# Patient Record
Sex: FEMALE | Race: WHITE | NOT HISPANIC OR LATINO | Employment: OTHER | URBAN - METROPOLITAN AREA
[De-identification: names, ages, dates, MRNs, and addresses within clinical notes are randomized per-mention and may not be internally consistent; named-entity substitution may affect disease eponyms.]

---

## 2016-08-09 LAB
HUMAN PAPILLOMAVIRUS (HPV): NORMAL
HUMAN PAPILLOMAVIRUS (HPV): NORMAL

## 2019-06-13 LAB
HEP C VIRUS AB: 0.1
HIV: NON REACTIVE

## 2020-05-21 ENCOUNTER — HOSPITAL ENCOUNTER (EMERGENCY)
Facility: HOSPITAL | Age: 55
Discharge: HOME | End: 2020-05-21
Attending: FAMILY MEDICINE

## 2020-05-21 VITALS
HEIGHT: 69 IN | WEIGHT: 169 LBS | RESPIRATION RATE: 16 BRPM | DIASTOLIC BLOOD PRESSURE: 68 MMHG | TEMPERATURE: 98 F | BODY MASS INDEX: 25.03 KG/M2 | HEART RATE: 61 BPM | OXYGEN SATURATION: 99 % | SYSTOLIC BLOOD PRESSURE: 132 MMHG

## 2020-05-21 DIAGNOSIS — R53.1 WEAKNESS: Primary | ICD-10-CM

## 2020-05-21 DIAGNOSIS — N30.00 ACUTE CYSTITIS WITHOUT HEMATURIA: ICD-10-CM

## 2020-05-21 LAB
BACTERIA #/AREA URNS HPF: ABNORMAL /HPF
BILIRUB UR QL STRIP: NEGATIVE
CLARITY UR: CLEAR
COLOR UR: YELLOW
GLUCOSE UR QL STRIP: NEGATIVE
HGB UR QL STRIP: ABNORMAL
KETONES UR QL STRIP: NEGATIVE
LEUKOCYTE ESTERASE UR QL STRIP: ABNORMAL
MICROSCOPIC COMMENT: ABNORMAL
NITRITE UR QL STRIP: NEGATIVE
PH UR STRIP: 8 [PH] (ref 5–8)
PROT UR QL STRIP: NEGATIVE
RBC #/AREA URNS HPF: 0 /HPF (ref 0–4)
SP GR UR STRIP: 1.01 (ref 1–1.03)
SQUAMOUS #/AREA URNS HPF: ABNORMAL /HPF
URN SPEC COLLECT METH UR: ABNORMAL
UROBILINOGEN UR STRIP-ACNC: NEGATIVE EU/DL
WBC #/AREA URNS HPF: 4 /HPF (ref 0–5)

## 2020-05-21 PROCEDURE — 96372 THER/PROPH/DIAG INJ SC/IM: CPT

## 2020-05-21 PROCEDURE — 99284 EMERGENCY DEPT VISIT MOD MDM: CPT | Mod: 25

## 2020-05-21 PROCEDURE — 63600175 PHARM REV CODE 636 W HCPCS: Performed by: FAMILY MEDICINE

## 2020-05-21 PROCEDURE — 81000 URINALYSIS NONAUTO W/SCOPE: CPT

## 2020-05-21 RX ORDER — CEPHALEXIN 250 MG/1
250 CAPSULE ORAL 4 TIMES DAILY
Qty: 28 CAPSULE | Refills: 0 | Status: SHIPPED | OUTPATIENT
Start: 2020-05-21 | End: 2020-05-28

## 2020-05-21 RX ORDER — CEPHALEXIN 250 MG/1
250 CAPSULE ORAL 4 TIMES DAILY
Qty: 28 CAPSULE | Refills: 0 | Status: SHIPPED | OUTPATIENT
Start: 2020-05-21 | End: 2020-05-21 | Stop reason: SDUPTHER

## 2020-05-21 RX ORDER — CEFTRIAXONE 1 G/1
1 INJECTION, POWDER, FOR SOLUTION INTRAMUSCULAR; INTRAVENOUS
Status: COMPLETED | OUTPATIENT
Start: 2020-05-21 | End: 2020-05-21

## 2020-05-21 RX ORDER — SULFAMETHOXAZOLE AND TRIMETHOPRIM 800; 160 MG/1; MG/1
1 TABLET ORAL
COMMUNITY
End: 2020-07-01

## 2020-05-21 RX ADMIN — CEFTRIAXONE SODIUM 1 G: 1 INJECTION, POWDER, FOR SOLUTION INTRAMUSCULAR; INTRAVENOUS at 10:05

## 2020-05-22 NOTE — ED PROVIDER NOTES
Encounter Date: 5/21/2020       History     Chief Complaint   Patient presents with    Urinary Tract Infection    Dysuria     54-year-old female presents complaining of right flank pain for the past 3 days also some suprapubic tenderness and some urinary frequency she has had history of recurrent UTIs and even pyelonephritis since she was young girl no known drug allergies        Review of patient's allergies indicates:  No Known Allergies  Past Medical History:   Diagnosis Date    Anxiety      Past Surgical History:   Procedure Laterality Date    KNEE ARTHROSCOPY      SINUS SURGERY      TUBAL LIGATION       History reviewed. No pertinent family history.  Social History     Tobacco Use    Smoking status: Current Every Day Smoker   Substance Use Topics    Alcohol use: Not Currently    Drug use: Not Currently     Review of Systems   Constitutional: Negative for fever.   HENT: Negative for sore throat.    Respiratory: Negative for shortness of breath.    Cardiovascular: Negative for chest pain.   Gastrointestinal: Negative for nausea.   Genitourinary: Negative for dysuria.   Musculoskeletal: Negative for back pain.   Skin: Negative for rash.   Neurological: Negative for weakness.   Hematological: Does not bruise/bleed easily.       Physical Exam     Initial Vitals [05/21/20 2022]   BP Pulse Resp Temp SpO2   132/68 61 16 98.3 °F (36.8 °C) 99 %      MAP       --         Physical Exam    Nursing note and vitals reviewed.  Constitutional: She appears well-developed and well-nourished. She is not diaphoretic. No distress.   HENT:   Head: Normocephalic and atraumatic.   Right Ear: External ear normal.   Left Ear: External ear normal.   Eyes: Pupils are equal, round, and reactive to light. Right eye exhibits no discharge. Left eye exhibits no discharge.   Neck: No tracheal deviation present. No JVD present.   Cardiovascular: Exam reveals no friction rub.    No murmur heard.  Pulmonary/Chest: No stridor. No  respiratory distress. She has no wheezes. She has no rales.   Abdominal: Bowel sounds are normal. She exhibits no distension.   Musculoskeletal: Normal range of motion.   Neurological: She is alert.   Skin: Skin is warm.   Psychiatric: She has a normal mood and affect.         ED Course   Procedures  Labs Reviewed   URINALYSIS, REFLEX TO URINE CULTURE - Abnormal; Notable for the following components:       Result Value    Occult Blood UA Trace (*)     Leukocytes, UA Trace (*)     All other components within normal limits    Narrative:     Preferred Collection Type->Urine, Clean Catch   URINALYSIS MICROSCOPIC - Abnormal; Notable for the following components:    Bacteria Moderate (*)     All other components within normal limits    Narrative:     Preferred Collection Type->Urine, Clean Catch          Imaging Results    None                                          Clinical Impression:       ICD-10-CM ICD-9-CM   1. Weakness R53.1 780.79   2. Acute cystitis without hematuria N30.00 595.0             ED Disposition Condition    Discharge Stable        ED Prescriptions     Medication Sig Dispense Start Date End Date Auth. Provider    cephALEXin (KEFLEX) 250 MG capsule  (Status: Discontinued) Take 1 capsule (250 mg total) by mouth 4 (four) times daily. for 7 days 28 capsule 5/21/2020 5/21/2020 Max Baeza MD    cephALEXin (KEFLEX) 250 MG capsule Take 1 capsule (250 mg total) by mouth 4 (four) times daily. for 7 days 28 capsule 5/21/2020 5/28/2020 Max Baeza MD        Follow-up Information    None                                    Max Baeza MD  05/22/20 0037

## 2020-05-22 NOTE — ED TRIAGE NOTES
Has been on bactrim for 1 wk for uti.  Pt seen at urgent care and treated.  No culture was sent b/c she did not have insurance.  Pt reports feeling better at first but today her back hurts, dysuria, frequency.

## 2020-06-02 ENCOUNTER — OFFICE VISIT (OUTPATIENT)
Dept: FAMILY MEDICINE | Facility: CLINIC | Age: 55
End: 2020-06-02

## 2020-06-02 VITALS
TEMPERATURE: 98 F | WEIGHT: 171 LBS | DIASTOLIC BLOOD PRESSURE: 67 MMHG | OXYGEN SATURATION: 95 % | BODY MASS INDEX: 25.33 KG/M2 | SYSTOLIC BLOOD PRESSURE: 105 MMHG | HEIGHT: 69 IN | HEART RATE: 71 BPM

## 2020-06-02 DIAGNOSIS — G89.29 FLANK PAIN, CHRONIC: Primary | ICD-10-CM

## 2020-06-02 DIAGNOSIS — Z87.440 HISTORY OF RECURRENT UTIS: ICD-10-CM

## 2020-06-02 DIAGNOSIS — E11.9 TYPE 2 DIABETES MELLITUS WITHOUT COMPLICATION, WITHOUT LONG-TERM CURRENT USE OF INSULIN: ICD-10-CM

## 2020-06-02 DIAGNOSIS — R10.9 FLANK PAIN, CHRONIC: Primary | ICD-10-CM

## 2020-06-02 PROCEDURE — 99203 PR OFFICE/OUTPT VISIT, NEW, LEVL III, 30-44 MIN: ICD-10-PCS | Mod: S$GLB,,, | Performed by: FAMILY MEDICINE

## 2020-06-02 PROCEDURE — 99203 OFFICE O/P NEW LOW 30 MIN: CPT | Mod: S$GLB,,, | Performed by: FAMILY MEDICINE

## 2020-06-02 RX ORDER — MECLIZINE HYDROCHLORIDE 25 MG/1
TABLET ORAL
COMMUNITY
Start: 2019-06-12 | End: 2020-07-01

## 2020-06-02 RX ORDER — ALPRAZOLAM 0.25 MG/1
TABLET ORAL
COMMUNITY
End: 2021-04-15 | Stop reason: SDUPTHER

## 2020-06-02 RX ORDER — CETIRIZINE HYDROCHLORIDE 10 MG/1
TABLET ORAL
COMMUNITY
Start: 2019-07-23 | End: 2020-07-01

## 2020-06-02 RX ORDER — CIPROFLOXACIN 250 MG/1
TABLET, FILM COATED ORAL
COMMUNITY
Start: 2019-07-23 | End: 2020-07-01

## 2020-06-02 NOTE — PROGRESS NOTES
"  [facility] - Clinic Note    Subjective      Ms. West is a 54 y.o. female who presents to clinic for a possible bladder infection along with follow-up of chronic conditions    Urine sediment, "looks like baby squids or octopus"   Blood pressure well controlled  History of diabetes  Was very sick in November, flu like symptoms  Has completed a 7 day course of Bactrim, and a course of Keflex.  Has had recurrent UTIs.  Still having symptoms.  Is not getting relief.  Has difficulty emptying her bladder.  No gross hematuria.  Always has right flank pain with the symptoms.      Review of Systems   Constitutional: Negative for fatigue and fever.   Respiratory: Negative for shortness of breath.    Cardiovascular: Negative for chest pain.   Genitourinary: Negative for difficulty urinating and dysuria.        Sediment present in urine     ROS otherwise negative    PMH Misti has a past medical history of Anxiety.   PSXH Misti has a past surgical history that includes Tubal ligation; Sinus surgery; and Knee arthroscopy.    Misti's family history is not on file.    Misti reports that she has been smoking. She does not have any smokeless tobacco history on file. She reports that she drank alcohol. She reports that she has current or past drug history.   ALG Misti is allergic to codeine; morphine; naproxen sodium; penicillins; tramadol hcl; and trintex.   MED Misti has a current medication list which includes the following prescription(s): alprazolam, ciprofloxacin hcl, loratadine-pseudoephedrine 5-120 mg, meclizine, phenazopyridine hcl, cetirizine, and sulfamethoxazole-trimethoprim 800-160mg.       Objective     /67   Pulse 71   Temp 97.8 °F (36.6 °C) (Oral)   Ht 5' 9" (1.753 m)   Wt 77.6 kg (171 lb)   SpO2 95%   BMI 25.25 kg/m²     Physical Exam   Constitutional: She is oriented to person, place, and time. She appears well-developed and well-nourished. No distress.   Cardiovascular: Normal rate, " regular rhythm, normal heart sounds and intact distal pulses.   No murmur heard.  Pulmonary/Chest: Effort normal and breath sounds normal. She has no wheezes. She has no rales.   Genitourinary:   Genitourinary Comments: Right sided flank pain   Neurological: She is alert and oriented to person, place, and time.   Skin: She is not diaphoretic.      Assessment/Plan     1. Flank pain, chronic  Ambulatory referral/consult to Urology   2. History of recurrent UTIs  Ambulatory referral/consult to Urology   3. Type 2 diabetes mellitus without complication, without long-term current use of insulin  Hemoglobin A1C    CBC auto differential    Comprehensive metabolic panel         Stephanie Phillip MD  Family Medicine  [facility]  [phone]

## 2020-06-04 DIAGNOSIS — Z12.11 COLON CANCER SCREENING: ICD-10-CM

## 2020-06-04 DIAGNOSIS — Z12.39 BREAST CANCER SCREENING: ICD-10-CM

## 2020-06-04 DIAGNOSIS — Z11.59 NEED FOR HEPATITIS C SCREENING TEST: ICD-10-CM

## 2020-06-08 ENCOUNTER — TELEPHONE (OUTPATIENT)
Dept: UROLOGY | Facility: CLINIC | Age: 55
End: 2020-06-08

## 2020-06-08 NOTE — TELEPHONE ENCOUNTER
Pt appt was made and pt placed on waiting list, pt verbalized an understanding     ----- Message from Nevin Espinosa sent at 6/8/2020  4:28 PM CDT -----  Contact: PT  Type:  Sooner Apoointment Request    Caller is requesting a sooner appointment.  Caller declined first available appointment listed below.  Caller will not accept being placed on the waitlist and is requesting a message be sent to doctor.    Name of Caller:  PT  When is the first available appointment?  07/17  Symptoms: Kidney infection. Pt has an referral  Best Call Back Number:  [phone]  Additional Information:  Please Advise ---Thank you

## 2020-06-08 NOTE — TELEPHONE ENCOUNTER
----- Message from Nevin Espinosa sent at 6/8/2020  4:28 PM CDT -----  Contact: PT  Type:  Sooner Apoointment Request    Caller is requesting a sooner appointment.  Caller declined first available appointment listed below.  Caller will not accept being placed on the waitlist and is requesting a message be sent to doctor.    Name of Caller:  PT  When is the first available appointment?  07/17  Symptoms: Kidney infection. Pt has an referral  Best Call Back Number:  [phone]  Additional Information:  Please Advise ---Thank you

## 2020-06-10 ENCOUNTER — LAB VISIT (OUTPATIENT)
Dept: LAB | Facility: HOSPITAL | Age: 55
End: 2020-06-10
Attending: FAMILY MEDICINE
Payer: OTHER GOVERNMENT

## 2020-06-10 DIAGNOSIS — E11.9 TYPE 2 DIABETES MELLITUS WITHOUT COMPLICATION, WITHOUT LONG-TERM CURRENT USE OF INSULIN: ICD-10-CM

## 2020-06-10 LAB
ALBUMIN SERPL BCP-MCNC: 4.2 G/DL (ref 3.5–5.2)
ALP SERPL-CCNC: 58 U/L (ref 55–135)
ALT SERPL W/O P-5'-P-CCNC: 27 U/L (ref 10–44)
ANION GAP SERPL CALC-SCNC: 7 MMOL/L (ref 8–16)
AST SERPL-CCNC: 27 U/L (ref 10–40)
BASOPHILS # BLD AUTO: 0.04 K/UL (ref 0–0.2)
BASOPHILS NFR BLD: 0.7 % (ref 0–1.9)
BILIRUB SERPL-MCNC: 0.5 MG/DL (ref 0.1–1)
BUN SERPL-MCNC: 16 MG/DL (ref 6–20)
CALCIUM SERPL-MCNC: 8.7 MG/DL (ref 8.7–10.5)
CHLORIDE SERPL-SCNC: 101 MMOL/L (ref 95–110)
CO2 SERPL-SCNC: 27 MMOL/L (ref 23–29)
CREAT SERPL-MCNC: 0.7 MG/DL (ref 0.5–1.4)
DIFFERENTIAL METHOD: NORMAL
EOSINOPHIL # BLD AUTO: 0.2 K/UL (ref 0–0.5)
EOSINOPHIL NFR BLD: 3.3 % (ref 0–8)
ERYTHROCYTE [DISTWIDTH] IN BLOOD BY AUTOMATED COUNT: 13.8 % (ref 11.5–14.5)
EST. GFR  (AFRICAN AMERICAN): >60 ML/MIN/1.73 M^2
EST. GFR  (NON AFRICAN AMERICAN): >60 ML/MIN/1.73 M^2
ESTIMATED AVG GLUCOSE: 108 MG/DL (ref 68–131)
GLUCOSE SERPL-MCNC: 98 MG/DL (ref 70–110)
HBA1C MFR BLD HPLC: 5.4 % (ref 4.5–6.2)
HCT VFR BLD AUTO: 39.2 % (ref 37–48.5)
HGB BLD-MCNC: 12.8 G/DL (ref 12–16)
IMM GRANULOCYTES # BLD AUTO: 0.02 K/UL (ref 0–0.04)
IMM GRANULOCYTES NFR BLD AUTO: 0.4 % (ref 0–0.5)
LYMPHOCYTES # BLD AUTO: 2.1 K/UL (ref 1–4.8)
LYMPHOCYTES NFR BLD: 36.8 % (ref 18–48)
MCH RBC QN AUTO: 30.3 PG (ref 27–31)
MCHC RBC AUTO-ENTMCNC: 32.7 G/DL (ref 32–36)
MCV RBC AUTO: 93 FL (ref 82–98)
MONOCYTES # BLD AUTO: 0.4 K/UL (ref 0.3–1)
MONOCYTES NFR BLD: 6.3 % (ref 4–15)
NEUTROPHILS # BLD AUTO: 3 K/UL (ref 1.8–7.7)
NEUTROPHILS NFR BLD: 52.5 % (ref 38–73)
NRBC BLD-RTO: 0 /100 WBC
PLATELET # BLD AUTO: 265 K/UL (ref 150–350)
PMV BLD AUTO: 9.5 FL (ref 9.2–12.9)
POTASSIUM SERPL-SCNC: 4 MMOL/L (ref 3.5–5.1)
PROT SERPL-MCNC: 6.9 G/DL (ref 6–8.4)
RBC # BLD AUTO: 4.23 M/UL (ref 4–5.4)
SODIUM SERPL-SCNC: 135 MMOL/L (ref 136–145)
WBC # BLD AUTO: 5.7 K/UL (ref 3.9–12.7)

## 2020-06-10 PROCEDURE — 36415 COLL VENOUS BLD VENIPUNCTURE: CPT

## 2020-06-10 PROCEDURE — 85025 COMPLETE CBC W/AUTO DIFF WBC: CPT

## 2020-06-10 PROCEDURE — 83036 HEMOGLOBIN GLYCOSYLATED A1C: CPT

## 2020-06-10 PROCEDURE — 80053 COMPREHEN METABOLIC PANEL: CPT

## 2020-06-26 ENCOUNTER — PATIENT OUTREACH (OUTPATIENT)
Dept: ADMINISTRATIVE | Facility: HOSPITAL | Age: 55
End: 2020-06-26

## 2020-07-01 ENCOUNTER — OFFICE VISIT (OUTPATIENT)
Dept: UROLOGY | Facility: CLINIC | Age: 55
End: 2020-07-01

## 2020-07-01 VITALS
HEART RATE: 84 BPM | WEIGHT: 172 LBS | SYSTOLIC BLOOD PRESSURE: 110 MMHG | RESPIRATION RATE: 16 BRPM | BODY MASS INDEX: 25.48 KG/M2 | HEIGHT: 69 IN | TEMPERATURE: 98 F | DIASTOLIC BLOOD PRESSURE: 58 MMHG

## 2020-07-01 DIAGNOSIS — G89.29 FLANK PAIN, CHRONIC: ICD-10-CM

## 2020-07-01 DIAGNOSIS — N30.00 ACUTE CYSTITIS WITHOUT HEMATURIA: ICD-10-CM

## 2020-07-01 DIAGNOSIS — R10.9 FLANK PAIN, CHRONIC: ICD-10-CM

## 2020-07-01 DIAGNOSIS — Z87.440 HISTORY OF RECURRENT UTIS: ICD-10-CM

## 2020-07-01 LAB
BILIRUB SERPL-MCNC: NEGATIVE MG/DL
BLOOD URINE, POC: ABNORMAL
CLARITY, POC UA: CLEAR
COLOR, POC UA: YELLOW
GLUCOSE UR QL STRIP: NEGATIVE
KETONES UR QL STRIP: ABNORMAL
LEUKOCYTE ESTERASE URINE, POC: ABNORMAL
NITRITE, POC UA: NEGATIVE
PH, POC UA: 5
PROTEIN, POC: NEGATIVE
SPECIFIC GRAVITY, POC UA: 1.02
UROBILINOGEN, POC UA: NEGATIVE

## 2020-07-01 PROCEDURE — 99214 OFFICE O/P EST MOD 30 MIN: CPT | Mod: PBBFAC | Performed by: UROLOGY

## 2020-07-01 PROCEDURE — 87086 URINE CULTURE/COLONY COUNT: CPT

## 2020-07-01 PROCEDURE — 99999 PR PBB SHADOW E&M-EST. PATIENT-LVL IV: CPT | Mod: PBBFAC,,, | Performed by: UROLOGY

## 2020-07-01 PROCEDURE — 99204 OFFICE O/P NEW MOD 45 MIN: CPT | Mod: S$PBB,,, | Performed by: UROLOGY

## 2020-07-01 PROCEDURE — 81002 URINALYSIS NONAUTO W/O SCOPE: CPT | Mod: PBBFAC | Performed by: UROLOGY

## 2020-07-01 PROCEDURE — 99999 PR PBB SHADOW E&M-EST. PATIENT-LVL IV: ICD-10-PCS | Mod: PBBFAC,,, | Performed by: UROLOGY

## 2020-07-01 PROCEDURE — 99204 PR OFFICE/OUTPT VISIT, NEW, LEVL IV, 45-59 MIN: ICD-10-PCS | Mod: S$PBB,,, | Performed by: UROLOGY

## 2020-07-01 NOTE — PROGRESS NOTES
Subjective:       Patient ID: Misti West is a 54 y.o. female.    Chief Complaint:   Recurring urinary tract infections.  Chronic right flank pain.    HPI   Mrs West is a 54-year-old female who have a history of chronic recurring urinary tract infections.  Patient referred that is being treated for infections 4 months or years and always the infections recur after discontinuation of the antibiotics.  The patient referred infections are characterized by difficulty emptying the bladder and right flank pain denies any fever denies any gross hematuria.  Patient have nocturia x2 with significant discomfort at urination.  Apart from this recurring infections the patient have an negative previous  history.  Further questioning her seems to be that she never have imaging study to determine the etiology of the flank pain.  At least in our records there is not any test with that purpose.    Today urinalysis shows a trace of leukocyte trace of ketones and trace of blood.  His primary care physician is giving here PD  mg daily to improve her symptoms.    The past gyn history she have 3 pregnancies and 3 normal vaginal deliveries.    The past medical and surgical history the current medications and allergies are well documented in the EHR and all these were reviewed by me during this visit.  At the present time the patient is only taking xanax and anti allergic medications.    Review of Systems   Constitutional: Negative.  Negative for activity change.   HENT: Negative.  Negative for facial swelling.    Eyes: Negative for discharge.   Respiratory: Negative for cough and shortness of breath.    Cardiovascular: Negative for chest pain and palpitations.   Gastrointestinal: Negative for abdominal distention, blood in stool and constipation.   Genitourinary: Positive for difficulty urinating, dysuria and flank pain. Negative for frequency, hematuria, urgency and vaginal pain.   Musculoskeletal: Negative for arthralgias.    Skin: Negative.    Neurological: Negative.  Negative for dizziness.   Hematological: Negative for adenopathy.   Psychiatric/Behavioral: The patient is not nervous/anxious.          Objective:      Physical Exam   Constitutional: She appears well-developed.   HENT:   Head: Normocephalic.   Eyes: Pupils are equal, round, and reactive to light.   Neck: Normal range of motion.   Cardiovascular: Normal rate.    Pulmonary/Chest: Effort normal.   Abdominal: Soft. She exhibits no distension and no mass. There is no abdominal tenderness. Hernia confirmed negative in the left inguinal area.   Genitourinary:    Vagina normal.      No vaginal erythema, tenderness or bleeding.   No erythema, tenderness or bleeding in the vagina.    Genitourinary Comments: No CVA tenderness in either side.  The perc usually of the spine is painless.     Musculoskeletal: Normal range of motion.   Neurological: She is alert.   Skin: Skin is warm.         Assessment:       1. Flank pain, chronic    2. History of recurrent UTIs    3. Acute cystitis without hematuria        Plan:       Flank pain, chronic  -     Ambulatory referral/consult to Urology    History of recurrent UTIs  -     Ambulatory referral/consult to Urology  -     POCT URINE DIPSTICK WITHOUT MICROSCOPE  -     Urine culture    Acute cystitis without hematuria  -     CT Abdomen Pelvis  Without Contrast; Future; Expected date: 07/13/2020      I explained to the patient that we need to run a urine culture and sensitivity in order to determine what type of bacteria is infecting her to properly  design the antibiotic regimen for her.  I also wants to proceed with an x-ray of the genitourinary tract I think a CT scan will be the most appropriate.  She she may need to have a cystoscopic evaluation on a ureteral dilation since most of her symptoms are difficulty emptying the bladder with hesitation.  All the questions were answered to her satisfaction we are going to follow her after the  above test results becomes available.

## 2020-07-02 LAB — BACTERIA UR CULT: NO GROWTH

## 2020-07-06 ENCOUNTER — TELEPHONE (OUTPATIENT)
Dept: UROLOGY | Facility: CLINIC | Age: 55
End: 2020-07-06

## 2020-07-06 NOTE — TELEPHONE ENCOUNTER
Attempted to call pt. LVM         ----- Message from Lakshmi Monroe sent at 7/3/2020 11:18 AM CDT -----  patient needs call back regarding status of antibiotic..[phone] (home)     [facility] - [address] [address]  Phone: [phone] Fax: [phone]

## 2020-07-07 ENCOUNTER — TELEPHONE (OUTPATIENT)
Dept: UROLOGY | Facility: CLINIC | Age: 55
End: 2020-07-07

## 2020-07-07 NOTE — TELEPHONE ENCOUNTER
See result note     ----- Message from Licha Brandon sent at 7/7/2020 12:02 PM CDT -----  Contact: self  Type:  Patient Returning Call    Who Called:  patient   Who Left Message for Patient:  Belen Parish  Does the patient know what this is regarding?:  results  Best Call Back Number:  [phone]  Additional Information:

## 2020-07-08 ENCOUNTER — HOSPITAL ENCOUNTER (OUTPATIENT)
Dept: RADIOLOGY | Facility: HOSPITAL | Age: 55
Discharge: HOME | End: 2020-07-08
Attending: UROLOGY
Payer: OTHER GOVERNMENT

## 2020-07-08 DIAGNOSIS — N30.00 ACUTE CYSTITIS WITHOUT HEMATURIA: ICD-10-CM

## 2020-07-08 PROCEDURE — 74176 CT ABD & PELVIS W/O CONTRAST: CPT | Mod: TC

## 2020-07-08 PROCEDURE — 74176 CT ABD & PELVIS W/O CONTRAST: CPT | Mod: 26,,, | Performed by: RADIOLOGY

## 2020-07-08 PROCEDURE — 74176 CT ABDOMEN PELVIS WITHOUT CONTRAST: ICD-10-PCS | Mod: 26,,, | Performed by: RADIOLOGY

## 2020-07-10 ENCOUNTER — OFFICE VISIT (OUTPATIENT)
Dept: FAMILY MEDICINE | Facility: CLINIC | Age: 55
End: 2020-07-10

## 2020-07-10 VITALS
DIASTOLIC BLOOD PRESSURE: 66 MMHG | HEIGHT: 69 IN | RESPIRATION RATE: 16 BRPM | SYSTOLIC BLOOD PRESSURE: 99 MMHG | HEART RATE: 69 BPM | BODY MASS INDEX: 25.15 KG/M2 | TEMPERATURE: 97 F | WEIGHT: 169.81 LBS | OXYGEN SATURATION: 95 %

## 2020-07-10 DIAGNOSIS — M25.562 CHRONIC PAIN OF LEFT KNEE: ICD-10-CM

## 2020-07-10 DIAGNOSIS — Z12.11 SCREENING FOR MALIGNANT NEOPLASM OF COLON: ICD-10-CM

## 2020-07-10 DIAGNOSIS — E11.9 DIABETES MELLITUS WITHOUT COMPLICATION: Primary | ICD-10-CM

## 2020-07-10 DIAGNOSIS — K76.89 LIVER CYST: ICD-10-CM

## 2020-07-10 DIAGNOSIS — G89.29 CHRONIC PAIN OF LEFT KNEE: ICD-10-CM

## 2020-07-10 PROCEDURE — 99214 OFFICE O/P EST MOD 30 MIN: CPT | Mod: S$GLB,,, | Performed by: FAMILY MEDICINE

## 2020-07-10 PROCEDURE — 99214 PR OFFICE/OUTPT VISIT, EST, LEVL IV, 30-39 MIN: ICD-10-PCS | Mod: S$GLB,,, | Performed by: FAMILY MEDICINE

## 2020-07-10 NOTE — PROGRESS NOTES
"  [facility] - Clinic Note    Subjective      Ms. West is a 54 y.o. female who presents to clinic for follow up.    Back pain  No ruq pain or very minimal  Right sided upper back pain  Having constipation and diarrhea  Left knee pain, chronic.         Review of Systems   Gastrointestinal: Positive for constipation and diarrhea. Negative for abdominal pain.   Musculoskeletal: Positive for back pain.     ROS otherwise negative    PMH Misti has a past medical history of Anxiety, Hepatomegaly, Liver lesion, and PONV (postoperative nausea and vomiting).   PSXH Misti has a past surgical history that includes Tubal ligation; Sinus surgery; Knee arthroscopy; and Esophagogastroduodenoscopy (N/A, 8/13/2020).    Misti's family history includes Arthritis in her father, mother, sister, sister, and sister; Breast cancer in her sister, sister, and sister; COPD in her father; Cancer in her father and mother; Diabetes in her mother; Hypertension in her father; Kidney disease in her sister.   SH Misti reports that she has been smoking. She has been smoking about 1.00 pack per day. She has never used smokeless tobacco. She reports previous alcohol use. She reports previous drug use.   ALG Misti is allergic to codeine; morphine; naproxen sodium; penicillins; tramadol hcl; and trintex.   MED Misti has a current medication list which includes the following prescription(s): alprazolam, atorvastatin, loratadine-pseudoephedrine 5-120 mg, omeprazole, sennosides, UNABLE TO FIND, and UNABLE TO FIND.       Objective     BP 99/66 (BP Location: Right arm, Patient Position: Sitting)   Pulse 69   Temp 97.2 °F (36.2 °C)   Resp 16   Ht 5' 9" (1.753 m)   Wt 77 kg (169 lb 12.8 oz)   SpO2 95%   BMI 25.08 kg/m²     Physical Exam  Vitals signs reviewed.   Constitutional:       General: She is not in acute distress.     Appearance: Normal appearance. She is not ill-appearing or toxic-appearing.   Cardiovascular:      Rate and Rhythm: " Normal rate and regular rhythm.      Pulses: Normal pulses.      Heart sounds: Normal heart sounds. No murmur. No gallop.    Pulmonary:      Effort: Pulmonary effort is normal. No respiratory distress.      Breath sounds: No wheezing or rales.   Neurological:      Mental Status: She is alert. Mental status is at baseline.        Assessment/Plan     1. Diabetes mellitus without complication  Lipid Panel   2. Liver cyst  US Abdomen Limited   3. Screening for malignant neoplasm of colon  Ambulatory referral/consult to General Surgery   4. Chronic pain of left knee  Ambulatory referral/consult to Orthopedics     Referrals to Ortho for knee pain. Chronic. Would like evaluation.  Needs colonoscopy  Evaluate liver cyst  Lipid panel      Future Appointments   Date Time Provider Department Center   8/24/2020  8:50 AM PROCEDURAL COVID TESTING, [facility] PRIMARY CARE [facility] PRIMCAR [facility]   9/1/2020 10:00 AM [facility], DIABETIC EYECAM [facility]   9/1/2020 11:00 AM Pierre Espinal MD [facility]   9/14/2020 10:00 AM [facility]SE [facility]   9/14/2020 10:40 AM COVID TESTING, [facility]   1/29/2021  9:30 AM [facility] CT2 LIMIT 500 LBS [facility]AN [facility]       Stephanie Phillip MD  Family Medicine  [facility]  [phone]

## 2020-07-11 ENCOUNTER — LAB VISIT (OUTPATIENT)
Dept: LAB | Facility: HOSPITAL | Age: 55
End: 2020-07-11
Attending: FAMILY MEDICINE
Payer: OTHER GOVERNMENT

## 2020-07-11 DIAGNOSIS — E11.9 DIABETES MELLITUS WITHOUT COMPLICATION: ICD-10-CM

## 2020-07-11 LAB
CHOLEST SERPL-MCNC: 212 MG/DL (ref 120–199)
CHOLEST/HDLC SERPL: 3.8 {RATIO} (ref 2–5)
HDLC SERPL-MCNC: 56 MG/DL (ref 40–75)
HDLC SERPL: 26.4 % (ref 20–50)
LDLC SERPL CALC-MCNC: 143 MG/DL (ref 63–159)
NONHDLC SERPL-MCNC: 156 MG/DL
TRIGL SERPL-MCNC: 65 MG/DL (ref 30–150)

## 2020-07-11 PROCEDURE — 80061 LIPID PANEL: CPT

## 2020-07-11 PROCEDURE — 36415 COLL VENOUS BLD VENIPUNCTURE: CPT

## 2020-07-13 ENCOUNTER — TELEPHONE (OUTPATIENT)
Dept: FAMILY MEDICINE | Facility: CLINIC | Age: 55
End: 2020-07-13

## 2020-07-13 ENCOUNTER — NURSE TRIAGE (OUTPATIENT)
Dept: ADMINISTRATIVE | Facility: CLINIC | Age: 55
End: 2020-07-13

## 2020-07-13 ENCOUNTER — HOSPITAL ENCOUNTER (OUTPATIENT)
Dept: RADIOLOGY | Facility: HOSPITAL | Age: 55
Discharge: HOME | End: 2020-07-13
Attending: FAMILY MEDICINE
Payer: OTHER GOVERNMENT

## 2020-07-13 DIAGNOSIS — K76.89 LIVER CYST: ICD-10-CM

## 2020-07-13 PROCEDURE — 76705 ECHO EXAM OF ABDOMEN: CPT | Mod: 26,,, | Performed by: RADIOLOGY

## 2020-07-13 PROCEDURE — 76705 US ABDOMEN LIMITED: ICD-10-PCS | Mod: 26,,, | Performed by: RADIOLOGY

## 2020-07-13 PROCEDURE — 76705 ECHO EXAM OF ABDOMEN: CPT | Mod: TC

## 2020-07-13 NOTE — TELEPHONE ENCOUNTER
Pt c/o pain to epigastric and RUQ pain and widespread rash after eating x 2 days. Pt advised per protocol to ED now and pt verbalizes understanding.     Reason for Disposition   Pain lasting > 10 minutes and over 50 years old    Additional Information   Negative: Passed out (i.e., fainted, collapsed and was not responding)   Negative: Shock suspected (e.g., cold/pale/clammy skin, too weak to stand, low BP, rapid pulse)   Negative: Visible sweat on face or sweat is dripping down   Negative: SEVERE abdominal pain (e.g., excruciating)    Protocols used: ABDOMINAL PAIN - UPPER-A-OH

## 2020-07-13 NOTE — TELEPHONE ENCOUNTER
Pt states she will go the the ER if symptoms come back via triage nurse. Will keep us updated on her s/s.        Message from Dori Amaya sent at 7/13/2020  8:25 AM CDT -----  Type: Needs Medical Advice  Who Called:  Misit  Symptoms (please be specific):  broken out in hives (burns and itches) and the pain she discussed with you has gotten worse.  She hurts through to her shoulder blades and under her right ribs This all happens after she eats food.  She can't sleep.   How long has patient had these symptoms:  Worsening since 7/11, Pain lasts from 6 - 8 hours  Pharmacy name and phone #:    [facility] [place] [facility] [address] [facility]  [address] [place] [address] [place] [address]  Phone: [phone] Fax: [phone]  Best Call Back Number: [phone]  Additional Information:  Please call her. thank you! (transferred call to oncall nurse)

## 2020-07-14 ENCOUNTER — PATIENT OUTREACH (OUTPATIENT)
Dept: ADMINISTRATIVE | Facility: OTHER | Age: 55
End: 2020-07-14

## 2020-07-15 ENCOUNTER — OFFICE VISIT (OUTPATIENT)
Dept: UROLOGY | Facility: CLINIC | Age: 55
End: 2020-07-15

## 2020-07-15 ENCOUNTER — PATIENT MESSAGE (OUTPATIENT)
Dept: FAMILY MEDICINE | Facility: CLINIC | Age: 55
End: 2020-07-15

## 2020-07-15 ENCOUNTER — TELEPHONE (OUTPATIENT)
Dept: ORTHOPEDICS | Facility: CLINIC | Age: 55
End: 2020-07-15

## 2020-07-15 VITALS
DIASTOLIC BLOOD PRESSURE: 70 MMHG | BODY MASS INDEX: 25.62 KG/M2 | TEMPERATURE: 98 F | HEART RATE: 76 BPM | SYSTOLIC BLOOD PRESSURE: 116 MMHG | HEIGHT: 69 IN | WEIGHT: 173 LBS | RESPIRATION RATE: 16 BRPM

## 2020-07-15 DIAGNOSIS — G89.29 FLANK PAIN, CHRONIC: Primary | ICD-10-CM

## 2020-07-15 DIAGNOSIS — R10.9 FLANK PAIN, CHRONIC: Primary | ICD-10-CM

## 2020-07-15 DIAGNOSIS — K76.9 LIVER DISEASE, UNSPECIFIED: ICD-10-CM

## 2020-07-15 LAB
BILIRUB SERPL-MCNC: NEGATIVE MG/DL
BLOOD URINE, POC: ABNORMAL
CLARITY, POC UA: CLEAR
COLOR, POC UA: YELLOW
GLUCOSE UR QL STRIP: NEGATIVE
KETONES UR QL STRIP: NEGATIVE
LEUKOCYTE ESTERASE URINE, POC: ABNORMAL
NITRITE, POC UA: NEGATIVE
PH, POC UA: 5
PROTEIN, POC: NEGATIVE
SPECIFIC GRAVITY, POC UA: 1.01
UROBILINOGEN, POC UA: NEGATIVE

## 2020-07-15 PROCEDURE — 81002 URINALYSIS NONAUTO W/O SCOPE: CPT | Mod: PBBFAC | Performed by: UROLOGY

## 2020-07-15 PROCEDURE — 99213 PR OFFICE/OUTPT VISIT, EST, LEVL III, 20-29 MIN: ICD-10-PCS | Mod: S$PBB,,, | Performed by: UROLOGY

## 2020-07-15 PROCEDURE — 99999 PR PBB SHADOW E&M-EST. PATIENT-LVL III: ICD-10-PCS | Mod: PBBFAC,,, | Performed by: UROLOGY

## 2020-07-15 PROCEDURE — 99999 PR PBB SHADOW E&M-EST. PATIENT-LVL III: CPT | Mod: PBBFAC,,, | Performed by: UROLOGY

## 2020-07-15 PROCEDURE — 99213 OFFICE O/P EST LOW 20 MIN: CPT | Mod: S$PBB,,, | Performed by: UROLOGY

## 2020-07-15 PROCEDURE — 99213 OFFICE O/P EST LOW 20 MIN: CPT | Mod: PBBFAC | Performed by: UROLOGY

## 2020-07-15 RX ORDER — ATORVASTATIN CALCIUM 10 MG/1
10 TABLET, FILM COATED ORAL DAILY
Qty: 90 TABLET | Refills: 3 | Status: SHIPPED | OUTPATIENT
Start: 2020-07-15 | End: 2021-11-18 | Stop reason: SDUPTHER

## 2020-07-15 NOTE — PROGRESS NOTES
Mrs. West is a 54-year-old female last seen in our office on 07/01/2020 at that time the patient was complaining of chronic right flank pain and recurrent urinary tract infections.  I explained to the patient that she needs to have a CT scan of the abdomen and pelvis without contrast that she did.  The CT scan did show a questionable lesion of the liver.  Also has some hepatomegaly.  Dr. Phillip saw the patient after that CT scan and order and a abdominal ultrasound that again shows the lesion in the liver but no significant diagnosis can be achieved with the images of the ultrasound.  I went ahead and suggested to the patient that we need to proceed with an MRI with and without contrast to further determine if these lesions consistent to be benign or otherwise malignant.  She agreed to proceed with that test.    I explained to the patient that after all that is been completely  elucidated and clear we may consider to perform a cystoscopy with a possible urethral dilation.  Still I do not have explanation why she is complaining of right flank pain.  All the questions were answered the patient satisfaction she left the office in satisfactory condition I am planning to review the MRI results and give her a call with the final disposition.  I spent approximately 20 min with Ms. Maria today all the time was spent on counseling

## 2020-07-15 NOTE — LETTER
July 15, 2020      Stephanie Phillip MD  [address] [facility] [place] [address]           [facility]  [address] [place] [address]  Phone: [phone]  Fax: [phone]          Patient: Misti West   MR Number: [id number]   YOB: 1965   Date of Visit: 7/15/2020       Dear Dr. Stephanie Phillip:    Thank you for referring Misti West to me for evaluation. Attached you will find relevant portions of my assessment and plan of care.    If you have questions, please do not hesitate to call me. I look forward to following Misti West along with you.    Sincerely,    Bhargav Keane MD    Enclosure  CC:  No Recipients    If you would like to receive this communication electronically, please contact [email or web address] or [phone] to request more information on [employer] access.    For providers and/or their staff who would like to refer a patient to [facility], please contact us through our one-stop-shop provider referral line, [facility], at [phone].    If you feel you have received this communication in error or would no longer like to receive these types of communications, please e-mail [email or web address]

## 2020-07-15 NOTE — PROGRESS NOTES
Requested updates within Care Everywhere.  Patient's chart was reviewed for overdue ARYA topics.  Immunizations not able to be reconciled.

## 2020-07-16 ENCOUNTER — HOSPITAL ENCOUNTER (OUTPATIENT)
Dept: RADIOLOGY | Facility: HOSPITAL | Age: 55
Discharge: HOME | End: 2020-07-16
Attending: UROLOGY
Payer: OTHER GOVERNMENT

## 2020-07-16 DIAGNOSIS — K76.9 LIVER DISEASE, UNSPECIFIED: ICD-10-CM

## 2020-07-16 DIAGNOSIS — E11.9 TYPE 2 DIABETES MELLITUS WITHOUT COMPLICATION: ICD-10-CM

## 2020-07-16 PROCEDURE — 74181 MRI ABDOMEN WITHOUT CONTRAST: ICD-10-PCS | Mod: 26,,, | Performed by: RADIOLOGY

## 2020-07-16 PROCEDURE — 74181 MRI ABDOMEN W/O CONTRAST: CPT | Mod: TC

## 2020-07-16 PROCEDURE — 74181 MRI ABDOMEN W/O CONTRAST: CPT | Mod: 26,,, | Performed by: RADIOLOGY

## 2020-07-20 ENCOUNTER — PATIENT OUTREACH (OUTPATIENT)
Dept: ADMINISTRATIVE | Facility: HOSPITAL | Age: 55
End: 2020-07-20

## 2020-07-20 ENCOUNTER — TELEPHONE (OUTPATIENT)
Dept: UROLOGY | Facility: CLINIC | Age: 55
End: 2020-07-20

## 2020-07-20 NOTE — PROGRESS NOTES
Population Health Outreach.  Called pt to schedule appt for Urine micro albumin. Pt insist that she is not a diabetic. She  was not sure how being a diabetic was in her chart. Pt having multiple health issues and wants to Establish with a new PCP. Appointment made with Dr Espinal on 07/28/20at 1020 am at [facility]. MED clinic.

## 2020-07-20 NOTE — TELEPHONE ENCOUNTER
----- Message from Nate Baldwin sent at 7/17/2020  3:02 PM CDT -----  Contact: patient  Type: Needs Medical Advice  Who Called:  patient  Symptoms (please be specific):    How long has patient had these symptoms:    Pharmacy name and phone #:    Best Call Back Number: [phone]  Additional Information: stated she was unable to complete mri w/contrast stated tech suggest that she be sedated to have test done. Requesting a call back

## 2020-07-21 ENCOUNTER — TELEPHONE (OUTPATIENT)
Dept: UROLOGY | Facility: CLINIC | Age: 55
End: 2020-07-21

## 2020-07-21 NOTE — TELEPHONE ENCOUNTER
Pt states her bowls have not been working properly. She had diarrhea sat and then was constipated, she was able to have a normal stool this AM. Informed pt she can take OTC meds for relief, Pt states she was unable to finish the MRI. She felt hot and was uncomfortable and felt as if her breast were burning, she asked tech to pull her from the machine. Informed pt I will speak to MD regarding sedation and treatment options and give her a call back, verbalized an understanding.

## 2020-07-23 DIAGNOSIS — E11.9 TYPE 2 DIABETES MELLITUS WITHOUT COMPLICATION, UNSPECIFIED WHETHER LONG TERM INSULIN USE: ICD-10-CM

## 2020-07-28 ENCOUNTER — TELEPHONE (OUTPATIENT)
Dept: GASTROENTEROLOGY | Facility: CLINIC | Age: 55
End: 2020-07-28

## 2020-07-28 ENCOUNTER — OFFICE VISIT (OUTPATIENT)
Dept: FAMILY MEDICINE | Facility: CLINIC | Age: 55
End: 2020-07-28

## 2020-07-28 VITALS
WEIGHT: 171 LBS | HEART RATE: 67 BPM | DIASTOLIC BLOOD PRESSURE: 55 MMHG | RESPIRATION RATE: 15 BRPM | OXYGEN SATURATION: 96 % | HEIGHT: 69 IN | SYSTOLIC BLOOD PRESSURE: 107 MMHG | BODY MASS INDEX: 25.33 KG/M2 | TEMPERATURE: 100 F

## 2020-07-28 DIAGNOSIS — R10.12 LEFT UPPER QUADRANT PAIN: Primary | ICD-10-CM

## 2020-07-28 PROCEDURE — 99214 OFFICE O/P EST MOD 30 MIN: CPT | Mod: S$GLB,,, | Performed by: FAMILY MEDICINE

## 2020-07-28 PROCEDURE — 99214 PR OFFICE/OUTPT VISIT, EST, LEVL IV, 30-39 MIN: ICD-10-PCS | Mod: S$GLB,,, | Performed by: FAMILY MEDICINE

## 2020-07-28 NOTE — PROGRESS NOTES
[facility] - Clinic Note    Subjective      Ms. West is a 54 y.o. female who presents to clinic for Follow-up    Patient presents to transfer care.  Patient reports that 10 years ago she had a history of diabetes.  She lost weight and controlled it with the diet and for the last 10 years she has not taken any medication for diabetes.  Her A1cs have been in the normal range.  She reports the pain in her left upper quadrant a stabbing sensation.  She has been taking some supplements to try to help.  Has been having some issues with the liver which were noted to be cysts.    PMH Misti has a past medical history of Anxiety, Hepatomegaly, and Liver lesion.   PSXH Misti has a past surgical history that includes Tubal ligation; Sinus surgery; and Knee arthroscopy.    Misti's family history includes Arthritis in her father, mother, sister, sister, and sister; Breast cancer in her sister, sister, and sister; COPD in her father; Cancer in her father and mother; Diabetes in her mother; Hypertension in her father; Kidney disease in her sister.   TERRENCE Chappell reports that she has been smoking. She has been smoking about 1.00 pack per day. She has never used smokeless tobacco. She reports previous alcohol use. She reports previous drug use.   DERRICK Chappell is allergic to codeine; morphine; naproxen sodium; penicillins; tramadol hcl; and trintex.   LEILANI Chappell has a current medication list which includes the following prescription(s): atorvastatin, alprazolam, loratadine-pseudoephedrine 5-120 mg, omeprazole, sennosides, UNABLE TO FIND, and UNABLE TO FIND.     Review of Systems   Constitutional: Negative for chills and fever.   Respiratory: Negative for shortness of breath.    Cardiovascular: Negative for chest pain.   Gastrointestinal: Positive for abdominal pain.     Objective     BP (!) 107/55 (BP Location: Right arm, Patient Position: Sitting, BP Method: Large (Automatic))   Pulse 67   Temp 99.8 °F (37.7 °C) (Temporal)    "Resp 15   Ht 5' 9" (1.753 m)   Wt 77.6 kg (171 lb)   SpO2 96%   BMI 25.25 kg/m²     Physical Exam  Vitals signs and nursing note reviewed.   Constitutional:       General: She is not in acute distress.     Appearance: Normal appearance. She is well-developed. She is not diaphoretic.   HENT:      Head: Normocephalic and atraumatic.      Right Ear: External ear normal.      Left Ear: External ear normal.   Eyes:      General:         Right eye: No discharge.         Left eye: No discharge.   Cardiovascular:      Rate and Rhythm: Normal rate and regular rhythm.      Heart sounds: Normal heart sounds.   Pulmonary:      Effort: Pulmonary effort is normal.      Breath sounds: Normal breath sounds. No wheezing or rales.   Abdominal:      General: Abdomen is flat. Bowel sounds are normal.      Palpations: Abdomen is soft.      Tenderness: There is abdominal tenderness (luq).   Skin:     General: Skin is warm and dry.   Neurological:      Mental Status: She is alert and oriented to person, place, and time. Mental status is at baseline.   Psychiatric:         Mood and Affect: Mood normal.         Behavior: Behavior normal.         Thought Content: Thought content normal.         Judgment: Judgment normal.        Assessment/Plan     1. Left upper quadrant pain  Ambulatory referral/consult to Gastroenterology     Will refer to GI further evaluation of the left upper quadrant pain.  May need scopes.  Continue follow-up with Dr. Keane.  Will follow-up in 1 month.    Future Appointments   Date Time Provider Department Center   8/5/2020  9:30 AM [facility] MAMMO1 [facility] M[facility]MO [facility]   8/10/2020 11:10 AM PROCEDURAL COVID TESTING, [facility] PRIMARY CARE [facility] PRIMCAR [facility]   8/18/2020  1:00 PM Franki Cox DO [facility] ORTHO [facility]   8/24/2020  8:50 AM PROCEDURAL COVID TESTING, [facility] PRIMARY CARE [facility] PRIMCAR [facility]   9/1/2020 11:00 AM Pierre Espinal MD [facility] JESS [facility]   9/14/2020 10:00 AM [facility], RADHA " NURSE [facility] PREADM [facility]   9/14/2020 10:40 AM COVID TESTING, [facility] FAMILY PRACTICE [facility]   1/29/2021  9:30 AM [facility] CT2 LIMIT 500 LBS [facility] CT SCAN [facility]         Pierre Espinal MD  Family Medicine  [facility]

## 2020-07-28 NOTE — TELEPHONE ENCOUNTER
Called and scheduled appointment with gerda    ----- Message from Rafa Lawson sent at 7/28/2020  4:26 PM CDT -----  Regarding: appointment  Contact: self  Type: Needs Medical Advice  Who Called:  self  Symptoms (please be specific):    How long has patient had these symptoms:   Pharmacy name and phone #:    Best Call Back Number: [phone]  Additional Information: Patient requesting to schedule colonoscopy.

## 2020-07-29 ENCOUNTER — TELEPHONE (OUTPATIENT)
Dept: GASTROENTEROLOGY | Facility: CLINIC | Age: 55
End: 2020-07-29

## 2020-07-29 ENCOUNTER — OFFICE VISIT (OUTPATIENT)
Dept: GASTROENTEROLOGY | Facility: CLINIC | Age: 55
End: 2020-07-29

## 2020-07-29 ENCOUNTER — PATIENT OUTREACH (OUTPATIENT)
Dept: ADMINISTRATIVE | Facility: OTHER | Age: 55
End: 2020-07-29

## 2020-07-29 VITALS — HEIGHT: 69 IN | WEIGHT: 168.63 LBS | RESPIRATION RATE: 18 BRPM | BODY MASS INDEX: 24.98 KG/M2

## 2020-07-29 DIAGNOSIS — R19.8 IRREGULAR BOWEL HABITS: Primary | ICD-10-CM

## 2020-07-29 DIAGNOSIS — R19.4 CHANGE IN BOWEL HABITS: ICD-10-CM

## 2020-07-29 DIAGNOSIS — K76.9 LIVER LESION: ICD-10-CM

## 2020-07-29 DIAGNOSIS — Z01.818 PREOP TESTING: ICD-10-CM

## 2020-07-29 DIAGNOSIS — M54.6 ACUTE RIGHT-SIDED THORACIC BACK PAIN: ICD-10-CM

## 2020-07-29 DIAGNOSIS — R12 HEARTBURN: ICD-10-CM

## 2020-07-29 DIAGNOSIS — Z87.19 HISTORY OF DIVERTICULOSIS: ICD-10-CM

## 2020-07-29 DIAGNOSIS — R39.198 DIFFICULTY URINATING: ICD-10-CM

## 2020-07-29 DIAGNOSIS — R10.12 LUQ PAIN: ICD-10-CM

## 2020-07-29 PROCEDURE — 99244 PR OFFICE CONSULTATION,LEVEL IV: ICD-10-PCS | Mod: S$PBB,,, | Performed by: NURSE PRACTITIONER

## 2020-07-29 PROCEDURE — 99214 OFFICE O/P EST MOD 30 MIN: CPT | Mod: PBBFAC,PO | Performed by: NURSE PRACTITIONER

## 2020-07-29 PROCEDURE — 99999 PR PBB SHADOW E&M-EST. PATIENT-LVL IV: ICD-10-PCS | Mod: PBBFAC,,, | Performed by: NURSE PRACTITIONER

## 2020-07-29 PROCEDURE — 99999 PR PBB SHADOW E&M-EST. PATIENT-LVL IV: CPT | Mod: PBBFAC,,, | Performed by: NURSE PRACTITIONER

## 2020-07-29 PROCEDURE — 99244 OFF/OP CNSLTJ NEW/EST MOD 40: CPT | Mod: S$PBB,,, | Performed by: NURSE PRACTITIONER

## 2020-07-29 RX ORDER — OMEPRAZOLE 40 MG/1
40 CAPSULE, DELAYED RELEASE ORAL
Qty: 30 CAPSULE | Refills: 1 | Status: SHIPPED | OUTPATIENT
Start: 2020-07-29 | End: 2020-10-14

## 2020-07-29 NOTE — H&P (VIEW-ONLY)
Subjective:       Patient ID: Misti West is a 54 y.o. female Body mass index is 24.91 kg/m².    Chief Complaint: Abdominal Pain (diarrhea/constipation, reflux, bloating)    This patient is new to me.  Referring Provider: Dr. Espinal for LUQ pain.     GI Problem  The primary symptoms include abdominal pain, nausea (rarely) and diarrhea. Primary symptoms do not include fever, weight loss, fatigue, vomiting, melena, hematochezia or dysuria.   The abdominal pain began more than 2 days ago (started 1-2 weeks ago to epigastric to LUQ pain; started with right flank pain in 1/2020, has been seeing urology for it). The abdominal pain is located in the LUQ (described as pressure). The severity of the abdominal pain is 0/10 (currently).   The illness is also significant for constipation. The illness does not include chills, dysphagia or odynophagia. Associated symptoms comments: High fiber diet, eats lots of vegetables, such as cauliflower, broccoli, beans  Change in bowel habits started ~5/2020; rotates between constipation (about once a week, lasts 3-4 days, took exlax once which was yesterday) then progresses to diarrhea (lasts about 24 hours) then goes back to constipation. Significant associated medical issues include GERD (frequent belching after eating). Associated medical issues do not include inflammatory bowel disease. Associated medical issues comments: had UTI in 1/2020, saw urologist for it, getting set up for cystoscopy soon.     Review of Systems   Constitutional: Negative for appetite change, chills, fatigue, fever and weight loss.   HENT: Negative for sore throat and trouble swallowing.    Respiratory: Negative for cough, choking and shortness of breath.    Cardiovascular: Negative for chest pain.   Gastrointestinal: Positive for abdominal pain, constipation, diarrhea and nausea (rarely). Negative for anal bleeding, blood in stool, dysphagia, hematochezia, melena, rectal pain and vomiting.    Genitourinary: Positive for difficulty urinating. Negative for dysuria and flank pain.   Neurological: Negative for weakness.       No LMP recorded. Patient is postmenopausal.  Past Medical History:   Diagnosis Date    Anxiety     Hepatomegaly     Liver lesion      Past Surgical History:   Procedure Laterality Date    KNEE ARTHROSCOPY      SINUS SURGERY      TUBAL LIGATION       Family History   Problem Relation Age of Onset    Diabetes Mother     Cancer Mother     Arthritis Mother     Cancer Father     COPD Father     Hypertension Father     Arthritis Father     Breast cancer Sister     Arthritis Sister     Breast cancer Sister     Arthritis Sister     Breast cancer Sister     Kidney disease Sister     Arthritis Sister     Colon polyps Neg Hx     Colon cancer Neg Hx     Crohn's disease Neg Hx     Ulcerative colitis Neg Hx     Stomach cancer Neg Hx     Esophageal cancer Neg Hx     Celiac disease Neg Hx      Wt Readings from Last 10 Encounters:   07/29/20 76.5 kg (168 lb 10.4 oz)   07/28/20 77.6 kg (171 lb)   07/15/20 78.5 kg (173 lb)   07/10/20 77 kg (169 lb 12.8 oz)   07/01/20 78 kg (172 lb)   06/02/20 77.6 kg (171 lb)   05/21/20 76.7 kg (169 lb)     Lab Results   Component Value Date    WBC 5.70 06/10/2020    HGB 12.8 06/10/2020    HCT 39.2 06/10/2020    MCV 93 06/10/2020     06/10/2020     CMP  Sodium   Date Value Ref Range Status   06/10/2020 135 (L) 136 - 145 mmol/L Final     Potassium   Date Value Ref Range Status   06/10/2020 4.0 3.5 - 5.1 mmol/L Final     Chloride   Date Value Ref Range Status   06/10/2020 101 95 - 110 mmol/L Final     CO2   Date Value Ref Range Status   06/10/2020 27 23 - 29 mmol/L Final     Glucose   Date Value Ref Range Status   06/10/2020 98 70 - 110 mg/dL Final     BUN, Bld   Date Value Ref Range Status   06/10/2020 16 6 - 20 mg/dL Final     Creatinine   Date Value Ref Range Status   06/10/2020 0.7 0.5 - 1.4 mg/dL Final     Calcium   Date Value Ref  Range Status   06/10/2020 8.7 8.7 - 10.5 mg/dL Final     Total Protein   Date Value Ref Range Status   06/10/2020 6.9 6.0 - 8.4 g/dL Final     Albumin   Date Value Ref Range Status   06/10/2020 4.2 3.5 - 5.2 g/dL Final     Total Bilirubin   Date Value Ref Range Status   06/10/2020 0.5 0.1 - 1.0 mg/dL Final     Comment:     For infants and newborns, interpretation of results should be based  on gestational age, weight and in agreement with clinical  observations.  Premature Infant recommended reference ranges:  Up to 24 hours.............<8.0 mg/dL  Up to 48 hours............<12.0 mg/dL  3-5 days..................<15.0 mg/dL  6-29 days.................<15.0 mg/dL       Alkaline Phosphatase   Date Value Ref Range Status   06/10/2020 58 55 - 135 U/L Final     AST   Date Value Ref Range Status   06/10/2020 27 10 - 40 U/L Final     ALT   Date Value Ref Range Status   06/10/2020 27 10 - 44 U/L Final     Anion Gap   Date Value Ref Range Status   06/10/2020 7 (L) 8 - 16 mmol/L Final     eGFR if    Date Value Ref Range Status   06/10/2020 >60.0 >60 mL/min/1.73 m^2 Final     eGFR if non    Date Value Ref Range Status   06/10/2020 >60.0 >60 mL/min/1.73 m^2 Final     Comment:     Calculation used to obtain the estimated glomerular filtration  rate (eGFR) is the CKD-EPI equation.        Reviewed prior medical records including radiology report of 7/16/2020 MRI abdomen; 7/13/2020 limited abdominal ultrasound; & 7/8/2020 CT abdomen pelvis.    Objective:      Physical Exam  Vitals signs and nursing note reviewed.   Constitutional:       General: She is not in acute distress.     Appearance: Normal appearance. She is well-developed. She is not diaphoretic.   HENT:      Mouth/Throat:      Comments: Patient is wearing a face mask, which covers patient's mouth and nose, due to COVID 19 concerns.  Eyes:      General: No scleral icterus.     Conjunctiva/sclera: Conjunctivae normal.      Pupils: Pupils are  equal, round, and reactive to light.   Pulmonary:      Effort: Pulmonary effort is normal. No respiratory distress.      Breath sounds: Normal breath sounds. No wheezing.   Abdominal:      General: Bowel sounds are normal. There is no distension or abdominal bruit.      Palpations: Abdomen is soft. Abdomen is not rigid. There is no mass.      Tenderness: There is no abdominal tenderness. There is no guarding or rebound. Negative signs include Walker's sign and McBurney's sign.   Skin:     General: Skin is warm and dry.      Coloration: Skin is not pale.      Findings: No erythema or rash.      Comments: Non-jaundiced   Neurological:      Mental Status: She is alert and oriented to person, place, and time.   Psychiatric:         Behavior: Behavior normal.         Thought Content: Thought content normal.         Judgment: Judgment normal.         Assessment:       1. Irregular bowel habits    2. Change in bowel habits    3. History of diverticulosis    4. LUQ pain    5. Heartburn    6. Liver lesion    7. Acute right-sided thoracic back pain    8. Difficulty urinating        Plan:       Irregular bowel habits & Change in bowel habits  - schedule Colonoscopy, discussed procedure with the patient, including risks and benefits, patient verbalized understanding    History of diverticulosis  - schedule Colonoscopy, discussed procedure with the patient, including risks and benefits, patient verbalized understanding  - discussed the diagnosis of diverticulosis and diverticulitis, & to prevent diverticulitis, high fiber diet is recommended.  - Recommended daily exercise, adequate water intake (six 8-oz glasses of water daily), and high fiber diet. OTC fiber supplements are recommended if diet does not reach daily fiber goal (25 grams daily), such as Metamucil, Citrucel, or FiberCon (take as directed, separate from other oral medications by >2 hours).    LUQ pain  -  START   omeprazole (PRILOSEC) 40 MG capsule; Take 1 capsule  (40 mg total) by mouth before breakfast.  Dispense: 30 capsule; Refill: 1  - avoid/minimize use of NSAIDs- since they can cause GI upset, bleeding and/or ulcers. If NSAID must be taken, recommend take with food.  - schedule EGD, discussed procedure with patient, including risks and benefits, patient verbalized understanding    Heartburn  -  START   omeprazole (PRILOSEC) 40 MG capsule; Take 1 capsule (40 mg total) by mouth before breakfast.  Dispense: 30 capsule; Refill: 1  - schedule EGD, discussed procedure with patient, including risks and benefits, patient verbalized understanding    Liver lesion  -     CT Abdomen Pelvis W Wo Contrast; Future; Expected date: to be done in 3-6 months to monitor liver lesion    Acute right-sided thoracic back pain & Difficulty urinating  Recommend follow-up with Primary Care Provider & urology for continued evaluation and management.    Follow up in about 1 month (around 8/29/2020), or if symptoms worsen or fail to improve.      If no improvement in symptoms or symptoms worsen, call/follow-up at clinic or go to ER.

## 2020-07-29 NOTE — TELEPHONE ENCOUNTER
----- Message from ARACELIS Escobar sent at 7/29/2020  4:30 PM CDT -----  I wanted the CT scan to be done in 3-6 months to monitor liver lesion. It appears she is scheduled for the CT scan sometime next week. Please reschedule CT scan to be done in 3-6 months.Thank you,Leona

## 2020-07-29 NOTE — PATIENT INSTRUCTIONS
Eating a High-Fiber Diet  Fiber is what gives strength and structure to plants. Most grains, beans, vegetables, and fruits contain fiber. Foods rich in fiber are often low in calories and fat, and they fill you up more. They may also reduce your risks for certain health problems. To find out the amount of fiber in canned, packaged, or frozen foods, read the Nutrition Facts label. It tells you how much fiber is in a serving.    Types of fiber and their benefits  There are two types of fiber: insoluble and soluble. They both aid digestion and help you maintain a healthy weight.  · Insoluble fiber. This is found in whole grains, cereals, certain fruits and vegetables such as apple skin, corn, and carrots. Insoluble fiber may prevent constipation and reduce the risk for certain types of cancer.  · Soluble fiber. This type of fiber is in oats, beans, and certain fruits and vegetables such as strawberries and peas. Soluble fiber can reduce cholesterol, which may help lower the risk for heart disease. It also helps control blood sugar levels.  Look for high-fiber foods  Try these foods to add fiber to your diet:  · Whole-grain breads and cereals. Try to eat 6 to 8 ounces a day. Include wheat and oat bran cereals, whole-wheat muffins or toast, and corn tortillas in your meals.  · Fruits. Try to eat 2 cups a day. Apples, oranges, strawberries, pears, and bananas are good sources. (Note: Fruit juice is low in fiber.)  · Vegetables. Try to eat at least 2.5 cups a day. Add asparagus, carrots, broccoli, peas, and corn to your meals.  · Beans. One cup of cooked lentils gives you over 15 grams of fiber. Try navy beans, lentils, and chickpeas.  · Seeds. A small handful of seeds gives you about 3 grams of fiber. Try sunflower seeds.  Keep track of your fiber  Keep track of how much fiber you eat. Start by reading food labels. Then eat a variety of foods high in fiber. As you begin to eat more fiber, ask your healthcare provider  how much water you should be drinking to keep your digestive system working smoothly.  You should aim for a certain amount of fiber in your diet each day. If you are a woman, that amount is between 25 and 28 grams per day. Men should aim for 30 to 33 grams per day. After age 50, your daily fiber needs drop to 22 grams for women and 28 grams for men.  Before you reach for the fiber supplements, think about this. Fiber is found naturally in healthy whole foods. It gives you that feeling of fullness after you eat. Taking fiber supplements or eating fiber-enriched foods will not give you this full feeling.  Your fiber intake is a good measure for the quality of your overall diet. If you are missing out on your daily amount of fiber, you may be lacking other important nutrients as well.  Date Last Reviewed: 5/11/2015 © 2000-2017 [employer]. [address] [place] [address]. All rights reserved. This information is not intended as a substitute for professional medical care. Always follow your healthcare professional's instructions.          Understanding Diverticulosis and Diverticulitis     Pouches or diverticula usually occur in the lower part of the colon called the sigmoid.     The colon (large intestine) is the last part of the digestive tract. It absorbs water from stool and changes it from a liquid to a solid. In certain cases, small pouches called diverticula can form in the colon wall. This condition is called diverticulosis. The pouches can become infected. If this happens, it becomes a more serious problem called diverticulitis. These problems can be painful. But they can be managed.  Managing your condition  Diet changes or medicines may be prescribed.   If you have diverticulosis  Recommendations include:  · Diet changes are often enough to control symptoms. The main changes are adding fiber (roughage) and drinking more water. Fiber absorbs water as it travels through your colon. This  helps your stool stay soft and move smoothly. Water helps this process.  · If needed, you may be told to take over-the-counter stool softeners.  · To help relieve pain, antispasmodic medicines may be prescribed.  · Watch for changes in your bowel movements. Tell the healthcare provider if you notice any changes.  · Begin an exercise program. Ask your healthcare provider how to get started.  · Get plenty of rest and sleep.   If you have diverticulitis  Treatment depends on how bad your symptoms are.  · For mild symptoms. You may be put on a liquid diet for a short time. Antibiotics are usually prescribed. If these two steps relieve your symptoms, you may then be prescribed a high-fiber diet. If you still have symptoms, your healthcare provider will discuss more treatment choices with you.  · For severe symptoms. You may need to be admitted to the hospital. There, you can be given IV antibiotics and fluids. You will also be put on a low-fiber or liquid diet. Although not common, surgery is needed in some people with severe symptoms.  [place] to colon health     Diverticulitis occurs when the pouches become infected or inflamed.     Help keep your colon healthy with a diet that includes plenty of high-fiber fruits, vegetables, and whole grains. Drink plenty of liquids like water and juice. Maintain a healthy lifestyle including regular exercise, stress management, and adequate rest and sleep.   Date Last Reviewed: 7/1/2016  © 1426-7642 [employer]. [address] [place] [address]. All rights reserved. This information is not intended as a substitute for professional medical care. Always follow your healthcare professional's instructions.          GERD (Adult)    The esophagus is a tube that carries food from the mouth to the stomach. A valve at the lower end of the esophagus prevents stomach acid from flowing upward. When this valve doesn't work properly, stomach contents may repeatedly flow back  "up (reflux) into the esophagus. This is called gastroesophageal reflux disease (GERD). GERD can irritate the esophagus. It can cause problems with swallowing or breathing. In severe cases, GERD can cause recurrent pneumonia or other serious problems.  Symptoms of reflux include burning, pressure or sharp pain in the upper abdomen or mid to lower chest. The pain can spread to the neck, back, or shoulder. There may be belching, an acid taste in the back of the throat, chronic cough, or sore throat or hoarseness. GERD symptoms often occur during the day after a big meal. They can also occur at night when lying down.   Home care  Lifestyle changes can help reduce symptoms. If needed, medicines may be prescribed. Symptoms often improve with treatment, but if treatment is stopped, the symptoms often return after a few months. So most persons with GERD will need to continue treatment.  Lifestyle changes  · Limit or avoid fatty, fried, and spicy foods, as well as coffee, chocolate, mint, and foods with high acid content such as tomatoes and citrus fruit and juices (orange, grapefruit, lemon).  · Dont eat large meals, especially at night. Frequent, smaller meals are best. Do not lie down right after eating. And dont eat anything 3 hours before going to bed.  · Avoid drinking alcohol and smoking. As much as possible, stay away from second hand smoke.  · If you are overweight, losing weight will reduce symptoms.   · Avoid wearing tight clothing around your stomach area.  · If your symptoms occur during sleep, use a foam wedge to elevate your upper body (not just your head.) Or, place 4" blocks under the head of your bed.  Medicines  If needed, medicines can help relieve the symptoms of GERD and prevent damage to the esophagus. Discuss a medicine plan with your healthcare provider. This may include one or more of the following medicines:  · Antacids to help neutralize the normal acids in your stomach.  · Acid blockers (H2 " blockers) to decrease acid production.  · Acid inhibitors (PPIs) to decrease acid production in a different way than the blockers. They may work better, but can take a little longer to take effect.  Take an antacid 30-60 minutes after eating and at bedtime, but not at the same time as an acid blocker.  Try not to take medicines such as ibuprofen and aspirin. If you are taking aspirin for your heart or other medical reasons, talk to your healthcare provider about stopping it.  Follow-up care  Follow up with your healthcare provider or as advised by our staff.  When to seek medical advice  Call your healthcare provider if any of the following occur:  · Stomach pain gets worse or moves to the lower right abdomen (appendix area)  · Chest pain appears or gets worse, or spreads to the back, neck, shoulder, or arm  · Frequent vomiting (cant keep down liquids)  · Blood in the stool or vomit (red or black in color)  · Feeling weak or dizzy  · Fever of 100.4ºF (38ºC) or higher, or as directed by your healthcare provider  Date Last Reviewed: 6/23/2015  © 4956-3112 [employer]. [address] [place] [address]. All rights reserved. This information is not intended as a substitute for professional medical care. Always follow your healthcare professional's instructions.            Abdominal Pain    Abdominal pain is pain in the stomach or belly area. Everyone has this pain from time to time. In many cases it goes away on its own. But abdominal pain can sometimes be due to a serious problem, such as appendicitis. So its important to know when to seek help.  Causes of abdominal pain  There are many possible causes of abdominal pain. Common causes in adults include:  · Constipation, diarrhea, or gas  · Stomach acid flowing back up into the esophagus (acid reflux or heartburn)  · Severe acid reflux, called GERD (gastroesophageal reflux disease)  · A sore in the lining of the stomach or small intestine (peptic  ulcer)  · Inflammation of the gallbladder, liver, or pancreas  · Gallstones or kidney stones  · Appendicitis   · Intestinal blockage   · An internal organ pushing through a muscle or other tissue (hernia)  · Urinary tract infections  · In women, menstrual cramps, fibroids, or endometriosis  · Inflammation or infection of the intestines  Diagnosing the cause of abdominal pain  Your healthcare provider will do a physical exam help find the cause of your pain. If needed, tests will be ordered. Belly pain has many possible causes. So it can be hard to find the reason for your pain. Giving details about your pain can help. Tell your provider where and when you feel the pain, and what makes it better or worse. Also let your provider know if you have other symptoms such as:  · Fever  · Tiredness  · Upset stomach (nausea)  · Vomiting  · Changes in bathroom habits  Treating abdominal pain  Some causes of pain need emergency medical treatment right away. These include appendicitis or a bowel blockage. Other problems can be treated with rest, fluids, or medicines. Your healthcare provider can give you specific instructions for treatment or self-care based on what is causing your pain.  If you have vomiting or diarrhea, sip water or other clear fluids. When you are ready to eat solid foods again, start with small amounts of easy-to-digest, low-fat foods. These include apple sauce, toast, or crackers.   When to seek medical care  Call 911 or go to the hospital right away if you:  · Cant pass stool and are vomiting  · Are vomiting blood or have bloody diarrhea or black, tarry diarrhea  · Have chest, neck, or shoulder pain  · Feel like you might pass out  · Have pain in your shoulder blades with nausea  · Have sudden, severe belly pain  · Have new, severe pain unlike any you have felt before  · Have a belly that is rigid, hard, and tender to touch  Call your healthcare provider if you have:  · Pain for more than 5  days  · Bloating for more than 2 days  · Diarrhea for more than 5 days  · A fever of 100.4°F (38.0°C) or higher, or as directed by your provider  · Pain that gets worse  · Weight loss for no reason  · Continued lack of appetite  · Blood in your stool  How to prevent abdominal pain  Here are some tips to help prevent abdominal pain:  · Eat smaller amounts of food at one time.  · Avoid greasy, fried, or other high-fat foods.  · Avoid foods that give you gas.  · Exercise regularly.  · Drink plenty of fluids.  To help prevent GERD symptoms:  · Quit smoking.  · Reduce alcohol and certain foods that increase stomach acid.  · Avoid aspirin and over-the-counter pain and fever medicines (NSAIDS or nonsteroidal anti-inflammatory drugs), if possible  · Lose extra weight.  · Finish eating at least 2 hours before you go to bed or lie down.  · Raise the head of your bed.  Date Last Reviewed: 7/1/2016  © 5684-8671 [employer]. [address] [place] [address], [place] [address]. All rights reserved. This information is not intended as a substitute for professional medical care. Always follow your healthcare professional's instructions.

## 2020-07-29 NOTE — LETTER
July 29, 2020        Pierre Espinal MD  [address] [facility] [place] [address]             [facility]  [address], [address]  [place] [address]  Phone: [phone]   Patient: Misti West   MR Number: [id number]   YOB: 1965   Date of Visit: 7/29/2020       Dear Dr. Espinal:    Thank you for referring Misti West to me for evaluation. Below are the relevant portions of my assessment and plan of care.    1. Irregular bowel habits    2. History of diverticulosis    3. LUQ pain      Irregular bowel habits    History of diverticulosis    LUQ pain      No follow-ups on file.      If you have questions, please do not hesitate to call me. I look forward to following Misti along with you.    Sincerely,      Leona Javed, ARACELIS           CC  No Recipients

## 2020-07-29 NOTE — PROGRESS NOTES
Requested updates within Care Everywhere.  Patient's chart was reviewed for overdue ARYA topics.  Immunizations reconciled.    Mammogram tasked to patient.

## 2020-07-29 NOTE — Clinical Note
I wanted the CT scan to be done in 3-6 months to monitor liver lesion. It appears she is scheduled for the CT scan sometime next week. Please reschedule CT scan to be done in 3-6 months.  Thank you,  Leona

## 2020-07-31 ENCOUNTER — TELEPHONE (OUTPATIENT)
Dept: UROLOGY | Facility: CLINIC | Age: 55
End: 2020-07-31

## 2020-07-31 ENCOUNTER — OFFICE VISIT (OUTPATIENT)
Dept: UROLOGY | Facility: CLINIC | Age: 55
End: 2020-07-31

## 2020-07-31 VITALS
RESPIRATION RATE: 16 BRPM | BODY MASS INDEX: 24.88 KG/M2 | HEIGHT: 69 IN | DIASTOLIC BLOOD PRESSURE: 74 MMHG | WEIGHT: 168 LBS | HEART RATE: 65 BPM | TEMPERATURE: 98 F | SYSTOLIC BLOOD PRESSURE: 114 MMHG

## 2020-07-31 DIAGNOSIS — Z41.9 SURGERY, ELECTIVE: Primary | ICD-10-CM

## 2020-07-31 DIAGNOSIS — N39.0 RECURRENT UTI: Primary | ICD-10-CM

## 2020-07-31 PROCEDURE — 99999 PR PBB SHADOW E&M-EST. PATIENT-LVL V: CPT | Mod: PBBFAC,,, | Performed by: UROLOGY

## 2020-07-31 PROCEDURE — 99999 PR PBB SHADOW E&M-EST. PATIENT-LVL V: ICD-10-PCS | Mod: PBBFAC,,, | Performed by: UROLOGY

## 2020-07-31 PROCEDURE — 99213 OFFICE O/P EST LOW 20 MIN: CPT | Mod: S$PBB,,, | Performed by: UROLOGY

## 2020-07-31 PROCEDURE — 99213 PR OFFICE/OUTPT VISIT, EST, LEVL III, 20-29 MIN: ICD-10-PCS | Mod: S$PBB,,, | Performed by: UROLOGY

## 2020-07-31 PROCEDURE — 99215 OFFICE O/P EST HI 40 MIN: CPT | Mod: PBBFAC,PN | Performed by: UROLOGY

## 2020-07-31 RX ORDER — CIPROFLOXACIN 2 MG/ML
400 INJECTION, SOLUTION INTRAVENOUS
Status: CANCELLED | OUTPATIENT
Start: 2020-09-17

## 2020-07-31 NOTE — PROGRESS NOTES
Subjective:       Patient ID: Misti West is a 54 y.o. female.    Chief Complaint:   HPI   History of recurring urinary tract infections.  Right flank pain.    Review of Systems   Constitutional: Negative.  Negative for activity change.   HENT: Negative.  Negative for facial swelling.    Eyes: Negative for discharge.   Respiratory: Negative for cough and shortness of breath.    Cardiovascular: Negative for chest pain and palpitations.   Gastrointestinal: Negative for abdominal distention, blood in stool and constipation.   Genitourinary: Positive for flank pain. Negative for dyspareunia, dysuria, frequency, hematuria, urgency and vaginal pain.   Musculoskeletal: Positive for back pain. Negative for arthralgias.   Skin: Negative.    Neurological: Negative.  Negative for dizziness.   Hematological: Negative for adenopathy.   Psychiatric/Behavioral: The patient is not nervous/anxious.        Mrs. West is a 54-year-old female who has history of recurring urinary tract infections and right flank pain.  A CT scan of the abdomen and pelvis was performed that shows no evidence of any abnormality or calcifications in the  tract.  The CT scan did show lesions in the liver she underwent a MRI that shows that the cyst on the liver.  She is under the care of GI at the present time and they are contemplating in performing upper and lower GI endoscopy.    The patient have history of urethral stenosis as a child and is having lower urinary tract symptoms consistent with nocturia x3 in occasions time for mild urinary frequency lower urinary flow and the sensation that is difficult to empty the bladder satisfactory.  In the past we have considered the possibility of performing a cystoscopy and a urethral dilation and I think that probably we need to proceed with that since there is no evidence of urinary tract infection at the present time.    The past medical and surgical history the current medications and allergies are well  documented in the electronic health record and all these were reviewed by me during this visit.    Review of systems:      Objective:      Physical Exam   Constitutional: She appears well-developed.   HENT:   Head: Normocephalic.   Eyes: Pupils are equal, round, and reactive to light.   Neck: Normal range of motion.   Cardiovascular: Normal rate.    Pulmonary/Chest: Effort normal.   Abdominal: Soft. She exhibits no distension and no mass. There is no abdominal tenderness. Hernia confirmed negative in the left inguinal area.   Genitourinary:    Vagina normal.      No vaginal erythema, tenderness or bleeding.   No erythema, tenderness or bleeding in the vagina.   Musculoskeletal: Normal range of motion.   Neurological: She is alert.   Skin: Skin is warm.         Assessment:       1. Recurrent UTI        Plan:       Recurrent UTI  -     Case Request Operating Room: CYSTOURETHROSCOPY     Patient will undergo cystoscopy with possible urethral dilation on September 17, 2020 under mac sedation.  The rationale risks and complication of the procedure have been fully discussed with the patient she agree with it.  All the questions were answered at her satisfaction.

## 2020-08-05 ENCOUNTER — PATIENT OUTREACH (OUTPATIENT)
Dept: ADMINISTRATIVE | Facility: HOSPITAL | Age: 55
End: 2020-08-05

## 2020-08-05 ENCOUNTER — HOSPITAL ENCOUNTER (OUTPATIENT)
Dept: RADIOLOGY | Facility: HOSPITAL | Age: 55
Discharge: HOME | End: 2020-08-05
Attending: FAMILY MEDICINE
Payer: OTHER GOVERNMENT

## 2020-08-05 ENCOUNTER — TELEPHONE (OUTPATIENT)
Dept: GASTROENTEROLOGY | Facility: CLINIC | Age: 55
End: 2020-08-05

## 2020-08-05 DIAGNOSIS — Z12.39 BREAST CANCER SCREENING: ICD-10-CM

## 2020-08-05 PROCEDURE — 77067 MAMMO DIGITAL SCREENING BILAT WITH TOMOSYNTHESIS_CAD: ICD-10-PCS | Mod: 26,,, | Performed by: RADIOLOGY

## 2020-08-05 PROCEDURE — 77067 SCR MAMMO BI INCL CAD: CPT | Mod: TC

## 2020-08-05 PROCEDURE — 77063 BREAST TOMOSYNTHESIS BI: CPT | Mod: 26,,, | Performed by: RADIOLOGY

## 2020-08-05 PROCEDURE — 77063 MAMMO DIGITAL SCREENING BILAT WITH TOMOSYNTHESIS_CAD: ICD-10-PCS | Mod: 26,,, | Performed by: RADIOLOGY

## 2020-08-05 PROCEDURE — 77067 SCR MAMMO BI INCL CAD: CPT | Mod: 26,,, | Performed by: RADIOLOGY

## 2020-08-05 NOTE — PROGRESS NOTES
Population Health Outreach.  Spoke with pt about dx of DM. She said that she was dx about 15 yrs ago. She stated that Dr. Case Horton was her family MD for several yrs and she also went to [facility]. I informed her that 2 yrs of below DM range A1c labs needed to even think about considering not being a diabetic. Pt verbalized understanding. She stated that she was upset because her CT scan was cancelled today and she was not aware because a message was emailed to her and she can not get into her [facility] email.She perfers a phone call or paper letter for communication.    08/05/20  FAX SENTTO DR CASE HORTON FOR PAST LABS X3 YRS  [phone]

## 2020-08-05 NOTE — TELEPHONE ENCOUNTER
----- Message from Milan Rockwell sent at 8/5/2020  9:24 AM CDT -----  Regarding: appt cx w/out being notified  MRN  [id number]    Misti West is here in outpatient upset that her CT was canceled and no one called her. Pt showed up here for her CT unknowing that it was canceled. Pt states that she drove from [place] for this and that another dr is awaiting to do a procedure pending this CT. Pt wishes to be called as soon as possible.

## 2020-08-05 NOTE — TELEPHONE ENCOUNTER
----- Message from Milan Rockwell sent at 8/5/2020  9:24 AM CDT -----  Regarding: appt cx w/out being notified  MRN  [id number]    Misti West is here in outpatient upset that her CT was canceled and no one called her. Pt showed up here for her CT unknowing that it was canceled. Pt states that she drove from [place] for this and that another dr is awaiting to do a procedure pending this CT. Pt wishes to be called as soon as possible.

## 2020-08-05 NOTE — LETTER
FAX      AUTHORIZATION FOR RELEASE OF   CONFIDENTIAL INFORMATION         NURY    We are seeing Misti West, date of birth 1965, in the clinic at [facility]. Pierre Espinal MD is the patient's PCP. Misti West has an outstanding lab/procedure at the time we reviewed her chart. In order to help keep her health information updated, she has authorized us to request the following medical record(s):        (  )  MAMMOGRAM                                      (  )  COLONOSCOPY      (  )  PAP SMEAR                                          (  )  MOST RECENT LAB RESULTS     (  )  DEXA SCAN                                          (  )  DIABETIC EYE EXAM            (  )  DIABETIC FOOT EXAM                        (  )  MOST RECENT A1c, LIPID, &          URINE MICRO-ALBUMIN     (  )  OUTSIDE IMMUNIZATIONS                 ( X )  DIABETIC LABS FOR X3 YRS                                                                                             FROM  LAST VISIT        Please fax records to [facility]  [phone]     If you have any questions, please contact Jenise at [phone].      Jenise Chaparro L.P.N. Clinical Care Coordinator  [address] [place] [address] [phone]

## 2020-08-10 ENCOUNTER — HOSPITAL ENCOUNTER (EMERGENCY)
Facility: HOSPITAL | Age: 55
Discharge: HOME | End: 2020-08-10
Attending: FAMILY MEDICINE
Payer: OTHER GOVERNMENT

## 2020-08-10 ENCOUNTER — LAB VISIT (OUTPATIENT)
Dept: PRIMARY CARE CLINIC | Facility: CLINIC | Age: 55
End: 2020-08-10
Payer: OTHER GOVERNMENT

## 2020-08-10 VITALS
RESPIRATION RATE: 19 BRPM | WEIGHT: 172 LBS | HEART RATE: 49 BPM | OXYGEN SATURATION: 96 % | BODY MASS INDEX: 25.48 KG/M2 | TEMPERATURE: 98 F | HEIGHT: 69 IN | SYSTOLIC BLOOD PRESSURE: 131 MMHG | DIASTOLIC BLOOD PRESSURE: 81 MMHG

## 2020-08-10 DIAGNOSIS — R07.9 CHEST PAIN: ICD-10-CM

## 2020-08-10 DIAGNOSIS — R07.9 CHEST PAIN, UNSPECIFIED TYPE: Primary | ICD-10-CM

## 2020-08-10 DIAGNOSIS — K21.9 CHEST PAIN DUE TO GERD: ICD-10-CM

## 2020-08-10 DIAGNOSIS — R07.9 CHEST PAIN DUE TO GERD: ICD-10-CM

## 2020-08-10 DIAGNOSIS — Z01.818 PREOP TESTING: ICD-10-CM

## 2020-08-10 LAB
ALBUMIN SERPL BCP-MCNC: 4.2 G/DL (ref 3.5–5.2)
ALP SERPL-CCNC: 60 U/L (ref 55–135)
ALT SERPL W/O P-5'-P-CCNC: 18 U/L (ref 10–44)
ANION GAP SERPL CALC-SCNC: 10 MMOL/L (ref 8–16)
AST SERPL-CCNC: 22 U/L (ref 10–40)
BASOPHILS # BLD AUTO: 0.05 K/UL (ref 0–0.2)
BASOPHILS NFR BLD: 0.8 % (ref 0–1.9)
BILIRUB SERPL-MCNC: 0.7 MG/DL (ref 0.1–1)
BUN SERPL-MCNC: 14 MG/DL (ref 6–20)
CALCIUM SERPL-MCNC: 8.8 MG/DL (ref 8.7–10.5)
CHLORIDE SERPL-SCNC: 105 MMOL/L (ref 95–110)
CO2 SERPL-SCNC: 23 MMOL/L (ref 23–29)
CREAT SERPL-MCNC: 0.8 MG/DL (ref 0.5–1.4)
DIFFERENTIAL METHOD: ABNORMAL
EOSINOPHIL # BLD AUTO: 0.2 K/UL (ref 0–0.5)
EOSINOPHIL NFR BLD: 3.2 % (ref 0–8)
ERYTHROCYTE [DISTWIDTH] IN BLOOD BY AUTOMATED COUNT: 13.2 % (ref 11.5–14.5)
EST. GFR  (AFRICAN AMERICAN): >60 ML/MIN/1.73 M^2
EST. GFR  (NON AFRICAN AMERICAN): >60 ML/MIN/1.73 M^2
GLUCOSE SERPL-MCNC: 100 MG/DL (ref 70–110)
HCT VFR BLD AUTO: 36.9 % (ref 37–48.5)
HGB BLD-MCNC: 12.5 G/DL (ref 12–16)
IMM GRANULOCYTES # BLD AUTO: 0.01 K/UL (ref 0–0.04)
IMM GRANULOCYTES NFR BLD AUTO: 0.2 % (ref 0–0.5)
LIPASE SERPL-CCNC: 29 U/L (ref 4–60)
LYMPHOCYTES # BLD AUTO: 2.7 K/UL (ref 1–4.8)
LYMPHOCYTES NFR BLD: 44 % (ref 18–48)
MCH RBC QN AUTO: 30.6 PG (ref 27–31)
MCHC RBC AUTO-ENTMCNC: 33.9 G/DL (ref 32–36)
MCV RBC AUTO: 90 FL (ref 82–98)
MONOCYTES # BLD AUTO: 0.4 K/UL (ref 0.3–1)
MONOCYTES NFR BLD: 6 % (ref 4–15)
NEUTROPHILS # BLD AUTO: 2.8 K/UL (ref 1.8–7.7)
NEUTROPHILS NFR BLD: 45.8 % (ref 38–73)
NRBC BLD-RTO: 0 /100 WBC
PLATELET # BLD AUTO: 214 K/UL (ref 150–350)
PMV BLD AUTO: 10 FL (ref 9.2–12.9)
POTASSIUM SERPL-SCNC: 3.5 MMOL/L (ref 3.5–5.1)
PROT SERPL-MCNC: 6.9 G/DL (ref 6–8.4)
RBC # BLD AUTO: 4.08 M/UL (ref 4–5.4)
SODIUM SERPL-SCNC: 138 MMOL/L (ref 136–145)
TROPONIN I SERPL DL<=0.01 NG/ML-MCNC: <0.01 NG/ML (ref 0.02–0.5)
WBC # BLD AUTO: 6.2 K/UL (ref 3.9–12.7)

## 2020-08-10 PROCEDURE — 84484 ASSAY OF TROPONIN QUANT: CPT

## 2020-08-10 PROCEDURE — 93005 ELECTROCARDIOGRAM TRACING: CPT

## 2020-08-10 PROCEDURE — 99284 EMERGENCY DEPT VISIT MOD MDM: CPT | Mod: 25

## 2020-08-10 PROCEDURE — U0003 INFECTIOUS AGENT DETECTION BY NUCLEIC ACID (DNA OR RNA); SEVERE ACUTE RESPIRATORY SYNDROME CORONAVIRUS 2 (SARS-COV-2) (CORONAVIRUS DISEASE [COVID-19]), AMPLIFIED PROBE TECHNIQUE, MAKING USE OF HIGH THROUGHPUT TECHNOLOGIES AS DESCRIBED BY CMS-2020-01-R: HCPCS

## 2020-08-10 PROCEDURE — 80053 COMPREHEN METABOLIC PANEL: CPT

## 2020-08-10 PROCEDURE — 83690 ASSAY OF LIPASE: CPT

## 2020-08-10 PROCEDURE — 25000003 PHARM REV CODE 250: Performed by: FAMILY MEDICINE

## 2020-08-10 PROCEDURE — 85025 COMPLETE CBC W/AUTO DIFF WBC: CPT

## 2020-08-10 RX ADMIN — LIDOCAINE HYDROCHLORIDE: 20 SOLUTION ORAL; TOPICAL at 03:08

## 2020-08-11 LAB — SARS-COV-2 RNA RESP QL NAA+PROBE: NOT DETECTED

## 2020-08-11 NOTE — ED PROVIDER NOTES
Encounter Date: 8/10/2020       History     Chief Complaint   Patient presents with    Chest Pain    Headache     54-year-old female presents to the ED complaining of epigastric discomfort that is nonradiating some associated nausea no vomiting there is no radiation the pain Ms. similar pain she has had in the past she has had a mild headache but no fever chills or cough no exposure to documented COVID-19 patient        Review of patient's allergies indicates:   Allergen Reactions    Codeine     Morphine     Naproxen sodium     Penicillins     Tramadol hcl     Trintex      Past Medical History:   Diagnosis Date    Anxiety     Hepatomegaly     Liver lesion      Past Surgical History:   Procedure Laterality Date    KNEE ARTHROSCOPY      SINUS SURGERY      TUBAL LIGATION       Family History   Problem Relation Age of Onset    Diabetes Mother     Cancer Mother     Arthritis Mother     Cancer Father     COPD Father     Hypertension Father     Arthritis Father     Breast cancer Sister     Arthritis Sister     Breast cancer Sister     Arthritis Sister     Breast cancer Sister     Kidney disease Sister     Arthritis Sister     Colon polyps Neg Hx     Colon cancer Neg Hx     Crohn's disease Neg Hx     Ulcerative colitis Neg Hx     Stomach cancer Neg Hx     Esophageal cancer Neg Hx     Celiac disease Neg Hx      Social History     Tobacco Use    Smoking status: Current Every Day Smoker     Packs/day: 1.00    Smokeless tobacco: Never Used   Substance Use Topics    Alcohol use: Not Currently     Frequency: Never     Drinks per session: Patient refused     Binge frequency: Never    Drug use: Not Currently     Review of Systems   Constitutional: Negative for fever.   HENT: Negative for sore throat.    Respiratory: Negative for shortness of breath.    Cardiovascular: Negative for chest pain.   Gastrointestinal: Negative for nausea.   Genitourinary: Negative for dysuria.   Musculoskeletal:  Negative for back pain.   Skin: Negative for rash.   Neurological: Negative for weakness.   Hematological: Does not bruise/bleed easily.   Psychiatric/Behavioral: Negative for agitation, behavioral problems, confusion and decreased concentration.       Physical Exam     Initial Vitals [08/10/20 0316]   BP Pulse Resp Temp SpO2   138/81 61 19 97.8 °F (36.6 °C) 96 %      MAP       --         Physical Exam    Nursing note and vitals reviewed.  Constitutional: She appears well-developed and well-nourished. She is not diaphoretic. No distress.   HENT:   Head: Normocephalic and atraumatic.   Right Ear: External ear normal.   Left Ear: External ear normal.   Eyes: Pupils are equal, round, and reactive to light. Right eye exhibits no discharge. Left eye exhibits no discharge.   Neck: No tracheal deviation present. No JVD present.   Cardiovascular: Exam reveals no friction rub.    No murmur heard.  Pulmonary/Chest: No stridor. No respiratory distress. She has no wheezes. She has no rales.   Abdominal: Bowel sounds are normal. She exhibits no distension.   Musculoskeletal: Normal range of motion.   Neurological: She is alert.   Skin: Skin is warm.   Psychiatric: She has a normal mood and affect.         ED Course   Procedures  Labs Reviewed   CBC W/ AUTO DIFFERENTIAL - Abnormal; Notable for the following components:       Result Value    Hematocrit 36.9 (*)     All other components within normal limits   TROPONIN I - Abnormal; Notable for the following components:    Troponin I <0.01 (*)     All other components within normal limits   COMPREHENSIVE METABOLIC PANEL   LIPASE        ECG Results          EKG 12-lead (Final result)  Result time 08/10/20 08:56:00    Final result by Interface, Lab In [facility] (08/10/20 08:56:00)                 Narrative:    Test Reason : R07.9,    Vent. Rate : 057 BPM     Atrial Rate : 057 BPM     P-R Int : 188 ms          QRS Dur : 100 ms      QT Int : 434 ms       P-R-T Axes : 081 069 070 degrees      QTc Int : 422 ms    Sinus bradycardia  Possible Anterior infarct ,age undetermined  Abnormal ECG  No previous ECGs available  Confirmed by Ervin Mueller MD ([id number]) on 8/10/2020 8:55:50 AM    Referred By:             Confirmed By:Ervin Mueller MD                            Imaging Results    None      Patient's chest pain was relieved with GI cocktail                                     Clinical Impression:       ICD-10-CM ICD-9-CM   1. Chest pain, unspecified type  R07.9 786.50   2. Chest pain  R07.9 786.50   3. Chest pain due to GERD  R07.9 786.50    K21.9 530.81             ED Disposition Condition    Discharge Stable        ED Prescriptions     None        Follow-up Information    None                                    Max Baeza MD  08/11/20 0416

## 2020-08-12 DIAGNOSIS — M25.562 ACUTE PAIN OF LEFT KNEE: Primary | ICD-10-CM

## 2020-08-13 ENCOUNTER — ANESTHESIA EVENT (OUTPATIENT)
Dept: ENDOSCOPY | Facility: HOSPITAL | Age: 55
End: 2020-08-13

## 2020-08-13 ENCOUNTER — ANESTHESIA (OUTPATIENT)
Dept: ENDOSCOPY | Facility: HOSPITAL | Age: 55
End: 2020-08-13

## 2020-08-13 ENCOUNTER — HOSPITAL ENCOUNTER (OUTPATIENT)
Facility: HOSPITAL | Age: 55
Discharge: HOME | End: 2020-08-13
Attending: INTERNAL MEDICINE | Admitting: INTERNAL MEDICINE

## 2020-08-13 DIAGNOSIS — R10.13 EPIGASTRIC PAIN: ICD-10-CM

## 2020-08-13 DIAGNOSIS — K29.70 GASTRITIS, PRESENCE OF BLEEDING UNSPECIFIED, UNSPECIFIED CHRONICITY, UNSPECIFIED GASTRITIS TYPE: ICD-10-CM

## 2020-08-13 DIAGNOSIS — K44.9 HIATAL HERNIA: Primary | ICD-10-CM

## 2020-08-13 PROCEDURE — 43239 PR EGD, FLEX, W/BIOPSY, SGL/MULTI: ICD-10-PCS | Mod: ,,, | Performed by: INTERNAL MEDICINE

## 2020-08-13 PROCEDURE — 63600175 PHARM REV CODE 636 W HCPCS: Performed by: NURSE ANESTHETIST, CERTIFIED REGISTERED

## 2020-08-13 PROCEDURE — 37000008 HC ANESTHESIA 1ST 15 MINUTES: Performed by: INTERNAL MEDICINE

## 2020-08-13 PROCEDURE — 88342 IMHCHEM/IMCYTCHM 1ST ANTB: CPT | Performed by: PATHOLOGY

## 2020-08-13 PROCEDURE — 43239 EGD BIOPSY SINGLE/MULTIPLE: CPT | Mod: ,,, | Performed by: INTERNAL MEDICINE

## 2020-08-13 PROCEDURE — 27201012 HC FORCEPS, HOT/COLD, DISP: Performed by: INTERNAL MEDICINE

## 2020-08-13 PROCEDURE — 43239 EGD BIOPSY SINGLE/MULTIPLE: CPT | Performed by: INTERNAL MEDICINE

## 2020-08-13 PROCEDURE — 25000003 PHARM REV CODE 250: Performed by: INTERNAL MEDICINE

## 2020-08-13 PROCEDURE — 88342 IMHCHEM/IMCYTCHM 1ST ANTB: CPT | Mod: 26,,, | Performed by: PATHOLOGY

## 2020-08-13 PROCEDURE — 88342 CHG IMMUNOCYTOCHEMISTRY: ICD-10-PCS | Mod: 26,,, | Performed by: PATHOLOGY

## 2020-08-13 PROCEDURE — 88305 TISSUE EXAM BY PATHOLOGIST: CPT | Mod: 26,,, | Performed by: PATHOLOGY

## 2020-08-13 PROCEDURE — D9220A PRA ANESTHESIA: Mod: ,,, | Performed by: ANESTHESIOLOGY

## 2020-08-13 PROCEDURE — 88305 TISSUE EXAM BY PATHOLOGIST: CPT | Performed by: PATHOLOGY

## 2020-08-13 PROCEDURE — D9220A PRA ANESTHESIA: ICD-10-PCS | Mod: ,,, | Performed by: ANESTHESIOLOGY

## 2020-08-13 PROCEDURE — 88305 TISSUE EXAM BY PATHOLOGIST: ICD-10-PCS | Mod: 26,,, | Performed by: PATHOLOGY

## 2020-08-13 PROCEDURE — 25000003 PHARM REV CODE 250: Performed by: NURSE ANESTHETIST, CERTIFIED REGISTERED

## 2020-08-13 RX ORDER — PROPOFOL 10 MG/ML
VIAL (ML) INTRAVENOUS
Status: DISCONTINUED | OUTPATIENT
Start: 2020-08-13 | End: 2020-08-13

## 2020-08-13 RX ORDER — SODIUM CHLORIDE 9 MG/ML
INJECTION, SOLUTION INTRAVENOUS CONTINUOUS
Status: DISCONTINUED | OUTPATIENT
Start: 2020-08-13 | End: 2020-08-13 | Stop reason: HOSPADM

## 2020-08-13 RX ORDER — LIDOCAINE HCL/PF 100 MG/5ML
SYRINGE (ML) INTRAVENOUS
Status: DISCONTINUED | OUTPATIENT
Start: 2020-08-13 | End: 2020-08-13

## 2020-08-13 RX ADMIN — SODIUM CHLORIDE: 0.9 INJECTION, SOLUTION INTRAVENOUS at 08:08

## 2020-08-13 RX ADMIN — LIDOCAINE HYDROCHLORIDE 100 MG: 20 INJECTION INTRAVENOUS at 08:08

## 2020-08-13 RX ADMIN — PROPOFOL 30 MG: 10 INJECTION, EMULSION INTRAVENOUS at 08:08

## 2020-08-13 RX ADMIN — PROPOFOL 100 MG: 10 INJECTION, EMULSION INTRAVENOUS at 08:08

## 2020-08-13 NOTE — PLAN OF CARE
Patient seen by Dr Aguayo and is ok to discharge to home.  Discharge instructions given to patient and spouse.  Both verbalilized understanding.

## 2020-08-13 NOTE — ANESTHESIA PREPROCEDURE EVALUATION
08/13/2020  Misti West is a 54 y.o., female.    Anesthesia Evaluation    I have reviewed the Patient Summary Reports.    I have reviewed the Nursing Notes. I have reviewed the NPO Status.   I have reviewed the Medications.     Review of Systems  Anesthesia Hx:  Denies Family Hx of Anesthesia complications.  Personal Hx of Anesthesia complications, Post-Operative Nausea/Vomiting, in the past, but not with recent anesthetics / prophylaxis   Hepatic/GI:   GERD Liver Disease,        Physical Exam  General:  Well nourished    Airway/Jaw/Neck:  Airway Findings: Mouth Opening: Normal Tongue: Normal  General Airway Assessment: Adult  Mallampati: III  Improves to II with phonation.  TM Distance: Normal, at least 6 cm  Jaw/Neck Findings:  Neck ROM: Normal ROM     Eyes/Ears/Nose:  EYES/EARS/NOSE FINDINGS: Normal    Chest/Lungs:  Chest/Lungs Findings: Normal Respiratory Rate     Heart/Vascular:  Heart Findings: Rate: Normal  Rhythm: Regular Rhythm        Mental Status:  Mental Status Findings: Normal        Anesthesia Plan  Type of Anesthesia, risks & benefits discussed:  Anesthesia Type:  general  Patient's Preference:   Intra-op Monitoring Plan: standard ASA monitors  Intra-op Monitoring Plan Comments:   Post Op Pain Control Plan:   Post Op Pain Control Plan Comments:   Induction:   IV  Beta Blocker:  Patient is not currently on a Beta-Blocker (No further documentation required).       Informed Consent: Patient understands risks and agrees with Anesthesia plan.  Questions answered. Anesthesia consent signed with patient.  ASA Score: 2     Day of Surgery Review of History & Physical:    H&P update referred to the provider.         Ready For Surgery From Anesthesia Perspective.

## 2020-08-13 NOTE — DISCHARGE INSTRUCTIONS
Eating a High-Fiber Diet  Fiber is what gives strength and structure to plants. Most grains, beans, vegetables, and fruits contain fiber. Foods rich in fiber are often low in calories and fat, and they fill you up more. They may also reduce your risks for certain health problems. To find out the amount of fiber in canned, packaged, or frozen foods, read the Nutrition Facts label. It tells you how much fiber is in a serving.    Types of fiber and their benefits  There are two types of fiber: insoluble and soluble. They both aid digestion and help you maintain a healthy weight.  · Insoluble fiber. This is found in whole grains, cereals, certain fruits and vegetables such as apple skin, corn, and carrots. Insoluble fiber may prevent constipation and reduce the risk for certain types of cancer.  · Soluble fiber. This type of fiber is in oats, beans, and certain fruits and vegetables such as strawberries and peas. Soluble fiber can reduce cholesterol, which may help lower the risk for heart disease. It also helps control blood sugar levels.  Look for high-fiber foods  Try these foods to add fiber to your diet:  · Whole-grain breads and cereals. Try to eat 6 to 8 ounces a day. Include wheat and oat bran cereals, whole-wheat muffins or toast, and corn tortillas in your meals.  · Fruits. Try to eat 2 cups a day. Apples, oranges, strawberries, pears, and bananas are good sources. (Note: Fruit juice is low in fiber.)  · Vegetables. Try to eat at least 2.5 cups a day. Add asparagus, carrots, broccoli, peas, and corn to your meals.  · Beans. One cup of cooked lentils gives you over 15 grams of fiber. Try navy beans, lentils, and chickpeas.  · Seeds. A small handful of seeds gives you about 3 grams of fiber. Try sunflower seeds.  Keep track of your fiber  Keep track of how much fiber you eat. Start by reading food labels. Then eat a variety of foods high in fiber. As you begin to eat more fiber, ask your healthcare provider  how much water you should be drinking to keep your digestive system working smoothly.  You should aim for a certain amount of fiber in your diet each day. If you are a woman, that amount is between 25 and 28 grams per day. Men should aim for 30 to 33 grams per day. After age 50, your daily fiber needs drop to 22 grams for women and 28 grams for men.  Before you reach for the fiber supplements, think about this. Fiber is found naturally in healthy whole foods. It gives you that feeling of fullness after you eat. Taking fiber supplements or eating fiber-enriched foods will not give you this full feeling.  Your fiber intake is a good measure for the quality of your overall diet. If you are missing out on your daily amount of fiber, you may be lacking other important nutrients as well.  Date Last Reviewed: 5/11/2015 © 2000-2017 [employer]. [address] [place] [address]. All rights reserved. This information is not intended as a substitute for professional medical care. Always follow your healthcare professional's instructions.        Gastritis (Adult)    Gastritis is inflammation and irritation of the stomach lining. It can be present for a short time (acute) or be long lasting (chronic). Gastritis is often caused by infection with bacteria called H pylori. More than a third of people in the [place] have this bacteria in their bodies. In many cases, H pylori causes no problems or symptoms. In some people, though, the infection irritates the stomach lining and causes gastritis. Other causes of stomach irritation include drinking alcohol or taking pain-relieving medicines called NSAIDs (such as aspirin or ibuprofen).   Symptoms of gastritis can include:  · Abdominal pain or bloating  · Loss of appetite  · Nausea or vomiting  · Vomiting blood or having black stools  · Feeling more tired than usual  An inflamed and irritated stomach lining is more likely to develop a sore called an ulcer. To help  prevent this, gastritis should be treated.  Home care  If needed, medicines may be prescribed. If you have H pylori infection, treating it will likely relieve your symptoms. Other changes can help reduce stomach irritation and help it heal.  · If you have been prescribed medicines for H pylori infection, take them as directed. Take all of the medicine until it is finished or your healthcare provider tells you to stop, even if you feel better.  · Your healthcare provider may recommend avoiding NSAIDs. If you take daily aspirin for your heart or other medical reasons, do not stop without talking to your healthcare provider first.  · Avoid drinking alcohol.  · Stop smoking. Smoking can irritate the stomach and delay healing. As much as possible, stay away from second hand smoke.  Follow-up care  Follow up with your healthcare provider, or as advised by our staff. Testing may be needed to check for inflammation or an ulcer.  When to seek medical advice  Call your healthcare provider for any of the following:  · Stomach pain that gets worse or moves to the lower right abdomen (appendix area)  · Chest pain that appears or gets worse, or spreads to the back, neck, shoulder, or arm  · Frequent vomiting (cant keep down liquids)  · Blood in the stool or vomit (red or black in color)  · Feeling weak or dizzy  · Fever of 100.4ºF (38ºC) or higher, or as directed by your healthcare provider  Date Last Reviewed: 6/22/2015 © 2000-2017 [employer]. [address] [place] [address]. All rights reserved. This information is not intended as a substitute for professional medical care. Always follow your healthcare professional's instructions.        What Is a Hiatal Hernia?    Hiatal hernia is when the area where the stomach and esophagus meet bulges up through the diaphragm into the chest cavity. In some cases, part of the stomach may bulge above the diaphragm. Stomach acid may move up into the esophagus and  cause symptoms. The symptoms are often blamed on gastroesophageal reflux disease (GERD). You may only know about the hernia when it shows up on an X-ray taken for other reasons.   What you may feel  The hiatus is a normal hole in the diaphragm. The esophagus passes through this hole and leads to the stomach. In some cases, part of the stomach may bulge above the diaphragm. This bulge is called a hernia. Stomach acid may move up into the esophagus and cause symptoms.  When you eat, the muscle at the hiatus relaxes to allow food to pass into the stomach. It tightens again to keep food and digestive acids in the stomach.  Many people with hiatal hernias have mild symptoms. You may notice the following GERD symptoms:  · Heartburn or other chest discomfort  · A feeling of chest fullness after a meal  · Frequent burping  · Acid taste in the mouth  · Trouble swallowing  Treating symptoms  If you have been diagnosed with hiatal hernia, these suggestions may help improve symptoms:  · Lose excess weight. Extra weight puts pressure on the stomach and esophagus.  · Dont lie down after eating. Sit up for at least an hour after eating. Lying down after eating can increase symptoms.  · Avoid certain foods and drinks. These include fatty foods, chocolate, coffee, mint, and other foods that cause symptoms for you.  · Dont smoke or drink alcohol. These can worsen symptoms.  · Look at your medicines. Discuss your medicines with your healthcare provider. Many medicines can cause symptoms.  · Consider an antacid medicine. Ask your healthcare provider about over-the-counter and prescription medicines that may help.  · Ask about surgery, if needed. Surgery is a treatment choice for some people. Your healthcare provider can determine if surgery is an option for you.    Date Last Reviewed: 10/1/2016  © 3398-2602 [employer]. [address] [place] [address], [place] [address]. All rights reserved. This information is not intended as a  substitute for professional medical care. Always follow your healthcare professional's instructions.        Upper GI Endoscopy     During endoscopy, a long, flexible tube is used to view the inside of your upper GI tract.      Upper GI endoscopy allows your healthcare provider to look directly into the beginning of your gastrointestinal (GI) tract. The esophagus, stomach, and duodenum (the first part of the small intestine) make up the upper GI tract.   Before the exam  Follow these and any other instructions you are given before your endoscopy. If you dont follow the healthcare providers instructions carefully, the test may need to be canceled or done over:  · Don't eat or drink anything after midnight the night before your exam. If your exam is in the afternoon, drink only clear liquids in the morning. Don't eat or drink anything for 8 hours before the exam. In some cases, you may be able to take medicines with sips of water until 2 hours before the procedure. Speak with your healthcare provider about this.   · Bring your X-rays and any other test results you have.  · Because you will be sedated, arrange for an adult to drive you home after the exam.  · Tell your healthcare provider before the exam if you are taking any medicines or have any medical problems.  The procedure  Here is what to expect:  · You will lie on the endoscopy table. Usually patients lie on the left side.  · You will be monitored and given oxygen.  · Your throat may be numbed with a spray or gargle. You are given medicine through an intravenous (IV) line that will help you relax and remain comfortable. You may be awake or asleep during the procedure.  · The healthcare provider will put the endoscope in your mouth and down your esophagus. It is thinner than most pieces of food that you swallow. It will not affect your breathing. The medicine helps keep you from gagging.  · Air is put into your GI tract to expand it. It can make you  burp.  · During the procedure, the healthcare provider can take biopsies (tissue samples), remove abnormalities, such as polyps, or treat abnormalities through a variety of devices placed through the endoscope. You will not feel this.   · The endoscope carries images of your upper GI tract to a video screen. If you are awake, you may be able to look at the images.  · After the procedure is done, you will rest for a time. An adult must drive you home.  When to call your healthcare provider  Contact your healthcare provider if you have:  · Black or tarry stools, or blood in your stool  · Fever  · Pain in your belly that does not go away  · Nausea and vomiting, or vomiting blood   Date Last Reviewed: 7/1/2016  © 7807-3627 [employer]. [address] [place] [address], [place] [address]. All rights reserved. This information is not intended as a substitute for professional medical care. Always follow your healthcare professional's instructions.

## 2020-08-13 NOTE — [employer] PATIENT INSTRUCTIONS
Discharge Summary/Instructions after an Endoscopic Procedure  Patient Name: Misti West  Patient MRN: [id number]  Patient YOB: 1965  Thursday, August 13, 2020  Tim Jorge MD  RESTRICTIONS:  During your procedure today, you received medications for sedation.  These   medications may affect your judgment, balance and coordination.  Therefore,   for 24 hours, you have the following restrictions:   - DO NOT drive a car, operate machinery, make legal/financial decisions,   sign important papers or drink alcohol.    ACTIVITY:  Today: no heavy lifting, straining or running due to procedural   sedation/anesthesia.  The following day: return to full activity including work.  DIET:  Eat and drink normally unless instructed otherwise.     TREATMENT FOR COMMON SIDE EFFECTS:  - Mild abdominal pain, nausea, belching, bloating or excessive gas:  rest,   eat lightly and use a heating pad.  - Sore Throat: treat with throat lozenges and/or gargle with warm salt   water.  - Because air was used during the procedure, expelling large amounts of air   from your rectum or belching is normal.  - If a bowel prep was taken, you may not have a bowel movement for 1-3 days.    This is normal.  SYMPTOMS TO WATCH FOR AND REPORT TO YOUR PHYSICIAN:  1. Abdominal pain or bloating, other than gas cramps.  2. Chest pain.  3. Back pain.  4. Signs of infection such as: chills or fever occurring within 24 hours   after the procedure.  5. Rectal bleeding, which would show as bright red, maroon, or black stools.   (A tablespoon of blood from the rectum is not serious, especially if   hemorrhoids are present.)  6. Vomiting.  7. Weakness or dizziness.  GO DIRECTLY TO THE NEAREST EMERGENCY ROOM IF YOU HAVE ANY OF THE FOLLOWING:      Difficulty breathing              Chills and/or fever over 101 F   Persistent vomiting and/or vomiting blood   Severe abdominal pain   Severe chest pain   Black, tarry stools   Bleeding- more than one  tablespoon   Any other symptom or condition that you feel may need urgent attention  Your doctor recommends these additional instructions:  If any biopsies were taken, your doctors clinic will contact you in 1 to 2   weeks with any results.  - Patient has a contact number available for emergencies.  The signs and   symptoms of potential delayed complications were discussed with the   patient.  Return to normal activities tomorrow.  Written discharge   instructions were provided to the patient.   - Resume previous diet.   - Continue present medications.   - No aspirin, ibuprofen, naproxen, or other non-steroidal anti-inflammatory   drugs.   - Await pathology results.   - Discharge patient to home (ambulatory).   - Follow an antireflux regimen.   - Return to nurse practitioner in 6 weeks.  For questions, problems or results please call your physician - Tim Jorge MD at Work:  [phone].  [facility], EMERGENCY ROOM PHONE NUMBER: [phone]  IF A COMPLICATION OR EMERGENCY SITUATION ARISES AND YOU ARE UNABLE TO REACH   YOUR PHYSICIAN - GO DIRECTLY TO THE EMERGENCY ROOM.  Tim Jorge MD  8/13/2020 8:53:17 AM  This report has been verified and signed electronically.  [employer]

## 2020-08-13 NOTE — TRANSFER OF CARE
"Anesthesia Transfer of Care Note    Patient: Misti West    Procedure(s) Performed: Procedure(s) (LRB):  EGD (ESOPHAGOGASTRODUODENOSCOPY) (N/A)    Patient location: GI    Anesthesia Type: general    Transport from OR: Transported from OR on room air with adequate spontaneous ventilation    Post pain: adequate analgesia    Post assessment: no apparent anesthetic complications    Post vital signs: stable    Level of consciousness: awake    Nausea/Vomiting: no nausea/vomiting    Complications: none    Transfer of care protocol was followed      Last vitals:   Visit Vitals  Ht 5' 9" (1.753 m)   Wt 77.1 kg (170 lb)   Breastfeeding No   BMI 25.10 kg/m²     "

## 2020-08-13 NOTE — ANESTHESIA POSTPROCEDURE EVALUATION
Anesthesia Post Evaluation    Patient: Misti West    Procedure(s) Performed: Procedure(s) (LRB):  EGD (ESOPHAGOGASTRODUODENOSCOPY) (N/A)    Final Anesthesia Type: general    Patient location during evaluation: PACU  Patient participation: Yes- Able to Participate  Level of consciousness: awake and alert  Post-procedure vital signs: reviewed and stable  Pain management: adequate  Airway patency: patent    PONV status at discharge: No PONV  Anesthetic complications: no      Cardiovascular status: blood pressure returned to baseline  Respiratory status: unassisted  Hydration status: euvolemic  Follow-up not needed.          Vitals Value Taken Time   /70 08/13/20 0920   Temp 36.6 °C (97.9 °F) 08/13/20 0854   Pulse 58 08/13/20 0920   Resp 16 08/13/20 0920   SpO2 98 % 08/13/20 0920         Event Time   Out of Recovery 09:50:00         Pain/Marcio Score: Marcio Score: 10 (8/13/2020  9:45 AM)

## 2020-08-14 VITALS
BODY MASS INDEX: 25.18 KG/M2 | OXYGEN SATURATION: 98 % | SYSTOLIC BLOOD PRESSURE: 121 MMHG | HEART RATE: 58 BPM | WEIGHT: 170 LBS | RESPIRATION RATE: 16 BRPM | DIASTOLIC BLOOD PRESSURE: 70 MMHG | HEIGHT: 69 IN | TEMPERATURE: 98 F

## 2020-08-17 ENCOUNTER — PATIENT OUTREACH (OUTPATIENT)
Dept: ADMINISTRATIVE | Facility: OTHER | Age: 55
End: 2020-08-17

## 2020-08-18 ENCOUNTER — PATIENT OUTREACH (OUTPATIENT)
Dept: ADMINISTRATIVE | Facility: HOSPITAL | Age: 55
End: 2020-08-18

## 2020-08-18 ENCOUNTER — HOSPITAL ENCOUNTER (OUTPATIENT)
Dept: RADIOLOGY | Facility: HOSPITAL | Age: 55
Discharge: HOME | End: 2020-08-18
Attending: ORTHOPAEDIC SURGERY
Payer: OTHER GOVERNMENT

## 2020-08-18 ENCOUNTER — OFFICE VISIT (OUTPATIENT)
Dept: ORTHOPEDICS | Facility: CLINIC | Age: 55
End: 2020-08-18

## 2020-08-18 VITALS
WEIGHT: 170 LBS | HEART RATE: 66 BPM | OXYGEN SATURATION: 98 % | BODY MASS INDEX: 25.18 KG/M2 | HEIGHT: 69 IN | TEMPERATURE: 99 F | RESPIRATION RATE: 12 BRPM

## 2020-08-18 DIAGNOSIS — M25.562 ACUTE PAIN OF LEFT KNEE: ICD-10-CM

## 2020-08-18 DIAGNOSIS — G89.29 CHRONIC PAIN OF LEFT KNEE: ICD-10-CM

## 2020-08-18 DIAGNOSIS — M25.562 CHRONIC PAIN OF LEFT KNEE: ICD-10-CM

## 2020-08-18 DIAGNOSIS — M23.52 CHRONIC INSTABILITY OF LEFT KNEE: ICD-10-CM

## 2020-08-18 DIAGNOSIS — M17.32 POST-TRAUMATIC OSTEOARTHRITIS OF LEFT KNEE: Primary | ICD-10-CM

## 2020-08-18 DIAGNOSIS — M71.22 BAKER CYST, LEFT: ICD-10-CM

## 2020-08-18 LAB
FINAL PATHOLOGIC DIAGNOSIS: NORMAL
GROSS: NORMAL
SUPPLEMENTAL DIAGNOSIS: NORMAL

## 2020-08-18 PROCEDURE — 99214 OFFICE O/P EST MOD 30 MIN: CPT | Mod: PBBFAC,25,PN | Performed by: ORTHOPAEDIC SURGERY

## 2020-08-18 PROCEDURE — 99204 PR OFFICE/OUTPT VISIT, NEW, LEVL IV, 45-59 MIN: ICD-10-PCS | Mod: 25,S$PBB,, | Performed by: ORTHOPAEDIC SURGERY

## 2020-08-18 PROCEDURE — 73562 XR KNEE 3 VIEW LEFT: ICD-10-PCS | Mod: 26,LT,, | Performed by: RADIOLOGY

## 2020-08-18 PROCEDURE — 73562 X-RAY EXAM OF KNEE 3: CPT | Mod: 26,LT,, | Performed by: RADIOLOGY

## 2020-08-18 PROCEDURE — 99999 PR PBB SHADOW E&M-EST. PATIENT-LVL IV: CPT | Mod: PBBFAC,,, | Performed by: ORTHOPAEDIC SURGERY

## 2020-08-18 PROCEDURE — 99204 OFFICE O/P NEW MOD 45 MIN: CPT | Mod: 25,S$PBB,, | Performed by: ORTHOPAEDIC SURGERY

## 2020-08-18 PROCEDURE — 20610 LARGE JOINT ASPIRATION/INJECTION: L KNEE: ICD-10-PCS | Mod: S$PBB,LT,, | Performed by: ORTHOPAEDIC SURGERY

## 2020-08-18 PROCEDURE — 99999 PR PBB SHADOW E&M-EST. PATIENT-LVL IV: ICD-10-PCS | Mod: PBBFAC,,, | Performed by: ORTHOPAEDIC SURGERY

## 2020-08-18 PROCEDURE — 20610 DRAIN/INJ JOINT/BURSA W/O US: CPT | Mod: PBBFAC,PN | Performed by: ORTHOPAEDIC SURGERY

## 2020-08-18 PROCEDURE — 73562 X-RAY EXAM OF KNEE 3: CPT | Mod: TC,PN,LT

## 2020-08-18 RX ORDER — TRIAMCINOLONE ACETONIDE 40 MG/ML
40 INJECTION, SUSPENSION INTRA-ARTICULAR; INTRAMUSCULAR
Status: DISCONTINUED | OUTPATIENT
Start: 2020-08-18 | End: 2020-08-18 | Stop reason: HOSPADM

## 2020-08-18 RX ADMIN — TRIAMCINOLONE ACETONIDE 40 MG: 40 INJECTION, SUSPENSION INTRA-ARTICULAR; INTRAMUSCULAR at 01:08

## 2020-08-18 NOTE — LETTER
August 18, 2020      Stephanie Phillip MD  [address] [place] [address]           [facility]  [address] [place] [address] [address]  [facility] [place] [address]  Phone: [phone]  Fax: [phone]          Patient: Misti West   MR Number: [id number]   YOB: 1965   Date of Visit: 8/18/2020       Dear Dr. Stephanie Phillip:    Thank you for referring Misti West to me for evaluation. Attached you will find relevant portions of my assessment and plan of care.    If you have questions, please do not hesitate to call me. I look forward to following Misti West along with you.    Sincerely,    Franki Cox, DO    Enclosure  CC:  No Recipients    If you would like to receive this communication electronically, please contact [email or web address] or [phone] to request more information on Member Desk Link access.    For providers and/or their staff who would like to refer a patient to [facility], please contact us through our one-stop-shop provider referral line, [facility], at [phone].    If you feel you have received this communication in error or would no longer like to receive these types of communications, please e-mail [email or web address]
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
## 2020-08-18 NOTE — PROGRESS NOTES
Health Maintenance Due   Topic Date Due    Foot Exam  10/07/1973    Urine Microalbumin  10/07/1973    TETANUS VACCINE  10/07/1981    Pneumococcal Vaccine (Medium Risk) (1 of 1 - PPSV23) 10/07/1982    Shingles Vaccine (1 of 2) 10/07/2013    Colorectal Cancer Screening  08/20/2016    Eye Exam  02/22/2017     Updates were requested from care everywhere.  Chart was reviewed for overdue Proactive [facility] Encounters (ARYA) topics (CRS, Breast Cancer Screening, Eye exam)  Health Maintenance has been updated.  LINKS immunization registry triggered.  Immunizations were not able to be reconciled. Patient not found in LINKS.

## 2020-08-18 NOTE — PROCEDURES
Large Joint Aspiration/Injection: L knee    Date/Time: 8/18/2020 1:00 PM  Performed by: Franki Cox DO  Authorized by: Franki Cox, DO     Consent Done?:  Yes (Verbal)  Indications:  Arthritis, diagnostic evaluation, joint swelling and pain  Site marked: the procedure site was marked    Timeout: prior to procedure the correct patient, procedure, and site was verified    Prep: patient was prepped and draped in usual sterile fashion      Details:  Needle Size:  22 G  Ultrasonic Guidance for needle placement?: No    Approach:  Anterolateral  Location:  Knee  Site:  L knee  Medications:  40 mg triamcinolone acetonide 40 mg/mL  Patient tolerance:  Patient tolerated the procedure well with no immediate complications

## 2020-08-24 ENCOUNTER — LAB VISIT (OUTPATIENT)
Dept: PRIMARY CARE CLINIC | Facility: CLINIC | Age: 55
End: 2020-08-24
Payer: OTHER GOVERNMENT

## 2020-08-24 DIAGNOSIS — Z01.818 PREOP TESTING: ICD-10-CM

## 2020-08-24 PROCEDURE — U0003 INFECTIOUS AGENT DETECTION BY NUCLEIC ACID (DNA OR RNA); SEVERE ACUTE RESPIRATORY SYNDROME CORONAVIRUS 2 (SARS-COV-2) (CORONAVIRUS DISEASE [COVID-19]), AMPLIFIED PROBE TECHNIQUE, MAKING USE OF HIGH THROUGHPUT TECHNOLOGIES AS DESCRIBED BY CMS-2020-01-R: HCPCS

## 2020-08-25 LAB — SARS-COV-2 RNA RESP QL NAA+PROBE: NOT DETECTED

## 2020-08-27 ENCOUNTER — TELEPHONE (OUTPATIENT)
Dept: GASTROENTEROLOGY | Facility: CLINIC | Age: 55
End: 2020-08-27

## 2020-08-27 ENCOUNTER — HOSPITAL ENCOUNTER (OUTPATIENT)
Facility: HOSPITAL | Age: 55
Discharge: HOME | End: 2020-08-27
Attending: INTERNAL MEDICINE | Admitting: INTERNAL MEDICINE

## 2020-08-27 ENCOUNTER — ANESTHESIA (OUTPATIENT)
Dept: ENDOSCOPY | Facility: HOSPITAL | Age: 55
End: 2020-08-27

## 2020-08-27 ENCOUNTER — ANESTHESIA EVENT (OUTPATIENT)
Dept: ENDOSCOPY | Facility: HOSPITAL | Age: 55
End: 2020-08-27

## 2020-08-27 DIAGNOSIS — K64.8 INTERNAL HEMORRHOIDS: ICD-10-CM

## 2020-08-27 DIAGNOSIS — K57.90 DIVERTICULOSIS: ICD-10-CM

## 2020-08-27 DIAGNOSIS — K63.5 POLYP OF COLON, UNSPECIFIED PART OF COLON, UNSPECIFIED TYPE: Primary | ICD-10-CM

## 2020-08-27 DIAGNOSIS — R19.4 CHANGE IN BOWEL HABITS: ICD-10-CM

## 2020-08-27 PROCEDURE — 45380 PR COLONOSCOPY,BIOPSY: ICD-10-PCS | Mod: 59,,, | Performed by: INTERNAL MEDICINE

## 2020-08-27 PROCEDURE — 27201012 HC FORCEPS, HOT/COLD, DISP: Performed by: INTERNAL MEDICINE

## 2020-08-27 PROCEDURE — 88305 TISSUE EXAM BY PATHOLOGIST: CPT | Mod: 26,,, | Performed by: PATHOLOGY

## 2020-08-27 PROCEDURE — 25000003 PHARM REV CODE 250: Performed by: NURSE ANESTHETIST, CERTIFIED REGISTERED

## 2020-08-27 PROCEDURE — 45385 PR COLONOSCOPY,REMV LESN,SNARE: ICD-10-PCS | Mod: ,,, | Performed by: INTERNAL MEDICINE

## 2020-08-27 PROCEDURE — 25000003 PHARM REV CODE 250: Performed by: INTERNAL MEDICINE

## 2020-08-27 PROCEDURE — 88305 TISSUE EXAM BY PATHOLOGIST: ICD-10-PCS | Mod: 26,,, | Performed by: PATHOLOGY

## 2020-08-27 PROCEDURE — 27201089 HC SNARE, DISP (ANY): Performed by: INTERNAL MEDICINE

## 2020-08-27 PROCEDURE — 37000009 HC ANESTHESIA EA ADD 15 MINS: Performed by: INTERNAL MEDICINE

## 2020-08-27 PROCEDURE — 45385 COLONOSCOPY W/LESION REMOVAL: CPT | Mod: ,,, | Performed by: INTERNAL MEDICINE

## 2020-08-27 PROCEDURE — 88305 TISSUE EXAM BY PATHOLOGIST: CPT | Performed by: PATHOLOGY

## 2020-08-27 PROCEDURE — 45380 COLONOSCOPY AND BIOPSY: CPT | Mod: 59,,, | Performed by: INTERNAL MEDICINE

## 2020-08-27 PROCEDURE — D9220A PRA ANESTHESIA: Mod: ,,, | Performed by: ANESTHESIOLOGY

## 2020-08-27 PROCEDURE — 45385 COLONOSCOPY W/LESION REMOVAL: CPT | Performed by: INTERNAL MEDICINE

## 2020-08-27 PROCEDURE — 63600175 PHARM REV CODE 636 W HCPCS: Performed by: NURSE ANESTHETIST, CERTIFIED REGISTERED

## 2020-08-27 PROCEDURE — 45380 COLONOSCOPY AND BIOPSY: CPT | Performed by: INTERNAL MEDICINE

## 2020-08-27 PROCEDURE — 37000008 HC ANESTHESIA 1ST 15 MINUTES: Performed by: INTERNAL MEDICINE

## 2020-08-27 PROCEDURE — D9220A PRA ANESTHESIA: ICD-10-PCS | Mod: ,,, | Performed by: ANESTHESIOLOGY

## 2020-08-27 RX ORDER — SODIUM CHLORIDE 9 MG/ML
INJECTION, SOLUTION INTRAVENOUS CONTINUOUS
Status: DISCONTINUED | OUTPATIENT
Start: 2020-08-27 | End: 2020-08-27 | Stop reason: HOSPADM

## 2020-08-27 RX ORDER — CLARITHROMYCIN 500 MG/1
500 TABLET, FILM COATED ORAL 2 TIMES DAILY
Qty: 20 TABLET | Refills: 0 | Status: SHIPPED | OUTPATIENT
Start: 2020-08-27 | End: 2020-09-06

## 2020-08-27 RX ORDER — METRONIDAZOLE 500 MG/1
500 TABLET ORAL 3 TIMES DAILY
Qty: 30 TABLET | Refills: 0 | Status: SHIPPED | OUTPATIENT
Start: 2020-08-27 | End: 2020-09-06

## 2020-08-27 RX ORDER — LIDOCAINE HCL/PF 100 MG/5ML
SYRINGE (ML) INTRAVENOUS
Status: DISCONTINUED | OUTPATIENT
Start: 2020-08-27 | End: 2020-08-27

## 2020-08-27 RX ORDER — PROPOFOL 10 MG/ML
VIAL (ML) INTRAVENOUS
Status: DISCONTINUED | OUTPATIENT
Start: 2020-08-27 | End: 2020-08-27

## 2020-08-27 RX ADMIN — PROPOFOL 50 MG: 10 INJECTION, EMULSION INTRAVENOUS at 08:08

## 2020-08-27 RX ADMIN — LIDOCAINE HYDROCHLORIDE 100 MG: 20 INJECTION INTRAVENOUS at 08:08

## 2020-08-27 RX ADMIN — SODIUM CHLORIDE: 0.9 INJECTION, SOLUTION INTRAVENOUS at 08:08

## 2020-08-27 RX ADMIN — PROPOFOL 100 MG: 10 INJECTION, EMULSION INTRAVENOUS at 08:08

## 2020-08-27 NOTE — DISCHARGE INSTRUCTIONS

## 2020-08-27 NOTE — INTERVAL H&P NOTE
The patient has been examined and the H&P has been reviewed:    I concur with the findings and no changes have occurred since H&P was written.    Surgery risks, benefits and alternative options discussed and understood by patient/family.          Active Hospital Problems    Diagnosis  POA    Change in bowel habits [R19.4]  Yes      Resolved Hospital Problems   No resolved problems to display.

## 2020-08-27 NOTE — ANESTHESIA PREPROCEDURE EVALUATION
08/27/2020  Misti West is a 54 y.o., female.    Anesthesia Evaluation    I have reviewed the Patient Summary Reports.    I have reviewed the Nursing Notes.       Review of Systems  Anesthesia Hx:  Hx of Anesthetic complications    Cardiovascular:  Cardiovascular Normal     Pulmonary:  Pulmonary Normal    Hepatic/GI:   Hiatal Hernia, Liver Disease,        Physical Exam  General:  Well nourished    Airway/Jaw/Neck:  Airway Findings: Mouth Opening: Normal Tongue: Normal  Mallampati: II  TM Distance: Normal, at least 6 cm  Jaw/Neck Findings:  Neck ROM: Normal ROM     Eyes/Ears/Nose:  Eyes/Ears/Nose Findings:    Dental:  Dental Findings: In tact   Chest/Lungs:  Chest/Lungs Findings: Normal Respiratory Rate     Heart/Vascular:  Heart Findings: Rate: Normal  Rhythm: Regular Rhythm        Mental Status:  Mental Status Findings:  Cooperative, Alert and Oriented         Anesthesia Plan  Type of Anesthesia, risks & benefits discussed:  Anesthesia Type:  general  Patient's Preference: General  Intra-op Monitoring Plan: standard ASA monitors  Intra-op Monitoring Plan Comments:   Post Op Pain Control Plan:   Post Op Pain Control Plan Comments:   Induction:   IV  Beta Blocker:  Patient is not currently on a Beta-Blocker (No further documentation required).       Informed Consent: Patient understands risks and agrees with Anesthesia plan.  Questions answered. Anesthesia consent signed with patient.  ASA Score: 1     Day of Surgery Review of History & Physical:    H&P update referred to the surgeon.         Ready For Surgery From Anesthesia Perspective.

## 2020-08-27 NOTE — PLAN OF CARE
Vss, jennifer po fluids, denies pain, ambulates easily. IV removed, catheter intact. Discharge instructions provided and states understanding. States ready to go home.  Discharged from facility with family per wheelchair.

## 2020-08-27 NOTE — PROVATION PATIENT INSTRUCTIONS
Discharge Summary/Instructions after an Endoscopic Procedure  Patient Name: iMsti West  Patient MRN: [id number]  Patient YOB: 1965  Thursday, August 27, 2020  Tim Jorge MD  RESTRICTIONS:  During your procedure today, you received medications for sedation.  These   medications may affect your judgment, balance and coordination.  Therefore,   for 24 hours, you have the following restrictions:   - DO NOT drive a car, operate machinery, make legal/financial decisions,   sign important papers or drink alcohol.    ACTIVITY:  Today: no heavy lifting, straining or running due to procedural   sedation/anesthesia.  The following day: return to full activity including work.  DIET:  Eat and drink normally unless instructed otherwise.     TREATMENT FOR COMMON SIDE EFFECTS:  - Mild abdominal pain, nausea, belching, bloating or excessive gas:  rest,   eat lightly and use a heating pad.  - Sore Throat: treat with throat lozenges and/or gargle with warm salt   water.  - Because air was used during the procedure, expelling large amounts of air   from your rectum or belching is normal.  - If a bowel prep was taken, you may not have a bowel movement for 1-3 days.    This is normal.  SYMPTOMS TO WATCH FOR AND REPORT TO YOUR PHYSICIAN:  1. Abdominal pain or bloating, other than gas cramps.  2. Chest pain.  3. Back pain.  4. Signs of infection such as: chills or fever occurring within 24 hours   after the procedure.  5. Rectal bleeding, which would show as bright red, maroon, or black stools.   (A tablespoon of blood from the rectum is not serious, especially if   hemorrhoids are present.)  6. Vomiting.  7. Weakness or dizziness.  GO DIRECTLY TO THE NEAREST EMERGENCY ROOM IF YOU HAVE ANY OF THE FOLLOWING:      Difficulty breathing              Chills and/or fever over 101 F   Persistent vomiting and/or vomiting blood   Severe abdominal pain   Severe chest pain   Black, tarry stools   Bleeding- more than one  tablespoon   Any other symptom or condition that you feel may need urgent attention  Your doctor recommends these additional instructions:  If any biopsies were taken, your doctors clinic will contact you in 1 to 2   weeks with any results.  - Patient has a contact number available for emergencies.  The signs and   symptoms of potential delayed complications were discussed with the   patient.  Return to normal activities tomorrow.  Written discharge   instructions were provided to the patient.   - High fiber diet.   - Continue present medications.   - Await pathology results.   - Repeat colonoscopy in 3 - 5 years for surveillance.   - Discharge patient to home (ambulatory).   - Repeat colonoscopy in 3 - 5 years for surveillance.   - Return to my office after studies are complete.  For questions, problems or results please call your physician - Tim Jorge MD at Work:  [phone].  [facility], EMERGENCY ROOM PHONE NUMBER: [phone]  IF A COMPLICATION OR EMERGENCY SITUATION ARISES AND YOU ARE UNABLE TO REACH   YOUR PHYSICIAN - GO DIRECTLY TO THE EMERGENCY ROOM.  Tim Jorge MD  8/27/2020 9:17:11 AM  This report has been verified and signed electronically.  [employer]ATION

## 2020-08-27 NOTE — TRANSFER OF CARE
"Anesthesia Transfer of Care Note    Patient: Misti West    Procedure(s) Performed: Procedure(s) (LRB):  COLONOSCOPY (N/A)    Patient location: GI    Anesthesia Type: general    Transport from OR: Transported from OR on room air with adequate spontaneous ventilation    Post pain: adequate analgesia    Post assessment: no apparent anesthetic complications and tolerated procedure well    Post vital signs: stable    Level of consciousness: awake, alert and oriented    Nausea/Vomiting: no nausea/vomiting    Complications: none    Transfer of care protocol was followed      Last vitals:   Visit Vitals  /60 (BP Location: Left arm, Patient Position: Lying)   Pulse (!) 57   Temp 36.7 °C (98.1 °F) (Skin)   Resp 16   Ht 5' 9" (1.753 m)   Wt 75.3 kg (166 lb)   SpO2 97%   Breastfeeding No   BMI 24.51 kg/m²     "

## 2020-08-28 VITALS
WEIGHT: 166 LBS | HEART RATE: 62 BPM | SYSTOLIC BLOOD PRESSURE: 128 MMHG | RESPIRATION RATE: 16 BRPM | HEIGHT: 69 IN | BODY MASS INDEX: 24.59 KG/M2 | DIASTOLIC BLOOD PRESSURE: 64 MMHG | TEMPERATURE: 98 F | OXYGEN SATURATION: 97 %

## 2020-08-28 LAB
FINAL PATHOLOGIC DIAGNOSIS: NORMAL
GROSS: NORMAL

## 2020-08-28 NOTE — ANESTHESIA POSTPROCEDURE EVALUATION
Anesthesia Post Evaluation    Patient: Misti West    Procedure(s) Performed: Procedure(s) (LRB):  COLONOSCOPY (N/A)    Final Anesthesia Type: general    Patient location during evaluation: PACU  Patient participation: Yes- Able to Participate  Level of consciousness: awake and alert, oriented and awake  Post-procedure vital signs: reviewed and stable  Pain management: adequate  Airway patency: patent    PONV status at discharge: No PONV  Anesthetic complications: no      Cardiovascular status: blood pressure returned to baseline and hemodynamically stable  Respiratory status: unassisted, spontaneous ventilation and room air  Hydration status: euvolemic  Follow-up not needed.          Vitals Value Taken Time   /64 08/27/20 0930   Temp 36.7 °C (98.1 °F) 08/27/20 0910   Pulse 62 08/27/20 0930   Resp 16 08/27/20 0930   SpO2 97 % 08/27/20 0930         Event Time   Out of Recovery 09:40:12         Pain/Marcio Score: Marcio Score: 10 (8/27/2020  9:30 AM)

## 2020-09-01 ENCOUNTER — CLINICAL SUPPORT (OUTPATIENT)
Dept: FAMILY MEDICINE | Facility: CLINIC | Age: 55
End: 2020-09-01

## 2020-09-01 ENCOUNTER — OFFICE VISIT (OUTPATIENT)
Dept: FAMILY MEDICINE | Facility: CLINIC | Age: 55
End: 2020-09-01

## 2020-09-01 VITALS
OXYGEN SATURATION: 97 % | RESPIRATION RATE: 15 BRPM | HEIGHT: 69 IN | WEIGHT: 174.81 LBS | SYSTOLIC BLOOD PRESSURE: 96 MMHG | TEMPERATURE: 99 F | DIASTOLIC BLOOD PRESSURE: 63 MMHG | BODY MASS INDEX: 25.89 KG/M2 | HEART RATE: 76 BPM

## 2020-09-01 DIAGNOSIS — R10.84 GENERALIZED ABDOMINAL PAIN: Primary | ICD-10-CM

## 2020-09-01 DIAGNOSIS — E11.9 TYPE 2 DIABETES MELLITUS WITHOUT COMPLICATION, UNSPECIFIED WHETHER LONG TERM INSULIN USE: ICD-10-CM

## 2020-09-01 DIAGNOSIS — J30.2 SEASONAL ALLERGIES: ICD-10-CM

## 2020-09-01 PROCEDURE — 92250 FUNDUS PHOTOGRAPHY W/I&R: CPT | Mod: 26,S$PBB,, | Performed by: OPHTHALMOLOGY

## 2020-09-01 PROCEDURE — 92250 DIABETIC EYE SCREENING PHOTO: ICD-10-PCS | Mod: 26,S$PBB,, | Performed by: OPHTHALMOLOGY

## 2020-09-01 PROCEDURE — 99214 OFFICE O/P EST MOD 30 MIN: CPT | Mod: S$GLB,,, | Performed by: FAMILY MEDICINE

## 2020-09-01 PROCEDURE — 99214 PR OFFICE/OUTPT VISIT, EST, LEVL IV, 30-39 MIN: ICD-10-PCS | Mod: S$GLB,,, | Performed by: FAMILY MEDICINE

## 2020-09-01 PROCEDURE — 92250 FUNDUS PHOTOGRAPHY W/I&R: CPT | Mod: PBBFAC,PN

## 2020-09-01 RX ORDER — LORATADINE AND PSEUDOEPHEDRINE SULFATE 5; 120 MG/1; MG/1
1 TABLET, EXTENDED RELEASE ORAL 2 TIMES DAILY PRN
Qty: 20 TABLET | Refills: 2 | Status: SHIPPED | OUTPATIENT
Start: 2020-09-01 | End: 2021-04-15 | Stop reason: SDUPTHER

## 2020-09-01 NOTE — PROGRESS NOTES
[facility] - Clinic Note    Subjective      Ms. West is a 54 y.o. female who presents to clinic for Follow-up    Patient reports that overall she has been feeling better.  Had colonoscopy with polypectomy.  No signs of cancer.  EGD showed H pylori.  Is currently taking antibiotics for this.  Has having some jittery feeling in her stomach.  Could be related to anxiety.  Has not noticed any relation to food.  Needs refill of Claritin D which she uses intermittently for allergy symptoms.    PMH Misti has a past medical history of Anxiety, Arthritis, Cancer, Hepatomegaly, Hiatal hernia, Liver lesion, and PONV (postoperative nausea and vomiting).   PSXH Misti has a past surgical history that includes Tubal ligation; Sinus surgery; Knee arthroscopy; Esophagogastroduodenoscopy (N/A, 8/13/2020); Cervix surgery; and Colonoscopy (N/A, 8/27/2020).    Misti's family history includes Arthritis in her father, mother, sister, sister, and sister; Breast cancer in her sister, sister, and sister; COPD in her father; Cancer in her father and mother; Diabetes in her mother; Hypertension in her father; Kidney disease in her sister.    Misti reports that she has been smoking. She has been smoking about 1.00 pack per day. She has never used smokeless tobacco. She reports previous alcohol use. She reports that she does not use drugs.   DERRICK Chappell is allergic to codeine; morphine; naproxen sodium; penicillins; tramadol hcl; and trintex.   LEILANI Chappell has a current medication list which includes the following prescription(s): alprazolam, atorvastatin, clarithromycin, claritin-d 12 hour, metronidazole, and omeprazole.     Review of Systems   Constitutional: Negative for chills and fever.   HENT: Positive for congestion.    Cardiovascular: Negative for chest pain.     Objective     BP 96/63 (BP Location: Left arm, Patient Position: Sitting, BP Method: Large (Automatic))   Pulse 76   Temp 98.8 °F (37.1 °C) (Temporal)   Resp 15    "Ht 5' 9" (1.753 m)   Wt 79.3 kg (174 lb 12.8 oz)   SpO2 97%   BMI 25.81 kg/m²     Physical Exam  Vitals signs and nursing note reviewed.   Constitutional:       General: She is not in acute distress.     Appearance: Normal appearance. She is well-developed. She is not diaphoretic.   HENT:      Head: Normocephalic and atraumatic.      Right Ear: External ear normal.      Left Ear: External ear normal.   Eyes:      General:         Right eye: No discharge.         Left eye: No discharge.   Cardiovascular:      Rate and Rhythm: Normal rate and regular rhythm.      Heart sounds: Normal heart sounds.   Pulmonary:      Effort: Pulmonary effort is normal.      Breath sounds: Normal breath sounds. No wheezing or rales.   Skin:     General: Skin is warm and dry.   Neurological:      Mental Status: She is alert and oriented to person, place, and time. Mental status is at baseline.   Psychiatric:         Mood and Affect: Mood normal.         Behavior: Behavior normal.         Thought Content: Thought content normal.         Judgment: Judgment normal.        Assessment/Plan     1. Generalized abdominal pain     2. Seasonal allergies  loratadine-pseudoephedrine 5-120 mg (CLARITIN-D 12 HOUR) 5-120 mg per tablet     Will continue to monitor abdominal symptoms.  Finish antibiotics.  Patient has follow-up with urology in the next coming weeks.  Refill of Claritin D provided.   reviewed.  No red flags.    Future Appointments   Date Time Provider Department Center   9/14/2020 10:00 AM [facility], RADHA NURSE [facility]   9/14/2020 10:40 AM COVID TESTING, [facility]   12/1/2020  1:30 PM Tim Aguayo MD [facility]STRO [facility]   1/29/2021  9:30 AM [facility] CT2 LIMIT 500 LBS [facility] CT SCAN [place]         Pierre Espinal MD  Family Medicine  [facility]            "

## 2020-09-01 NOTE — PROGRESS NOTES
Misti West is a 54 y.o. female here for a diabetic eye screening with non-dilated fundus photos per PCP.    Patient cooperative?:YES  Small pupils?:NO  Last eye exam: UNKNOWN    For exam results, see Encounter Report.

## 2020-09-08 ENCOUNTER — PATIENT MESSAGE (OUTPATIENT)
Dept: GASTROENTEROLOGY | Facility: CLINIC | Age: 55
End: 2020-09-08

## 2020-09-10 ENCOUNTER — PATIENT MESSAGE (OUTPATIENT)
Dept: GASTROENTEROLOGY | Facility: CLINIC | Age: 55
End: 2020-09-10

## 2020-09-14 ENCOUNTER — TELEPHONE (OUTPATIENT)
Dept: UROLOGY | Facility: CLINIC | Age: 55
End: 2020-09-14

## 2020-09-14 ENCOUNTER — HOSPITAL ENCOUNTER (OUTPATIENT)
Dept: PREADMISSION TESTING | Facility: HOSPITAL | Age: 55
Discharge: HOME | End: 2020-09-14
Attending: UROLOGY
Payer: OTHER GOVERNMENT

## 2020-09-14 ENCOUNTER — ANESTHESIA EVENT (OUTPATIENT)
Dept: SURGERY | Facility: HOSPITAL | Age: 55
End: 2020-09-14

## 2020-09-14 VITALS — HEIGHT: 69 IN | WEIGHT: 174 LBS | BODY MASS INDEX: 25.77 KG/M2

## 2020-09-14 DIAGNOSIS — Z41.9 SURGERY, ELECTIVE: Primary | ICD-10-CM

## 2020-09-14 DIAGNOSIS — Z01.818 PREOP EXAMINATION: ICD-10-CM

## 2020-09-14 PROCEDURE — 99900103 DSU ONLY-NO CHARGE-INITIAL HR (STAT)

## 2020-09-14 PROCEDURE — U0003 INFECTIOUS AGENT DETECTION BY NUCLEIC ACID (DNA OR RNA); SEVERE ACUTE RESPIRATORY SYNDROME CORONAVIRUS 2 (SARS-COV-2) (CORONAVIRUS DISEASE [COVID-19]), AMPLIFIED PROBE TECHNIQUE, MAKING USE OF HIGH THROUGHPUT TECHNOLOGIES AS DESCRIBED BY CMS-2020-01-R: HCPCS

## 2020-09-14 NOTE — TELEPHONE ENCOUNTER
Spoke to pt and informed her procedure for 09/17 will be moved to 09/24, pt was informed she will need to repeat covid, order has been placed. Will need to contact covid clinic to schedule no open slots are available.

## 2020-09-14 NOTE — ANESTHESIA PREPROCEDURE EVALUATION
09/14/2020  Misti West is a 54 y.o., female.    Anesthesia Evaluation    I have reviewed the Patient Summary Reports.    I have reviewed the Nursing Notes.    I have reviewed the Medications.     Review of Systems  Anesthesia Hx:         PONV Neg history of prior surgery. Denies Family Hx of Anesthesia complications.   Denies Personal Hx of Anesthesia complications.   Social:  Smoker    Hematology/Oncology:  Hematology Normal   Oncology Normal     EENT/Dental:EENT/Dental Normal   Cardiovascular:  Cardiovascular Normal     Pulmonary:  Pulmonary Normal    Renal/:  Renal/ Normal     Hepatic/GI:   Hiatal Hernia, Liver Disease,    Musculoskeletal:  Musculoskeletal Normal    Neurological:  Neurology Normal    Endocrine:  Endocrine Normal    Dermatological:  Skin Normal    Psych:  Psychiatric Normal           Physical Exam  General:  Well nourished    Airway/Jaw/Neck:  Airway Findings: Mouth Opening: Normal Tongue: Normal  General Airway Assessment: Adult  Mallampati: I  TM Distance: 4 - 6 cm        Eyes/Ears/Nose:  EYES/EARS/NOSE FINDINGS: Normal   Dental:  DENTAL FINDINGS: Normal   Chest/Lungs:  Chest/Lungs Clear    Heart/Vascular:  Heart Findings: Normal Heart murmur: negative Vascular Findings: Normal    Abdomen:  Abdomen Findings: Normal    Musculoskeletal:  Musculoskeletal Findings: Normal   Skin:  Skin Findings: Normal    Mental Status:  Mental Status Findings: Normal        Anesthesia Plan  Type of Anesthesia, risks & benefits discussed:  Anesthesia Type:  general  Patient's Preference:   Intra-op Monitoring Plan: standard ASA monitors  Intra-op Monitoring Plan Comments:   Post Op Pain Control Plan:   Post Op Pain Control Plan Comments:   Induction:   IV  Beta Blocker:  Patient is not currently on a Beta-Blocker (No further documentation required).       Informed Consent: Patient understands risks and  agrees with Anesthesia plan.  Questions answered. Anesthesia consent signed with patient.  ASA Score: 2     Day of Surgery Review of History & Physical: I have interviewed and examined the patient. I have reviewed the patient's H&P dated:    H&P update referred to the provider.         Ready For Surgery From Anesthesia Perspective.

## 2020-09-15 LAB — SARS-COV-2 RNA RESP QL NAA+PROBE: NOT DETECTED

## 2020-09-17 ENCOUNTER — ANESTHESIA (OUTPATIENT)
Dept: SURGERY | Facility: HOSPITAL | Age: 55
End: 2020-09-17

## 2020-09-28 ENCOUNTER — LAB VISIT (OUTPATIENT)
Dept: FAMILY MEDICINE | Facility: CLINIC | Age: 55
End: 2020-09-28
Payer: OTHER GOVERNMENT

## 2020-09-28 DIAGNOSIS — Z41.9 SURGERY, ELECTIVE: ICD-10-CM

## 2020-09-28 PROCEDURE — U0003 INFECTIOUS AGENT DETECTION BY NUCLEIC ACID (DNA OR RNA); SEVERE ACUTE RESPIRATORY SYNDROME CORONAVIRUS 2 (SARS-COV-2) (CORONAVIRUS DISEASE [COVID-19]), AMPLIFIED PROBE TECHNIQUE, MAKING USE OF HIGH THROUGHPUT TECHNOLOGIES AS DESCRIBED BY CMS-2020-01-R: HCPCS

## 2020-09-29 ENCOUNTER — TELEPHONE (OUTPATIENT)
Dept: UROLOGY | Facility: CLINIC | Age: 55
End: 2020-09-29

## 2020-09-29 ENCOUNTER — PATIENT MESSAGE (OUTPATIENT)
Dept: SURGERY | Facility: HOSPITAL | Age: 55
End: 2020-09-29

## 2020-09-29 ENCOUNTER — PATIENT MESSAGE (OUTPATIENT)
Dept: GASTROENTEROLOGY | Facility: CLINIC | Age: 55
End: 2020-09-29

## 2020-09-29 LAB — SARS-COV-2 RNA RESP QL NAA+PROBE: NOT DETECTED

## 2020-09-29 NOTE — TELEPHONE ENCOUNTER
Message sent through PP also by pt,  answered pt concerns through Patient Portal     ----- Message from Mary Ann Mandujano sent at 9/29/2020 12:32 PM CDT -----  Regarding: Appointment time  Contact: Patient  Patient want to speak with a nurse regarding upcoming appointment procedure time and date please call back at [phone], patient needed to know because of transportation

## 2020-10-01 ENCOUNTER — HOSPITAL ENCOUNTER (OUTPATIENT)
Facility: HOSPITAL | Age: 55
Discharge: HOME | End: 2020-10-01
Attending: UROLOGY | Admitting: UROLOGY

## 2020-10-01 VITALS
WEIGHT: 170 LBS | HEART RATE: 58 BPM | HEIGHT: 69 IN | BODY MASS INDEX: 25.18 KG/M2 | DIASTOLIC BLOOD PRESSURE: 71 MMHG | TEMPERATURE: 98 F | RESPIRATION RATE: 12 BRPM | SYSTOLIC BLOOD PRESSURE: 125 MMHG | OXYGEN SATURATION: 98 %

## 2020-10-01 DIAGNOSIS — N39.0 RECURRENT UTI: ICD-10-CM

## 2020-10-01 PROCEDURE — 37000009 HC ANESTHESIA EA ADD 15 MINS: Performed by: UROLOGY

## 2020-10-01 PROCEDURE — 71000015 HC POSTOP RECOV 1ST HR: Performed by: UROLOGY

## 2020-10-01 PROCEDURE — 36000706: Performed by: UROLOGY

## 2020-10-01 PROCEDURE — D9220A PRA ANESTHESIA: Mod: ,,, | Performed by: ANESTHESIOLOGY

## 2020-10-01 PROCEDURE — 52000 PR CYSTOURETHROSCOPY: ICD-10-PCS | Mod: ,,, | Performed by: UROLOGY

## 2020-10-01 PROCEDURE — 25000003 PHARM REV CODE 250: Performed by: UROLOGY

## 2020-10-01 PROCEDURE — 36000707: Performed by: UROLOGY

## 2020-10-01 PROCEDURE — 52000 CYSTOURETHROSCOPY: CPT | Mod: ,,, | Performed by: UROLOGY

## 2020-10-01 PROCEDURE — D9220A PRA ANESTHESIA: ICD-10-PCS | Mod: ,,, | Performed by: ANESTHESIOLOGY

## 2020-10-01 PROCEDURE — 37000008 HC ANESTHESIA 1ST 15 MINUTES: Performed by: UROLOGY

## 2020-10-01 PROCEDURE — 63600175 PHARM REV CODE 636 W HCPCS: Performed by: NURSE ANESTHETIST, CERTIFIED REGISTERED

## 2020-10-01 PROCEDURE — 71000033 HC RECOVERY, INTIAL HOUR: Performed by: UROLOGY

## 2020-10-01 PROCEDURE — 63600175 PHARM REV CODE 636 W HCPCS: Performed by: UROLOGY

## 2020-10-01 RX ORDER — OXYCODONE AND ACETAMINOPHEN 5; 325 MG/1; MG/1
1 TABLET ORAL
Status: DISCONTINUED | OUTPATIENT
Start: 2020-10-01 | End: 2020-10-01 | Stop reason: HOSPADM

## 2020-10-01 RX ORDER — PROPOFOL 10 MG/ML
VIAL (ML) INTRAVENOUS
Status: DISCONTINUED | OUTPATIENT
Start: 2020-10-01 | End: 2020-10-01

## 2020-10-01 RX ORDER — SODIUM CHLORIDE, SODIUM LACTATE, POTASSIUM CHLORIDE, CALCIUM CHLORIDE 600; 310; 30; 20 MG/100ML; MG/100ML; MG/100ML; MG/100ML
INJECTION, SOLUTION INTRAVENOUS CONTINUOUS PRN
Status: DISCONTINUED | OUTPATIENT
Start: 2020-10-01 | End: 2020-10-01

## 2020-10-01 RX ORDER — ONDANSETRON 2 MG/ML
4 INJECTION INTRAMUSCULAR; INTRAVENOUS DAILY PRN
Status: DISCONTINUED | OUTPATIENT
Start: 2020-10-01 | End: 2020-10-01 | Stop reason: HOSPADM

## 2020-10-01 RX ORDER — CIPROFLOXACIN 2 MG/ML
400 INJECTION, SOLUTION INTRAVENOUS
Status: COMPLETED | OUTPATIENT
Start: 2020-10-01 | End: 2020-10-01

## 2020-10-01 RX ORDER — MIDAZOLAM HYDROCHLORIDE 1 MG/ML
INJECTION, SOLUTION INTRAMUSCULAR; INTRAVENOUS
Status: DISCONTINUED | OUTPATIENT
Start: 2020-10-01 | End: 2020-10-01

## 2020-10-01 RX ORDER — SODIUM CHLORIDE, SODIUM LACTATE, POTASSIUM CHLORIDE, CALCIUM CHLORIDE 600; 310; 30; 20 MG/100ML; MG/100ML; MG/100ML; MG/100ML
INJECTION, SOLUTION INTRAVENOUS CONTINUOUS
Status: CANCELLED | OUTPATIENT
Start: 2020-10-01

## 2020-10-01 RX ORDER — KETOROLAC TROMETHAMINE 30 MG/ML
15 INJECTION, SOLUTION INTRAMUSCULAR; INTRAVENOUS ONCE
Status: DISCONTINUED | OUTPATIENT
Start: 2020-10-01 | End: 2020-10-01 | Stop reason: HOSPADM

## 2020-10-01 RX ORDER — LIDOCAINE HYDROCHLORIDE 10 MG/ML
1 INJECTION, SOLUTION EPIDURAL; INFILTRATION; INTRACAUDAL; PERINEURAL ONCE
Status: CANCELLED | OUTPATIENT
Start: 2020-10-01 | End: 2020-10-01

## 2020-10-01 RX ORDER — LIDOCAINE HYDROCHLORIDE 20 MG/ML
INJECTION INTRAVENOUS
Status: DISCONTINUED | OUTPATIENT
Start: 2020-10-01 | End: 2020-10-01

## 2020-10-01 RX ORDER — SODIUM CHLORIDE, SODIUM LACTATE, POTASSIUM CHLORIDE, CALCIUM CHLORIDE 600; 310; 30; 20 MG/100ML; MG/100ML; MG/100ML; MG/100ML
125 INJECTION, SOLUTION INTRAVENOUS CONTINUOUS
Status: DISCONTINUED | OUTPATIENT
Start: 2020-10-01 | End: 2020-10-01 | Stop reason: HOSPADM

## 2020-10-01 RX ORDER — LIDOCAINE HYDROCHLORIDE 20 MG/ML
JELLY TOPICAL
Status: DISCONTINUED | OUTPATIENT
Start: 2020-10-01 | End: 2020-10-01 | Stop reason: HOSPADM

## 2020-10-01 RX ADMIN — PROPOFOL 50 MG: 10 INJECTION, EMULSION INTRAVENOUS at 07:10

## 2020-10-01 RX ADMIN — CIPROFLOXACIN 400 MG: 2 INJECTION, SOLUTION INTRAVENOUS at 08:10

## 2020-10-01 RX ADMIN — PROPOFOL 50 MG: 10 INJECTION, EMULSION INTRAVENOUS at 08:10

## 2020-10-01 RX ADMIN — SODIUM CHLORIDE, POTASSIUM CHLORIDE, SODIUM LACTATE AND CALCIUM CHLORIDE: 600; 310; 30; 20 INJECTION, SOLUTION INTRAVENOUS at 07:10

## 2020-10-01 RX ADMIN — MIDAZOLAM HYDROCHLORIDE 2 MG: 1 INJECTION, SOLUTION INTRAMUSCULAR; INTRAVENOUS at 07:10

## 2020-10-01 RX ADMIN — LIDOCAINE HYDROCHLORIDE 100 MG: 20 INJECTION, SOLUTION INTRAVENOUS at 07:10

## 2020-10-01 NOTE — OP NOTE
CYSTOSCOPY REPORT    Surgery Date:  10/1/2020  Surgeon(s) and Role:     * Bhargav Keane MD - Primary    Misti West  : 1965  Pre Procedure Diagnosis:  Recurring urinary tract infections.  Suprapubic pressure.    OPERATION: CYSTOSCOPY    ANESTHESIA: 10 cc 2% lidocaine jelly applied per urethra.  Mac      PROCEDURE:  The patient was taken to the cystoscopy suite and placed in supine position with legs in lithotomy position.  The genitalia was prepped and draped  in the usual sterile fashion.  Two percent lidocaine jelly was inserted in the urethra.  After sufficent time had passed to allow good local anesthesia, the cystoscope was inserted in the urethra. The dome, anterior, posterior and lateral walls were examined systematically.  The ureteral orifices were in their usual position and configuration.  The cystoscope was turned upon itself 180 degrees to visualize the bladder neck.  The cystoscope was then brought to the level of the bladder neck, the water was turned on and the urethra was visualized.  The cystoscope was removed.  Specimen:  None     FINDINGS:  URETHRA:  Calibrated to 16 [place] dilated to 30 [place].  No diverticulum seen  BLADDER:  No lesions or stones seen.  No evidence of acute or chronic inflammation.  TRABEC:  Grade 2-3  URETERAL ORIFICES:  Normal shape size and position both with clear efflux.  OTHER FINDINGS:  None    Procedure medications: Lidocaine gel in the urethra  Post Procedure Diagnosis:  Mild urethral stenosis    ASSESSMENT/PLAN:    Status Post  cystoscopy.  1. Push fluids for 24 hours.  2. May see blood in the urine, this should gradually improve over the next 2-3 days.  3. The patient was instructed to return to the office or go to the emergency should fever, chills, cloudy urine, or inability to urinate develop.  4.  PLAN:  We will observe any change of symptoms after urethral dilation to decide the patient needs any further pharmacological management.  5. Follow up in  2 weeks

## 2020-10-01 NOTE — TRANSFER OF CARE
"Anesthesia Transfer of Care Note    Patient: Misti West    Procedure(s) Performed: Procedure(s):  CYSTOSCOPY, WITH URETHRAL DILATION    Patient location: PACU    Anesthesia Type: general    Transport from OR: Transported from OR on room air with adequate spontaneous ventilation    Post pain: adequate analgesia    Post assessment: no apparent anesthetic complications and tolerated procedure well    Post vital signs: stable    Level of consciousness: awake, alert and oriented    Nausea/Vomiting: no nausea/vomiting    Complications: none    Transfer of care protocol was followed      Last vitals:   Visit Vitals  /67   Pulse 61   Temp 36.6 °C (97.9 °F) (Oral)   Resp 14   Ht 5' 9" (1.753 m)   Wt 77.1 kg (170 lb)   SpO2 99%   Breastfeeding No   BMI 25.10 kg/m²     "

## 2020-10-01 NOTE — PLAN OF CARE
Patient arrives  to PACU resting quietly, monitor connected, PIV intact and infusing LR'S to gravity. VS'S, no complaints will continue to monitor.

## 2020-10-01 NOTE — BRIEF OP NOTE
Brief Operative Note     Surgery Date: 10/1/2020    Surgeon:   Bhargav Keane MD     Pre-op Diagnosis:  Recurrent UTI [N39.0]  Recurrent UTI [N39.0]    Post-op Diagnosis:  Mild urethral stenosis.  Normal cystoscopy    Procedure:  Procedure(s):  CYSTOSCOPY, WITH URETHRAL DILATION    Anesthesia: Local MAC    Findings/Key Components:  Urethra calibrated to 16 French dilated to 30 French    Estimated Blood Loss: Minimal                                                                                                                           Discharge Summary    Admit Date: 10/1/2020     Attending Physician: Bhargav Keane MD     Discharge Physician: Bhargav Keane MD      Discharge Date: 10/1/2020    Treatments/Procedures: Procedure(s):  CYSTOSCOPY, WITH URETHRAL DILATION  Final Diagnosis:  Mild urethral stenosis  Hospital Course:  Cystoscopy and urethral dilation performed as planned  Follow-up Dr. Keane 2 weeks    Disposition: Home or Self Care     Patient Instructions:     Current Discharge Medication List:         Brett Misti   Home Medication Instructions DENISE:[id number]    Printed on:10/01/20 0822   Medication Information                      ALPRAZolam (XANAX) 0.25 MG tablet  Take by mouth.             atorvastatin (LIPITOR) 10 MG tablet  Take 1 tablet (10 mg total) by mouth once daily.             loratadine-pseudoephedrine 5-120 mg (CLARITIN-D 12 HOUR) 5-120 mg per tablet  Take 1 tablet by mouth 2 (two) times daily as needed for Allergies.             omeprazole (PRILOSEC) 40 MG capsule  Take 1 capsule (40 mg total) by mouth before breakfast.                     Current Discharge Medication List      CONTINUE these medications which have NOT CHANGED    Details   ALPRAZolam (XANAX) 0.25 MG tablet Take by mouth.      atorvastatin (LIPITOR) 10 MG tablet Take 1 tablet (10 mg total) by mouth once daily.  Qty: 90 tablet, Refills: 3      loratadine-pseudoephedrine 5-120 mg (CLARITIN-D 12 HOUR)  5-120 mg per tablet Take 1 tablet by mouth 2 (two) times daily as needed for Allergies.  Qty: 20 tablet, Refills: 2    Associated Diagnoses: Seasonal allergies      omeprazole (PRILOSEC) 40 MG capsule Take 1 capsule (40 mg total) by mouth before breakfast.  Qty: 30 capsule, Refills: 1    Associated Diagnoses: LUQ pain; Heartburn             Discharge Procedure Orders:    Discharge Procedure Orders   Diet Adult Regular     Activity as tolerated

## 2020-10-01 NOTE — ANESTHESIA POSTPROCEDURE EVALUATION
Anesthesia Post Evaluation    Patient: Misti West    Procedure(s) Performed: Procedure(s):  CYSTOSCOPY, WITH URETHRAL DILATION    Final Anesthesia Type: general    Patient location during evaluation: PACU  Patient participation: Yes- Able to Participate  Level of consciousness: awake and awake and alert  Post-procedure vital signs: reviewed and stable  Pain management: adequate  Airway patency: patent    PONV status at discharge: No PONV  Anesthetic complications: no      Cardiovascular status: blood pressure returned to baseline  Respiratory status: unassisted and spontaneous ventilation  Hydration status: euvolemic  Follow-up not needed.          Vitals Value Taken Time   /79 10/01/20 0902   Temp 36.5 °C (97.7 °F) 10/01/20 0820   Pulse 60 10/01/20 0859   Resp 12 10/01/20 0855   SpO2 98 % 10/01/20 0859   Vitals shown include unvalidated device data.      Event Time   Out of Recovery 08:43:10         Pain/Marcio Score: Marcio Score: 10 (10/1/2020  8:55 AM)  Modified Marcio Score: 20 (10/1/2020  8:55 AM)

## 2020-10-01 NOTE — H&P
Patient ID: Misti West is a 54 y.o. female.     Chief Complaint:   HPI   History of recurring urinary tract infections.  Right flank pain.    Mrs. West is a 54-year-old female who has history of recurring urinary tract infections and right flank pain.  A CT scan of the abdomen and pelvis was performed that shows no evidence of any abnormality or calcifications in the  tract.  The CT scan did show lesions in the liver she underwent a MRI that shows that the cyst on the liver.  She is under the care of GI at the present time and they are contemplating in performing upper and lower GI endoscopy.     The patient have history of urethral stenosis as a child and is having lower urinary tract symptoms consistent with nocturia x3 in occasions time for mild urinary frequency lower urinary flow and the sensation that is difficult to empty the bladder satisfactory.  In the past we have considered the possibility of performing a cystoscopy and a urethral dilation and I think that probably we need to proceed with that since there is no evidence of urinary tract infection at the present time.     The past medical and surgical history the current medications and allergies are well documented in the electronic health record and all these were reviewed by me during this visit.     Review of Systems   Constitutional: Negative.  Negative for activity change.   HENT: Negative.  Negative for facial swelling.    Eyes: Negative for discharge.   Respiratory: Negative for cough and shortness of breath.    Cardiovascular: Negative for chest pain and palpitations.   Gastrointestinal: Negative for abdominal distention, blood in stool and constipation.   Genitourinary: Positive for flank pain. Negative for dyspareunia, dysuria, frequency, hematuria, urgency and vaginal pain.   Musculoskeletal: Positive for back pain. Negative for arthralgias.   Skin: Negative.    Neurological: Negative.  Negative for dizziness.   Hematological: Negative for  adenopathy.   Psychiatric/Behavioral: The patient is not nervous/anxious.             Objective:   Physical Exam   Constitutional: She appears well-developed.   HENT:   Head: Normocephalic.   Eyes: Pupils are equal, round, and reactive to light.   Neck: Normal range of motion.   Cardiovascular: Normal rate.    Pulmonary/Chest: Effort normal.   Abdominal: Soft. She exhibits no distension and no mass. There is no abdominal tenderness. Hernia confirmed negative in the left inguinal area.   Genitourinary:    Vagina normal.      No vaginal erythema, tenderness or bleeding.   No erythema, tenderness or bleeding in the vagina.   Musculoskeletal: Normal range of motion.   Neurological: She is alert.   Skin: Skin is warm.          Assessment:       1. Recurrent UTI        Plan:       Recurrent UTI  -     Case Request Operating Room: CYSTOURETHROSCOPY      Patient will undergo cystoscopy with possible urethral dilation on September 17, 2020 under mac sedation.  The rationale risks and complication of the procedure have been fully discussed with the patient she agree with it.  All the questions were answered at her satisfaction.

## 2020-10-05 ENCOUNTER — PATIENT MESSAGE (OUTPATIENT)
Dept: GASTROENTEROLOGY | Facility: CLINIC | Age: 55
End: 2020-10-05

## 2020-10-05 ENCOUNTER — PATIENT MESSAGE (OUTPATIENT)
Dept: ADMINISTRATIVE | Facility: HOSPITAL | Age: 55
End: 2020-10-05

## 2020-10-09 ENCOUNTER — PATIENT MESSAGE (OUTPATIENT)
Dept: UROLOGY | Facility: CLINIC | Age: 55
End: 2020-10-09

## 2020-10-12 ENCOUNTER — PATIENT OUTREACH (OUTPATIENT)
Dept: ADMINISTRATIVE | Facility: OTHER | Age: 55
End: 2020-10-12

## 2020-10-12 ENCOUNTER — PATIENT MESSAGE (OUTPATIENT)
Dept: UROLOGY | Facility: CLINIC | Age: 55
End: 2020-10-12

## 2020-10-13 NOTE — PROGRESS NOTES
Chart was reviewed for overdue Proactive [facility] Encounters (ARYA)  topics  Updates were requested from care everywhere  Health Maintenance has been updated  [facility] immunization registry triggered

## 2020-10-14 ENCOUNTER — OFFICE VISIT (OUTPATIENT)
Dept: UROLOGY | Facility: CLINIC | Age: 55
End: 2020-10-14

## 2020-10-14 ENCOUNTER — PATIENT MESSAGE (OUTPATIENT)
Dept: UROLOGY | Facility: CLINIC | Age: 55
End: 2020-10-14

## 2020-10-14 VITALS
HEIGHT: 69 IN | SYSTOLIC BLOOD PRESSURE: 125 MMHG | DIASTOLIC BLOOD PRESSURE: 74 MMHG | HEART RATE: 62 BPM | TEMPERATURE: 98 F | RESPIRATION RATE: 16 BRPM | BODY MASS INDEX: 24.73 KG/M2 | WEIGHT: 167 LBS

## 2020-10-14 DIAGNOSIS — N32.81 OAB (OVERACTIVE BLADDER): Primary | ICD-10-CM

## 2020-10-14 LAB
BILIRUB SERPL-MCNC: NEGATIVE MG/DL
BLOOD URINE, POC: NEGATIVE
CLARITY, POC UA: CLEAR
COLOR, POC UA: YELLOW
GLUCOSE UR QL STRIP: NEGATIVE
KETONES UR QL STRIP: NEGATIVE
LEUKOCYTE ESTERASE URINE, POC: ABNORMAL
NITRITE, POC UA: NEGATIVE
PH, POC UA: 7
PROTEIN, POC: NEGATIVE
SPECIFIC GRAVITY, POC UA: 1.02
UROBILINOGEN, POC UA: NEGATIVE

## 2020-10-14 PROCEDURE — 99999 PR PBB SHADOW E&M-EST. PATIENT-LVL III: ICD-10-PCS | Mod: PBBFAC,,, | Performed by: UROLOGY

## 2020-10-14 PROCEDURE — 99212 PR OFFICE/OUTPT VISIT, EST, LEVL II, 10-19 MIN: ICD-10-PCS | Mod: 25,,, | Performed by: UROLOGY

## 2020-10-14 PROCEDURE — 81002 POCT URINE DIPSTICK WITHOUT MICROSCOPE: ICD-10-PCS | Mod: ,,, | Performed by: UROLOGY

## 2020-10-14 PROCEDURE — 99212 OFFICE O/P EST SF 10 MIN: CPT | Mod: 25,,, | Performed by: UROLOGY

## 2020-10-14 PROCEDURE — 81002 URINALYSIS NONAUTO W/O SCOPE: CPT | Mod: ,,, | Performed by: UROLOGY

## 2020-10-14 PROCEDURE — 99999 PR PBB SHADOW E&M-EST. PATIENT-LVL III: CPT | Mod: PBBFAC,,, | Performed by: UROLOGY

## 2020-10-14 RX ORDER — ASCORBIC ACID 500 MG
500 TABLET ORAL DAILY
COMMUNITY

## 2020-10-14 RX ORDER — BIOTIN 1 MG
1000 TABLET ORAL 3 TIMES DAILY
COMMUNITY

## 2020-10-14 RX ORDER — OXYBUTYNIN CHLORIDE 5 MG/1
5 TABLET, EXTENDED RELEASE ORAL DAILY
Qty: 30 TABLET | Refills: 12 | Status: SHIPPED | OUTPATIENT
Start: 2020-10-14 | End: 2021-05-12

## 2020-10-14 RX ORDER — MULTIVIT WITH MINERALS/HERBS
1 TABLET ORAL DAILY
COMMUNITY

## 2020-10-14 NOTE — PROGRESS NOTES
Mrs. West is a 55-year-old female who have history of suprapubic pressure and lower urinary tract symptoms.  On October 1st of this year she underwent a cystoscopic evaluation and urethral dilation.  The patient here for her follow-up visit.  The patient referred that her symptoms have significantly improved S still experiencing frequency and urgency.    The urinalysis today is clear.  After a lengthy discussion with the patient we agree that we are going to start her on an anticholinergic with the hope that with that medication she may improve her urinary frequency and urgency.  I explained the rationale the risks and complications of anticholinergic therapy.  She agreed to proceed with oxybutynin 5 mg p.o. q.d..    She is going to have a follow-up appointment in about 4 5 months to see if with the dilation and anticholinergics we have satisfactory control of her symptoms.  All the questions were answered to her satisfaction and she left the office in satisfactory condition.  I spent approximately 50 min with Ms. West all the time was spent on counseling.

## 2020-10-21 ENCOUNTER — PATIENT MESSAGE (OUTPATIENT)
Dept: GASTROENTEROLOGY | Facility: CLINIC | Age: 55
End: 2020-10-21

## 2020-10-21 ENCOUNTER — OFFICE VISIT (OUTPATIENT)
Dept: GASTROENTEROLOGY | Facility: CLINIC | Age: 55
End: 2020-10-21

## 2020-10-21 VITALS
WEIGHT: 168.63 LBS | HEART RATE: 76 BPM | DIASTOLIC BLOOD PRESSURE: 65 MMHG | SYSTOLIC BLOOD PRESSURE: 100 MMHG | BODY MASS INDEX: 24.91 KG/M2

## 2020-10-21 DIAGNOSIS — A04.8 H. PYLORI INFECTION: ICD-10-CM

## 2020-10-21 DIAGNOSIS — K59.00 CONSTIPATION, UNSPECIFIED CONSTIPATION TYPE: ICD-10-CM

## 2020-10-21 DIAGNOSIS — R10.9 ABDOMINAL DISCOMFORT: Primary | ICD-10-CM

## 2020-10-21 DIAGNOSIS — R10.13 EPIGASTRIC PAIN: ICD-10-CM

## 2020-10-21 DIAGNOSIS — K76.9 LIVER LESION: ICD-10-CM

## 2020-10-21 PROCEDURE — 99999 PR PBB SHADOW E&M-EST. PATIENT-LVL III: CPT | Mod: PBBFAC,,, | Performed by: INTERNAL MEDICINE

## 2020-10-21 PROCEDURE — 99214 OFFICE O/P EST MOD 30 MIN: CPT | Mod: S$PBB,,, | Performed by: INTERNAL MEDICINE

## 2020-10-21 PROCEDURE — 99213 OFFICE O/P EST LOW 20 MIN: CPT | Mod: PBBFAC,PN | Performed by: INTERNAL MEDICINE

## 2020-10-21 PROCEDURE — 99999 PR PBB SHADOW E&M-EST. PATIENT-LVL III: ICD-10-PCS | Mod: PBBFAC,,, | Performed by: INTERNAL MEDICINE

## 2020-10-21 PROCEDURE — 99214 PR OFFICE/OUTPT VISIT, EST, LEVL IV, 30-39 MIN: ICD-10-PCS | Mod: S$PBB,,, | Performed by: INTERNAL MEDICINE

## 2020-10-21 NOTE — PROGRESS NOTES
Subjective:       Patient ID: Misti West is a 55 y.o. female.    This is an established patient.      Chief Complaint: Spasms and Gastroesophageal Reflux    Patient seen for abdominal discomfort, remote onset, ongoing but intermittent, described as nervousness/quivering, with no alleviating factors and not related to eating.  She believes it is worsened by anxiety.  She had H.pylori infection on recent EGD which was treated but eradication testing has not been done.  She had colonoscopy which was unremarkable.  She has been on ditropan for OAB but notes that this made her constipated.  She has a stool softener which she is just beginning to take.  No other acute GI issues.  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  She cannot tolerate MRI so interval follow up with CT scan is recommended.  She also had two simple liver cysts.  She has no known history of intrinsic liver disease.      Review of Systems   Constitutional: Negative for chills, fatigue and fever.   HENT: Negative for sore throat and trouble swallowing.    Respiratory: Negative for cough, shortness of breath and wheezing.    Cardiovascular: Negative for chest pain and palpitations.   Gastrointestinal: Positive for constipation (medication induced.). Negative for abdominal pain, blood in stool, nausea and vomiting.        + abdominal discomfort     Genitourinary: Positive for dysuria (OAB). Negative for hematuria.   Musculoskeletal: Negative for arthralgias and myalgias.   Integumentary:  Negative for color change and rash.   Neurological: Negative for dizziness and headaches.   Hematological: Negative for adenopathy.   Psychiatric/Behavioral: Negative for confusion. The patient is not nervous/anxious.    All other systems reviewed and are negative.        Objective:         Vitals:    10/21/20 1435   BP: 100/65   Pulse: 76   Weight: 76.5 kg (168 lb 10.4 oz)       Physical Exam  Constitutional:        Appearance: She is well-developed.   HENT:      Head: Normocephalic and atraumatic.   Eyes:      General: No scleral icterus.     Pupils: Pupils are equal, round, and reactive to light.   Neck:      Musculoskeletal: Normal range of motion.   Cardiovascular:      Rate and Rhythm: Normal rate and regular rhythm.      Heart sounds: No murmur.   Pulmonary:      Effort: Pulmonary effort is normal.      Breath sounds: Normal breath sounds. No wheezing.   Abdominal:      General: Bowel sounds are normal. There is no distension.      Palpations: Abdomen is soft.      Tenderness: There is no abdominal tenderness.   Musculoskeletal:         General: No tenderness.   Lymphadenopathy:      Cervical: No cervical adenopathy.   Skin:     General: Skin is warm and dry.      Findings: No rash.   Neurological:      Mental Status: She is alert.           Lab Results   Component Value Date    WBC 6.20 08/10/2020    HGB 12.5 08/10/2020    HCT 36.9 (L) 08/10/2020    MCV 90 08/10/2020     08/10/2020         CMP  Sodium   Date Value Ref Range Status   08/10/2020 138 136 - 145 mmol/L Final     Potassium   Date Value Ref Range Status   08/10/2020 3.5 3.5 - 5.1 mmol/L Final     Chloride   Date Value Ref Range Status   08/10/2020 105 95 - 110 mmol/L Final     CO2   Date Value Ref Range Status   08/10/2020 23 23 - 29 mmol/L Final     Glucose   Date Value Ref Range Status   08/10/2020 100 70 - 110 mg/dL Final     BUN, Bld   Date Value Ref Range Status   08/10/2020 14 6 - 20 mg/dL Final     Creatinine   Date Value Ref Range Status   08/10/2020 0.8 0.5 - 1.4 mg/dL Final     Calcium   Date Value Ref Range Status   08/10/2020 8.8 8.7 - 10.5 mg/dL Final     Total Protein   Date Value Ref Range Status   08/10/2020 6.9 6.0 - 8.4 g/dL Final     Albumin   Date Value Ref Range Status   08/10/2020 4.2 3.5 - 5.2 g/dL Final     Total Bilirubin   Date Value Ref Range Status   08/10/2020 0.7 0.1 - 1.0 mg/dL Final     Comment:     For infants and  newborns, interpretation of results should be based  on gestational age, weight and in agreement with clinical  observations.  Premature Infant recommended reference ranges:  Up to 24 hours.............<8.0 mg/dL  Up to 48 hours............<12.0 mg/dL  3-5 days..................<15.0 mg/dL  6-29 days.................<15.0 mg/dL       Alkaline Phosphatase   Date Value Ref Range Status   08/10/2020 60 55 - 135 U/L Final     AST   Date Value Ref Range Status   08/10/2020 22 10 - 40 U/L Final     ALT   Date Value Ref Range Status   08/10/2020 18 10 - 44 U/L Final     Anion Gap   Date Value Ref Range Status   08/10/2020 10 8 - 16 mmol/L Final     eGFR if    Date Value Ref Range Status   08/10/2020 >60.0 >60 mL/min/1.73 m^2 Final     eGFR if non    Date Value Ref Range Status   08/10/2020 >60.0 >60 mL/min/1.73 m^2 Final     Comment:     Calculation used to obtain the estimated glomerular filtration  rate (eGFR) is the CKD-EPI equation.          Past CT scan was independently visualized and reviewed by me and showed findings as above in HPI.    Assessment:       1. Abdominal discomfort    2. H. pylori infection    3. Constipation, unspecified constipation type    4. Epigastric pain    5. Liver lesion        Plan:       1.  Schedule CT scan with triple phase liver protocol  2.  Test stool for h.pylori eradication  3.  Antireflux precautions including avoidance of late night eating and lying down soon after eating.     4.  Consider bentyl if no relief with stool softener for constipation  5.  Further recommendations to follow after above.

## 2020-11-03 ENCOUNTER — HOSPITAL ENCOUNTER (OUTPATIENT)
Dept: RADIOLOGY | Facility: HOSPITAL | Age: 55
Discharge: HOME | End: 2020-11-03
Attending: INTERNAL MEDICINE

## 2020-11-03 DIAGNOSIS — K59.00 CONSTIPATION, UNSPECIFIED CONSTIPATION TYPE: ICD-10-CM

## 2020-11-03 DIAGNOSIS — A04.8 H. PYLORI INFECTION: ICD-10-CM

## 2020-11-03 DIAGNOSIS — R10.9 ABDOMINAL DISCOMFORT: ICD-10-CM

## 2020-11-03 DIAGNOSIS — R10.13 EPIGASTRIC PAIN: ICD-10-CM

## 2020-11-03 DIAGNOSIS — K76.9 LIVER LESION: ICD-10-CM

## 2020-11-03 PROCEDURE — 74177 CT ABD & PELVIS W/CONTRAST: CPT | Mod: 26,,, | Performed by: RADIOLOGY

## 2020-11-03 PROCEDURE — 25500020 PHARM REV CODE 255

## 2020-11-03 PROCEDURE — 74177 CT ABDOMEN PELVIS WITH CONTRAST: ICD-10-PCS | Mod: 26,,, | Performed by: RADIOLOGY

## 2020-11-03 PROCEDURE — A9698 NON-RAD CONTRAST MATERIALNOC: HCPCS

## 2020-11-03 PROCEDURE — 74177 CT ABD & PELVIS W/CONTRAST: CPT | Mod: TC

## 2020-11-03 RX ADMIN — IOHEXOL 100 ML: 350 INJECTION, SOLUTION INTRAVENOUS at 10:11

## 2020-11-03 RX ADMIN — IOHEXOL 1000 ML: 9 SOLUTION ORAL at 10:11

## 2020-11-06 ENCOUNTER — PATIENT MESSAGE (OUTPATIENT)
Dept: GASTROENTEROLOGY | Facility: CLINIC | Age: 55
End: 2020-11-06

## 2020-11-25 ENCOUNTER — PATIENT MESSAGE (OUTPATIENT)
Dept: FAMILY MEDICINE | Facility: CLINIC | Age: 55
End: 2020-11-25

## 2020-11-25 DIAGNOSIS — M25.531 RIGHT WRIST PAIN: ICD-10-CM

## 2020-11-25 DIAGNOSIS — L98.9 SKIN LESION: Primary | ICD-10-CM

## 2020-11-30 ENCOUNTER — PATIENT MESSAGE (OUTPATIENT)
Dept: GASTROENTEROLOGY | Facility: CLINIC | Age: 55
End: 2020-11-30

## 2020-11-30 NOTE — TELEPHONE ENCOUNTER
Informed pt her referrals has been place, please wait on a call from [facility]. Pt states she understands

## 2020-12-01 ENCOUNTER — OFFICE VISIT (OUTPATIENT)
Dept: GASTROENTEROLOGY | Facility: CLINIC | Age: 55
End: 2020-12-01

## 2020-12-01 VITALS
SYSTOLIC BLOOD PRESSURE: 125 MMHG | WEIGHT: 160.25 LBS | HEART RATE: 76 BPM | BODY MASS INDEX: 23.67 KG/M2 | DIASTOLIC BLOOD PRESSURE: 77 MMHG

## 2020-12-01 DIAGNOSIS — A04.8 H. PYLORI INFECTION: ICD-10-CM

## 2020-12-01 DIAGNOSIS — R10.13 EPIGASTRIC PAIN: Primary | ICD-10-CM

## 2020-12-01 DIAGNOSIS — K76.89 LIVER CYST: ICD-10-CM

## 2020-12-01 DIAGNOSIS — R19.4 CHANGE IN BOWEL HABITS: ICD-10-CM

## 2020-12-01 PROCEDURE — 99999 PR PBB SHADOW E&M-EST. PATIENT-LVL III: CPT | Mod: PBBFAC,,, | Performed by: INTERNAL MEDICINE

## 2020-12-01 PROCEDURE — 99213 OFFICE O/P EST LOW 20 MIN: CPT | Mod: PBBFAC,PN | Performed by: INTERNAL MEDICINE

## 2020-12-01 PROCEDURE — 99214 PR OFFICE/OUTPT VISIT, EST, LEVL IV, 30-39 MIN: ICD-10-PCS | Mod: S$PBB,,, | Performed by: INTERNAL MEDICINE

## 2020-12-01 PROCEDURE — 99214 OFFICE O/P EST MOD 30 MIN: CPT | Mod: S$PBB,,, | Performed by: INTERNAL MEDICINE

## 2020-12-01 PROCEDURE — 99999 PR PBB SHADOW E&M-EST. PATIENT-LVL III: ICD-10-PCS | Mod: PBBFAC,,, | Performed by: INTERNAL MEDICINE

## 2020-12-01 NOTE — PROGRESS NOTES
Subjective:       Patient ID: Misti West is a 55 y.o. female.    This is an established patient.      Chief Complaint: Abdominal discomfort    PAST HISTORY:    Patient seen for abdominal discomfort, remote onset, ongoing but intermittent, described as nervousness/quivering, with no alleviating factors and not related to eating.  She believes it is worsened by anxiety.  She had H.pylori infection on recent EGD which was treated but eradication testing has not been done.  She had colonoscopy which was unremarkable.  She has been on ditropan for OAB but notes that this made her constipated.  She has a stool softener which she is just beginning to take.  No other acute GI issues.  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  She cannot tolerate MRI so interval follow up with CT scan is recommended.  She also had two simple liver cysts.  She has no known history of intrinsic liver disease.        INTERVAL HISTORY:  Since last visit, she notes that her symptoms have improved greatly.  She has not yet been able to submit stool sample to confirm h.pylori eradication.  She denies bleeding or abdominal pain.  Her CT scan was repeated and showed only simple liver cysts with no other findings.  She denies any other specific complaints.  She states that constipation is improved.    Review of Systems   HENT: Negative for trouble swallowing.    Respiratory: Negative for shortness of breath and wheezing.    Cardiovascular: Negative for palpitations.   Gastrointestinal: Positive for constipation (medication induced.). Negative for blood in stool.        + abdominal discomfort     Genitourinary: Positive for dysuria (OAB). Negative for hematuria.   Integumentary:  Negative for color change.   Neurological: Negative for dizziness.   Hematological: Negative for adenopathy.   Psychiatric/Behavioral: Negative for confusion. The patient is not nervous/anxious.    All other systems  reviewed and are negative.        Objective:         Vitals:    12/01/20 1336   BP: 125/77   Pulse: 76   Weight: 72.7 kg (160 lb 4.4 oz)       Physical Exam  Constitutional:       Appearance: She is well-developed.   HENT:      Head: Normocephalic and atraumatic.   Eyes:      General: No scleral icterus.     Pupils: Pupils are equal, round, and reactive to light.   Neck:      Musculoskeletal: Normal range of motion.   Cardiovascular:      Rate and Rhythm: Normal rate and regular rhythm.      Heart sounds: No murmur.   Pulmonary:      Effort: Pulmonary effort is normal.      Breath sounds: Normal breath sounds. No wheezing.   Abdominal:      General: Bowel sounds are normal. There is no distension.      Palpations: Abdomen is soft.      Tenderness: There is no abdominal tenderness.   Musculoskeletal:         General: No tenderness.   Lymphadenopathy:      Cervical: No cervical adenopathy.   Skin:     General: Skin is warm and dry.      Findings: No rash.   Neurological:      Mental Status: She is alert.           Lab Results   Component Value Date    WBC 6.20 08/10/2020    HGB 12.5 08/10/2020    HCT 36.9 (L) 08/10/2020    MCV 90 08/10/2020     08/10/2020         CMP  Sodium   Date Value Ref Range Status   08/10/2020 138 136 - 145 mmol/L Final     Potassium   Date Value Ref Range Status   08/10/2020 3.5 3.5 - 5.1 mmol/L Final     Chloride   Date Value Ref Range Status   08/10/2020 105 95 - 110 mmol/L Final     CO2   Date Value Ref Range Status   08/10/2020 23 23 - 29 mmol/L Final     Glucose   Date Value Ref Range Status   08/10/2020 100 70 - 110 mg/dL Final     BUN   Date Value Ref Range Status   08/10/2020 14 6 - 20 mg/dL Final     Creatinine   Date Value Ref Range Status   08/10/2020 0.8 0.5 - 1.4 mg/dL Final     Calcium   Date Value Ref Range Status   08/10/2020 8.8 8.7 - 10.5 mg/dL Final     Total Protein   Date Value Ref Range Status   08/10/2020 6.9 6.0 - 8.4 g/dL Final     Albumin   Date Value Ref Range  Status   08/10/2020 4.2 3.5 - 5.2 g/dL Final     Total Bilirubin   Date Value Ref Range Status   08/10/2020 0.7 0.1 - 1.0 mg/dL Final     Comment:     For infants and newborns, interpretation of results should be based  on gestational age, weight and in agreement with clinical  observations.  Premature Infant recommended reference ranges:  Up to 24 hours.............<8.0 mg/dL  Up to 48 hours............<12.0 mg/dL  3-5 days..................<15.0 mg/dL  6-29 days.................<15.0 mg/dL       Alkaline Phosphatase   Date Value Ref Range Status   08/10/2020 60 55 - 135 U/L Final     AST   Date Value Ref Range Status   08/10/2020 22 10 - 40 U/L Final     ALT   Date Value Ref Range Status   08/10/2020 18 10 - 44 U/L Final     Anion Gap   Date Value Ref Range Status   08/10/2020 10 8 - 16 mmol/L Final     eGFR if    Date Value Ref Range Status   08/10/2020 >60.0 >60 mL/min/1.73 m^2 Final     eGFR if non    Date Value Ref Range Status   08/10/2020 >60.0 >60 mL/min/1.73 m^2 Final     Comment:     Calculation used to obtain the estimated glomerular filtration  rate (eGFR) is the CKD-EPI equation.          Past CT scan was independently visualized and reviewed by me and showed findings as above in HPI.    Assessment:       1. Epigastric pain    2. Change in bowel habits    3. Liver cyst    4. H. pylori infection        Plan:       1.  Continue current medications.  2.  Test stool for h.pylori eradication  3.  Antireflux precautions including avoidance of late night eating and lying down soon after eating.     4.  Further recommendations to follow after above.

## 2020-12-09 DIAGNOSIS — M25.531 RIGHT WRIST PAIN: Primary | ICD-10-CM

## 2020-12-10 ENCOUNTER — OFFICE VISIT (OUTPATIENT)
Dept: ORTHOPEDICS | Facility: CLINIC | Age: 55
End: 2020-12-10

## 2020-12-10 ENCOUNTER — HOSPITAL ENCOUNTER (OUTPATIENT)
Dept: RADIOLOGY | Facility: HOSPITAL | Age: 55
Discharge: HOME | End: 2020-12-10
Attending: ORTHOPAEDIC SURGERY

## 2020-12-10 VITALS — RESPIRATION RATE: 16 BRPM | BODY MASS INDEX: 23.7 KG/M2 | WEIGHT: 160 LBS | HEIGHT: 69 IN

## 2020-12-10 DIAGNOSIS — M65.4 DE QUERVAIN'S TENOSYNOVITIS, RIGHT: Primary | ICD-10-CM

## 2020-12-10 DIAGNOSIS — M25.531 RIGHT WRIST PAIN: ICD-10-CM

## 2020-12-10 DIAGNOSIS — M17.9 OSTEOARTHRITIS OF KNEE, UNSPECIFIED LATERALITY, UNSPECIFIED OSTEOARTHRITIS TYPE: ICD-10-CM

## 2020-12-10 PROCEDURE — 99203 PR OFFICE/OUTPT VISIT, NEW, LEVL III, 30-44 MIN: ICD-10-PCS | Mod: 25,S$PBB,, | Performed by: ORTHOPAEDIC SURGERY

## 2020-12-10 PROCEDURE — 99213 OFFICE O/P EST LOW 20 MIN: CPT | Mod: PBBFAC,25,PN | Performed by: ORTHOPAEDIC SURGERY

## 2020-12-10 PROCEDURE — 99999 PR PBB SHADOW E&M-EST. PATIENT-LVL III: ICD-10-PCS | Mod: PBBFAC,,, | Performed by: ORTHOPAEDIC SURGERY

## 2020-12-10 PROCEDURE — 73110 X-RAY EXAM OF WRIST: CPT | Mod: TC,PN,RT

## 2020-12-10 PROCEDURE — 73110 XR WRIST COMPLETE 3 VIEWS RIGHT: ICD-10-PCS | Mod: 26,RT,, | Performed by: RADIOLOGY

## 2020-12-10 PROCEDURE — 73110 X-RAY EXAM OF WRIST: CPT | Mod: 26,RT,, | Performed by: RADIOLOGY

## 2020-12-10 PROCEDURE — 20550 TENDON SHEATH: ICD-10-PCS | Mod: S$PBB,RT,, | Performed by: ORTHOPAEDIC SURGERY

## 2020-12-10 PROCEDURE — 99203 OFFICE O/P NEW LOW 30 MIN: CPT | Mod: 25,S$PBB,, | Performed by: ORTHOPAEDIC SURGERY

## 2020-12-10 PROCEDURE — 20550 NJX 1 TENDON SHEATH/LIGAMENT: CPT | Mod: PBBFAC,PN | Performed by: ORTHOPAEDIC SURGERY

## 2020-12-10 PROCEDURE — 99999 PR PBB SHADOW E&M-EST. PATIENT-LVL III: CPT | Mod: PBBFAC,,, | Performed by: ORTHOPAEDIC SURGERY

## 2020-12-10 RX ORDER — DEXAMETHASONE SODIUM PHOSPHATE 4 MG/ML
4 INJECTION, SOLUTION INTRA-ARTICULAR; INTRALESIONAL; INTRAMUSCULAR; INTRAVENOUS; SOFT TISSUE
Status: DISCONTINUED | OUTPATIENT
Start: 2020-12-10 | End: 2020-12-10 | Stop reason: HOSPADM

## 2020-12-10 RX ADMIN — DEXAMETHASONE SODIUM PHOSPHATE 4 MG: 4 INJECTION INTRA-ARTICULAR; INTRALESIONAL; INTRAMUSCULAR; INTRAVENOUS; SOFT TISSUE at 09:12

## 2020-12-10 NOTE — PROGRESS NOTES
Past Medical History:   Diagnosis Date    Anxiety     Arthritis     Cancer     cervical    Hepatomegaly     3 liver cyst    Hiatal hernia     Liver lesion     PONV (postoperative nausea and vomiting)        Past Surgical History:   Procedure Laterality Date    CERVIX SURGERY      COLONOSCOPY N/A 8/27/2020    Procedure: COLONOSCOPY;  Surgeon: Tim Aguayo MD;  Location: [facility];  Service: Endoscopy;  Laterality: N/A;    CYSTOSCOPY WITH URETHRAL DILATION  10/1/2020    Procedure: CYSTOSCOPY, WITH URETHRAL DILATION;  Surgeon: Bhargav Keane MD;  Location: [facility] OR;  Service: Urology;;    ESOPHAGOGASTRODUODENOSCOPY N/A 8/13/2020    Procedure: EGD (ESOPHAGOGASTRODUODENOSCOPY);  Surgeon: Tim Aguayo MD;  Location: [facility];  Service: Endoscopy;  Laterality: N/A;    KNEE ARTHROSCOPY      SINUS SURGERY      TUBAL LIGATION         Current Outpatient Medications   Medication Sig    ALPRAZolam (XANAX) 0.25 MG tablet Take by mouth.    ascorbic acid, vitamin C, (VITAMIN C) 500 MG tablet Take 500 mg by mouth once daily.    atorvastatin (LIPITOR) 10 MG tablet Take 1 tablet (10 mg total) by mouth once daily.    b complex vitamins tablet Take 1 tablet by mouth once daily.    biotin 1 mg tablet Take 1,000 mcg by mouth 3 (three) times daily.    cholecalciferol, vitamin D3, (VITAMIN D3 ORAL) Take by mouth.    loratadine-pseudoephedrine 5-120 mg (CLARITIN-D 12 HOUR) 5-120 mg per tablet Take 1 tablet by mouth 2 (two) times daily as needed for Allergies.    multivitamin capsule Take 1 capsule by mouth once daily.    oxybutynin (DITROPAN XL) 5 MG TR24 Take 1 tablet (5 mg total) by mouth once daily.     No current facility-administered medications for this visit.        Review of patient's allergies indicates:   Allergen Reactions    Codeine     Morphine     Naproxen sodium     Penicillins     Tramadol hcl     Trintex        Family History   Problem Relation Age of Onset    Diabetes Mother      Cancer Mother     Arthritis Mother     Cancer Father     COPD Father     Hypertension Father     Arthritis Father     Breast cancer Sister     Arthritis Sister     Breast cancer Sister     Arthritis Sister     Breast cancer Sister     Kidney disease Sister     Arthritis Sister     Colon polyps Neg Hx     Colon cancer Neg Hx     Crohn's disease Neg Hx     Ulcerative colitis Neg Hx     Stomach cancer Neg Hx     Esophageal cancer Neg Hx     Celiac disease Neg Hx        Social History     Socioeconomic History    Marital status:      Spouse name: Not on file    Number of children: Not on file    Years of education: Not on file    Highest education level: Not on file   Occupational History    Not on file   Social Needs    Financial resource strain: Not very hard    Food insecurity     Worry: Never true     Inability: Never true    Transportation needs     Medical: No     Non-medical: No   Tobacco Use    Smoking status: Current Every Day Smoker     Packs/day: 1.00    Smokeless tobacco: Never Used   Substance and Sexual Activity    Alcohol use: Not Currently     Frequency: Never     Drinks per session: Patient refused     Binge frequency: Never    Drug use: Never    Sexual activity: Yes   Lifestyle    Physical activity     Days per week: 5 days     Minutes per session: 20 min    Stress: Not at all   Relationships    Social connections     Talks on phone: More than three times a week     Gets together: Three times a week     Attends Christian service: Not on file     Active member of club or organization: Patient refused     Attends meetings of clubs or organizations: Patient refused     Relationship status:    Other Topics Concern    Not on file   Social History Narrative    Not on file       Chief Complaint:   Chief Complaint   Patient presents with    Right Wrist - Pain    Right Shoulder - Pain       History of present illness:  This is a 55-year-old female seen for  right wrist pain.  Patient is left-hand dominant but does use her right hand a lot for crafts.  Pain along the radial aspect and volar aspect of the wrist.  Sounds like she gets a ganglion cyst from time to time.  Pain is an 8/10.  Symptoms are worsening and severe.  Pain even radiates into her shoulder at times.  Patient takes Aleve as well as numerous vitamins.      Review of Systems:    Constitution: Negative for chills, fever, and sweats.  Negative for unexplained weight loss.    HENT:  Negative for headaches and blurry vision.    Cardiovascular:Negative for chest pain or irregular heart beat. Negative for hypertension.    Respiratory:  Negative for cough and shortness of breath.    Gastrointestinal: Negative for abdominal pain, heartburn, melena, nausea, and vomitting.    Genitourinary:  Negative bladder incontinence and dysuria.    Musculoskeletal:  See HPI    Neurological: Negative for numbness.    Psychiatric/Behavioral: Negative for depression.  The patient is not nervous/anxious.      Endocrine: Negative for polyuria    Hematologic/Lymphatic: Negative for bleeding problem.  Does not bruise/bleed easily.    Skin: Negative for poor would healing and rash      Physical Examination:    Vital Signs:    Vitals:    12/10/20 0910   Resp: 16       Body mass index is 23.63 kg/m².    This a well-developed, well nourished patient in no acute distress.  They are alert and oriented and cooperative to examination.  Pt. walks without an antalgic gait.      Examination of the right hand and wrist shows no signs of rashes or erythema. Patient has possibly a small volar ganglion. Patient has full range of motion of the wrist in flexion and extension as well as ulnar and radial deviation. The patient also has full range of motion of all joints in the hand. There are 2+ radial pulse and intact light touch sensation in all 5 digits. Nontender over the anatomic snuffbox. Negative Tinel's.  Positive Finkelstein's  test.    Examination of the left hand and wrist shows no signs of rashes or erythema. Patient has no masses ecchymosis or effusions. Patient has full range of motion of the wrist in flexion and extension as well as ulnar and radial deviation. The patient also has full range of motion of all joints in the hand. There are 2+ radial pulse and intact light touch sensation in all 5 digits. Nontender over the anatomic snuffbox. Negative Tinel's. Negative Finkelstein's test.          X-rays:  X-rays of the right wrist are ordered and reviewed which show no significant abnormalities     Assessment::  Right de Quervain tenosynovitis  Osteoarthritis    Plan:  I reviewed the findings with her today.  We talked some time going over her supplements and vitamins.  I gave her an information sheet on osteoarthritis as well.  Patient agreed to a de Quervain injection.  Follow-up as needed.    This note was created using [employer] voice recognition software that occasionally misinterpreted phrases or words.    Consult note is delivered via Epic messaging service.

## 2020-12-10 NOTE — LETTER
December 10, 2020      Pierre Espinal MD  [address] [facility] [place] [address]           [facility] HERIBERTO SOUZA [address] [place] [id number]  Phone: [phone]          Patient: Misti West   MR Number: [id number]   YOB: 1965   Date of Visit: 12/10/2020       Dear Dr. Pierre Espinal:    Thank you for referring Misti West to me for evaluation. Attached you will find relevant portions of my assessment and plan of care.    If you have questions, please do not hesitate to call me. I look forward to following Misti West along with you.    Sincerely,    Neo Zapata MD    Enclosure  CC:  No Recipients    If you would like to receive this communication electronically, please contact [email or web address] or [phone] to request more information on [employer] access.    For providers and/or their staff who would like to refer a patient to [facility], please contact us through our one-stop-shop provider referral line, [facility] , at [phone].    If you feel you have received this communication in error or would no longer like to receive these types of communications, please e-mail [email or web address]

## 2020-12-10 NOTE — PROCEDURES
Tendon Sheath    Date/Time: 12/10/2020 9:30 AM  Performed by: Neo Zapata MD  Authorized by: Neo Zapata MD     Consent Done?:  Yes (Verbal)  Indications:  Pain  Site marked: the procedure site was marked    Timeout: prior to procedure the correct patient, procedure, and site was verified    Prep: patient was prepped and draped in usual sterile fashion      Local anesthesia used?: Yes    Anesthesia:  Local infiltration  Local anesthetic:  Lidocaine 1% without epinephrine and bupivacaine 0.25% without epinephrine  Anesthetic total (ml):  2    Location:  Wrist  Site:  R first doral compartment  Needle size:  22 G  Approach:  Dorsal  Medications:  4 mg dexamethasone 4 mg/mL  Patient tolerance:  Patient tolerated the procedure well with no immediate complications

## 2020-12-23 DIAGNOSIS — E11.9 TYPE 2 DIABETES MELLITUS WITHOUT COMPLICATION: ICD-10-CM

## 2021-01-04 ENCOUNTER — PATIENT MESSAGE (OUTPATIENT)
Dept: ADMINISTRATIVE | Facility: HOSPITAL | Age: 56
End: 2021-01-04

## 2021-02-12 ENCOUNTER — PATIENT MESSAGE (OUTPATIENT)
Dept: FAMILY MEDICINE | Facility: CLINIC | Age: 56
End: 2021-02-12

## 2021-02-12 DIAGNOSIS — Z86.39 HISTORY OF DIABETES MELLITUS: Primary | ICD-10-CM

## 2021-02-18 ENCOUNTER — LAB VISIT (OUTPATIENT)
Dept: LAB | Facility: HOSPITAL | Age: 56
End: 2021-02-18
Attending: FAMILY MEDICINE

## 2021-02-18 ENCOUNTER — PATIENT MESSAGE (OUTPATIENT)
Dept: FAMILY MEDICINE | Facility: CLINIC | Age: 56
End: 2021-02-18

## 2021-02-18 DIAGNOSIS — E11.9 TYPE 2 DIABETES MELLITUS WITHOUT COMPLICATION: ICD-10-CM

## 2021-02-18 DIAGNOSIS — Z86.39 HISTORY OF DIABETES MELLITUS: ICD-10-CM

## 2021-02-18 LAB
ALBUMIN SERPL BCP-MCNC: 4.4 G/DL (ref 3.5–5.2)
ALP SERPL-CCNC: 66 U/L (ref 55–135)
ALT SERPL W/O P-5'-P-CCNC: 26 U/L (ref 10–44)
ANION GAP SERPL CALC-SCNC: 9 MMOL/L (ref 8–16)
AST SERPL-CCNC: 26 U/L (ref 10–40)
BASOPHILS # BLD AUTO: 0.07 K/UL (ref 0–0.2)
BASOPHILS NFR BLD: 1.3 % (ref 0–1.9)
BILIRUB SERPL-MCNC: 0.7 MG/DL (ref 0.1–1)
BUN SERPL-MCNC: 12 MG/DL (ref 6–20)
CALCIUM SERPL-MCNC: 9.3 MG/DL (ref 8.7–10.5)
CHLORIDE SERPL-SCNC: 105 MMOL/L (ref 95–110)
CHOLEST SERPL-MCNC: 198 MG/DL (ref 120–199)
CHOLEST/HDLC SERPL: 2.7 {RATIO} (ref 2–5)
CO2 SERPL-SCNC: 30 MMOL/L (ref 23–29)
CREAT SERPL-MCNC: 0.8 MG/DL (ref 0.5–1.4)
DIFFERENTIAL METHOD: ABNORMAL
EOSINOPHIL # BLD AUTO: 0.5 K/UL (ref 0–0.5)
EOSINOPHIL NFR BLD: 8.8 % (ref 0–8)
ERYTHROCYTE [DISTWIDTH] IN BLOOD BY AUTOMATED COUNT: 13.5 % (ref 11.5–14.5)
EST. GFR  (AFRICAN AMERICAN): >60 ML/MIN/1.73 M^2
EST. GFR  (NON AFRICAN AMERICAN): >60 ML/MIN/1.73 M^2
ESTIMATED AVG GLUCOSE: 111 MG/DL (ref 68–131)
ESTIMATED AVG GLUCOSE: 111 MG/DL (ref 68–131)
GLUCOSE SERPL-MCNC: 96 MG/DL (ref 70–110)
HBA1C MFR BLD: 5.5 % (ref 4.5–6.2)
HBA1C MFR BLD: 5.5 % (ref 4.5–6.2)
HCT VFR BLD AUTO: 41.8 % (ref 37–48.5)
HDLC SERPL-MCNC: 73 MG/DL (ref 40–75)
HDLC SERPL: 36.9 % (ref 20–50)
HGB BLD-MCNC: 13.9 G/DL (ref 12–16)
IMM GRANULOCYTES # BLD AUTO: 0.01 K/UL (ref 0–0.04)
IMM GRANULOCYTES NFR BLD AUTO: 0.2 % (ref 0–0.5)
LDLC SERPL CALC-MCNC: 116.2 MG/DL (ref 63–159)
LYMPHOCYTES # BLD AUTO: 2.2 K/UL (ref 1–4.8)
LYMPHOCYTES NFR BLD: 39.3 % (ref 18–48)
MCH RBC QN AUTO: 30.8 PG (ref 27–31)
MCHC RBC AUTO-ENTMCNC: 33.3 G/DL (ref 32–36)
MCV RBC AUTO: 93 FL (ref 82–98)
MONOCYTES # BLD AUTO: 0.3 K/UL (ref 0.3–1)
MONOCYTES NFR BLD: 6.2 % (ref 4–15)
NEUTROPHILS # BLD AUTO: 2.4 K/UL (ref 1.8–7.7)
NEUTROPHILS NFR BLD: 44.2 % (ref 38–73)
NONHDLC SERPL-MCNC: 125 MG/DL
NRBC BLD-RTO: 0 /100 WBC
PLATELET # BLD AUTO: 280 K/UL (ref 150–350)
PMV BLD AUTO: 9.6 FL (ref 9.2–12.9)
POTASSIUM SERPL-SCNC: 4.3 MMOL/L (ref 3.5–5.1)
PROT SERPL-MCNC: 7.5 G/DL (ref 6–8.4)
RBC # BLD AUTO: 4.51 M/UL (ref 4–5.4)
SODIUM SERPL-SCNC: 144 MMOL/L (ref 136–145)
TRIGL SERPL-MCNC: 44 MG/DL (ref 30–150)
WBC # BLD AUTO: 5.47 K/UL (ref 3.9–12.7)

## 2021-02-18 PROCEDURE — 80061 LIPID PANEL: CPT

## 2021-02-18 PROCEDURE — 80053 COMPREHEN METABOLIC PANEL: CPT

## 2021-02-18 PROCEDURE — 83036 HEMOGLOBIN GLYCOSYLATED A1C: CPT

## 2021-02-18 PROCEDURE — 36415 COLL VENOUS BLD VENIPUNCTURE: CPT

## 2021-02-18 PROCEDURE — 85025 COMPLETE CBC W/AUTO DIFF WBC: CPT

## 2021-02-21 ENCOUNTER — PATIENT MESSAGE (OUTPATIENT)
Dept: FAMILY MEDICINE | Facility: CLINIC | Age: 56
End: 2021-02-21

## 2021-03-08 ENCOUNTER — IMMUNIZATION (OUTPATIENT)
Dept: FAMILY MEDICINE | Facility: CLINIC | Age: 56
End: 2021-03-08

## 2021-03-08 DIAGNOSIS — Z23 NEED FOR VACCINATION: Primary | ICD-10-CM

## 2021-03-08 PROCEDURE — 91301 COVID-19, MRNA, LNP-S, PF, 100 MCG/0.5 ML DOSE VACCINE: ICD-10-PCS | Mod: S$GLB,,, | Performed by: FAMILY MEDICINE

## 2021-03-08 PROCEDURE — 0011A COVID-19, MRNA, LNP-S, PF, 100 MCG/0.5 ML DOSE VACCINE: ICD-10-PCS | Mod: CV19,S$GLB,, | Performed by: FAMILY MEDICINE

## 2021-03-08 PROCEDURE — 91301 COVID-19, MRNA, LNP-S, PF, 100 MCG/0.5 ML DOSE VACCINE: CPT | Mod: S$GLB,,, | Performed by: FAMILY MEDICINE

## 2021-03-08 PROCEDURE — 0011A COVID-19, MRNA, LNP-S, PF, 100 MCG/0.5 ML DOSE VACCINE: CPT | Mod: CV19,S$GLB,, | Performed by: FAMILY MEDICINE

## 2021-04-05 ENCOUNTER — IMMUNIZATION (OUTPATIENT)
Dept: FAMILY MEDICINE | Facility: CLINIC | Age: 56
End: 2021-04-05

## 2021-04-05 DIAGNOSIS — Z23 NEED FOR VACCINATION: Primary | ICD-10-CM

## 2021-04-05 PROCEDURE — 0012A COVID-19, MRNA, LNP-S, PF, 100 MCG/0.5 ML DOSE VACCINE: ICD-10-PCS | Mod: CV19,S$GLB,, | Performed by: FAMILY MEDICINE

## 2021-04-05 PROCEDURE — 91301 COVID-19, MRNA, LNP-S, PF, 100 MCG/0.5 ML DOSE VACCINE: CPT | Mod: S$GLB,,, | Performed by: FAMILY MEDICINE

## 2021-04-05 PROCEDURE — 91301 COVID-19, MRNA, LNP-S, PF, 100 MCG/0.5 ML DOSE VACCINE: ICD-10-PCS | Mod: S$GLB,,, | Performed by: FAMILY MEDICINE

## 2021-04-05 PROCEDURE — 0012A COVID-19, MRNA, LNP-S, PF, 100 MCG/0.5 ML DOSE VACCINE: CPT | Mod: CV19,S$GLB,, | Performed by: FAMILY MEDICINE

## 2021-04-07 ENCOUNTER — PATIENT MESSAGE (OUTPATIENT)
Dept: ADMINISTRATIVE | Facility: HOSPITAL | Age: 56
End: 2021-04-07

## 2021-04-14 ENCOUNTER — OFFICE VISIT (OUTPATIENT)
Dept: FAMILY MEDICINE | Facility: CLINIC | Age: 56
End: 2021-04-14

## 2021-04-14 ENCOUNTER — PATIENT MESSAGE (OUTPATIENT)
Dept: FAMILY MEDICINE | Facility: CLINIC | Age: 56
End: 2021-04-14

## 2021-04-14 ENCOUNTER — LAB VISIT (OUTPATIENT)
Dept: LAB | Facility: HOSPITAL | Age: 56
End: 2021-04-14
Attending: FAMILY MEDICINE

## 2021-04-14 VITALS
WEIGHT: 165.19 LBS | TEMPERATURE: 97 F | RESPIRATION RATE: 14 BRPM | BODY MASS INDEX: 24.47 KG/M2 | HEART RATE: 70 BPM | DIASTOLIC BLOOD PRESSURE: 72 MMHG | SYSTOLIC BLOOD PRESSURE: 108 MMHG | HEIGHT: 69 IN | OXYGEN SATURATION: 97 %

## 2021-04-14 DIAGNOSIS — R53.83 FATIGUE, UNSPECIFIED TYPE: Primary | ICD-10-CM

## 2021-04-14 DIAGNOSIS — L98.9 SKIN LESION: ICD-10-CM

## 2021-04-14 DIAGNOSIS — R53.83 FATIGUE, UNSPECIFIED TYPE: ICD-10-CM

## 2021-04-14 LAB
ALBUMIN SERPL BCP-MCNC: 4.4 G/DL (ref 3.5–5.2)
ALP SERPL-CCNC: 61 U/L (ref 55–135)
ALT SERPL W/O P-5'-P-CCNC: 22 U/L (ref 10–44)
ANION GAP SERPL CALC-SCNC: 8 MMOL/L (ref 8–16)
AST SERPL-CCNC: 21 U/L (ref 10–40)
BASOPHILS # BLD AUTO: 0.05 K/UL (ref 0–0.2)
BASOPHILS NFR BLD: 0.8 % (ref 0–1.9)
BILIRUB SERPL-MCNC: 0.6 MG/DL (ref 0.1–1)
BUN SERPL-MCNC: 15 MG/DL (ref 6–20)
CALCIUM SERPL-MCNC: 9.3 MG/DL (ref 8.7–10.5)
CHLORIDE SERPL-SCNC: 105 MMOL/L (ref 95–110)
CO2 SERPL-SCNC: 27 MMOL/L (ref 23–29)
CREAT SERPL-MCNC: 0.6 MG/DL (ref 0.5–1.4)
DIFFERENTIAL METHOD: NORMAL
EOSINOPHIL # BLD AUTO: 0.3 K/UL (ref 0–0.5)
EOSINOPHIL NFR BLD: 5.2 % (ref 0–8)
ERYTHROCYTE [DISTWIDTH] IN BLOOD BY AUTOMATED COUNT: 13.9 % (ref 11.5–14.5)
EST. GFR  (AFRICAN AMERICAN): >60 ML/MIN/1.73 M^2
EST. GFR  (NON AFRICAN AMERICAN): >60 ML/MIN/1.73 M^2
GLUCOSE SERPL-MCNC: 96 MG/DL (ref 70–110)
HCT VFR BLD AUTO: 40.7 % (ref 37–48.5)
HGB BLD-MCNC: 13.5 G/DL (ref 12–16)
IMM GRANULOCYTES # BLD AUTO: 0.02 K/UL (ref 0–0.04)
IMM GRANULOCYTES NFR BLD AUTO: 0.3 % (ref 0–0.5)
LYMPHOCYTES # BLD AUTO: 2.1 K/UL (ref 1–4.8)
LYMPHOCYTES NFR BLD: 33 % (ref 18–48)
MCH RBC QN AUTO: 30.9 PG (ref 27–31)
MCHC RBC AUTO-ENTMCNC: 33.2 G/DL (ref 32–36)
MCV RBC AUTO: 93 FL (ref 82–98)
MONOCYTES # BLD AUTO: 0.3 K/UL (ref 0.3–1)
MONOCYTES NFR BLD: 4.6 % (ref 4–15)
NEUTROPHILS # BLD AUTO: 3.5 K/UL (ref 1.8–7.7)
NEUTROPHILS NFR BLD: 56.1 % (ref 38–73)
NRBC BLD-RTO: 0 /100 WBC
PLATELET # BLD AUTO: 290 K/UL (ref 150–450)
PMV BLD AUTO: 9.7 FL (ref 9.2–12.9)
POTASSIUM SERPL-SCNC: 4.3 MMOL/L (ref 3.5–5.1)
PROT SERPL-MCNC: 7.3 G/DL (ref 6–8.4)
RBC # BLD AUTO: 4.37 M/UL (ref 4–5.4)
SODIUM SERPL-SCNC: 140 MMOL/L (ref 136–145)
WBC # BLD AUTO: 6.31 K/UL (ref 3.9–12.7)

## 2021-04-14 PROCEDURE — 36415 COLL VENOUS BLD VENIPUNCTURE: CPT | Performed by: FAMILY MEDICINE

## 2021-04-14 PROCEDURE — 99214 PR OFFICE/OUTPT VISIT, EST, LEVL IV, 30-39 MIN: ICD-10-PCS | Mod: ,,, | Performed by: FAMILY MEDICINE

## 2021-04-14 PROCEDURE — 85025 COMPLETE CBC W/AUTO DIFF WBC: CPT | Performed by: FAMILY MEDICINE

## 2021-04-14 PROCEDURE — 80053 COMPREHEN METABOLIC PANEL: CPT | Performed by: FAMILY MEDICINE

## 2021-04-14 PROCEDURE — 99214 OFFICE O/P EST MOD 30 MIN: CPT | Mod: ,,, | Performed by: FAMILY MEDICINE

## 2021-04-15 ENCOUNTER — PATIENT MESSAGE (OUTPATIENT)
Dept: FAMILY MEDICINE | Facility: CLINIC | Age: 56
End: 2021-04-15

## 2021-04-15 DIAGNOSIS — J30.2 SEASONAL ALLERGIES: ICD-10-CM

## 2021-04-18 RX ORDER — LORATADINE AND PSEUDOEPHEDRINE SULFATE 5; 120 MG/1; MG/1
1 TABLET, EXTENDED RELEASE ORAL 2 TIMES DAILY PRN
Qty: 20 TABLET | Refills: 2 | Status: SHIPPED | OUTPATIENT
Start: 2021-04-18 | End: 2021-12-09

## 2021-04-18 RX ORDER — ALPRAZOLAM 0.25 MG/1
0.25 TABLET ORAL NIGHTLY PRN
Qty: 30 TABLET | Refills: 0 | Status: SHIPPED | OUTPATIENT
Start: 2021-04-18 | End: 2021-11-18 | Stop reason: SDUPTHER

## 2021-04-19 ENCOUNTER — PATIENT MESSAGE (OUTPATIENT)
Dept: FAMILY MEDICINE | Facility: CLINIC | Age: 56
End: 2021-04-19

## 2021-04-19 DIAGNOSIS — J30.2 SEASONAL ALLERGIES: ICD-10-CM

## 2021-04-19 RX ORDER — LORATADINE AND PSEUDOEPHEDRINE SULFATE 5; 120 MG/1; MG/1
1 TABLET, EXTENDED RELEASE ORAL 2 TIMES DAILY PRN
Qty: 20 TABLET | Refills: 2 | Status: CANCELLED | OUTPATIENT
Start: 2021-04-19

## 2021-04-20 ENCOUNTER — PATIENT MESSAGE (OUTPATIENT)
Dept: FAMILY MEDICINE | Facility: CLINIC | Age: 56
End: 2021-04-20

## 2021-05-12 ENCOUNTER — PATIENT OUTREACH (OUTPATIENT)
Dept: ADMINISTRATIVE | Facility: OTHER | Age: 56
End: 2021-05-12

## 2021-05-12 ENCOUNTER — OFFICE VISIT (OUTPATIENT)
Dept: DERMATOLOGY | Facility: CLINIC | Age: 56
End: 2021-05-12

## 2021-05-12 DIAGNOSIS — L82.1 SEBORRHEIC KERATOSIS: ICD-10-CM

## 2021-05-12 DIAGNOSIS — L81.4 SOLAR LENTIGINOSIS: ICD-10-CM

## 2021-05-12 DIAGNOSIS — L57.3 POIKILODERMA CIVATTE'S: ICD-10-CM

## 2021-05-12 DIAGNOSIS — D18.01 CHERRY ANGIOMA: ICD-10-CM

## 2021-05-12 DIAGNOSIS — D48.5 NEOPLASM OF UNCERTAIN BEHAVIOR OF SKIN: Primary | ICD-10-CM

## 2021-05-12 PROCEDURE — 88305 TISSUE EXAM BY PATHOLOGIST: CPT | Mod: 26,,, | Performed by: PATHOLOGY

## 2021-05-12 PROCEDURE — 11102 TANGNTL BX SKIN SINGLE LES: CPT | Mod: S$PBB,,, | Performed by: DERMATOLOGY

## 2021-05-12 PROCEDURE — 99999 PR PBB SHADOW E&M-EST. PATIENT-LVL III: CPT | Mod: PBBFAC,,, | Performed by: DERMATOLOGY

## 2021-05-12 PROCEDURE — 11103 TANGNTL BX SKIN EA SEP/ADDL: CPT | Mod: S$PBB,,, | Performed by: DERMATOLOGY

## 2021-05-12 PROCEDURE — 11102 PR TANGENTIAL BIOPSY, SKIN, SINGLE LESION: ICD-10-PCS | Mod: S$PBB,,, | Performed by: DERMATOLOGY

## 2021-05-12 PROCEDURE — 88342 IMHCHEM/IMCYTCHM 1ST ANTB: CPT | Mod: 26,,, | Performed by: PATHOLOGY

## 2021-05-12 PROCEDURE — 88305 TISSUE EXAM BY PATHOLOGIST: CPT | Mod: 59 | Performed by: PATHOLOGY

## 2021-05-12 PROCEDURE — 11103 PR TANGENTIAL BIOPSY, SKIN, EA ADDTL LESION: ICD-10-PCS | Mod: S$PBB,,, | Performed by: DERMATOLOGY

## 2021-05-12 PROCEDURE — 11103 TANGNTL BX SKIN EA SEP/ADDL: CPT | Mod: PBBFAC,PO | Performed by: DERMATOLOGY

## 2021-05-12 PROCEDURE — 99202 PR OFFICE/OUTPT VISIT, NEW, LEVL II, 15-29 MIN: ICD-10-PCS | Mod: 25,S$PBB,, | Performed by: DERMATOLOGY

## 2021-05-12 PROCEDURE — 99999 PR PBB SHADOW E&M-EST. PATIENT-LVL III: ICD-10-PCS | Mod: PBBFAC,,, | Performed by: DERMATOLOGY

## 2021-05-12 PROCEDURE — 99202 OFFICE O/P NEW SF 15 MIN: CPT | Mod: 25,S$PBB,, | Performed by: DERMATOLOGY

## 2021-05-12 PROCEDURE — 11102 TANGNTL BX SKIN SINGLE LES: CPT | Mod: PBBFAC,PO | Performed by: DERMATOLOGY

## 2021-05-12 PROCEDURE — 99213 OFFICE O/P EST LOW 20 MIN: CPT | Mod: PBBFAC,PO | Performed by: DERMATOLOGY

## 2021-05-12 PROCEDURE — 88342 CHG IMMUNOCYTOCHEMISTRY: ICD-10-PCS | Mod: 26,,, | Performed by: PATHOLOGY

## 2021-05-12 PROCEDURE — 88305 TISSUE EXAM BY PATHOLOGIST: ICD-10-PCS | Mod: 26,,, | Performed by: PATHOLOGY

## 2021-05-20 LAB
FINAL PATHOLOGIC DIAGNOSIS: NORMAL
GROSS: NORMAL
Lab: NORMAL
MICROSCOPIC EXAM: NORMAL

## 2021-08-12 DIAGNOSIS — Z12.31 OTHER SCREENING MAMMOGRAM: ICD-10-CM

## 2021-09-02 DIAGNOSIS — E11.9 TYPE 2 DIABETES MELLITUS WITHOUT COMPLICATION: ICD-10-CM

## 2021-09-15 DIAGNOSIS — E11.9 TYPE 2 DIABETES MELLITUS WITHOUT COMPLICATION: ICD-10-CM

## 2021-10-26 ENCOUNTER — PATIENT MESSAGE (OUTPATIENT)
Dept: FAMILY MEDICINE | Facility: CLINIC | Age: 56
End: 2021-10-26

## 2021-10-28 DIAGNOSIS — E11.9 TYPE 2 DIABETES MELLITUS WITHOUT COMPLICATION, UNSPECIFIED WHETHER LONG TERM INSULIN USE: ICD-10-CM

## 2021-11-18 ENCOUNTER — OFFICE VISIT (OUTPATIENT)
Dept: FAMILY MEDICINE | Facility: CLINIC | Age: 56
End: 2021-11-18

## 2021-11-18 VITALS
DIASTOLIC BLOOD PRESSURE: 66 MMHG | TEMPERATURE: 98 F | HEART RATE: 12 BPM | HEIGHT: 69 IN | WEIGHT: 162.19 LBS | SYSTOLIC BLOOD PRESSURE: 120 MMHG | BODY MASS INDEX: 24.02 KG/M2 | RESPIRATION RATE: 12 BRPM | OXYGEN SATURATION: 97 %

## 2021-11-18 DIAGNOSIS — Z12.4 SCREENING FOR CERVICAL CANCER: Primary | ICD-10-CM

## 2021-11-18 DIAGNOSIS — F41.9 ANXIETY: ICD-10-CM

## 2021-11-18 DIAGNOSIS — E78.5 HYPERLIPIDEMIA, UNSPECIFIED HYPERLIPIDEMIA TYPE: ICD-10-CM

## 2021-11-18 PROCEDURE — 88175 CYTOPATH C/V AUTO FLUID REDO: CPT | Performed by: FAMILY MEDICINE

## 2021-11-18 PROCEDURE — 99214 PR OFFICE/OUTPT VISIT, EST, LEVL IV, 30-39 MIN: ICD-10-PCS | Mod: S$PBB,,, | Performed by: FAMILY MEDICINE

## 2021-11-18 PROCEDURE — 99999 PR PBB SHADOW E&M-EST. PATIENT-LVL IV: ICD-10-PCS | Mod: PBBFAC,,, | Performed by: FAMILY MEDICINE

## 2021-11-18 PROCEDURE — 87624 HPV HI-RISK TYP POOLED RSLT: CPT | Performed by: FAMILY MEDICINE

## 2021-11-18 PROCEDURE — 99999 PR PBB SHADOW E&M-EST. PATIENT-LVL IV: CPT | Mod: PBBFAC,,, | Performed by: FAMILY MEDICINE

## 2021-11-18 PROCEDURE — 99214 OFFICE O/P EST MOD 30 MIN: CPT | Mod: S$PBB,,, | Performed by: FAMILY MEDICINE

## 2021-11-18 PROCEDURE — 99214 OFFICE O/P EST MOD 30 MIN: CPT | Mod: PBBFAC | Performed by: FAMILY MEDICINE

## 2021-11-20 RX ORDER — ATORVASTATIN CALCIUM 10 MG/1
10 TABLET, FILM COATED ORAL DAILY
Qty: 90 TABLET | Refills: 3 | Status: SHIPPED | OUTPATIENT
Start: 2021-11-20 | End: 2022-07-21 | Stop reason: SDUPTHER

## 2021-11-20 RX ORDER — ALPRAZOLAM 0.25 MG/1
0.25 TABLET ORAL NIGHTLY PRN
Qty: 30 TABLET | Refills: 0 | Status: SHIPPED | OUTPATIENT
Start: 2021-11-20 | End: 2022-03-28

## 2021-11-24 LAB
FINAL PATHOLOGIC DIAGNOSIS: NORMAL
HPV HR 12 DNA SPEC QL NAA+PROBE: NEGATIVE
HPV16 AG SPEC QL: NEGATIVE
HPV18 DNA SPEC QL NAA+PROBE: NEGATIVE
Lab: NORMAL

## 2021-12-08 ENCOUNTER — PATIENT MESSAGE (OUTPATIENT)
Dept: FAMILY MEDICINE | Facility: CLINIC | Age: 56
End: 2021-12-08

## 2021-12-08 DIAGNOSIS — J30.2 SEASONAL ALLERGIES: ICD-10-CM

## 2021-12-09 RX ORDER — LORATADINE AND PSEUDOEPHEDRINE SULFATE 5; 120 MG/1; MG/1
1 TABLET, EXTENDED RELEASE ORAL 2 TIMES DAILY PRN
Qty: 20 TABLET | Refills: 2 | Status: SHIPPED | OUTPATIENT
Start: 2021-12-09 | End: 2022-03-28

## 2022-01-05 ENCOUNTER — PATIENT MESSAGE (OUTPATIENT)
Dept: FAMILY MEDICINE | Facility: CLINIC | Age: 57
End: 2022-01-05

## 2022-01-05 DIAGNOSIS — J40 BRONCHITIS: Primary | ICD-10-CM

## 2022-01-05 RX ORDER — AZITHROMYCIN 250 MG/1
TABLET, FILM COATED ORAL
Qty: 6 TABLET | Refills: 0 | Status: SHIPPED | OUTPATIENT
Start: 2022-01-05 | End: 2022-01-10

## 2022-01-06 ENCOUNTER — PATIENT MESSAGE (OUTPATIENT)
Dept: FAMILY MEDICINE | Facility: CLINIC | Age: 57
End: 2022-01-06

## 2022-01-10 ENCOUNTER — PATIENT MESSAGE (OUTPATIENT)
Dept: ADMINISTRATIVE | Facility: HOSPITAL | Age: 57
End: 2022-01-10

## 2022-01-14 ENCOUNTER — LAB VISIT (OUTPATIENT)
Dept: FAMILY MEDICINE | Facility: CLINIC | Age: 57
End: 2022-01-14

## 2022-01-14 ENCOUNTER — NURSE TRIAGE (OUTPATIENT)
Dept: ADMINISTRATIVE | Facility: CLINIC | Age: 57
End: 2022-01-14

## 2022-01-14 ENCOUNTER — TELEPHONE (OUTPATIENT)
Dept: FAMILY MEDICINE | Facility: CLINIC | Age: 57
End: 2022-01-14

## 2022-01-14 DIAGNOSIS — Z20.822 EXPOSURE TO COVID-19 VIRUS: Primary | ICD-10-CM

## 2022-01-14 PROCEDURE — U0005 INFEC AGEN DETEC AMPLI PROBE: HCPCS | Performed by: FAMILY MEDICINE

## 2022-01-14 PROCEDURE — U0003 INFECTIOUS AGENT DETECTION BY NUCLEIC ACID (DNA OR RNA); SEVERE ACUTE RESPIRATORY SYNDROME CORONAVIRUS 2 (SARS-COV-2) (CORONAVIRUS DISEASE [COVID-19]), AMPLIFIED PROBE TECHNIQUE, MAKING USE OF HIGH THROUGHPUT TECHNOLOGIES AS DESCRIBED BY CMS-2020-01-R: HCPCS | Performed by: FAMILY MEDICINE

## 2022-01-14 NOTE — TELEPHONE ENCOUNTER
----- Message from Erlinda Ceron sent at 1/14/2022  1:23 PM CST -----  Contact: Patient  Type:  Sooner Apoointment Request    Caller is requesting a sooner appointment.  Caller declined first available appointment listed below.  Caller will not accept being placed on the waitlist and is requesting a message be sent to doctor.    Name of Caller:  Patient  When is the first available appointment?  2/22  Symptoms:  Sinus congestion, chills  Best Call Back Number:  [phone]  Additional Information:  Please call back.  Thanks.

## 2022-01-14 NOTE — PROGRESS NOTES
Misti West presented to clinic for COVID-19 swab.   Misti West verified x2, name and .   Misti West instructed on what will be completed, asked if ever had COVID-19 swab.   Explained procedure to Misti West.   Specimen obtained.   No questions or concerns voiced further at this time.   Misti West left in satisfactory condition.

## 2022-01-14 NOTE — TELEPHONE ENCOUNTER
Her granddaughter tested positive for covid sars covid on Sunday. Her grandson tested positive for covid on yesterday. Pt stated she is not sure what strain the grandson has. Pt has been exposed and she wants to know if she can watch both kids. She said she has no choice she has to watch them both. Instructed pt to make sure she keep them separate to protect everyone since the kids are from two separate home. Pt also instructed to make sure she is wearing a tight fitted mask, washing her hands, using disinfecting on frequently touch places. Pt verbalized understanding. Call pt with additional care advice.     Reason for Disposition   General information question, no triage required and triager able to answer question    Protocols used: INFORMATION ONLY CALL-A-AH

## 2022-01-15 ENCOUNTER — PATIENT MESSAGE (OUTPATIENT)
Dept: ADMINISTRATIVE | Facility: OTHER | Age: 57
End: 2022-01-15

## 2022-01-15 LAB
SARS-COV-2 RNA RESP QL NAA+PROBE: DETECTED
SARS-COV-2- CYCLE NUMBER: 16

## 2022-02-16 DIAGNOSIS — G89.29 CHRONIC PAIN OF LEFT KNEE: Primary | ICD-10-CM

## 2022-02-16 DIAGNOSIS — M25.562 CHRONIC PAIN OF LEFT KNEE: Primary | ICD-10-CM

## 2022-02-17 ENCOUNTER — PATIENT OUTREACH (OUTPATIENT)
Dept: ADMINISTRATIVE | Facility: OTHER | Age: 57
End: 2022-02-17

## 2022-02-22 ENCOUNTER — OFFICE VISIT (OUTPATIENT)
Dept: PODIATRY | Facility: CLINIC | Age: 57
End: 2022-02-22

## 2022-02-22 VITALS
HEART RATE: 81 BPM | RESPIRATION RATE: 18 BRPM | HEIGHT: 69 IN | SYSTOLIC BLOOD PRESSURE: 115 MMHG | DIASTOLIC BLOOD PRESSURE: 61 MMHG | BODY MASS INDEX: 23.99 KG/M2 | TEMPERATURE: 98 F | WEIGHT: 162 LBS

## 2022-02-22 DIAGNOSIS — M79.672 PAIN IN BOTH FEET: Primary | ICD-10-CM

## 2022-02-22 DIAGNOSIS — M79.671 PAIN IN BOTH FEET: Primary | ICD-10-CM

## 2022-02-22 DIAGNOSIS — L85.1 ACQUIRED KERATOSIS OF PLANTAR ASPECT OF FOOT: ICD-10-CM

## 2022-02-22 DIAGNOSIS — B35.1 ONYCHOMYCOSIS OF TOENAIL: ICD-10-CM

## 2022-02-22 PROCEDURE — 99999 PR PBB SHADOW E&M-EST. PATIENT-LVL IV: CPT | Mod: PBBFAC,,, | Performed by: PODIATRIST

## 2022-02-22 PROCEDURE — 99202 PR OFFICE/OUTPT VISIT, NEW, LEVL II, 15-29 MIN: ICD-10-PCS | Mod: S$PBB,,, | Performed by: PODIATRIST

## 2022-02-22 PROCEDURE — 99214 OFFICE O/P EST MOD 30 MIN: CPT | Mod: PBBFAC | Performed by: PODIATRIST

## 2022-02-22 PROCEDURE — 99999 PR PBB SHADOW E&M-EST. PATIENT-LVL IV: ICD-10-PCS | Mod: PBBFAC,,, | Performed by: PODIATRIST

## 2022-02-22 PROCEDURE — 99202 OFFICE O/P NEW SF 15 MIN: CPT | Mod: S$PBB,,, | Performed by: PODIATRIST

## 2022-02-23 DIAGNOSIS — E11.9 TYPE 2 DIABETES MELLITUS WITHOUT COMPLICATION: ICD-10-CM

## 2022-02-24 ENCOUNTER — HOSPITAL ENCOUNTER (OUTPATIENT)
Dept: RADIOLOGY | Facility: HOSPITAL | Age: 57
Discharge: HOME | End: 2022-02-24
Attending: ORTHOPAEDIC SURGERY

## 2022-02-24 ENCOUNTER — TELEPHONE (OUTPATIENT)
Dept: FAMILY MEDICINE | Facility: CLINIC | Age: 57
End: 2022-02-24

## 2022-02-24 ENCOUNTER — PATIENT MESSAGE (OUTPATIENT)
Dept: FAMILY MEDICINE | Facility: CLINIC | Age: 57
End: 2022-02-24

## 2022-02-24 ENCOUNTER — PATIENT MESSAGE (OUTPATIENT)
Dept: ORTHOPEDICS | Facility: CLINIC | Age: 57
End: 2022-02-24

## 2022-02-24 ENCOUNTER — OFFICE VISIT (OUTPATIENT)
Dept: ORTHOPEDICS | Facility: CLINIC | Age: 57
End: 2022-02-24

## 2022-02-24 VITALS — RESPIRATION RATE: 18 BRPM | WEIGHT: 162 LBS | BODY MASS INDEX: 23.99 KG/M2 | HEIGHT: 69 IN

## 2022-02-24 DIAGNOSIS — M25.562 CHRONIC PAIN OF LEFT KNEE: Primary | ICD-10-CM

## 2022-02-24 DIAGNOSIS — M17.9 OSTEOARTHRITIS OF KNEE, UNSPECIFIED LATERALITY, UNSPECIFIED OSTEOARTHRITIS TYPE: Primary | ICD-10-CM

## 2022-02-24 DIAGNOSIS — G89.29 CHRONIC PAIN OF LEFT KNEE: ICD-10-CM

## 2022-02-24 DIAGNOSIS — G89.29 CHRONIC PAIN OF LEFT KNEE: Primary | ICD-10-CM

## 2022-02-24 DIAGNOSIS — M17.12 OSTEOARTHRITIS OF LEFT KNEE, UNSPECIFIED OSTEOARTHRITIS TYPE: ICD-10-CM

## 2022-02-24 DIAGNOSIS — M17.9 OSTEOARTHRITIS OF KNEE, UNSPECIFIED LATERALITY, UNSPECIFIED OSTEOARTHRITIS TYPE: ICD-10-CM

## 2022-02-24 DIAGNOSIS — M25.562 CHRONIC PAIN OF LEFT KNEE: ICD-10-CM

## 2022-02-24 PROCEDURE — 99214 PR OFFICE/OUTPT VISIT, EST, LEVL IV, 30-39 MIN: ICD-10-PCS | Mod: S$PBB,,, | Performed by: ORTHOPAEDIC SURGERY

## 2022-02-24 PROCEDURE — 99999 PR PBB SHADOW E&M-EST. PATIENT-LVL III: CPT | Mod: PBBFAC,,, | Performed by: ORTHOPAEDIC SURGERY

## 2022-02-24 PROCEDURE — 99213 OFFICE O/P EST LOW 20 MIN: CPT | Mod: PBBFAC,PN | Performed by: ORTHOPAEDIC SURGERY

## 2022-02-24 PROCEDURE — 73562 X-RAY EXAM OF KNEE 3: CPT | Mod: 26,RT,, | Performed by: RADIOLOGY

## 2022-02-24 PROCEDURE — 99214 OFFICE O/P EST MOD 30 MIN: CPT | Mod: S$PBB,,, | Performed by: ORTHOPAEDIC SURGERY

## 2022-02-24 PROCEDURE — 73562 XR KNEE ORTHO LEFT WITH FLEXION: ICD-10-PCS | Mod: 26,RT,, | Performed by: RADIOLOGY

## 2022-02-24 PROCEDURE — 73562 X-RAY EXAM OF KNEE 3: CPT | Mod: TC,PN,RT

## 2022-02-24 PROCEDURE — 99999 PR PBB SHADOW E&M-EST. PATIENT-LVL III: ICD-10-PCS | Mod: PBBFAC,,, | Performed by: ORTHOPAEDIC SURGERY

## 2022-02-24 PROCEDURE — 73564 X-RAY EXAM KNEE 4 OR MORE: CPT | Mod: 26,LT,, | Performed by: RADIOLOGY

## 2022-02-24 PROCEDURE — 73564 XR KNEE ORTHO LEFT WITH FLEXION: ICD-10-PCS | Mod: 26,LT,, | Performed by: RADIOLOGY

## 2022-02-24 RX ORDER — CLINDAMYCIN PHOSPHATE 900 MG/50ML
900 INJECTION, SOLUTION INTRAVENOUS
Status: CANCELLED | OUTPATIENT
Start: 2022-02-24

## 2022-02-24 RX ORDER — MUPIROCIN 20 MG/G
OINTMENT TOPICAL
Status: CANCELLED | OUTPATIENT
Start: 2022-02-24

## 2022-02-24 RX ORDER — TRANEXAMIC ACID 100 MG/ML
1000 INJECTION, SOLUTION INTRAVENOUS
Status: CANCELLED | OUTPATIENT
Start: 2022-02-24

## 2022-02-24 NOTE — TELEPHONE ENCOUNTER
Patient was evaluated today by Dr. Zapata and consented for Left knee TKA 3/29. Patient is in need of surgical clearance to proceed. Please advise!

## 2022-02-24 NOTE — TELEPHONE ENCOUNTER
----- Message from Everett Mcdonnell sent at 2/24/2022  4:36 PM CST -----  Regarding: pre op clearance  Patient was seen today by Dr. Zapata and consented for left total knee arthroplasty.  Surgery has been scheduled for 3/29/22.  Please advise on pre op medical clearance for this patient.      Thank you,     Asa

## 2022-02-24 NOTE — PROGRESS NOTES
Past Medical History:   Diagnosis Date    Anxiety     Arthritis     Cancer     cervical    Hepatomegaly     3 liver cyst    Hiatal hernia     Liver lesion     PONV (postoperative nausea and vomiting)        Past Surgical History:   Procedure Laterality Date    CERVIX SURGERY      COLONOSCOPY N/A 8/27/2020    Procedure: COLONOSCOPY;  Surgeon: Tim Aguayo MD;  Location: [facility];  Service: Endoscopy;  Laterality: N/A;    CYSTOSCOPY WITH URETHRAL DILATION  10/1/2020    Procedure: CYSTOSCOPY, WITH URETHRAL DILATION;  Surgeon: Bhargav Keane MD;  Location: [facility] OR;  Service: Urology;;    ESOPHAGOGASTRODUODENOSCOPY N/A 8/13/2020    Procedure: EGD (ESOPHAGOGASTRODUODENOSCOPY);  Surgeon: Tim Aguayo MD;  Location: [facility];  Service: Endoscopy;  Laterality: N/A;    KNEE ARTHROSCOPY      SINUS SURGERY      TUBAL LIGATION         Current Outpatient Medications   Medication Sig    ALPRAZolam (XANAX) 0.25 MG tablet Take 1 tablet (0.25 mg total) by mouth nightly as needed for Anxiety.    ascorbic acid, vitamin C, (VITAMIN C) 500 MG tablet Take 500 mg by mouth once daily.    atorvastatin (LIPITOR) 10 MG tablet Take 1 tablet (10 mg total) by mouth once daily.    b complex vitamins tablet Take 1 tablet by mouth once daily.    biotin 1 mg tablet Take 1,000 mcg by mouth 3 (three) times daily.    cholecalciferol, vitamin D3, (VITAMIN D3 ORAL) Take by mouth.    loratadine-pseudoephedrine 5-120 mg (CLARITIN-D 12 HOUR) 5-120 mg per tablet Take 1 tablet by mouth 2 (two) times daily as needed for Allergies.    multivitamin capsule Take 1 capsule by mouth once daily.     No current facility-administered medications for this visit.       Review of patient's allergies indicates:   Allergen Reactions    Codeine     Morphine     Naproxen sodium     Penicillins     Tramadol hcl     Trintex        Family History   Problem Relation Age of Onset    Diabetes Mother     Cancer Mother     Arthritis  Mother     Cancer Father     COPD Father     Hypertension Father     Arthritis Father     Melanoma Father     Breast cancer Sister     Arthritis Sister     Breast cancer Sister     Arthritis Sister     Breast cancer Sister     Kidney disease Sister     Arthritis Sister     Colon polyps Neg Hx     Colon cancer Neg Hx     Crohn's disease Neg Hx     Ulcerative colitis Neg Hx     Stomach cancer Neg Hx     Esophageal cancer Neg Hx     Celiac disease Neg Hx        Social History     Socioeconomic History    Marital status:    Tobacco Use    Smoking status: Current Every Day Smoker     Packs/day: 1.00    Smokeless tobacco: Never Used   Substance and Sexual Activity    Alcohol use: Not Currently    Drug use: Never    Sexual activity: Yes       Chief Complaint:   Chief Complaint   Patient presents with    Left Knee - Pain       History of present illness:  This is a 56-year-old female seen for worsening left knee pain.  Patient had a open ligament surgery of her left knee in 1980.  Since then she has had progressive left knee pain.  Patient has known arthritis.  She has tried injections with only about a week of relief.  She has an  brace which does not fit.  Pain is a 9/10.  Knee continues to give way.  Starting affect her back and hip pain is constant.  Wakes her up at night.  Pain is a 9/10.      Review of Systems:    Constitution: Negative for chills, fever, and sweats.  Negative for unexplained weight loss.    HENT:  Negative for headaches and blurry vision.    Cardiovascular:Negative for chest pain or irregular heart beat. Negative for hypertension.    Respiratory:  Negative for cough and shortness of breath.    Gastrointestinal: Negative for abdominal pain, heartburn, melena, nausea, and vomitting.    Genitourinary:  Negative bladder incontinence and dysuria.    Musculoskeletal:  See HPI    Neurological: Negative for numbness.    Psychiatric/Behavioral: Negative for depression.   The patient is not nervous/anxious.      Endocrine: Negative for polyuria    Hematologic/Lymphatic: Negative for bleeding problem.  Does not bruise/bleed easily.    Skin: Negative for poor would healing and rash      Physical Examination:    Vital Signs:    Vitals:    02/24/22 0828   Resp: 18       Body mass index is 23.92 kg/m².    This a well-developed, well nourished patient in no acute distress.  They are alert and oriented and cooperative to examination.  Pt. walks without an antalgic gait.      Examination of the left knee shows no rashes or erythema. There are no masses ecchymosis or effusion. Patient has full range of motion from 0-130°. Patient is moderately tender to palpation over lateral joint line and nontender to palpation over the medial joint line. Patient has a - Lachman exam, - anterior drawer exam, and - posterior drawer exam. - Gustavo's exam. Knee is stable to varus and valgus stress. 5 out of 5 motor strength. Palpable distal pulses. Intact light touch sensation. Negative Patellofemoral crepitus    Heart is regular rate without obvious murmurs   Normal respiratory effort without audible wheezing  Abdomen is soft and nontender     X-rays:  X-rays of the left knee are ordered and reviewed which show severe lateral compartment narrowing of the left knee with some joint subluxation.  Progressive arthritis from previous x-rays     Assessment::  Left valgus knee arthritis    Plan:  Reviewed the findings with her today.  Patient has end-stage knee arthritis with some giving way due to the chronic ligament issues.  We talked about total knee arthroplasty.  Patient is agreeable.  Plan is for left Buzz robotic assisted total knee arthroplasty.  Risks, benefits, and alternatives to the procedure were explained to the patient including but not limited to damage to nerves, arteries, blood vessels, bones, tendons, ligaments, stiffness, instability, infection, DVT, PE, as well as general anesthetic  complications including seizure, stroke, heart attack and even death. The patient understood these risks and wished to proceed and signed the informed consent.       This note was created using [employer] voice recognition software that occasionally misinterpreted phrases or words.    Consult note is delivered via Epic messaging service.

## 2022-02-27 NOTE — PROGRESS NOTES
Subjective:       Patient ID: Misti West is a 56 y.o. female.    Chief Complaint: Callouses and Nail Problem  Patient presents with complaint very painful areas 1 on the ball of both feet and progressing discomfort big toenails right greater than left.  She has been applying medicated pads to the calluses on the ball of her feet.  Started wearing orthopedic shoes which have helped even her knees.  Pain level 8/10    Past Medical History:   Diagnosis Date    Anxiety     Arthritis     Cancer     cervical    Hepatomegaly     3 liver cyst    Hiatal hernia     Liver lesion     PONV (postoperative nausea and vomiting)      Past Surgical History:   Procedure Laterality Date    CERVIX SURGERY      COLONOSCOPY N/A 8/27/2020    Procedure: COLONOSCOPY;  Surgeon: Tim Aguayo MD;  Location: [facility] ENDO;  Service: Endoscopy;  Laterality: N/A;    CYSTOSCOPY WITH URETHRAL DILATION  10/1/2020    Procedure: CYSTOSCOPY, WITH URETHRAL DILATION;  Surgeon: Bhargav Keane MD;  Location: [facility] OR;  Service: Urology;;    ESOPHAGOGASTRODUODENOSCOPY N/A 8/13/2020    Procedure: EGD (ESOPHAGOGASTRODUODENOSCOPY);  Surgeon: Tim Aguayo MD;  Location: [facility];  Service: Endoscopy;  Laterality: N/A;    KNEE ARTHROSCOPY      SINUS SURGERY      TUBAL LIGATION       Family History   Problem Relation Age of Onset    Diabetes Mother     Cancer Mother     Arthritis Mother     Cancer Father     COPD Father     Hypertension Father     Arthritis Father     Melanoma Father     Breast cancer Sister     Arthritis Sister     Breast cancer Sister     Arthritis Sister     Breast cancer Sister     Kidney disease Sister     Arthritis Sister     Colon polyps Neg Hx     Colon cancer Neg Hx     Crohn's disease Neg Hx     Ulcerative colitis Neg Hx     Stomach cancer Neg Hx     Esophageal cancer Neg Hx     Celiac disease Neg Hx      Social History     Socioeconomic History    Marital status:    Tobacco Use     "Smoking status: Current Every Day Smoker     Packs/day: 1.00    Smokeless tobacco: Never Used   Substance and Sexual Activity    Alcohol use: Not Currently    Drug use: Never    Sexual activity: Yes       Current Outpatient Medications   Medication Sig Dispense Refill    ALPRAZolam (XANAX) 0.25 MG tablet Take 1 tablet (0.25 mg total) by mouth nightly as needed for Anxiety. 30 tablet 0    ascorbic acid, vitamin C, (VITAMIN C) 500 MG tablet Take 500 mg by mouth once daily.      atorvastatin (LIPITOR) 10 MG tablet Take 1 tablet (10 mg total) by mouth once daily. 90 tablet 3    b complex vitamins tablet Take 1 tablet by mouth once daily.      biotin 1 mg tablet Take 1,000 mcg by mouth 3 (three) times daily.      cholecalciferol, vitamin D3, (VITAMIN D3 ORAL) Take by mouth.      loratadine-pseudoephedrine 5-120 mg (CLARITIN-D 12 HOUR) 5-120 mg per tablet Take 1 tablet by mouth 2 (two) times daily as needed for Allergies. 20 tablet 2    multivitamin capsule Take 1 capsule by mouth once daily.       No current facility-administered medications for this visit.     Review of patient's allergies indicates:   Allergen Reactions    Codeine     Morphine     Naproxen sodium     Penicillins     Tramadol hcl     Trintex        Review of Systems   HENT: Negative for congestion.    Respiratory: Negative for cough.    Cardiovascular: Negative for leg swelling.   Musculoskeletal: Negative for gait problem.   All other systems reviewed and are negative.      Objective:      Vitals:    02/22/22 1501   BP: 115/61   Pulse: 81   Resp: 18   Temp: 97.7 °F (36.5 °C)   TempSrc: Oral   Weight: 73.5 kg (162 lb)   Height: 5' 9" (1.753 m)     Physical Exam  Vitals and nursing note reviewed.   Constitutional:       General: She is not in acute distress.  Cardiovascular:      Pulses:           Dorsalis pedis pulses are 2+ on the right side and 2+ on the left side.        Posterior tibial pulses are 2+ on the right side and 2+ on the " left side.   Pulmonary:      Effort: Pulmonary effort is normal.   Feet:      Right foot:      Skin integrity: Callus (Painful macerated IPK sub 4th digit right foot.  Tender dystrophic fungal right hallux nail with some incurvation of nail borders.  No sign of infection) present.      Toenail Condition: Fungal disease present.     Left foot:      Skin integrity: Callus (Painful macerated IPK sub 5th digit region left foot.  Mild fungal nail no pain no sign of infection) present.   Skin:     Capillary Refill: Capillary refill takes less than 2 seconds.   Neurological:      General: No focal deficit present.   Psychiatric:         Mood and Affect: Mood normal.         Behavior: Behavior normal.                                        Assessment:       1. Pain in both feet    2. Acquired keratosis of plantar aspect of foot    3. Onychomycosis of toenail - Right Foot        Plan:           Following debridement of hyperkeratotic lesions reassured patient these areas have developed seed corn from repetitive pressure, most likely flat or ill-fitting shoes prior to her change in shoes.  Advised patient excellent sign she has had reduced pain with the new shoes which she should continue at all times even indoors until these areas are completely resolved with no recurrence.  We discussed additional arch support to help take pressure off the ball of the foot, proper arch support and how to gradually adjust to wearing comfortably removing existing insoles 1st.  Reviewed appropriate shoes indoors, no flat shoes or walking barefoot.  We discussed care and maintenance of these areas to try to prevent recurrence  We discussed fungal nail right greater than left big toe.  Explained on the right due to discoloration and thickening of the nail both borders are starting to slightly pinch or becoming incurvated.  Reassured patient nail is not ingrown and we had a lengthy discussion regarding topical treatments use twice daily as well  as soaking in warm water and Epson salt to help alleviate pain, pressure, treat fungal nail and try to avoid ingrown nail and infection.  Showed patient how to reduce thickness of nail to prevent pain and allow medication to penetrate more effectively.  This should be done weekly.  We discussed early signs of fungus left great toenail, should be treating both nails daily and explained it can take 3 months to notice improvement and 12 months to completely grow out.  Patient was in understanding and agreement with treatment plan.  I counseled the patient on their conditions, implications and medical management.  Instructed patient to contact the office with any changes, questions, concerns, worsening of symptoms.   Total face to face time, exam, assessment, treatment, discussion, documentation 20 minutes, more than half this time spent on consultation and coordination of care.   Follow up as needed      This note was created using M*Modal voice recognition software that occasionally misinterpreted phrases or words.

## 2022-03-02 ENCOUNTER — PATIENT MESSAGE (OUTPATIENT)
Dept: ADMINISTRATIVE | Facility: HOSPITAL | Age: 57
End: 2022-03-02

## 2022-03-05 ENCOUNTER — PATIENT MESSAGE (OUTPATIENT)
Dept: ADMINISTRATIVE | Facility: OTHER | Age: 57
End: 2022-03-05

## 2022-03-07 ENCOUNTER — PATIENT MESSAGE (OUTPATIENT)
Dept: FAMILY MEDICINE | Facility: CLINIC | Age: 57
End: 2022-03-07

## 2022-03-09 ENCOUNTER — TELEPHONE (OUTPATIENT)
Dept: FAMILY MEDICINE | Facility: CLINIC | Age: 57
End: 2022-03-09

## 2022-03-09 ENCOUNTER — OFFICE VISIT (OUTPATIENT)
Dept: FAMILY MEDICINE | Facility: CLINIC | Age: 57
End: 2022-03-09

## 2022-03-09 VITALS
RESPIRATION RATE: 14 BRPM | DIASTOLIC BLOOD PRESSURE: 66 MMHG | SYSTOLIC BLOOD PRESSURE: 126 MMHG | OXYGEN SATURATION: 97 % | WEIGHT: 160.81 LBS | HEIGHT: 69 IN | TEMPERATURE: 98 F | BODY MASS INDEX: 23.82 KG/M2 | HEART RATE: 79 BPM

## 2022-03-09 DIAGNOSIS — Z01.818 PREOP EXAMINATION: Primary | ICD-10-CM

## 2022-03-09 PROCEDURE — 99999 PR PBB SHADOW E&M-EST. PATIENT-LVL V: ICD-10-PCS | Mod: PBBFAC,,, | Performed by: FAMILY MEDICINE

## 2022-03-09 PROCEDURE — 99214 PR OFFICE/OUTPT VISIT, EST, LEVL IV, 30-39 MIN: ICD-10-PCS | Mod: ,,, | Performed by: FAMILY MEDICINE

## 2022-03-09 PROCEDURE — 99214 OFFICE O/P EST MOD 30 MIN: CPT | Mod: ,,, | Performed by: FAMILY MEDICINE

## 2022-03-09 PROCEDURE — 99999 PR PBB SHADOW E&M-EST. PATIENT-LVL V: CPT | Mod: PBBFAC,,, | Performed by: FAMILY MEDICINE

## 2022-03-09 NOTE — PROGRESS NOTES
[facility] Note    Subjective      Ms. West is a 56 y.o. female who presents to clinic for Pre-op Exam (Sx on 03/29/22 for knee replacement)    Patient is scheduled for left knee replacement with Dr. Zapata on 03/29/2022.  She denies any chest pain, blurry vision, headaches, shortness of breath.  She has no other complaints at this time.    PMH Misti has a past medical history of Anxiety, Arthritis, Cancer, Hepatomegaly, Hiatal hernia, Liver lesion, and PONV (postoperative nausea and vomiting).   PSXH Misti has a past surgical history that includes Tubal ligation; Sinus surgery; Knee arthroscopy; Esophagogastroduodenoscopy (N/A, 8/13/2020); Cervix surgery; Colonoscopy (N/A, 8/27/2020); and Cystoscopy with urethral dilation (10/1/2020).    Misti's family history includes Arthritis in her father, mother, sister, sister, and sister; Breast cancer in her sister, sister, and sister; COPD in her father; Cancer in her father and mother; Diabetes in her mother; Hypertension in her father; Kidney disease in her sister; Melanoma in her father.    Misti reports that she has been smoking. She has been smoking about 1.00 pack per day. She has never used smokeless tobacco. She reports previous alcohol use. She reports that she does not use drugs.   DERRICK Chappell is allergic to codeine, morphine, naproxen sodium, penicillins, tramadol hcl, and trintex.   LEILANI Chappell has a current medication list which includes the following prescription(s): alprazolam, ascorbic acid (vitamin c), atorvastatin, b complex vitamins, biotin, cholecalciferol (vitamin d3), claritin-d 12 hour, and multivitamin.     Review of Systems   Constitutional: Negative for chills and fever.   Eyes: Negative for visual disturbance.   Respiratory: Negative for shortness of breath.    Cardiovascular: Negative for chest pain.   Musculoskeletal: Positive for arthralgias.     Objective     /66 (BP Location: Right arm, Patient Position: Sitting, BP  "Method: Large (Manual))   Pulse 79   Temp 97.6 °F (36.4 °C) (Temporal)   Resp 14   Ht 5' 9" (1.753 m)   Wt 72.9 kg (160 lb 12.8 oz)   SpO2 97%   BMI 23.75 kg/m²     Physical Exam  Vitals and nursing note reviewed.   Constitutional:       General: She is not in acute distress.     Appearance: Normal appearance. She is well-developed. She is not diaphoretic.   HENT:      Head: Normocephalic and atraumatic.      Right Ear: External ear normal.      Left Ear: External ear normal.   Eyes:      General:         Right eye: No discharge.         Left eye: No discharge.   Cardiovascular:      Rate and Rhythm: Normal rate and regular rhythm.      Heart sounds: Normal heart sounds.   Pulmonary:      Effort: Pulmonary effort is normal.      Breath sounds: Normal breath sounds. No wheezing or rales.   Skin:     General: Skin is warm and dry.   Neurological:      Mental Status: She is alert and oriented to person, place, and time. Mental status is at baseline.   Psychiatric:         Mood and Affect: Mood normal.         Behavior: Behavior normal.         Thought Content: Thought content normal.         Judgment: Judgment normal.        Assessment/Plan     1. Preop examination  Comprehensive Metabolic Panel    CBC Auto Differential    Hemoglobin A1C    EKG 12-lead    X-Ray Chest PA And Lateral     Preoperative examination:  Patient is currently medically optimized. Reviewed patient's preoperative lab work, EKG, chest x-ray.  Chest x-ray within normal limits, EKG with no new abnormalities.  Based on RCRI patient is low risk for a low to moderate risk procedure.  Patient has good exercise tolerance and should do well with the surgery.    Future Appointments   Date Time Provider Department Center   3/10/2022  9:50 AM [facility], LABORATORY [facility] LAB [facility]   3/10/2022 10:00 AM [facility] XR1  FL1 [facility] XRAY [facility]   3/10/2022 10:15 AM [facility] EKG [facility] RAQUEL [facility]   3/17/2022 11:00 AM JOINT [place] NURSE, [facility] PREADMT " [facility]   3/17/2022 12:15 PM [facility] CT2 LIMIT 500 LBS [facility] CT SCAN [facility]   3/26/2022 11:00 AM PROCEDURAL COVID TESTING, [facility] PRIMARY CARE [facility] PRIMCAR [facility]   4/11/2022 11:00 AM Neo Zapata MD [facility] ORTHO [facility]   5/18/2022  2:20 PM Pierre Espinal MD [facility]         Pierre Espinal MD  Family Medicine  [facility]

## 2022-03-09 NOTE — TELEPHONE ENCOUNTER
----- Message from David Wong sent at 3/9/2022  3:11 PM CST -----  Type: Needs Medical Advice  Who Called:  Pt    Best Call Back Number: [phone]   Additional Information: Sts she is running a few minutes late should be there by 330.  Please advise -- Thank you

## 2022-03-09 NOTE — Clinical Note
Preoperative examination:  Patient is currently medically optimized. Reviewed patient's preoperative lab work, EKG, chest x-ray.  Chest x-ray within normal limits, EKG with no new abnormalities.  Based on RCRI patient is low risk for a low to moderate risk procedure.  Patient has good exercise tolerance and should do well with the surgery.

## 2022-03-10 ENCOUNTER — HOSPITAL ENCOUNTER (OUTPATIENT)
Dept: RADIOLOGY | Facility: HOSPITAL | Age: 57
Discharge: HOME | End: 2022-03-10
Attending: FAMILY MEDICINE

## 2022-03-10 ENCOUNTER — HOSPITAL ENCOUNTER (OUTPATIENT)
Dept: CARDIOLOGY | Facility: HOSPITAL | Age: 57
Discharge: HOME | End: 2022-03-10
Attending: FAMILY MEDICINE

## 2022-03-10 DIAGNOSIS — Z01.818 PREOP EXAMINATION: ICD-10-CM

## 2022-03-10 PROCEDURE — 93010 EKG 12-LEAD: ICD-10-PCS | Mod: ,,, | Performed by: INTERNAL MEDICINE

## 2022-03-10 PROCEDURE — 93005 ELECTROCARDIOGRAM TRACING: CPT

## 2022-03-10 PROCEDURE — 71046 XR CHEST PA AND LATERAL: ICD-10-PCS | Mod: 26,,, | Performed by: RADIOLOGY

## 2022-03-10 PROCEDURE — 71046 X-RAY EXAM CHEST 2 VIEWS: CPT | Mod: TC,FY

## 2022-03-10 PROCEDURE — 71046 X-RAY EXAM CHEST 2 VIEWS: CPT | Mod: 26,,, | Performed by: RADIOLOGY

## 2022-03-10 PROCEDURE — 93010 ELECTROCARDIOGRAM REPORT: CPT | Mod: ,,, | Performed by: INTERNAL MEDICINE

## 2022-03-16 ENCOUNTER — DOCUMENTATION ONLY (OUTPATIENT)
Dept: REHABILITATION | Facility: HOSPITAL | Age: 57
End: 2022-03-16

## 2022-03-16 NOTE — PROGRESS NOTES
Patient Name: Misti West  YOB: 1965   MRN: [id number]     [facility]   Basic Mobility Inpatient Short Form 6 Clicks         How much difficulty does the patient currently have  Unable  A Lot  A Little  None      1. Turning over in bed (including adjusting bedclothes, sheets and blankets)?     1 []    2 []    3 [x]    4 []        2. Sitting down on and standing up from a chair with arms (e.g., wheelchair, bedside commode, etc.)     1 []  2 []  3 []     4 [x]      3. Moving from lying on back to sitting on the side of the bed?     1 []  2 []  3 []    4 [x]    How much help from another person does the patient currently need  Total  A Lot  A Little  None      4. Moving to and from a bed to a chair (including a wheelchair)?    1 []  2 []  3 []    4 [x]      5. Need to walk in hospital room?    1 []  2 []  3 []    4 [x]      6. Climbing 3-5 steps with a railing?    1 []  2 []  3 []    4 [x]       Raw Score:      23            CMS 0-100% Score:   11.2         %   Standardized Score:     6.22          CMS Modifier:        CI                                  [facility]   Basic Mobility Inpatient Short Form 6 Clicks Score Conversion Table*         *Use this form to convert AM-PAC Basic Mobility Inpatient Raw Scores.   [facility] Inpatient Basic Mobility Short Form Scoring Example   1. Add the number values associated with the response to each item. For example, items totals yield a Raw Score of 21.   2. Match the raw score to the t-Scale scores (t-Scale score = 50.25, SE = 4.69).   3. Find the associated CMS % (CMS % = 28.97%).   4. Locate the correct CMS Functional Modifier Code, or G Code (G code = CJ)     NOTE: Each AM-PAC Short Form has a separate conversion table. Make sure that you use the correct conversion table.       Instruction Manual - page 45 contains conversion table                   [facility] Risk Assessment Scale    Patient Name: Misti West  Date of Birth:  "1965  MRN: [id number]            RIsk Factor Measure Recommendation Patient Data Scale/Score   BMI >40 Reconsider surgery, weight loss   Estimated body mass index is 23.63 kg/m² as calculated from the following:    Height as of 3/17/22: 5' 9" (1.753 m).    Weight as of 3/17/22: 72.6 kg (160 lb).   [x] 0 = 1 - 24.9  [] 1 = 25-29.9  [] 2 = 30-34.9  [] 3 = 35-39.9  [] 4 = 40-44.9  [] 5 = 45-99.9   Hemoglobin AIC (if applicable) >9 Delay surgery until DM under control  Refer for:  · Nutrition Therapy  · Exercise   · Medication    Lab Results   Component Value Date    HGBA1C 5.2 03/10/2022       Lab Results   Component Value Date    GLU 95 03/10/2022      [] 0 = 4.0-5.6  [] 1 = 5.7-6.4  [] 2 = 6.5-6.9  [] 3 = 7.0-7.9  [] 4 = 8.0-8.9  [] 5 = 9.0-12   Hemoglobin (Anemia) <9 Delay surgery   Correct anemia Lab Results   Component Value Date    HGB 12.9 03/10/2022    [] 20 - <9.0                    Albumin <3 Delay surgery &Workup Lab Results   Component Value Date    ALBUMIN 3.9 03/10/2022    [] 20 - <3.0   Smoking Cessation >4 Weeks Delay Surgery  Refer to OP Cessation Class    Yes, 1 PPD [x] 20 - current smoker                                _1_ PPD                    Hx of MI, PE, Arrhythmia, CVA, DVT <30 Days Delay Surgery    NA [] 20      Infection Variable Delay surgery and re-evaluate   NA [] 20 - recent/current infection     Depression (PHQ) >10 out of 27 Delay Surgery and re-evaluate   Medication   Counseling      NA         [] 0     []1     []2     []3      []4      [] 5                    (1-4)      (5-9)  (10-14)  (15-19)   (20-27)     Memory Impairment & Memory loss (Mini-Cog Screening Tool) Advanced dementia and/or Parkinson's Reconsider surgery    NA [] 0     []1     []2     []3     []4     [] 5     Physical Conditioning (Modified AM-PAC Per Physical [facility] at [facility]) Unable to ambulate on day of surgery Delay surgery and re-evaluate  Pre-Rehabilitation   (PT evaluation)      11.2% [x]  0   []4       " []8     []12        []16     []20       (<20%)   (<40%)   (<60%)   (<80% )    (>80%)     Home Environment/Caregiver support  (Per /Navigator Interview)    Availability of basic services and/or approprate assistance during post-operative period Delay surgery and re-evaluate   Safe home environment   Health   1 week post-surgery   Transportation  availability   Ability to obtain DME/Medications post-op Tobacco smokers in home    [] 0     [x]1     []2     []3     []4     [] 5  [x] 0     []1     []2     []3     []4     [] 5  [x] 0     []1     []2     []3     []4     [] 5  [x] 0     []1     []2     []3     []4     [] 5         MD Contact:  Comments:  Total Score:  22     JOINT CAMP ASSESSMENT    Name Misti West   MRN [id number]    Age/Sex 56 y.o. female    Surgeon No ref. provider found   Joint Camp Date 3/16/2022   Surgery Date 3/29/2022   Procedure (L) TKA   Insurance Payor: / No coverage found.   Care Team Patient Care Team:  Pierre Espinal MD as PCP - General (Family Medicine)  Bhargav Keane MD (Inactive) as Consulting Physician (Urology)  Jenise Chaparro LPN as Care Coordinator    Pharmacy   [facility] [place] [address]  Phone: [phone] Fax: [phone]     AM-PAC Score   23   Risk Assessment Score 21     Past Medical History:   Diagnosis Date    Anxiety     Arthritis     Cancer     cervical    Hepatomegaly     3 liver cyst    Hiatal hernia     Liver lesion     PONV (postoperative nausea and vomiting)        Past Surgical History:   Procedure Laterality Date    CERVIX SURGERY      COLONOSCOPY N/A 8/27/2020    Procedure: COLONOSCOPY;  Surgeon: Tim Aguayo MD;  [facility];  Service: Endoscopy;  Laterality: N/A;    CYSTOSCOPY WITH URETHRAL DILATION  10/1/2020    Procedure: CYSTOSCOPY, WITH URETHRAL DILATION;  Surgeon: Bhargav Keane MD;  Location: [facility] OR;  Service: Urology;;     ESOPHAGOGASTRODUODENOSCOPY N/A 8/13/2020    Procedure: EGD (ESOPHAGOGASTRODUODENOSCOPY);  Surgeon: Tim Aguayo MD;  Location: [facility];  Service: Endoscopy;  Laterality: N/A;    KNEE ARTHROSCOPY      SINUS SURGERY      TUBAL LIGATION           Home Enviroment     Living Arrangement: Lives with spouse and daughter  Home Environment: 1-story house/ trailer, number of outside stairs: 3 with railing, walk-in shower  Home Safety Concerns: Tobacco Smoke in Home: yes    DISCHARGE CAREGIVER/SUPPORT SYSTEM     Identified post-op caregiver: Patient has spouse / significant other.  Patient's caregiver(s) will be able to provide physical assistance. Patient will have someone to assist overnight.      Caregiver present at pre-op interview:  Yes      PRE-OPERATIVE FUNCTIONAL STATUS     Employment: Unemployed    Pre-op Functional Status: Patient is independent with mobility/ambulation, transfers, ADL's, IADL's.    Use of assistive device for ambulation: none  ADL: self care  ADL Limitations: none  Medical Restrictions: Frequent Falls and Decreased range of motions in extremities    POTENTIAL BARRIERS TO DISCHARGE/POTENTIAL POST-OP COMPLICATIONS     Current smoker         DISCHARGE PLAN     Expected LOS of 2 days or less for joint replacement discussed with patient.     Patient in agreement with discharge plan: Yes    Discharge to: Discharge home with home health (PT/OT) x2 weeks with transition to outpatient PT and Outpatient PT    HH:    Discharge to outpatient PT.     OP PT: [facility]- [facility], Requesting Corin     Home DME: rolling walker and bedside commode    Needed DME at D/C: none     Rx: Per Dr. Zapata at discharge     Meds to Beds: N/A  Patient expected to discharge on Aspirin 81mg by mouth twice daily for DVT prophylaxis. Coumadin Clinic Needed: No

## 2022-03-17 ENCOUNTER — HOSPITAL ENCOUNTER (OUTPATIENT)
Dept: PREADMISSION TESTING | Facility: HOSPITAL | Age: 57
Discharge: HOME | End: 2022-03-17
Attending: ORTHOPAEDIC SURGERY

## 2022-03-17 ENCOUNTER — HOSPITAL ENCOUNTER (OUTPATIENT)
Dept: RADIOLOGY | Facility: HOSPITAL | Age: 57
Discharge: HOME | End: 2022-03-17
Attending: ORTHOPAEDIC SURGERY

## 2022-03-17 VITALS — WEIGHT: 160 LBS | BODY MASS INDEX: 23.7 KG/M2 | HEIGHT: 69 IN

## 2022-03-17 DIAGNOSIS — G89.29 CHRONIC PAIN OF LEFT KNEE: ICD-10-CM

## 2022-03-17 DIAGNOSIS — M25.562 CHRONIC PAIN OF LEFT KNEE: ICD-10-CM

## 2022-03-17 DIAGNOSIS — M17.9 OSTEOARTHRITIS OF KNEE, UNSPECIFIED LATERALITY, UNSPECIFIED OSTEOARTHRITIS TYPE: ICD-10-CM

## 2022-03-17 DIAGNOSIS — Z01.818 PREOP TESTING: ICD-10-CM

## 2022-03-17 LAB
ABO + RH BLD: NORMAL
BLD GP AB SCN CELLS X3 SERPL QL: NORMAL

## 2022-03-17 PROCEDURE — 86900 BLOOD TYPING SEROLOGIC ABO: CPT | Performed by: ORTHOPAEDIC SURGERY

## 2022-03-17 PROCEDURE — 87081 CULTURE SCREEN ONLY: CPT | Performed by: ORTHOPAEDIC SURGERY

## 2022-03-17 PROCEDURE — 73700 CT LOWER EXTREMITY W/O DYE: CPT | Mod: 26,LT,, | Performed by: RADIOLOGY

## 2022-03-17 PROCEDURE — 99900103 DSU ONLY-NO CHARGE-INITIAL HR (STAT)

## 2022-03-17 PROCEDURE — 36415 COLL VENOUS BLD VENIPUNCTURE: CPT | Performed by: ORTHOPAEDIC SURGERY

## 2022-03-17 PROCEDURE — 73700 CT LOWER EXTREMITY W/O DYE: CPT | Mod: TC,LT

## 2022-03-17 PROCEDURE — 86850 RBC ANTIBODY SCREEN: CPT | Performed by: ORTHOPAEDIC SURGERY

## 2022-03-17 PROCEDURE — 99900104 DSU ONLY-NO CHARGE-EA ADD'L HR (STAT)

## 2022-03-17 PROCEDURE — 73700 CT KNEE WITHOUT CONTRAST LEFT W/MAKO PROTOCOL: ICD-10-PCS | Mod: 26,LT,, | Performed by: RADIOLOGY

## 2022-03-17 NOTE — DISCHARGE INSTRUCTIONS
To confirm, Your doctor has instructed you that surgery is scheduled for: 3/29/22   [place]  [phone]    Please report to [facility] the morning of surgery. You must check-in and receive a wristband before going to your procedure.    Pre-Op will call the afternoon prior to surgery between 1:00 and 6:00 PM with the final arrival time.  Phone number: [phone]    PLEASE NOTE:  The surgery schedule has many variables which may affect the time of your surgery case.  Family members should be available if your surgery time changes.  Plan to be here the day of your procedure between 4-6 hours.    MEDICATIONS:  TAKE ONLY THESE MEDICATIONS WITH A SMALL SIP OF WATER THE MORNING OF YOUR PROCEDURE:    See List    DO NOT TAKE THESE MEDICATIONS 5-7 DAYS PRIOR to your procedure or per your surgeon's request:   ASPIRIN, ALEVE, ADVIL, IBUPROFEN, FISH OIL VITAMIN E, HERBALS  (May take Tylenol)    ONLY if you are prescribed any types of blood thinners such as:  Aspirin, Coumadin, Plavix, Pradaxa, Xarelto, Aggrenox, Effient, Eliquis, Savasya, Brilinta, or any other, ask your surgeon whether you should stop taking them and how long before surgery you should stop.  You may also need to verify with the prescribing physician if it is ok to stop your medication.      INSTRUCTIONS IMPORTANT!!  Do not eat or drink anything between midnight and the time of your procedure- this includes gum, mints, and candy.  Do not smoke or drink alcoholic beverages 24 hours prior to your procedure.  Shower the night before AND the morning of your procedure with a Chlorhexidine wash such as Hibiclens or Dial antibacterial soap from the neck down.  Do not get it on your face or in your eyes.  You may use your own shampoo and face wash. This helps your skin to be as bacteria free as possible.    If you wear contact lenses, dentures, hearing aids or glasses, bring a container to put them in during surgery and give to a  family member for safe keeping.  Please leave all jewelry, piercing's and valuables at home.   DO NOT remove hair from the surgery site.  Do not shave the incision site unless you are given specific instructions to do so.    ONLY if you have been diagnosed with sleep apnea please bring your C-PAP machine.  ONLY if you wear home oxygen please bring your portable oxygen tank the day of your procedure.  ONLY if you have a history of OPEN HEART SURGERY you will need a clearance from your Cardiologist per Anesthesia.      ONLY for patients requiring bowel prep, written instructions will be given by your doctor's office.  ONLY if you have a neuro stimulator, please bring the controller with you the morning of surgery  ONLY if a type and screen test is needed before surgery, please return:  If your doctor has scheduled you for an overnight stay, bring a small overnight bag with any personal items you need.  Make arrangements in advance for transportation home by a responsible adult.  It is not safe to drive a vehicle during the 24 hours after anesthesia.       [facility] will resume routine visitation for COVID-19 negative patients, including inpatients, outpatients, and  procedural areas. Visitors are still required to practice social distancing in waiting rooms, cafeterias, and common  areas. Visitors and patients should maintain six feet distance between those not in their group. Visitors will be  asked to leave if they exhibit symptoms of respiratory infection, have tested positive for COVID -19 in the last  ten days, have a pending COVID-19 test for symptoms, are unable or refuse to wear a mask OR do not comply  with current policy.       All [facility] facilities and properties are tobacco free.  Smoking is NOT allowed.   If you have any questions about these instructions, call Pre-Op Admit  Nursing at [phone] or the Pre-Op Day Surgery Unit at [phone].

## 2022-03-19 LAB — MRSA SPEC QL CULT: NORMAL

## 2022-03-25 ENCOUNTER — TELEPHONE (OUTPATIENT)
Dept: ORTHOPEDICS | Facility: CLINIC | Age: 57
End: 2022-03-25

## 2022-03-25 DIAGNOSIS — M17.9 OSTEOARTHRITIS OF KNEE, UNSPECIFIED LATERALITY, UNSPECIFIED OSTEOARTHRITIS TYPE: Primary | ICD-10-CM

## 2022-03-25 DIAGNOSIS — Z96.652 STATUS POST LEFT KNEE REPLACEMENT: Primary | ICD-10-CM

## 2022-03-25 NOTE — TELEPHONE ENCOUNTER
----- Message from Sol Salazarnington sent at 3/25/2022  4:07 PM CDT -----  Contact: patient  Type: Needs Medical Advice  Who Called:  patient  Best Call Back Number: [phone] (home)   Additional Information: outpatient therapy called her to schedule appt bc she is on assisted medical coverage and it doesn't look like anyone will come out to her house bc they want to wait 14 days, she is requesting a call back to discuss. She would also like to discuss her vitamins.

## 2022-03-25 NOTE — TELEPHONE ENCOUNTER
Returned call. Pt states she will only be able to receive Out Patient PT, and it has to be through [facility] in [place] due to it not being covered. Pt states we should be receiving a message from [facility] regarding how soon to schedule. Have not received a message regarding this as of yet. Will send message to PT stating PT must start a couple of days up to a week after surgery. Pt verbalized understanding of the importance and medical necessity of her doing PT after joint replacement, and she states she will commute to [place] to ensure it is done.

## 2022-03-25 NOTE — TELEPHONE ENCOUNTER
Returned call. Pt states she will only be able to receive outpatient PT (not Home Health PT) in [place] after surgery due to it not being covered. Pt states someone from PT dept should be sending us a message regarding how soon to schedule her. No message has been received as of yet. Orders will be placed and sent to [facility] per pt's request and statement that it would be covered through them. Pt understands the importance and necessity of receiving PT weekly after joint replacement.

## 2022-03-25 NOTE — TELEPHONE ENCOUNTER
----- Message from Sol Salazarnington sent at 3/25/2022  4:07 PM CDT -----  Contact: patient  Type: Needs Medical Advice  Who Called:  patient  Best Call Back Number: [phone] (home)   Additional Information: outpatient therapy called her to schedule appt bc she is on assisted medical coverage and it doesn't look like anyone will come out to her house bc they want to wait 14 days, she is requesting a call back to discuss. She would also like to discuss her vitamins.

## 2022-03-28 ENCOUNTER — PATIENT MESSAGE (OUTPATIENT)
Dept: FAMILY MEDICINE | Facility: CLINIC | Age: 57
End: 2022-03-28

## 2022-03-28 ENCOUNTER — ANESTHESIA EVENT (OUTPATIENT)
Dept: SURGERY | Facility: HOSPITAL | Age: 57
End: 2022-03-28

## 2022-03-28 ENCOUNTER — PATIENT MESSAGE (OUTPATIENT)
Dept: SURGERY | Facility: HOSPITAL | Age: 57
End: 2022-03-28

## 2022-03-29 ENCOUNTER — HOSPITAL ENCOUNTER (OUTPATIENT)
Facility: HOSPITAL | Age: 57
Discharge: HOME | End: 2022-03-30
Attending: ORTHOPAEDIC SURGERY | Admitting: ORTHOPAEDIC SURGERY

## 2022-03-29 ENCOUNTER — ANESTHESIA (OUTPATIENT)
Dept: SURGERY | Facility: HOSPITAL | Age: 57
End: 2022-03-29

## 2022-03-29 DIAGNOSIS — M17.12 OSTEOARTHRITIS OF LEFT KNEE, UNSPECIFIED OSTEOARTHRITIS TYPE: ICD-10-CM

## 2022-03-29 DIAGNOSIS — M17.9 OSTEOARTHRITIS OF KNEE, UNSPECIFIED LATERALITY, UNSPECIFIED OSTEOARTHRITIS TYPE: ICD-10-CM

## 2022-03-29 PROBLEM — R00.1 BRADYCARDIA: Status: ACTIVE | Noted: 2022-03-29

## 2022-03-29 PROBLEM — F17.210 TOBACCO DEPENDENCE DUE TO CIGARETTES: Status: ACTIVE | Noted: 2022-03-29

## 2022-03-29 PROCEDURE — C1713 ANCHOR/SCREW BN/BN,TIS/BN: HCPCS | Performed by: ORTHOPAEDIC SURGERY

## 2022-03-29 PROCEDURE — 27200750 HC INSULATED NEEDLE/ STIMUPLEX: Performed by: ANESTHESIOLOGY

## 2022-03-29 PROCEDURE — 64447 NJX AA&/STRD FEMORAL NRV IMG: CPT | Performed by: ANESTHESIOLOGY

## 2022-03-29 PROCEDURE — 25000003 PHARM REV CODE 250: Performed by: ORTHOPAEDIC SURGERY

## 2022-03-29 PROCEDURE — 36000712 HC OR TIME LEV V 1ST 15 MIN: Performed by: ORTHOPAEDIC SURGERY

## 2022-03-29 PROCEDURE — C9290 INJ, BUPIVACAINE LIPOSOME: HCPCS | Performed by: ANESTHESIOLOGY

## 2022-03-29 PROCEDURE — 25000003 PHARM REV CODE 250: Performed by: ANESTHESIOLOGY

## 2022-03-29 PROCEDURE — 63600175 PHARM REV CODE 636 W HCPCS: Performed by: ORTHOPAEDIC SURGERY

## 2022-03-29 PROCEDURE — 27447 TOTAL KNEE ARTHROPLASTY: CPT | Mod: LT,,, | Performed by: ORTHOPAEDIC SURGERY

## 2022-03-29 PROCEDURE — 27447 PR TOTAL KNEE ARTHROPLASTY: ICD-10-PCS | Mod: LT,,, | Performed by: ORTHOPAEDIC SURGERY

## 2022-03-29 PROCEDURE — 64447 PR NERVE BLOCK INJ, ANES/STEROID, FEMORAL, INCL IMAG GUIDANCE: ICD-10-PCS | Mod: 59,LT,, | Performed by: ANESTHESIOLOGY

## 2022-03-29 PROCEDURE — 63600175 PHARM REV CODE 636 W HCPCS: Performed by: NURSE PRACTITIONER

## 2022-03-29 PROCEDURE — 36000713 HC OR TIME LEV V EA ADD 15 MIN: Performed by: ORTHOPAEDIC SURGERY

## 2022-03-29 PROCEDURE — 99900104 DSU ONLY-NO CHARGE-EA ADD'L HR (STAT): Performed by: ORTHOPAEDIC SURGERY

## 2022-03-29 PROCEDURE — 63600175 PHARM REV CODE 636 W HCPCS

## 2022-03-29 PROCEDURE — 25000003 PHARM REV CODE 250: Performed by: NURSE PRACTITIONER

## 2022-03-29 PROCEDURE — D9220A PRA ANESTHESIA: ICD-10-PCS | Mod: ,,, | Performed by: ANESTHESIOLOGY

## 2022-03-29 PROCEDURE — 20985 PR CPTR-ASST SURGICAL NAVIGATION IMAGE-LESS: ICD-10-PCS | Mod: LT,,, | Performed by: ORTHOPAEDIC SURGERY

## 2022-03-29 PROCEDURE — 37000008 HC ANESTHESIA 1ST 15 MINUTES: Performed by: ORTHOPAEDIC SURGERY

## 2022-03-29 PROCEDURE — D9220A PRA ANESTHESIA: Mod: ,,, | Performed by: ANESTHESIOLOGY

## 2022-03-29 PROCEDURE — 71000033 HC RECOVERY, INTIAL HOUR: Performed by: ORTHOPAEDIC SURGERY

## 2022-03-29 PROCEDURE — C1776 JOINT DEVICE (IMPLANTABLE): HCPCS | Performed by: ORTHOPAEDIC SURGERY

## 2022-03-29 PROCEDURE — 97161 PT EVAL LOW COMPLEX 20 MIN: CPT

## 2022-03-29 PROCEDURE — 63600175 PHARM REV CODE 636 W HCPCS: Performed by: NURSE ANESTHETIST, CERTIFIED REGISTERED

## 2022-03-29 PROCEDURE — 94799 UNLISTED PULMONARY SVC/PX: CPT

## 2022-03-29 PROCEDURE — 99900103 DSU ONLY-NO CHARGE-INITIAL HR (STAT): Performed by: ORTHOPAEDIC SURGERY

## 2022-03-29 PROCEDURE — 37000009 HC ANESTHESIA EA ADD 15 MINS: Performed by: ORTHOPAEDIC SURGERY

## 2022-03-29 PROCEDURE — 64447 NJX AA&/STRD FEMORAL NRV IMG: CPT | Mod: 59,LT,, | Performed by: ANESTHESIOLOGY

## 2022-03-29 PROCEDURE — 27201423 OPTIME MED/SURG SUP & DEVICES STERILE SUPPLY: Performed by: ORTHOPAEDIC SURGERY

## 2022-03-29 PROCEDURE — 63600175 PHARM REV CODE 636 W HCPCS: Performed by: ANESTHESIOLOGY

## 2022-03-29 PROCEDURE — 76942 PR U/S GUIDANCE FOR NEEDLE GUIDANCE: ICD-10-PCS | Mod: 26,,, | Performed by: ANESTHESIOLOGY

## 2022-03-29 PROCEDURE — 94761 N-INVAS EAR/PLS OXIMETRY MLT: CPT

## 2022-03-29 PROCEDURE — 97110 THERAPEUTIC EXERCISES: CPT

## 2022-03-29 PROCEDURE — 25000003 PHARM REV CODE 250: Performed by: NURSE ANESTHETIST, CERTIFIED REGISTERED

## 2022-03-29 PROCEDURE — 27200688 HC TRAY, SPINAL-HYPER/ ISOBARIC: Performed by: ANESTHESIOLOGY

## 2022-03-29 PROCEDURE — 76942 ECHO GUIDE FOR BIOPSY: CPT | Mod: 26,,, | Performed by: ANESTHESIOLOGY

## 2022-03-29 PROCEDURE — 71000039 HC RECOVERY, EACH ADD'L HOUR: Performed by: ORTHOPAEDIC SURGERY

## 2022-03-29 PROCEDURE — 20985 CPTR-ASST DIR MS PX: CPT | Mod: LT,,, | Performed by: ORTHOPAEDIC SURGERY

## 2022-03-29 DEVICE — BASEPLATE TIB CEM PRIM SZ 4: Type: IMPLANTABLE DEVICE | Site: KNEE | Status: FUNCTIONAL

## 2022-03-29 DEVICE — CEMENT BONE ANTIBIO SIMPLEX P: Type: IMPLANTABLE DEVICE | Site: KNEE | Status: FUNCTIONAL

## 2022-03-29 DEVICE — PATELLA TRIATHLON 31X9 SYMTRC: Type: IMPLANTABLE DEVICE | Site: KNEE | Status: FUNCTIONAL

## 2022-03-29 DEVICE — FEMORAL CRUC RTN CEM SZ 4 LEFT: Type: IMPLANTABLE DEVICE | Site: KNEE | Status: FUNCTIONAL

## 2022-03-29 DEVICE — IMPLANTABLE DEVICE: Type: IMPLANTABLE DEVICE | Site: KNEE | Status: FUNCTIONAL

## 2022-03-29 RX ORDER — PHENYLEPHRINE HYDROCHLORIDE 10 MG/ML
INJECTION INTRAVENOUS
Status: DISCONTINUED | OUTPATIENT
Start: 2022-03-29 | End: 2022-03-29

## 2022-03-29 RX ORDER — FENTANYL CITRATE 50 UG/ML
25 INJECTION, SOLUTION INTRAMUSCULAR; INTRAVENOUS EVERY 5 MIN PRN
Status: DISCONTINUED | OUTPATIENT
Start: 2022-03-29 | End: 2022-03-29

## 2022-03-29 RX ORDER — ALPRAZOLAM 0.25 MG/1
0.25 TABLET ORAL NIGHTLY PRN
Status: DISCONTINUED | OUTPATIENT
Start: 2022-03-29 | End: 2022-03-30 | Stop reason: HOSPADM

## 2022-03-29 RX ORDER — BUPIVACAINE HYDROCHLORIDE 7.5 MG/ML
INJECTION, SOLUTION EPIDURAL; RETROBULBAR
Status: COMPLETED | OUTPATIENT
Start: 2022-03-29 | End: 2022-03-29

## 2022-03-29 RX ORDER — CLINDAMYCIN PHOSPHATE 900 MG/50ML
900 INJECTION, SOLUTION INTRAVENOUS
Status: COMPLETED | OUTPATIENT
Start: 2022-03-29 | End: 2022-03-30

## 2022-03-29 RX ORDER — SODIUM CHLORIDE 0.9 % (FLUSH) 0.9 %
10 SYRINGE (ML) INJECTION EVERY 12 HOURS PRN
Status: DISCONTINUED | OUTPATIENT
Start: 2022-03-29 | End: 2022-03-30 | Stop reason: HOSPADM

## 2022-03-29 RX ORDER — ONDANSETRON 2 MG/ML
4 INJECTION INTRAMUSCULAR; INTRAVENOUS EVERY 6 HOURS PRN
Status: DISCONTINUED | OUTPATIENT
Start: 2022-03-29 | End: 2022-03-30 | Stop reason: HOSPADM

## 2022-03-29 RX ORDER — LANOLIN ALCOHOL/MO/W.PET/CERES
800 CREAM (GRAM) TOPICAL
Status: DISCONTINUED | OUTPATIENT
Start: 2022-03-29 | End: 2022-03-30 | Stop reason: HOSPADM

## 2022-03-29 RX ORDER — LIDOCAINE HYDROCHLORIDE 10 MG/ML
1 INJECTION, SOLUTION EPIDURAL; INFILTRATION; INTRACAUDAL; PERINEURAL ONCE
Status: DISCONTINUED | OUTPATIENT
Start: 2022-03-29 | End: 2022-03-29 | Stop reason: HOSPADM

## 2022-03-29 RX ORDER — ONDANSETRON 2 MG/ML
INJECTION INTRAMUSCULAR; INTRAVENOUS
Status: DISCONTINUED | OUTPATIENT
Start: 2022-03-29 | End: 2022-03-29

## 2022-03-29 RX ORDER — TRANEXAMIC ACID 100 MG/ML
1000 INJECTION, SOLUTION INTRAVENOUS
Status: COMPLETED | OUTPATIENT
Start: 2022-03-29 | End: 2022-03-29

## 2022-03-29 RX ORDER — FENTANYL CITRATE 50 UG/ML
INJECTION, SOLUTION INTRAMUSCULAR; INTRAVENOUS
Status: DISCONTINUED | OUTPATIENT
Start: 2022-03-29 | End: 2022-03-29

## 2022-03-29 RX ORDER — CELECOXIB 100 MG/1
400 CAPSULE ORAL ONCE
Status: DISCONTINUED | OUTPATIENT
Start: 2022-03-29 | End: 2022-03-29

## 2022-03-29 RX ORDER — EPHEDRINE SULFATE 50 MG/ML
INJECTION, SOLUTION INTRAVENOUS
Status: DISCONTINUED | OUTPATIENT
Start: 2022-03-29 | End: 2022-03-29

## 2022-03-29 RX ORDER — PREGABALIN 75 MG/1
75 CAPSULE ORAL ONCE
Status: COMPLETED | OUTPATIENT
Start: 2022-03-29 | End: 2022-03-29

## 2022-03-29 RX ORDER — FAMOTIDINE 20 MG/1
20 TABLET, FILM COATED ORAL 2 TIMES DAILY
Status: DISCONTINUED | OUTPATIENT
Start: 2022-03-29 | End: 2022-03-30 | Stop reason: HOSPADM

## 2022-03-29 RX ORDER — NAPROXEN SODIUM 220 MG/1
81 TABLET, FILM COATED ORAL 2 TIMES DAILY
Status: DISCONTINUED | OUTPATIENT
Start: 2022-03-29 | End: 2022-03-29

## 2022-03-29 RX ORDER — LORAZEPAM 0.5 MG/1
0.5 TABLET ORAL ONCE
Status: COMPLETED | OUTPATIENT
Start: 2022-03-29 | End: 2022-03-29

## 2022-03-29 RX ORDER — ASCORBIC ACID 500 MG
500 TABLET ORAL DAILY
Status: DISCONTINUED | OUTPATIENT
Start: 2022-03-29 | End: 2022-03-30 | Stop reason: HOSPADM

## 2022-03-29 RX ORDER — DOCUSATE SODIUM 100 MG/1
100 CAPSULE, LIQUID FILLED ORAL EVERY 12 HOURS
Status: DISCONTINUED | OUTPATIENT
Start: 2022-03-29 | End: 2022-03-30 | Stop reason: HOSPADM

## 2022-03-29 RX ORDER — LOPERAMIDE HYDROCHLORIDE 2 MG/1
2 CAPSULE ORAL CONTINUOUS PRN
Status: DISCONTINUED | OUTPATIENT
Start: 2022-03-29 | End: 2022-03-30 | Stop reason: HOSPADM

## 2022-03-29 RX ORDER — HYDROCODONE BITARTRATE AND ACETAMINOPHEN 10; 325 MG/1; MG/1
1 TABLET ORAL EVERY 6 HOURS PRN
Status: DISCONTINUED | OUTPATIENT
Start: 2022-03-29 | End: 2022-03-29 | Stop reason: HOSPADM

## 2022-03-29 RX ORDER — ONDANSETRON 2 MG/ML
4 INJECTION INTRAMUSCULAR; INTRAVENOUS ONCE AS NEEDED
Status: DISCONTINUED | OUTPATIENT
Start: 2022-03-29 | End: 2022-03-29 | Stop reason: HOSPADM

## 2022-03-29 RX ORDER — PROPOFOL 10 MG/ML
VIAL (ML) INTRAVENOUS CONTINUOUS PRN
Status: DISCONTINUED | OUTPATIENT
Start: 2022-03-29 | End: 2022-03-29

## 2022-03-29 RX ORDER — NALOXONE HCL 0.4 MG/ML
0.02 VIAL (ML) INJECTION
Status: DISCONTINUED | OUTPATIENT
Start: 2022-03-29 | End: 2022-03-30 | Stop reason: HOSPADM

## 2022-03-29 RX ORDER — SODIUM CHLORIDE 0.9 % (FLUSH) 0.9 %
10 SYRINGE (ML) INJECTION
Status: DISCONTINUED | OUTPATIENT
Start: 2022-03-29 | End: 2022-03-30 | Stop reason: HOSPADM

## 2022-03-29 RX ORDER — CLINDAMYCIN PHOSPHATE 900 MG/50ML
900 INJECTION, SOLUTION INTRAVENOUS
Status: COMPLETED | OUTPATIENT
Start: 2022-03-29 | End: 2022-03-29

## 2022-03-29 RX ORDER — MIDAZOLAM HYDROCHLORIDE 1 MG/ML
INJECTION, SOLUTION INTRAMUSCULAR; INTRAVENOUS
Status: DISCONTINUED | OUTPATIENT
Start: 2022-03-29 | End: 2022-03-29

## 2022-03-29 RX ORDER — ACETAMINOPHEN 500 MG
1000 TABLET ORAL
Status: COMPLETED | OUTPATIENT
Start: 2022-03-29 | End: 2022-03-29

## 2022-03-29 RX ORDER — MUPIROCIN 20 MG/G
1 OINTMENT TOPICAL 2 TIMES DAILY
Status: DISCONTINUED | OUTPATIENT
Start: 2022-03-29 | End: 2022-03-30 | Stop reason: HOSPADM

## 2022-03-29 RX ORDER — DEXTROSE MONOHYDRATE AND SODIUM CHLORIDE 5; .9 G/100ML; G/100ML
INJECTION, SOLUTION INTRAVENOUS CONTINUOUS
Status: DISCONTINUED | OUTPATIENT
Start: 2022-03-29 | End: 2022-03-29

## 2022-03-29 RX ORDER — ACETAMINOPHEN 500 MG
1000 TABLET ORAL EVERY 6 HOURS PRN
Status: DISCONTINUED | OUTPATIENT
Start: 2022-03-29 | End: 2022-03-30 | Stop reason: HOSPADM

## 2022-03-29 RX ORDER — MUPIROCIN 20 MG/G
OINTMENT TOPICAL
Status: DISCONTINUED | OUTPATIENT
Start: 2022-03-29 | End: 2022-03-29 | Stop reason: HOSPADM

## 2022-03-29 RX ORDER — ATORVASTATIN CALCIUM 10 MG/1
10 TABLET, FILM COATED ORAL DAILY
Status: DISCONTINUED | OUTPATIENT
Start: 2022-03-29 | End: 2022-03-30 | Stop reason: HOSPADM

## 2022-03-29 RX ORDER — BUPIVACAINE HYDROCHLORIDE 5 MG/ML
INJECTION, SOLUTION EPIDURAL; INTRACAUDAL
Status: COMPLETED | OUTPATIENT
Start: 2022-03-29 | End: 2022-03-29

## 2022-03-29 RX ADMIN — MIDAZOLAM 2 MG: 1 INJECTION INTRAMUSCULAR; INTRAVENOUS at 06:03

## 2022-03-29 RX ADMIN — TRANEXAMIC ACID 1000 MG: 100 INJECTION, SOLUTION INTRAVENOUS at 08:03

## 2022-03-29 RX ADMIN — FENTANYL CITRATE 50 MCG: 50 INJECTION, SOLUTION INTRAMUSCULAR; INTRAVENOUS at 07:03

## 2022-03-29 RX ADMIN — MUPIROCIN: 20 OINTMENT TOPICAL at 06:03

## 2022-03-29 RX ADMIN — FENTANYL CITRATE 25 MCG: 50 INJECTION INTRAMUSCULAR; INTRAVENOUS at 10:03

## 2022-03-29 RX ADMIN — PHENYLEPHRINE HYDROCHLORIDE 200 MCG: 10 INJECTION INTRAVENOUS at 07:03

## 2022-03-29 RX ADMIN — EPHEDRINE SULFATE 25 MG: 50 INJECTION, SOLUTION INTRAMUSCULAR; INTRAVENOUS; SUBCUTANEOUS at 07:03

## 2022-03-29 RX ADMIN — PROPOFOL 50 MCG/KG/MIN: 10 INJECTION, EMULSION INTRAVENOUS at 07:03

## 2022-03-29 RX ADMIN — ACETAMINOPHEN 1000 MG: 500 TABLET ORAL at 10:03

## 2022-03-29 RX ADMIN — ACETAMINOPHEN 1000 MG: 500 TABLET ORAL at 06:03

## 2022-03-29 RX ADMIN — ACETAMINOPHEN 1000 MG: 500 TABLET ORAL at 04:03

## 2022-03-29 RX ADMIN — CLINDAMYCIN IN 5 PERCENT DEXTROSE 900 MG: 18 INJECTION, SOLUTION INTRAVENOUS at 04:03

## 2022-03-29 RX ADMIN — FAMOTIDINE 20 MG: 20 TABLET, FILM COATED ORAL at 10:03

## 2022-03-29 RX ADMIN — CLINDAMYCIN PHOSPHATE 900 MG: 18 INJECTION, SOLUTION INTRAVENOUS at 07:03

## 2022-03-29 RX ADMIN — HYDROCODONE BITARTRATE AND ACETAMINOPHEN 1 TABLET: 10; 325 TABLET ORAL at 10:03

## 2022-03-29 RX ADMIN — FENTANYL CITRATE 50 MCG: 50 INJECTION, SOLUTION INTRAMUSCULAR; INTRAVENOUS at 06:03

## 2022-03-29 RX ADMIN — BUPIVACAINE HYDROCHLORIDE 10 ML: 5 INJECTION, SOLUTION EPIDURAL; INTRACAUDAL; PERINEURAL at 07:03

## 2022-03-29 RX ADMIN — FAMOTIDINE 20 MG: 20 TABLET, FILM COATED ORAL at 09:03

## 2022-03-29 RX ADMIN — ONDANSETRON 4 MG: 2 INJECTION INTRAMUSCULAR; INTRAVENOUS at 12:03

## 2022-03-29 RX ADMIN — CLINDAMYCIN IN 5 PERCENT DEXTROSE 900 MG: 18 INJECTION, SOLUTION INTRAVENOUS at 11:03

## 2022-03-29 RX ADMIN — TRANEXAMIC ACID 1000 MG: 100 INJECTION, SOLUTION INTRAVENOUS at 07:03

## 2022-03-29 RX ADMIN — SODIUM CHLORIDE, SODIUM GLUCONATE, SODIUM ACETATE, POTASSIUM CHLORIDE, MAGNESIUM CHLORIDE, SODIUM PHOSPHATE, DIBASIC, AND POTASSIUM PHOSPHATE: .53; .5; .37; .037; .03; .012; .00082 INJECTION, SOLUTION INTRAVENOUS at 06:03

## 2022-03-29 RX ADMIN — PREGABALIN 75 MG: 75 CAPSULE ORAL at 06:03

## 2022-03-29 RX ADMIN — ONDANSETRON 4 MG: 2 INJECTION INTRAMUSCULAR; INTRAVENOUS at 07:03

## 2022-03-29 RX ADMIN — SODIUM CHLORIDE, SODIUM GLUCONATE, SODIUM ACETATE, POTASSIUM CHLORIDE, MAGNESIUM CHLORIDE, SODIUM PHOSPHATE, DIBASIC, AND POTASSIUM PHOSPHATE: .53; .5; .37; .037; .03; .012; .00082 INJECTION, SOLUTION INTRAVENOUS at 07:03

## 2022-03-29 RX ADMIN — BUPIVACAINE 20 ML: 13.3 INJECTION, SUSPENSION, LIPOSOMAL INFILTRATION at 07:03

## 2022-03-29 RX ADMIN — PHENYLEPHRINE HYDROCHLORIDE 200 MCG: 10 INJECTION INTRAVENOUS at 08:03

## 2022-03-29 RX ADMIN — LORAZEPAM 0.5 MG: 0.5 TABLET ORAL at 05:03

## 2022-03-29 RX ADMIN — SODIUM CHLORIDE, SODIUM GLUCONATE, SODIUM ACETATE, POTASSIUM CHLORIDE, MAGNESIUM CHLORIDE, SODIUM PHOSPHATE, DIBASIC, AND POTASSIUM PHOSPHATE: .53; .5; .37; .037; .03; .012; .00082 INJECTION, SOLUTION INTRAVENOUS at 08:03

## 2022-03-29 RX ADMIN — MUPIROCIN 1 G: 20 OINTMENT TOPICAL at 09:03

## 2022-03-29 RX ADMIN — DEXTROSE AND SODIUM CHLORIDE: 5; .9 INJECTION, SOLUTION INTRAVENOUS at 09:03

## 2022-03-29 RX ADMIN — DOCUSATE SODIUM 100 MG: 100 CAPSULE, LIQUID FILLED ORAL at 09:03

## 2022-03-29 RX ADMIN — BUPIVACAINE HYDROCHLORIDE 2 ML: 7.5 INJECTION, SOLUTION EPIDURAL; RETROBULBAR at 07:03

## 2022-03-29 NOTE — OP NOTE
[facility]  Orthopedic Surgery  Operative Note    SUMMARY     Date of Procedure: 3/29/2022     Procedure: Procedure(s) (LRB):  ROBOTIC ARTHROPLASTY, KNEE, TOTAL (Left)       Surgeon(s) and Role:     * Neo Zapata MD - Primary    Assistant: Everett OLIVAS    Pre-Operative Diagnosis: Osteoarthritis of knee,unspecified osteoarthritis type [M17.10] left    Post-Operative Diagnosis: Post-Op Diagnosis Codes:     * Osteoarthritis of knee,left, unspecified osteoarthritis type [M17.10]    Anesthesia: General    Complications: No    Estimated Blood Loss (EBL): 25ml           Implants:   Implant Name Type Inv. Item Serial No.  Lot No. LRB No. Used Action   PIN BONE 4 X 140MM STERILE - [id number]  PIN BONE 4 X 140MM STERILE  [employer]  Left 2 Implanted and Explanted   FEMORAL CRUC RTN TINY SZ 4 LEFT - [id number]  FEMORAL CRUC RTN TINY SZ 4 LEFT  [employer]. [id number] Left 1 Implanted   PATELLA TRIATHLON 31X9 SYMTRC - [id number]  PATELLA TRIATHLON 31X9 SYMTRC  [employer]. [id number] Left 1 Implanted   BASEPLATE TIB TINY PRIM SZ 4 - [id number]  BASEPLATE TIB TINY PRIM SZ 4  [employer]. [id number] Left 1 Implanted   CEMENT BONE ANTIBIO SIMPLEX P - [id number]  CEMENT BONE ANTIBIO SIMPLEX P  [employer]ANT[employer]. [id number] Left 1 Implanted   TRIATHLON TIBIAL BEARING INSERT CS    DEJA [id number] Left 1 Implanted       Tourniquet time: 47min at 300mmHg    Specimens:   Specimen (24h ago, onward)            None                  Condition: Good    Disposition: PACU - hemodynamically stable.    Attestation: I was present and scrubbed for the entire procedure.    INDICATIONS FOR THE PROCEDURE: A 56F with a history of chronic   Left knee arthritis, had failed all conservative measures including cortisone   injections, PT, viscosupplementation and activity modification. After a long   discussion, the patient wished to proceed with the procedure above.     PROCEDURE IN DETAIL: Risks,  benefits and alternatives of the procedure were   explained to the patient including, but not limited to damage to nerves,   arteries or blood vessels. Also explained risk of infection, stiffness, DVT, PE,   polyethylene wear as well as anesthetic complications including seizure, stroke,   heart attack and death, understood this and signed informed consent. The   patient's Left knee was marked prior to coming to the Operating Room. The patient was brought to the operating room, placed on the operating table in a supine position.A  formal timeout was done in which correct patient, procedure and op site were all   correctly identified and confirmed by the entire operating team.  900mg Clindamycin was given prior to surgical incision. Spinal anesthesia was induced. The patient'sLeft  lower extremity was prepped and   draped in normal sterile fashion. TheLeft  leg was exsanguinated with an   Esmarch. Tourniquet was inflated up 300 mmHg. Standard anterior approach to   the knee was made. Medial parapatellar arthrotomy was made, leaving about 1/8   inch of tissue for later repair. Proximal medial tibial release was performed,   making sure not to release any of the MCL. We then   everted the patella and reflexed the knee. Fat pad was excised as well as some   of the ACL. Soft tissue off the distal anterior femur was removed for   visualization, so as to help prevent notching.  We started off by placing our femoral pins.  Two pins were placed about 2 centimeters proximal and 1 centimeter anterior to the medial epicondyle.  We then measured 4 finger breaths below the tibial tubercle and identified the tibial crest.  We made an incision and blunt dissection with a hemostat.  Two pins were placed just short of bicortically in the tibia.  We then hooked up our arays to our pins.  We placed 2 checkpoints.  One in the tibia and 1 in the femur.  We then took the leg through range of motion.  We then began by marking off our bony  points.  We did this 1st on the femur and then on the tibia.  After this we did ligament balancing of the knee 1st extension and then in flexion by using some spoons and an osteotome.  We then adjusted our bone cuts on the computer and once we liked our plan began by making our cuts.  The alaTest robotic assisted cutting arm was then brought in and then we made our femoral cuts and then turned our attention to the tibia.  Tibial cuts were made.  All of bone was removed as well as excess soft tissue.  Posterior osteophytes removed as well.  We then began to trial.  We placed a size 4 femur and a size 4 tibia with a size 9 polyethylene.  We were within 1 millimeter between our medial and lateral compartments in both flexion and extension.  Robot and pins were all removed.      We turned our attention to patella, caliper was used on the patella where it   measured 24. We set guide at 15 and made our 9-mm cut for polyethylene component, 31 was selected. Then drill holes were placed and the patellar button was placed.   This had good tracking. No need for a lateral release and with no hand tests,   it stayed centered the whole time. We then marked our tibial rotation. We then drilled our femoral lugs.  We then   removed everything except the size 4 tibial tray. We then checked our tibial tray   with a drop cesario again and then marked our rotation and pinned it into place then   drilled, and then punched for our keel. We then started preparing final   components on the back table. Anterior, medial and lateral structures were injected with our local   Cocktail. Bony surfaces   copiously irrigated with Pulsavac and then thoroughly dried. Once the cement   was the appropriate consistency, first the tibia and then the femur were   cemented into place, removing excess cement. A 11 trial was placed. The knee   was held in compression and then the patella was placed. Cement was allowed to   cure. Once the cement was cured, we then  removed excess cement. Again trialed   one final time, again seeing a 11. We then tapped into place the   final 11 meniscal bearing into place. After this was done, we then did our   diluted iodine soak for 3 minutes and then proceeded with closing.  Arthrotomy was closed using our StrataFix.   Subcutaneous tissue was closed using 2-0 Vicryl and skin was using a running 3-0   StrataFix and Dermabond.Instrument, sponge, and needle counts were correct prior to wound closure and at the conclusion of the case.  Sterile dressing was applied including a Cryo/Cuff.   They were extubated, awakened and transferred from the Operating Room to the   Recovery Room in stable condition.

## 2022-03-29 NOTE — PLAN OF CARE
Released from Pacu when criteria met pain controlled skin w+d No nausea No emesis dsg dry intact ace wrap ice man to L knee  aaox4 encouraged deep breaths Pt has all belongings  With spouse able to move a dermatone now still has spinal and block in effect 2+ pedal pulse Sinus leonard with HR   Yaneth Garcia NP made aware of pt location

## 2022-03-29 NOTE — ANESTHESIA PREPROCEDURE EVALUATION
03/29/2022  Misti West is a 56 y.o., female.      Pre-op Assessment    I have reviewed the Patient Summary Reports.     I have reviewed the Nursing Notes.       Review of Systems  Anesthesia Hx:  Hx of Anesthetic complications    Hepatic/GI:   Hiatal Hernia, Liver Disease,        Physical Exam  General: Well nourished        Anesthesia Plan  Type of Anesthesia, risks & benefits discussed:    Anesthesia Type: Spinal  Intra-op Monitoring Plan: Standard ASA Monitors  Post Op Pain Control Plan: multimodal analgesia, IV/PO Opioids PRN and peripheral nerve block  Induction:  IV  Informed Consent: Informed consent signed with the Patient and all parties understand the risks and agree with anesthesia plan.  All questions answered.   ASA Score: 2  Day of Surgery Review of History & Physical: H&P Update referred to the surgeon/provider.    Ready For Surgery From Anesthesia Perspective.     .

## 2022-03-29 NOTE — NURSING
Pt in bed. Call light in reach. Pt voiced concerns about anxiety over daughter's death and surgery.  Pt given prn medication as ordered.  IV medication given as order. Pt stable. Pt voices no concerns at this time.

## 2022-03-29 NOTE — TRANSFER OF CARE
Anesthesia Transfer of Care Note    Patient: Misti West    Procedure(s) Performed: Procedure(s) (LRB):  ROBOTIC ARTHROPLASTY, KNEE, TOTAL (Left)    Patient location: PACU    Anesthesia Type: MAC and spinal    Transport from OR: Transported from OR on 2-3 L/min O2 by NC with adequate spontaneous ventilation    Post pain: adequate analgesia    Post assessment: no apparent anesthetic complications and tolerated procedure well    Post vital signs: stable    Level of consciousness: awake, alert and oriented    Nausea/Vomiting: no nausea/vomiting    Complications: none    Transfer of care protocol was followed      Last vitals:   Visit Vitals  /64 (BP Location: Left arm, Patient Position: Lying)   Pulse (!) 55   Temp 36.6 °C (97.9 °F)   Resp 11   Wt 71.6 kg (157 lb 13.6 oz)   SpO2 99%   Breastfeeding No   BMI 23.31 kg/m²

## 2022-03-29 NOTE — PT/OT/SLP EVAL
Physical Therapy Evaluation    Patient Name:  Misti West   MRN:  [id number]    Recommendations:     Discharge Recommendations:  home, home health PT   Discharge Equipment Recommendations: walker, rolling, bedside commode   Barriers to discharge: None    Assessment:     Misti West is a 56 y.o. female admitted with a medical diagnosis of Primary osteoarthritis of left knee.  She presents with the following impairments/functional limitations:  weakness, impaired endurance, impaired functional mobilty, gait instability, impaired balance, decreased lower extremity function, pain, decreased ROM, edema, orthopedic precautions .  Patient agreeable to PT evaluation this afternoon.  Patient presented supine in bed and required min assist to transfer to sitting and then min assist to stand.  Patient then ambulated x 40 feet with RW with CGA with distance limited by patient feeling nauseated and dizzy.    Rehab Prognosis: Good; patient would benefit from acute skilled PT services to address these deficits and reach maximum level of function.    Recent Surgery: Procedure(s) (LRB):  ROBOTIC ARTHROPLASTY, KNEE, TOTAL (Left) Day of Surgery    Plan:     During this hospitalization, patient to be seen BID to address the identified rehab impairments via gait training, therapeutic activities, therapeutic exercises and progress toward the following goals:    · Plan of Care Expires:  04/26/22    Subjective     Chief Complaint: nauseated and dizzy  Patient/Family Comments/goals: go home  Pain/Comfort:  · Pain Rating 1: 2/10  · Location - Side 1: Left  · Location - Orientation 1: lower  · Location 1: knee  · Pain Addressed 1: Reposition, Cessation of Activity  · Pain Rating Post-Intervention 1: 5/10    Patients cultural, spiritual, Nondenominational conflicts given the current situation:      Living Environment:  Currently lives in [address] home with 5 step entry with hand rail on the left going up.  Prior to admission, patients level of function  was independent.  Equipment used at home: none.  DME owned (not currently used): none.  Upon discharge, patient will have assistance from family.    Objective:     Communicated with nurse prior to session.  Patient found supine with bed alarm, cryotherapy  upon PT entry to room.    General Precautions: Standard, fall   Orthopedic Precautions:LLE weight bearing as tolerated   Braces:    Respiratory Status: Room air    Exams:  · RLE ROM: WFL  · RLE Strength: WNL  · LLE ROM: Deficits: knee extension -15 degrees, flexion 80 degrees both taken in sitting EOB  · LLE Strength: Deficits: 2/5 overall    Functional Mobility:  · Bed Mobility:     · Supine to Sit: minimum assistance  · Sit to Supine: minimum assistance  · Transfers:     · Sit to Stand:  minimum assistance with rolling walker  · Gait: x 40 feet with RW with min assist.    Therapeutic Activities and Exercises:   exercise to include ankle pumps, quad sets, heel slides, SLR and hip abd.  All done left LE x 10 reps.    AM-PAC 6 CLICK MOBILITY  Total Score:18     Patient left supine with call button in reach, bed alarm on, nurse notified and spouse present.    GOALS:   Multidisciplinary Problems     Physical Therapy Goals        Problem: Physical Therapy    Goal Priority Disciplines Outcome Goal Variances Interventions   Physical Therapy Goal     PT, PT/OT Ongoing, Progressing     Description: Goals to be met by: 22    Patient will increase functional independence with mobility by performin. Supine to sit with [place]  2. Sit to supine with [place]  3. Sit to stand transfer with Supervision  4. Bed to chair transfer with Supervision using Rolling Walker  5. Gait  x 250 feet with Supervision using Rolling Walker.                      History:     Past Medical History:   Diagnosis Date    Anxiety     Arthritis     Cancer     cervical    Hepatomegaly     3 liver cyst    Hiatal hernia     Liver lesion     PONV (postoperative nausea and  vomiting)        Past Surgical History:   Procedure Laterality Date    CERVIX SURGERY      COLONOSCOPY N/A 8/27/2020    Procedure: COLONOSCOPY;  Surgeon: Tim Aguayo MD;  Location: [facility];  Service: Endoscopy;  Laterality: N/A;    CYSTOSCOPY WITH URETHRAL DILATION  10/1/2020    Procedure: CYSTOSCOPY, WITH URETHRAL DILATION;  Surgeon: Bhargav Keane MD;  Location: [facility] OR;  Service: Urology;;    ESOPHAGOGASTRODUODENOSCOPY N/A 8/13/2020    Procedure: EGD (ESOPHAGOGASTRODUODENOSCOPY);  Surgeon: Tim Aguayo MD;  Location: [facility];  Service: Endoscopy;  Laterality: N/A;    KNEE ARTHROSCOPY      SINUS SURGERY      TUBAL LIGATION         Time Tracking:     PT Received On: 03/29/22  PT Start Time: 1354     PT Stop Time: 1424  PT Total Time (min): 30 min     Billable Minutes: Evaluation 20 and Therapeutic Exercise 10      03/29/2022

## 2022-03-29 NOTE — PLAN OF CARE
"Anesthesia called pt moving lower legs now spinal wearing off a bit and block partial requests po pain med dr Donaldson called pt states she can take Loratab safely " no adverse side effects she ordered lortab po    "

## 2022-03-29 NOTE — PLAN OF CARE
Plan of care discussed with Pt and spouse.  Pt oriented to room and call light system.  Pt instructed to call with needs.  Spouse at side. Pt voiced no concerns at this time.  Will continue to monitor.

## 2022-03-29 NOTE — PLAN OF CARE
[facility]  Initial Discharge Assessment       Primary Care Provider: Pierre Espinal MD    Admission Diagnosis: Osteoarthritis of knee, unspecified laterality, unspecified osteoarthritis type [M17.10]  Osteoarthritis of left knee, unspecified osteoarthritis type [M17.12]    Admission Date: 3/29/2022  Expected Discharge Date:     Discharge Barriers Identified: None    Payor: /        Cm completed the assessment with pt and spouse at bedside. Demographic and PCP is current on face sheet. Pt has no insurance. Pt is 100% coverage with [facility]  financial assistance until 5/12/22. Pt denies hh poa/lw. Pt has dme listed below. Disposition:  Pt will discharge to home. Spouse will provide transportation on discharge. Pt will have to love out pt rehab. No other needs verbalized at this time.        Extended Emergency Contact Information  Primary Emergency Contact: Max West  Address: [address] [place] [address] [facility]  Home Phone: [phone]  Mobile Phone: [phone]  Relation: Spouse  Preferred language: English   needed? No    Discharge Plan A: Home with family  Discharge Plan B: Home with family      [id number] - [place], [place] - [address]  [address]  [place] [address]  Phone: [phone] Fax: [phone]      Initial Assessment (most recent)     Adult Discharge Assessment - 03/29/22 1100        Discharge Assessment    Assessment Type Discharge Planning Assessment     Confirmed/corrected address, phone number and insurance Yes     Confirmed Demographics Correct on Facesheet     Source of Information patient;family     Lives With spouse     Facility Arrived From: home     Do you expect to return to your current living situation? Yes     Do you have help at home or someone to help you manage your care at home? Yes     Who are your caregiver(s) and their phone number(s)? Gene -  [phone]     Prior to hospitilization cognitive status: Alert/Oriented     Current cognitive status: Alert/Oriented     Walking or Climbing Stairs Difficulty ambulation difficulty, requires equipment     Dressing/Bathing Difficulty none     Equipment Currently Used at Home bath bench;walker, rolling;cane, straight;grab bar     Readmission within 30 days? No     Patient currently being followed by outpatient case management? No     Do you currently have service(s) that help you manage your care at home? No     Do you take prescription medications? Yes     Do you have prescription coverage? No     Do you have any problems affording any of your prescribed medications? Yes     If yes, what medications? no insurance. generics needed     Is the patient taking medications as prescribed? yes     Who is going to help you get home at discharge? spouse - gene - [phone]     How do you get to doctors appointments? family or friend will provide     Are you on dialysis? No     Do you take coumadin? No     Discharge Plan A Home with family     Discharge Plan B Home with family     DME Needed Upon Discharge  none     Discharge Plan discussed with: Spouse/sig other;Patient     Name(s) and Number(s) same as above     Discharge Barriers Identified None

## 2022-03-29 NOTE — PLAN OF CARE
Problem: Physical Therapy  Goal: Physical Therapy Goal  Description: Goals to be met by: 22    Patient will increase functional independence with mobility by performin. Supine to sit with [place]  2. Sit to supine with [place]  3. Sit to stand transfer with Supervision  4. Bed to chair transfer with Supervision using Rolling Walker  5. Gait  x 250 feet with Supervision using Rolling Walker.     Outcome: Ongoing, Progressing

## 2022-03-29 NOTE — HPI
Misti West is a 56 year old  female who was admitted post op left total knee arthroplasty. She was admitted to hospital medicine and followed closely. Pt has been having problems with knee since an injury in  and tried PT and knee injections with no relief. PMH significant for cervical cancer, liver cyst, bradycardia (heart rate dropped to 32 during PACU interview). PSH: includes cervical surgery, knee arthroscopy, EGD and colonscopy. Cystoscopy and tubal ligation. Social hx: tobacco- smokes 1/2 to 1 PPD, ETOH- not currently, Illicit drugs- never. Family hx: mother , Father with HTN and cancer. Lives with family. Has walker at home. Has out pt PT appts scheduled.    Left knee DDI. Able to flex and extend toes. Pedal pulses strong.

## 2022-03-29 NOTE — SUBJECTIVE & OBJECTIVE
"Past Medical History:   Diagnosis Date    Anxiety     Arthritis     Cancer     cervical    Hepatomegaly     3 liver cyst    Hiatal hernia     Liver lesion     PONV (postoperative nausea and vomiting)        Past Surgical History:   Procedure Laterality Date    CERVIX SURGERY      COLONOSCOPY N/A 8/27/2020    Procedure: COLONOSCOPY;  Surgeon: Tim Aguayo MD;  Location: [facility];  Service: Endoscopy;  Laterality: N/A;    CYSTOSCOPY WITH URETHRAL DILATION  10/1/2020    Procedure: CYSTOSCOPY, WITH URETHRAL DILATION;  Surgeon: Bhargav Keane MD;  Location: [facility] OR;  Service: Urology;;    ESOPHAGOGASTRODUODENOSCOPY N/A 8/13/2020    Procedure: EGD (ESOPHAGOGASTRODUODENOSCOPY);  Surgeon: Tim Aguayo MD;  Location: [facility];  Service: Endoscopy;  Laterality: N/A;    KNEE ARTHROSCOPY      SINUS SURGERY      TUBAL LIGATION         Review of patient's allergies indicates:   Allergen Reactions    Codeine Swelling     "Hives, vomiting"    Morphine Swelling     "Hives, vomiting"    Naproxen sodium Swelling     "Itching, high blood pressure"    Tramadol hcl Swelling     "itching & nausea"    Penicillins     Trintex        No current facility-administered medications on file prior to encounter.     Current Outpatient Medications on File Prior to Encounter   Medication Sig    ascorbic acid, vitamin C, (VITAMIN C) 500 MG tablet Take 500 mg by mouth once daily.    atorvastatin (LIPITOR) 10 MG tablet Take 1 tablet (10 mg total) by mouth once daily.    b complex vitamins tablet Take 1 tablet by mouth once daily.    biotin 1 mg tablet Take 1,000 mcg by mouth 3 (three) times daily.    cholecalciferol, vitamin D3, (VITAMIN D3 ORAL) Take by mouth.    multivitamin capsule Take 1 capsule by mouth once daily.     Family History       Problem Relation (Age of Onset)    Arthritis Mother, Father, Sister, Sister, Sister    Breast cancer Sister, Sister, Sister    COPD Father    Cancer Mother, Father    Diabetes Mother    " Hypertension Father    Kidney disease Sister    Melanoma Father          Tobacco Use    Smoking status: Current Every Day Smoker     Packs/day: 1.00    Smokeless tobacco: Never Used   Substance and Sexual Activity    Alcohol use: Not Currently    Drug use: Never    Sexual activity: Yes     Review of Systems   Constitutional:  Positive for activity change and appetite change. Negative for chills, diaphoresis, fatigue and fever.   HENT:  Negative for congestion, postnasal drip and sore throat.    Respiratory:  Negative for cough and shortness of breath.    Cardiovascular:  Negative for chest pain and palpitations.   Gastrointestinal:  Negative for abdominal pain, constipation, diarrhea, nausea and vomiting.   Genitourinary:  Negative for dysuria.   Musculoskeletal:  Positive for arthralgias, gait problem and myalgias. Negative for back pain and joint swelling.   Neurological:  Negative for dizziness and weakness.   Psychiatric/Behavioral:  Negative for agitation, behavioral problems, confusion and decreased concentration.    Objective:     Vital Signs (Most Recent):  Temp: 97.2 °F (36.2 °C) (03/29/22 0905)  Pulse: (!) 58 (03/29/22 1103)  Resp: 14 (03/29/22 1103)  BP: 104/60 (03/29/22 1103)  SpO2: 95 % (03/29/22 1103)   Vital Signs (24h Range):  Temp:  [97.2 °F (36.2 °C)-97.9 °F (36.6 °C)] 97.2 °F (36.2 °C)  Pulse:  [55-68] 58  Resp:  [10-20] 14  SpO2:  [93 %-99 %] 95 %  BP: ()/(51-67) 104/60     Weight: 71.6 kg (157 lb 13.6 oz)  Body mass index is 23.31 kg/m².    Physical Exam  Vitals and nursing note reviewed.   Constitutional:       Appearance: Normal appearance.   HENT:      Head: Normocephalic and atraumatic.      Nose: Nose normal.      Mouth/Throat:      Pharynx: Oropharynx is clear.   Eyes:      Conjunctiva/sclera: Conjunctivae normal.   Cardiovascular:      Rate and Rhythm: Bradycardia present.      Pulses: Normal pulses.      Heart sounds: Normal heart sounds.   Pulmonary:      Effort: Pulmonary effort  is normal.      Breath sounds: Normal breath sounds.   Abdominal:      General: Bowel sounds are normal.      Palpations: Abdomen is soft.      Tenderness: There is no abdominal tenderness. There is no guarding or rebound.   Musculoskeletal:      Cervical back: Normal range of motion.      Comments: Left leg- able to flex and extend foot and ankle. Pedal pulse strong and regular.   Skin:     General: Skin is warm.      Capillary Refill: Capillary refill takes less than 2 seconds.      Comments: Left leg DDI.   Neurological:      Mental Status: She is alert and oriented to person, place, and time.   Psychiatric:         Mood and Affect: Mood normal.         Behavior: Behavior normal.           Significant Labs: All pertinent labs within the past 24 hours have been reviewed.    3/10/2022- H/H 12.9/38.8, BUN/Cr 14/0.8    Significant Imaging: I have reviewed all pertinent imaging results/findings within the past 24 hours.

## 2022-03-29 NOTE — H&P
Past Medical History:   Diagnosis Date    Anxiety      Arthritis      Cancer       cervical    Hepatomegaly       3 liver cyst    Hiatal hernia      Liver lesion      PONV (postoperative nausea and vomiting)                 Past Surgical History:   Procedure Laterality Date    CERVIX SURGERY        COLONOSCOPY N/A 8/27/2020     Procedure: COLONOSCOPY;  Surgeon: Tim Aguayo MD;  Location: [facility];  Service: Endoscopy;  Laterality: N/A;    CYSTOSCOPY WITH URETHRAL DILATION   10/1/2020     Procedure: CYSTOSCOPY, WITH URETHRAL DILATION;  Surgeon: Bhargav Keane MD;  Location: [facility] OR;  Service: Urology;;    ESOPHAGOGASTRODUODENOSCOPY N/A 8/13/2020     Procedure: EGD (ESOPHAGOGASTRODUODENOSCOPY);  Surgeon: Tim Aguayo MD;  Location: [facility];  Service: Endoscopy;  Laterality: N/A;    KNEE ARTHROSCOPY        SINUS SURGERY        TUBAL LIGATION                  Current Outpatient Medications   Medication Sig    ALPRAZolam (XANAX) 0.25 MG tablet Take 1 tablet (0.25 mg total) by mouth nightly as needed for Anxiety.    ascorbic acid, vitamin C, (VITAMIN C) 500 MG tablet Take 500 mg by mouth once daily.    atorvastatin (LIPITOR) 10 MG tablet Take 1 tablet (10 mg total) by mouth once daily.    b complex vitamins tablet Take 1 tablet by mouth once daily.    biotin 1 mg tablet Take 1,000 mcg by mouth 3 (three) times daily.    cholecalciferol, vitamin D3, (VITAMIN D3 ORAL) Take by mouth.    loratadine-pseudoephedrine 5-120 mg (CLARITIN-D 12 HOUR) 5-120 mg per tablet Take 1 tablet by mouth 2 (two) times daily as needed for Allergies.    multivitamin capsule Take 1 capsule by mouth once daily.      No current facility-administered medications for this visit.         Review of patient's allergies indicates:   Allergen Reactions    Codeine      Morphine      Naproxen sodium      Penicillins      Tramadol hcl      Trintex                 Family History   Problem Relation Age of Onset     Diabetes Mother      Cancer Mother      Arthritis Mother      Cancer Father      COPD Father      Hypertension Father      Arthritis Father      Melanoma Father      Breast cancer Sister      Arthritis Sister      Breast cancer Sister      Arthritis Sister      Breast cancer Sister      Kidney disease Sister      Arthritis Sister      Colon polyps Neg Hx      Colon cancer Neg Hx      Crohn's disease Neg Hx      Ulcerative colitis Neg Hx      Stomach cancer Neg Hx      Esophageal cancer Neg Hx      Celiac disease Neg Hx           Social History               Socioeconomic History    Marital status:    Tobacco Use    Smoking status: Current Every Day Smoker       Packs/day: 1.00    Smokeless tobacco: Never Used   Substance and Sexual Activity    Alcohol use: Not Currently    Drug use: Never    Sexual activity: Yes            Chief Complaint:       Chief Complaint   Patient presents with    Left Knee - Pain         History of present illness:  This is a 56-year-old female seen for worsening left knee pain.  Patient had a open ligament surgery of her left knee in 1980.  Since then she has had progressive left knee pain.  Patient has known arthritis.  She has tried injections with only about a week of relief.  She has an  brace which does not fit.  Pain is a 9/10.  Knee continues to give way.  Starting affect her back and hip pain is constant.  Wakes her up at night.  Pain is a 9/10.        Review of Systems:     Constitution: Negative for chills, fever, and sweats.  Negative for unexplained weight loss.     HENT:  Negative for headaches and blurry vision.     Cardiovascular:Negative for chest pain or irregular heart beat. Negative for hypertension.     Respiratory:  Negative for cough and shortness of breath.     Gastrointestinal: Negative for abdominal pain, heartburn, melena, nausea, and vomitting.     Genitourinary:  Negative bladder incontinence and  dysuria.     Musculoskeletal:  See HPI     Neurological: Negative for numbness.     Psychiatric/Behavioral: Negative for depression.  The patient is not nervous/anxious.       Endocrine: Negative for polyuria     Hematologic/Lymphatic: Negative for bleeding problem.  Does not bruise/bleed easily.     Skin: Negative for poor would healing and rash        Physical Examination:     Vital Signs:        Vitals:     02/24/22 0828   Resp: 18         Body mass index is 23.92 kg/m².     This a well-developed, well nourished patient in no acute distress.  They are alert and oriented and cooperative to examination.  Pt. walks without an antalgic gait.       Examination of the left knee shows no rashes or erythema. There are no masses ecchymosis or effusion. Patient has full range of motion from 0-130°. Patient is moderately tender to palpation over lateral joint line and nontender to palpation over the medial joint line. Patient has a - Lachman exam, - anterior drawer exam, and - posterior drawer exam. - Gustavo's exam. Knee is stable to varus and valgus stress. 5 out of 5 motor strength. Palpable distal pulses. Intact light touch sensation. Negative Patellofemoral crepitus     Heart is regular rate without obvious murmurs   Normal respiratory effort without audible wheezing  Abdomen is soft and nontender      X-rays:  X-rays of the left knee are ordered and reviewed which show severe lateral compartment narrowing of the left knee with some joint subluxation.  Progressive arthritis from previous x-rays     Assessment::  Left valgus knee arthritis     Plan:  Reviewed the findings with her today.  Patient has end-stage knee arthritis with some giving way due to the chronic ligament issues.  We talked about total knee arthroplasty.  Patient is agreeable.  Plan is for left Buzz robotic assisted total knee arthroplasty.  Risks, benefits, and alternatives to the procedure were explained to the patient including but not limited to  damage to nerves, arteries, blood vessels, bones, tendons, ligaments, stiffness, instability, infection, DVT, PE, as well as general anesthetic complications including seizure, stroke, heart attack and even death. The patient understood these risks and wished to proceed and signed the informed consent.         This note was created using M Modal voice recognition software that occasionally misinterpreted phrases or words.     Consult note is delivered via Epic messaging service.

## 2022-03-29 NOTE — ASSESSMENT & PLAN NOTE
Post op management per ortho  Post op pain control per ortho  PT/OT eval and tx  HH and DME after dc

## 2022-03-29 NOTE — H&P
[facility] Hospital Medicine  History & Physical    Patient Name: Misti West  MRN: [id number]  Patient Class: OP- Outpatient Recovery  Admission Date: 3/29/2022  Attending Physician: Ishan Power MD   Primary Care Provider: Pierre Espinal MD         Patient information was obtained from patient and past medical records.     Subjective:     Principal Problem:Primary osteoarthritis of left knee    Chief Complaint:   Chief Complaint   Patient presents with    Osteoarthritis        HPI: Misti West is a 56 year old  female who was admitted post op left total knee arthroplasty. She was admitted to hospital medicine and followed closely. Pt has been having problems with knee since an injury in  and tried PT and knee injections with no relief. PMH significant for cervical cancer, liver cyst, bradycardia (heart rate dropped to 32 during PACU interview). PSH: includes cervical surgery, knee arthroscopy, EGD and colonscopy. Cystoscopy and tubal ligation. Social hx: tobacco- smokes 1/2 to 1 PPD, ETOH- not currently, Illicit drugs- never. Family hx: mother , Father with HTN and cancer. Lives with family. Has walker at home. Has out pt PT appts scheduled.    Left knee DDI. Able to flex and extend toes. Pedal pulses strong.      Past Medical History:   Diagnosis Date    Anxiety     Arthritis     Cancer     cervical    Hepatomegaly     3 liver cyst    Hiatal hernia     Liver lesion     PONV (postoperative nausea and vomiting)        Past Surgical History:   Procedure Laterality Date    CERVIX SURGERY      COLONOSCOPY N/A 2020    Procedure: COLONOSCOPY;  Surgeon: Tim Aguayo MD;  Location: [facility];  Service: Endoscopy;  Laterality: N/A;    CYSTOSCOPY WITH URETHRAL DILATION  10/1/2020    Procedure: CYSTOSCOPY, WITH URETHRAL DILATION;  Surgeon: Bhargav Keane MD;  Location: [facility] OR;  Service: Urology;;    ESOPHAGOGASTRODUODENOSCOPY N/A 2020     "Procedure: EGD (ESOPHAGOGASTRODUODENOSCOPY);  Surgeon: Tim Aguayo MD;  Location: [facility];  Service: Endoscopy;  Laterality: N/A;    KNEE ARTHROSCOPY      SINUS SURGERY      TUBAL LIGATION         Review of patient's allergies indicates:   Allergen Reactions    Codeine Swelling     "Hives, vomiting"    Morphine Swelling     "Hives, vomiting"    Naproxen sodium Swelling     "Itching, high blood pressure"    Tramadol hcl Swelling     "itching & nausea"    Penicillins     Trintex        No current facility-administered medications on file prior to encounter.     Current Outpatient Medications on File Prior to Encounter   Medication Sig    ascorbic acid, vitamin C, (VITAMIN C) 500 MG tablet Take 500 mg by mouth once daily.    atorvastatin (LIPITOR) 10 MG tablet Take 1 tablet (10 mg total) by mouth once daily.    b complex vitamins tablet Take 1 tablet by mouth once daily.    biotin 1 mg tablet Take 1,000 mcg by mouth 3 (three) times daily.    cholecalciferol, vitamin D3, (VITAMIN D3 ORAL) Take by mouth.    multivitamin capsule Take 1 capsule by mouth once daily.     Family History       Problem Relation (Age of Onset)    Arthritis Mother, Father, Sister, Sister, Sister    Breast cancer Sister, Sister, Sister    COPD Father    Cancer Mother, Father    Diabetes Mother    Hypertension Father    Kidney disease Sister    Melanoma Father          Tobacco Use    Smoking status: Current Every Day Smoker     Packs/day: 1.00    Smokeless tobacco: Never Used   Substance and Sexual Activity    Alcohol use: Not Currently    Drug use: Never    Sexual activity: Yes     Review of Systems   Constitutional:  Positive for activity change and appetite change. Negative for chills, diaphoresis, fatigue and fever.   HENT:  Negative for congestion, postnasal drip and sore throat.    Respiratory:  Negative for cough and shortness of breath.    Cardiovascular:  Negative for chest pain and palpitations. "   Gastrointestinal:  Negative for abdominal pain, constipation, diarrhea, nausea and vomiting.   Genitourinary:  Negative for dysuria.   Musculoskeletal:  Positive for arthralgias, gait problem and myalgias. Negative for back pain and joint swelling.   Neurological:  Negative for dizziness and weakness.   Psychiatric/Behavioral:  Negative for agitation, behavioral problems, confusion and decreased concentration.    Objective:     Vital Signs (Most Recent):  Temp: 97.2 °F (36.2 °C) (03/29/22 0905)  Pulse: (!) 58 (03/29/22 1103)  Resp: 14 (03/29/22 1103)  BP: 104/60 (03/29/22 1103)  SpO2: 95 % (03/29/22 1103)   Vital Signs (24h Range):  Temp:  [97.2 °F (36.2 °C)-97.9 °F (36.6 °C)] 97.2 °F (36.2 °C)  Pulse:  [55-68] 58  Resp:  [10-20] 14  SpO2:  [93 %-99 %] 95 %  BP: ()/(51-67) 104/60     Weight: 71.6 kg (157 lb 13.6 oz)  Body mass index is 23.31 kg/m².    Physical Exam  Vitals and nursing note reviewed.   Constitutional:       Appearance: Normal appearance.   HENT:      Head: Normocephalic and atraumatic.      Nose: Nose normal.      Mouth/Throat:      Pharynx: Oropharynx is clear.   Eyes:      Conjunctiva/sclera: Conjunctivae normal.   Cardiovascular:      Rate and Rhythm: Bradycardia present.      Pulses: Normal pulses.      Heart sounds: Normal heart sounds.   Pulmonary:      Effort: Pulmonary effort is normal.      Breath sounds: Normal breath sounds.   Abdominal:      General: Bowel sounds are normal.      Palpations: Abdomen is soft.      Tenderness: There is no abdominal tenderness. There is no guarding or rebound.   Musculoskeletal:      Cervical back: Normal range of motion.      Comments: Left leg- able to flex and extend foot and ankle. Pedal pulse strong and regular.   Skin:     General: Skin is warm.      Capillary Refill: Capillary refill takes less than 2 seconds.      Comments: Left leg DDI.   Neurological:      Mental Status: She is alert and oriented to person, place, and time.   Psychiatric:          Mood and Affect: Mood normal.         Behavior: Behavior normal.           Significant Labs: All pertinent labs within the past 24 hours have been reviewed.    3/10/2022- H/H 12.9/38.8, BUN/Cr 14/0.8    Significant Imaging: I have reviewed all pertinent imaging results/findings within the past 24 hours.    Assessment/Plan:     * Primary osteoarthritis of left knee    Post op management per ortho  Post op pain control per ortho  PT/OT eval and tx  HH and DME after dc      Bradycardia    Pt reported this has occurred before  Monitor on telemetry  Fall precautions  Administer pain medications carefully      Tobacco dependence due to cigarettes    Encouraged cessation  Offered nicotine patch        VTE Risk Mitigation (From admission, onward)         Ordered     IP VTE LOW RISK PATIENT  Once         03/29/22 1216     Place sequential compression device  Until discontinued         03/29/22 1216     Reason for no Mechanical VTE Prophylaxis  Once        Question:  Reasons:  Answer:  Physician Provided (leave comment)  Comment:  ortho ordered    03/29/22 1216     Reason for No Pharmacological VTE Prophylaxis  Once        Question:  Reasons:  Answer:  Physician Provided (leave comment)  Comment:  ortho orders    03/29/22 1216     Place sequential compression device  Until discontinued         03/29/22 1216                   Yaneth Garcia NP  Department of Hospital Medicine   [facility]

## 2022-03-29 NOTE — ANESTHESIA PROCEDURE NOTES
Spinal    Diagnosis: OA  Patient location during procedure: OR  Start time: 3/29/2022 7:16 AM  Timeout: 3/29/2022 7:15 AM  End time: 3/29/2022 7:25 AM    Staffing  Authorizing Provider: Monica Reynoso MD  Performing Provider: Monica Reynoso MD    Spinal Block  Patient position: sitting  Prep: ChloraPrep  Patient monitoring: heart rate, cardiac monitor, continuous pulse ox, frequent blood pressure checks and continuous capnometry  Approach: midline  Location: L2-3  Injection technique: single shot  CSF Fluid: clear free-flowing CSF  Needle  Needle type: Vikash   Needle gauge: 25 G  Needle length: 3.5 in  Additional Documentation: incremental injection, negative aspiration for heme and no paresthesia on injection  Needle localization: anatomical landmarks  Assessment  Sensory level: T10   Dermatomal levels determined by alcohol wipe  Ease of block: moderate  Patient's tolerance of the procedure: comfortable throughout block and no complaints  Medications:    Medications: bupivacaine (pf) (MARCAINE) injection 0.75% - Intraspinal   2 mL - 3/29/2022 7:25:00 AM

## 2022-03-29 NOTE — ANESTHESIA PROCEDURE NOTES
Peripheral Block    Patient location during procedure: pre-op   Block not for primary anesthetic.  Reason for block: at surgeon's request and post-op pain management   Post-op Pain Location: left knee   Start time: 3/29/2022 6:56 AM  Timeout: 3/29/2022 6:55 AM   End time: 3/29/2022 7:00 AM    Staffing  Authorizing Provider: Monica Reynoso MD  Performing Provider: Monica Reynoso MD    Preanesthetic Checklist  Completed: patient identified, IV checked, site marked, risks and benefits discussed, surgical consent, monitors and equipment checked, pre-op evaluation and timeout performed  Peripheral Block  Patient position: supine  Prep: ChloraPrep  Patient monitoring: heart rate, cardiac monitor, continuous pulse ox, continuous capnometry and frequent blood pressure checks  Block type: adductor canal  Laterality: left  Injection technique: single shot  Needle  Needle type: Stimuplex   Needle gauge: 21 G  Needle length: 4 in  Needle localization: anatomical landmarks and ultrasound guidance   -ultrasound image captured on disc.  Assessment  Injection assessment: negative aspiration, negative parasthesia and local visualized surrounding nerve  Paresthesia pain: none  Heart rate change: no  Slow fractionated injection: yes  Pain Tolerance: comfortable throughout block and no complaints  Medications:    Medications: bupivacaine (pf) (MARCAINE) injection 0.5% - Perineural   10 mL - 3/29/2022 7:00:00 AM    Additional Notes  VSS.  [facility] RN monitoring vitals throughout procedure.  Patient tolerated procedure well.     EXPAREL 20mL

## 2022-03-29 NOTE — ASSESSMENT & PLAN NOTE
Pt reported this has occurred before  Monitor on telemetry  Fall precautions  Administer pain medications carefully

## 2022-03-29 NOTE — ANESTHESIA POSTPROCEDURE EVALUATION
Anesthesia Post Evaluation    Patient: Misti West    Procedure(s) Performed: Procedure(s) (LRB):  ROBOTIC ARTHROPLASTY, KNEE, TOTAL (Left)    Final Anesthesia Type: spinal      Patient location during evaluation: PACU  Patient participation: Yes- Able to Participate  Level of consciousness: awake and alert, oriented and awake  Post-procedure vital signs: reviewed and stable  Pain management: adequate  Airway patency: patent    PONV status at discharge: No PONV  Anesthetic complications: no      Cardiovascular status: blood pressure returned to baseline and hemodynamically stable  Respiratory status: unassisted, spontaneous ventilation and room air  Hydration status: euvolemic  Follow-up not needed.          Vitals Value Taken Time   /60 03/29/22 1103   Temp 36.2 °C (97.2 °F) 03/29/22 0905   Pulse 58 03/29/22 1103   Resp 14 03/29/22 1103   SpO2 95 % 03/29/22 1103         Event Time   Out of Recovery 10:40:21         Pain/Marcio Score: Pain Rating Prior to Med Admin: 7 (3/29/2022 10:26 AM)  Pain Rating Post Med Admin: 0 (3/29/2022 11:05 AM)  Marcio Score: 10 (3/29/2022 11:03 AM)

## 2022-03-30 VITALS
OXYGEN SATURATION: 98 % | HEART RATE: 71 BPM | SYSTOLIC BLOOD PRESSURE: 124 MMHG | HEIGHT: 69 IN | DIASTOLIC BLOOD PRESSURE: 58 MMHG | BODY MASS INDEX: 23.38 KG/M2 | TEMPERATURE: 98 F | WEIGHT: 157.88 LBS | RESPIRATION RATE: 16 BRPM

## 2022-03-30 DIAGNOSIS — M17.9 OSTEOARTHRITIS OF KNEE, UNSPECIFIED LATERALITY, UNSPECIFIED OSTEOARTHRITIS TYPE: Primary | ICD-10-CM

## 2022-03-30 LAB
ANION GAP SERPL CALC-SCNC: 7 MMOL/L (ref 8–16)
BASOPHILS # BLD AUTO: 0.04 K/UL (ref 0–0.2)
BASOPHILS NFR BLD: 0.7 % (ref 0–1.9)
BUN SERPL-MCNC: 9 MG/DL (ref 6–20)
CALCIUM SERPL-MCNC: 8.3 MG/DL (ref 8.7–10.5)
CHLORIDE SERPL-SCNC: 108 MMOL/L (ref 95–110)
CO2 SERPL-SCNC: 26 MMOL/L (ref 23–29)
CREAT SERPL-MCNC: 0.7 MG/DL (ref 0.5–1.4)
DIFFERENTIAL METHOD: ABNORMAL
EOSINOPHIL # BLD AUTO: 0.7 K/UL (ref 0–0.5)
EOSINOPHIL NFR BLD: 11.6 % (ref 0–8)
ERYTHROCYTE [DISTWIDTH] IN BLOOD BY AUTOMATED COUNT: 14.1 % (ref 11.5–14.5)
EST. GFR  (AFRICAN AMERICAN): >60 ML/MIN/1.73 M^2
EST. GFR  (NON AFRICAN AMERICAN): >60 ML/MIN/1.73 M^2
GLUCOSE SERPL-MCNC: 111 MG/DL (ref 70–110)
HCT VFR BLD AUTO: 31.5 % (ref 37–48.5)
HGB BLD-MCNC: 10.2 G/DL (ref 12–16)
IMM GRANULOCYTES # BLD AUTO: 0.01 K/UL (ref 0–0.04)
IMM GRANULOCYTES NFR BLD AUTO: 0.2 % (ref 0–0.5)
LYMPHOCYTES # BLD AUTO: 1.9 K/UL (ref 1–4.8)
LYMPHOCYTES NFR BLD: 32.3 % (ref 18–48)
MCH RBC QN AUTO: 30.7 PG (ref 27–31)
MCHC RBC AUTO-ENTMCNC: 32.4 G/DL (ref 32–36)
MCV RBC AUTO: 95 FL (ref 82–98)
MONOCYTES # BLD AUTO: 0.4 K/UL (ref 0.3–1)
MONOCYTES NFR BLD: 6.3 % (ref 4–15)
NEUTROPHILS # BLD AUTO: 2.9 K/UL (ref 1.8–7.7)
NEUTROPHILS NFR BLD: 48.9 % (ref 38–73)
NRBC BLD-RTO: 0 /100 WBC
PLATELET # BLD AUTO: 198 K/UL (ref 150–450)
PMV BLD AUTO: 10.6 FL (ref 9.2–12.9)
POTASSIUM SERPL-SCNC: 4 MMOL/L (ref 3.5–5.1)
RBC # BLD AUTO: 3.32 M/UL (ref 4–5.4)
SODIUM SERPL-SCNC: 141 MMOL/L (ref 136–145)
WBC # BLD AUTO: 6.01 K/UL (ref 3.9–12.7)

## 2022-03-30 PROCEDURE — 25000003 PHARM REV CODE 250: Performed by: NURSE PRACTITIONER

## 2022-03-30 PROCEDURE — 36415 COLL VENOUS BLD VENIPUNCTURE: CPT | Performed by: ORTHOPAEDIC SURGERY

## 2022-03-30 PROCEDURE — 80048 BASIC METABOLIC PNL TOTAL CA: CPT | Performed by: ORTHOPAEDIC SURGERY

## 2022-03-30 PROCEDURE — 97530 THERAPEUTIC ACTIVITIES: CPT

## 2022-03-30 PROCEDURE — 25000003 PHARM REV CODE 250

## 2022-03-30 PROCEDURE — 97116 GAIT TRAINING THERAPY: CPT

## 2022-03-30 PROCEDURE — 97165 OT EVAL LOW COMPLEX 30 MIN: CPT

## 2022-03-30 PROCEDURE — 63600175 PHARM REV CODE 636 W HCPCS: Performed by: NURSE PRACTITIONER

## 2022-03-30 PROCEDURE — 25000003 PHARM REV CODE 250: Performed by: ORTHOPAEDIC SURGERY

## 2022-03-30 PROCEDURE — 99900035 HC TECH TIME PER 15 MIN (STAT)

## 2022-03-30 PROCEDURE — 85025 COMPLETE CBC W/AUTO DIFF WBC: CPT | Performed by: ORTHOPAEDIC SURGERY

## 2022-03-30 PROCEDURE — 97110 THERAPEUTIC EXERCISES: CPT

## 2022-03-30 PROCEDURE — 97535 SELF CARE MNGMENT TRAINING: CPT

## 2022-03-30 PROCEDURE — 94799 UNLISTED PULMONARY SVC/PX: CPT

## 2022-03-30 RX ORDER — NAPROXEN SODIUM 220 MG/1
81 TABLET, FILM COATED ORAL 2 TIMES DAILY
Status: DISCONTINUED | OUTPATIENT
Start: 2022-03-30 | End: 2022-03-30 | Stop reason: HOSPADM

## 2022-03-30 RX ORDER — HYDROCODONE BITARTRATE AND ACETAMINOPHEN 10; 325 MG/1; MG/1
1 TABLET ORAL EVERY 6 HOURS PRN
Qty: 20 TABLET | Refills: 0 | Status: SHIPPED | OUTPATIENT
Start: 2022-03-30 | End: 2022-04-04 | Stop reason: SDUPTHER

## 2022-03-30 RX ORDER — ASPIRIN 81 MG/1
81 TABLET ORAL 2 TIMES DAILY
Qty: 60 TABLET | Refills: 0 | Status: SHIPPED | OUTPATIENT
Start: 2022-03-30 | End: 2023-03-13

## 2022-03-30 RX ORDER — HYDROCODONE BITARTRATE AND ACETAMINOPHEN 10; 325 MG/1; MG/1
1 TABLET ORAL EVERY 6 HOURS PRN
Status: DISCONTINUED | OUTPATIENT
Start: 2022-03-30 | End: 2022-03-30 | Stop reason: HOSPADM

## 2022-03-30 RX ADMIN — ASPIRIN 81 MG CHEWABLE TABLET 81 MG: 81 TABLET CHEWABLE at 11:03

## 2022-03-30 RX ADMIN — HYDROCODONE BITARTRATE AND ACETAMINOPHEN 1 TABLET: 10; 325 TABLET ORAL at 12:03

## 2022-03-30 RX ADMIN — MUPIROCIN 1 G: 20 OINTMENT TOPICAL at 09:03

## 2022-03-30 RX ADMIN — ALPRAZOLAM 0.25 MG: 0.25 TABLET ORAL at 12:03

## 2022-03-30 RX ADMIN — FAMOTIDINE 20 MG: 20 TABLET, FILM COATED ORAL at 09:03

## 2022-03-30 RX ADMIN — DOCUSATE SODIUM 100 MG: 100 CAPSULE, LIQUID FILLED ORAL at 09:03

## 2022-03-30 RX ADMIN — THERA TABS 1 TABLET: TAB at 09:03

## 2022-03-30 RX ADMIN — HYDROCODONE BITARTRATE AND ACETAMINOPHEN 1 TABLET: 10; 325 TABLET ORAL at 07:03

## 2022-03-30 RX ADMIN — ONDANSETRON 4 MG: 2 INJECTION INTRAMUSCULAR; INTRAVENOUS at 12:03

## 2022-03-30 RX ADMIN — OXYCODONE HYDROCHLORIDE AND ACETAMINOPHEN 500 MG: 500 TABLET ORAL at 09:03

## 2022-03-30 RX ADMIN — ATORVASTATIN CALCIUM 10 MG: 10 TABLET, FILM COATED ORAL at 09:03

## 2022-03-30 NOTE — NURSING
Pt discharged per MD order. Verbalized understanding of discharge instructions, medications, and follow up appointments. PIV removed, catheter tip intact. Telemetry box removed and returned to nurse's station. Pt wheeled off of unit via PCT.

## 2022-03-30 NOTE — PLAN OF CARE
Pt is cleared from  for discharge.  Pt will go to Out Pt PT-It's scheduled.  Appointments in avs.       03/30/22 1102   Final Note   Assessment Type Discharge Planning Reassessment   Anticipated Discharge Disposition Home   Hospital Resources/Appts/Education Provided Appointments scheduled by Navigator/Coordinator

## 2022-03-30 NOTE — PLAN OF CARE
POC reviewed with patient voiced understanding. Pt voiced concerns about anxiety and restlessness, PRN meds given as ordered. Pt anxiety and restlessness relieved and meds tolerated.Pt ambulated several times with assistance to bathroom,tolerated well. Pt now resting in bed with no issues noted, bed in lowest position, call light within reach, will continue to monitor throughout shift.

## 2022-03-30 NOTE — DISCHARGE INSTRUCTIONS
You are to take aspirin 81 mg twice daily for 30 days after surgery to prevent blood clots.  Please obtain and take an over-the-counter stool softener while taking oral narcotics postoperatively to prevent constipation.  Please be careful postoperatively to avoid falls. Return to ED for any worsening symptoms or concerns. Follow us directed.       Discharge Instructions, [facility]    Thank you for choosing [facility] for your medical care.     You were admitted to the hospital with Primary osteoarthritis of left knee.     Please note your discharge instructions, including diet/activity restrictions, follow-up appointments, and medication changes.  If you have any questions about your medical issues, prescriptions, or any other questions, please feel free to contact the [facility] Dept at [phone] and we will help.    If you are previously with Home health, outpatient PT/OT or under a therapy program, you are cleared to return to those programs.    Please direct all long term medication refills and follow up to your primary care provider, Pierre Espinal MD. Thank you again for letting us take care of your health care needs.    Please note the following discharge instructions per your discharging physician-  Belen Taveras NP

## 2022-03-30 NOTE — PT/OT/SLP PROGRESS
Physical Therapy Treatment    Patient Name:  Misti West   MRN:  [id number]    Recommendations:     Discharge Recommendations:  home, home health PT   Discharge Equipment Recommendations: walker, rolling, bedside commode   Barriers to discharge: None    Assessment:     Misti West is a 56 y.o. female admitted with a medical diagnosis of Primary osteoarthritis of left knee.  She presents with the following impairments/functional limitations:  weakness, impaired endurance, decreased lower extremity function, impaired balance, gait instability, impaired functional mobilty, pain, decreased ROM, edema, orthopedic precautions .  Patient agreeable to PT treatment but was very agitated due to having requested pain meds over an hours before therapy was scheduled at 7 AM.  Patient presented supine in bed and was able to transfer to sitting and standing with SBA.  Patient then able to ambulate x 200 feet with RW with SBA but had to stop due to pain making patient nauseated and she felt like she was going to throw up.      Rehab Prognosis: Good; patient would benefit from acute skilled PT services to address these deficits and reach maximum level of function.    Recent Surgery: Procedure(s) (LRB):  ROBOTIC ARTHROPLASTY, KNEE, TOTAL (Left) 1 Day Post-Op    Plan:     During this hospitalization, patient to be seen BID to address the identified rehab impairments via gait training, therapeutic activities, therapeutic exercises and progress toward the following goals:    · Plan of Care Expires:  04/26/22    Subjective     Chief Complaint: pain  Patient/Family Comments/goals: get pain meds  Pain/Comfort:  · Pain Rating 1: 8/10  · Location - Orientation 1: lower  · Location 1: knee  · Pain Addressed 1: Reposition, Cessation of Activity  · Pain Rating Post-Intervention 1: 10/10      Objective:     Communicated with nurse prior to session.  Patient found supine with bed alarm, cryotherapy upon PT entry to room.     General Precautions:  Standard, fall   Orthopedic Precautions:LLE weight bearing as tolerated   Braces:    Respiratory Status: Room air     Functional Mobility:  · Bed Mobility:     · Supine to Sit: stand by assistance  · Transfers:     · Sit to Stand:  stand by assistance with rolling walker  · Gait: x 200 feet with RW with SBA      AM-PAC 6 CLICK MOBILITY          Therapeutic Activities and Exercises:   transfer training supine to sit with SBA, sit to stand x 3 times with RW with SBA  Gait training x 200 feet with RW with SBA but with slow step to gait pattern  Standing tolerance/balance to help pull up pants    Patient left up in chair with call button in reach and nurse notified..    GOALS:   Multidisciplinary Problems     Physical Therapy Goals        Problem: Physical Therapy    Goal Priority Disciplines Outcome Goal Variances Interventions   Physical Therapy Goal     PT, PT/OT Ongoing, Progressing     Description: Goals to be met by: 22    Patient will increase functional independence with mobility by performin. Supine to sit with [place]  2. Sit to supine with [place]  3. Sit to stand transfer with Supervision  4. Bed to chair transfer with Supervision using Rolling Walker  5. Gait  x 250 feet with Supervision using Rolling Walker.                      Time Tracking:     PT Received On: 22  PT Start Time: 07     PT Stop Time: 733  PT Total Time (min): 33 min     Billable Minutes: Gait Training 20 and Therapeutic Activity 13    Treatment Type: Treatment  PT/PTA: PT     PTA Visit Number: 0     2022

## 2022-03-30 NOTE — PT/OT/SLP PROGRESS
Physical Therapy Treatment    Patient Name:  Misti West   MRN:  [id number]    Recommendations:     Discharge Recommendations:  home, home health PT   Discharge Equipment Recommendations: walker, rolling, bedside commode   Barriers to discharge: None    Assessment:     Misti West is a 56 y.o. female admitted with a medical diagnosis of Primary osteoarthritis of left knee.  She presents with the following impairments/functional limitations:  weakness, impaired endurance, impaired functional mobilty, gait instability, impaired balance, decreased lower extremity function, pain, decreased ROM, edema, orthopedic precautions .  Patient agreeable to PT treatment again this morning.  Patient presented supine in bed and was able to transfer to sitting and standing with SBA.  Patient then ambulated x 100 feet with RW with SBA and up/down 5 steps with no AD with left hand rail and SBA.    Rehab Prognosis: Good; patient would benefit from acute skilled PT services to address these deficits and reach maximum level of function.    Recent Surgery: Procedure(s) (LRB):  ROBOTIC ARTHROPLASTY, KNEE, TOTAL (Left) 1 Day Post-Op    Plan:     During this hospitalization, patient to be seen BID to address the identified rehab impairments via gait training, therapeutic activities, therapeutic exercises and progress toward the following goals:    · Plan of Care Expires:  04/26/22    Subjective     Chief Complaint: pain  Patient/Family Comments/goals: none given  Pain/Comfort:  · Pain Rating 1: 2/10  · Location - Side 1: Left  · Location - Orientation 1: lower  · Location 1: knee  · Pain Addressed 1: Reposition, Cessation of Activity  · Pain Rating Post-Intervention 1: 6/10      Objective:     Communicated with nurse prior to session.  Patient found supine with bed alarm upon PT entry to room.     General Precautions: Standard, fall   Orthopedic Precautions:LLE weight bearing as tolerated   Braces:    Respiratory Status: Room air      Functional Mobility:  · Bed Mobility:     · Supine to Sit: stand by assistance  · Transfers:     · Sit to Stand:  stand by assistance with rolling walker  · Gait: x 100 feet with RW with SBA  · Stairs:  Pt ascended/descended 5 stair(s) with No Assistive Device with left handrail with Stand-by Assistance.       AM-PAC 6 CLICK MOBILITY          Therapeutic Activities and Exercises:   exercise to include ankle pumps, quad sets, heel slides, hip abd and SLR.  All done left LE x 10 reps.  Gait training x 100 feet with RW with SBA and up/down 5 steps left hand rail SBA    Patient left supine with call button in reach, nurse notified and spouse present..    GOALS:   Multidisciplinary Problems     Physical Therapy Goals        Problem: Physical Therapy    Goal Priority Disciplines Outcome Goal Variances Interventions   Physical Therapy Goal     PT, PT/OT Ongoing, Progressing     Description: Goals to be met by: 22    Patient will increase functional independence with mobility by performin. Supine to sit with [place]  2. Sit to supine with [place]  3. Sit to stand transfer with Supervision  4. Bed to chair transfer with Supervision using Rolling Walker  5. Gait  x 250 feet with Supervision using Rolling Walker.                      Time Tracking:     PT Received On: 22  PT Start Time: 1027     PT Stop Time: 1101  PT Total Time (min): 34 min     Billable Minutes: Gait Training 15 and Therapeutic Exercise 19    Treatment Type: Treatment  PT/PTA: PT     PTA Visit Number: 0     2022

## 2022-03-30 NOTE — PLAN OF CARE
Goals to be met by: 4/27/22    Patient will increase functional independence with ADLs by performing:    UE Dressing with Modified [place].  LE Dressing with Modified [place].  Grooming while standing at sink with Modified [place].  Toileting from bedside commode with Modified [place] for hygiene and clothing management.   Bathing from  shower chair/bench with Modified [place].  Toilet transfer to bedside commode with Modified [place].  Increased strength and functional activity tolerance for ADL's/IADL's as evidencec by requiring less assistance with these tasks.

## 2022-03-30 NOTE — PT/OT/SLP EVAL
Occupational Therapy   Evaluation    Name: Misti West  MRN: [id number]  Admitting Diagnosis:  Primary osteoarthritis of left knee  Recent Surgery: Procedure(s) (LRB):  ROBOTIC ARTHROPLASTY, KNEE, TOTAL (Left) 1 Day Post-Op    Recommendations:     Discharge Recommendations: home health OT  Discharge Equipment Recommendations:     Barriers to discharge:       Assessment:     Misti West is a 56 y.o. female with a medical diagnosis of Primary osteoarthritis of left knee.  She presents with the following performance deficits affecting function: weakness, impaired endurance, impaired self care skills, impaired functional mobilty, gait instability, impaired balance, decreased lower extremity function, pain, decreased ROM, orthopedic precautions. Pt was up in chair and agreeable to OT. Pt demonstrates BUE AROM/strength WNL's. OT provided instruction in home safety with ADL's/IADL's including review of home set up and DME/AE. Pt verbalized understanding. Pt was able to fabiola/doff socks with SBA while seated in chair. Pt required Min assist with sit to stand and transferring from chair to bed with RW. Pt required SBA with sit to supine. Recommend HHOT at discharge.    Rehab Prognosis: Good; patient would benefit from acute skilled OT services to address these deficits and reach maximum level of function.       Plan:     Patient to be seen 4 x/week to address the above listed problems via self-care/home management, therapeutic activities, therapeutic exercises  · Plan of Care Expires: 04/27/22  · Plan of Care Reviewed with: patient    Subjective     Chief Complaint: Pain  Patient/Family Comments/goals: To go home    Occupational Profile:  Living Environment: Pt lives with spouse in 1 story home with 3 HERIBERTO with handrail on L at entry. Pt has walk in shower with shower chair and raised toilet.  Previous level of function: Independent  Equipment Used at Home:  walker, rolling, cane, straight, bedside commode, bath bench, shower  chair  Assistance upon Discharge: Spouse    Pain/Comfort:  · Pain Rating 1: 10/10 (medicated)  · Location - Side 1: Left  · Location 1: knee  · Pain Rating Post-Intervention 1: 10/10    Patients cultural, spiritual, Sikh conflicts given the current situation:      Objective:     Communicated with: Nurse Lau prior to session.  Patient found up in chair with chair check, cryotherapy, peripheral IV upon OT entry to room.    General Precautions: Standard, fall   Orthopedic Precautions:LLE weight bearing as tolerated   Braces: N/A  Respiratory Status: Room air    Occupational Performance:    Bed Mobility:    · Patient completed Sit to Supine with stand by assistance    Functional Mobility/Transfers:  · Patient completed Sit <> Stand Transfer with minimum assistance  with  rolling walker   · Patient completed Bed <> Chair Transfer using Step Transfer technique with minimum assistance with rolling walker    Activities of Daily Living:  · Feeding:  independence    · Grooming: stand by assistance  set up in sitting  · Bathing: minimum assistance    · Upper Body Dressing: stand by assistance set up in sitting  · Lower Body Dressing: minimum assistance    · Toileting: minimum assistance      Cognitive/Visual Perceptual:  Pt alert and oriented    Physical Exam:  Upper Extremity Strength:    -       Right Upper Extremity: WNL  -       Left Upper Extremity: WNL    AMPAC 6 Click ADL:  AMPAC Total Score: 19    Treatment & Education:  OT provided education in role of OT. Pt verbalized understanding and was agreeable to OT.  OT provided instruction in home safety with ADL's/IADL's including review of home set up and DME/AE. Pt verbalized understanding.  Education:    Patient left HOB elevated with all lines intact, call button in reach, bed alarm on and Charge Nurse Karen notified    GOALS:   Multidisciplinary Problems     Occupational Therapy Goals        Problem: Occupational Therapy    Goal Priority Disciplines Outcome  Interventions   Occupational Therapy Goal     OT, PT/OT     Description: Goals to be met by: 4/27/22    Patient will increase functional independence with ADLs by performing:    UE Dressing with Modified [place].  LE Dressing with Modified [place].  Grooming while standing at sink with Modified [place].  Toileting from bedside commode with Modified [place] for hygiene and clothing management.   Bathing from  shower chair/bench with Modified [place].  Toilet transfer to bedside commode with Modified [place].  Increased strength and functional activity tolerance for ADL's/IADL's as evidencec by requiring less assistance with these tasks.                     History:     Past Medical History:   Diagnosis Date    Anxiety     Arthritis     Cancer     cervical    Hepatomegaly     3 liver cyst    Hiatal hernia     Liver lesion     PONV (postoperative nausea and vomiting)        Past Surgical History:   Procedure Laterality Date    CERVIX SURGERY      COLONOSCOPY N/A 8/27/2020    Procedure: COLONOSCOPY;  Surgeon: Tim Aguayo MD;  Location: [facility];  Service: Endoscopy;  Laterality: N/A;    CYSTOSCOPY WITH URETHRAL DILATION  10/1/2020    Procedure: CYSTOSCOPY, WITH URETHRAL DILATION;  Surgeon: Bhargav Keane MD;  Location[facility];  Service: Urology;;    ESOPHAGOGASTRODUODENOSCOPY N/A 8/13/2020    Procedure: EGD (ESOPHAGOGASTRODUODENOSCOPY);  Surgeon: Tim Aguayo MD;  Location: [facility];  Service: Endoscopy;  Laterality: N/A;    KNEE ARTHROSCOPY      ROBOTIC ARTHROPLASTY, KNEE Left 3/29/2022    Procedure: ROBOTIC ARTHROPLASTY, KNEE, TOTAL;  Surgeon: Neo Zapata MD;  Location: [facility] OR;  Service: Orthopedics;  Laterality: Left;    SINUS SURGERY      TUBAL LIGATION         Time Tracking:     OT Date of Treatment: 03/30/22  OT Start Time: 0902  OT Stop Time: 0925  OT Total Time (min): 23 min    Billable Minutes:Evaluation 8  Self Care/Home Management  15    3/30/2022

## 2022-04-01 ENCOUNTER — CLINICAL SUPPORT (OUTPATIENT)
Dept: REHABILITATION | Facility: HOSPITAL | Age: 57
End: 2022-04-01
Attending: ORTHOPAEDIC SURGERY

## 2022-04-01 ENCOUNTER — TELEPHONE (OUTPATIENT)
Dept: MEDSURG UNIT | Facility: HOSPITAL | Age: 57
End: 2022-04-01

## 2022-04-01 DIAGNOSIS — M17.9 OSTEOARTHRITIS OF KNEE, UNSPECIFIED LATERALITY, UNSPECIFIED OSTEOARTHRITIS TYPE: ICD-10-CM

## 2022-04-01 DIAGNOSIS — R26.9 GAIT ABNORMALITY: ICD-10-CM

## 2022-04-01 PROCEDURE — 97110 THERAPEUTIC EXERCISES: CPT | Mod: PN

## 2022-04-01 PROCEDURE — 97161 PT EVAL LOW COMPLEX 20 MIN: CPT | Mod: PN

## 2022-04-01 NOTE — DISCHARGE SUMMARY
[facility]  Discharge Summary      Patient Name: Misti West  MRN: [id number]  Patient Class: OP- Outpatient Recovery  Admission Date: 3/29/2022  Hospital Length of Stay: 0 days  Discharge Date and Time: 3/30/2022  1:05 PM  Attending Physician: No att. providers found   Discharging Provider: Belen Taveras NP  Primary Care Provider: Pierre Espinal MD      HPI:   Misti West is a 56 year old  female who was admitted post op left total knee arthroplasty. She was admitted to hospital medicine and followed closely. Pt has been having problems with knee since an injury in  and tried PT and knee injections with no relief. PMH significant for cervical cancer, liver cyst, bradycardia (heart rate dropped to 32 during PACU interview). PSH: includes cervical surgery, knee arthroscopy, EGD and colonscopy. Cystoscopy and tubal ligation. Social hx: tobacco- smokes 1/2 to 1 PPD, ETOH- not currently, Illicit drugs- never. Family hx: mother , Father with HTN and cancer. Lives with family. Has walker at home. Has out pt PT appts scheduled.    Left knee DDI. Able to flex and extend toes. Pedal pulses strong.      Procedure(s) (LRB):  ROBOTIC ARTHROPLASTY, KNEE, TOTAL (Left)      Hospital Course:   This patient was observed by hospital medicine after undergoing left TKA with Dr. Kauffman on 3/29.  Patient worked with PT/OT well and outpt PT was arrranged on d/c.  Her pain was controlled with oral narcotics as prescribed by orthopedist.  She was discharged home after cleared by orthopedist on aspirin 81 mg BID for 30 days as per orthopedic recommendations for VTE prophylaxis, as well as oral narcotics per orthopedic orders. Fall precautions were discussed with patient and family. Return precautions were discussed at length. Patient to follow up with primary care provider on discharge for any medical needs     Physical Exam:  AAO x 3, pleasant, NAD  HRRR; lungs CTA,  resp even unlabored  Left knee bandage CDI, +palp pulses, sensation intact       Goals of Care Treatment Preferences:  Code Status: Full Code      Consults:     No new Assessment & Plan notes have been filed under this hospital service since the last note was generated.  Service: Hospital Medicine    Final Active Diagnoses:    Diagnosis Date Noted POA    PRINCIPAL PROBLEM:  Primary osteoarthritis of left knee [M17.12] 03/29/2022 Yes    Tobacco dependence due to cigarettes [F17.210] 03/29/2022 Yes    Bradycardia [R00.1] 03/29/2022 Yes      Problems Resolved During this Admission:       Discharged Condition: good    Disposition: Home or Self Care    Follow Up:   Follow-up Information     Neo Zapata MD Follow up on 4/11/2022.    Specialties: Sports Medicine, Orthopedic Surgery  Why: post-op @ 11am  Contact information:  [address] [place] [address] [address]  [place] [address]  [phone]             Pierre Espinal MD Follow up in 1 week(s).    Specialty: Family Medicine  Why: hospital follow up  Contact information:  [address] [facility] [address]  [phone]             [facility] Follow up on 4/1/2022.    Specialty: Physical Therapy  Why: appt in [facility]  Contact information:  [address]  [place] [address]  [phone]                       Patient Instructions:      Diet Adult Regular     No driving until:   Order Comments: Until cleared by orthopedist     Notify your health care provider if you experience any of the following:  temperature >100.4     Notify your health care provider if you experience any of the following:  persistent nausea and vomiting or diarrhea     Notify your health care provider if you experience any of the following:  severe uncontrolled pain     Notify your health care provider if you experience any of the following:  redness, tenderness, or signs of infection (pain, swelling, redness, odor or green/yellow discharge around incision site)      Notify your health care provider if you experience any of the following:  difficulty breathing or increased cough     Notify your health care provider if you experience any of the following:  persistent dizziness, light-headedness, or visual disturbances     Notify your health care provider if you experience any of the following:  increased confusion or weakness     Change dressing (specify)   Order Comments: Dressing change: daily per Dr. Kauffman instructions     Activity as tolerated       Significant Diagnostic Studies: Labs:  All labs within the past 24 hours have been reviewed    Pending Diagnostic Studies:     None         Medications:  Reconciled Home Medications:      Medication List      START taking these medications    aspirin 81 MG EC tablet  Commonly known as: ECOTRIN  Take 1 tablet (81 mg total) by mouth 2 (two) times a day.     HYDROcodone-acetaminophen  mg per tablet  Commonly known as: NORCO  Take 1 tablet by mouth every 6 (six) hours as needed for Pain.        CONTINUE taking these medications    ALLERGY RELIEF D12 5-120 mg per tablet  Generic drug: loratadine-pseudoephedrine 5-120 mg  TAKE 1 TABLET BY MOUTH TWICE DAILY AS NEEDED FOR ALLERGIES     ALPRAZolam 0.25 MG tablet  Commonly known as: XANAX  TAKE 1 TABLET BY MOUTH NIGHTLY AS NEEDED FOR ANXIETY.     ascorbic acid (vitamin C) 500 MG tablet  Commonly known as: VITAMIN C  Take 500 mg by mouth once daily.     atorvastatin 10 MG tablet  Commonly known as: LIPITOR  Take 1 tablet (10 mg total) by mouth once daily.     b complex vitamins tablet  Take 1 tablet by mouth once daily.     biotin 1 mg tablet  Take 1,000 mcg by mouth 3 (three) times daily.     multivitamin capsule  Take 1 capsule by mouth once daily.     VITAMIN D3 ORAL  Take by mouth.            Indwelling Lines/Drains at time of discharge:   Lines/Drains/Airways     None                 Time spent on the discharge of patient: 40 minutes         Belen Taveras  NP  Department of Hospital Medicine  [facility]

## 2022-04-01 NOTE — HOSPITAL COURSE
This patient was observed by hospital medicine after undergoing left TKA with Dr. Kauffman on 3/29.  Patient worked with PT/OT well and outpt PT was arrranged on d/c.  Her pain was controlled with oral narcotics as prescribed by orthopedist.  She was discharged home after cleared by orthopedist on aspirin 81 mg BID for 30 days as per orthopedic recommendations for VTE prophylaxis, as well as oral narcotics per orthopedic orders. Fall precautions were discussed with patient and family. Return precautions were discussed at length. Patient to follow up with primary care provider on discharge for any medical needs     Physical Exam:  AAO x 3, pleasant, NAD  HRRR; lungs CTA, resp even unlabored  Left knee bandage CDI, +palp pulses, sensation intact
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
## 2022-04-04 ENCOUNTER — PATIENT MESSAGE (OUTPATIENT)
Dept: ORTHOPEDICS | Facility: CLINIC | Age: 57
End: 2022-04-04

## 2022-04-04 ENCOUNTER — PATIENT MESSAGE (OUTPATIENT)
Dept: ADMINISTRATIVE | Facility: HOSPITAL | Age: 57
End: 2022-04-04

## 2022-04-04 DIAGNOSIS — Z96.652 STATUS POST LEFT KNEE REPLACEMENT: Primary | ICD-10-CM

## 2022-04-04 RX ORDER — HYDROCODONE BITARTRATE AND ACETAMINOPHEN 10; 325 MG/1; MG/1
1 TABLET ORAL EVERY 6 HOURS PRN
Qty: 20 TABLET | Refills: 0 | Status: SHIPPED | OUTPATIENT
Start: 2022-04-04 | End: 2022-04-11 | Stop reason: SDUPTHER

## 2022-04-04 NOTE — PROGRESS NOTES
[facility]   Physical Therapy Treatment Note     Name: Misti West  Clinic Number: [id number]    Therapy Diagnosis:   Encounter Diagnosis   Name Primary?    Gait abnormality Yes     Physician: Neo Zapata,*    Visit Date: 4/5/2022    Physician Orders: PT Eval and Treat   Medical Diagnosis from Referral: OA of knee.S/P LT TKA   Evaluation Date: 4/1/2022  Authorization Period Expiration: 3/30/23  Plan of Care Expiration: 6/10  Progress Note Due: 4/30/22  Visit # / Visits authorized: 1/ 20  FOTO: 35/100     Precautions: Standard, Fall and cancer    PTA Visit #: 1/5     Time In: 730  Time Out: 820  Total Billable Time: 50 minutes    SUBJECTIVE     Pt reports: she has a lot of bruising and it concerned her so she messaged her doctor. She took pain medication this morning. She has muscle spasms in the hamstring and calf.   She was compliant with home exercise program.  Response to previous treatment: swelling in ankle   Functional change: ongoing     Pain: 4/10  Location: left knee and hip      OBJECTIVE     Objective Measures updated at progress report unless specified.     Arrived to session with ace bandage wrapped around knee per patient request.     Treatment     Misti received the treatments listed below:      therapeutic exercises to develop strength, endurance, ROM and flexibility for 45 minutes including:    Extension prop stretch x 4 min   Long sitting calf stretch with belt 3 x 30 sec  APs x 30   Quad sets x 30   Standing SLR 2 x 10   Seated heel slides 2 x 10       gait training to improve functional mobility and safety for 5 minutes, including:  RW adjusted properly   Heel toe gait with focus on extension and flexion to improve knee range of motion       cold pack for 0 minutes to left knee.        Patient Education and Home Exercises     Home Exercises Provided and Patient Education Provided     Education provided:   -HEP, proper gait, propping for swelling and  extension     Written Home Exercises Provided: Patient instructed to cont prior HEP. Exercises were reviewed and Misti was able to demonstrate them prior to the end of the session.  Misti demonstrated good  understanding of the education provided. See EMR under Patient Instructions for exercises provided during therapy sessions.     ASSESSMENT     Patient educated on importance of extension propping for range of motion and reduce swelling with the leg elevated above her heart. Misti required verbal cueing throughout session for proper form, especially seated heel slides to avoid hip hiking. Misti challenged with exercises due to swelling, muscle weakness, and muscle spasms in the left hamstring and calf. Patient left session with improved gait pattern and knee range of motion. Continue to progress.    Misti Is progressing well towards her goals.   Pt prognosis is Good.     Pt will continue to benefit from skilled outpatient physical therapy to address the deficits listed in the problem list box on initial evaluation, provide pt/family education and to maximize pt's level of independence in the home and community environment.     Pt's spiritual, cultural and educational needs considered and pt agreeable to plan of care and goals.     Anticipated barriers to physical therapy: none    Goals:   Short Term Goals (STG) # weeks Goal Review Date Reviewed Date Met   Pt will demonstrate independence with initial HEP to facilitate therex progression and improvements in functional mobility 4 Initial 4/1/2022     The patient will increase bilateral lower extremity strength to greater than or equal to 1/2 manual muscle grade for all deficient areas in order to facilitate ambulation x 100 without pain 4 Initial 4/1/2022 .   Pt will start HEP 4 Initial 4/1/2022         Initial 4/1/2022 4/1/2022 4/1/2022 4/1/2022 4/1/2022 4/1/2022 4/1/2022        Long Term Goals (LTG)  # weeks Goal Review Date Reviewed Date Met   The patient will improve the Lower Extremity Functional Scale to less than 30% Disability 8 Initial 4/1/2022     The patient will increase bilateral lower extremity strength to greater than or equal to 1.5 manual muscle grade for all deficient areas in order to get in and out of vehicle with no limitations  Pt will be independent with transfers without AD. 8 Initial 4/1/2022     The patient will increase bilateral hip ROM to WFL in order to improve stride length for more normalized gait pattern.0-125 8 Initial 4/1/2022     Pt will ambulate 300' without pain with no AD 8 Initial 4/1/2022     Pt will return to previous LOF 8 Initial. 4/1/2022 4/1/2022 4/1/2022 4/1/2022 4/1/2022 4/1/2022            PLAN     Continue per PT plan of care with focus on left knee strength and range of motion.     Rere Dumont, PTA

## 2022-04-05 ENCOUNTER — CLINICAL SUPPORT (OUTPATIENT)
Dept: REHABILITATION | Facility: HOSPITAL | Age: 57
End: 2022-04-05
Attending: ORTHOPAEDIC SURGERY

## 2022-04-05 DIAGNOSIS — R26.9 GAIT ABNORMALITY: Primary | ICD-10-CM

## 2022-04-05 PROCEDURE — 97110 THERAPEUTIC EXERCISES: CPT | Mod: PN,CQ

## 2022-04-06 RX ORDER — CYCLOBENZAPRINE HCL 10 MG
10 TABLET ORAL 3 TIMES DAILY PRN
Qty: 30 TABLET | Refills: 0 | Status: SHIPPED | OUTPATIENT
Start: 2022-04-06 | End: 2022-04-14 | Stop reason: SDUPTHER

## 2022-04-08 ENCOUNTER — CLINICAL SUPPORT (OUTPATIENT)
Dept: REHABILITATION | Facility: HOSPITAL | Age: 57
End: 2022-04-08
Attending: ORTHOPAEDIC SURGERY

## 2022-04-08 DIAGNOSIS — R26.9 GAIT ABNORMALITY: Primary | ICD-10-CM

## 2022-04-08 PROCEDURE — 97110 THERAPEUTIC EXERCISES: CPT | Mod: PN

## 2022-04-08 NOTE — PROGRESS NOTES
[facility] OUTPATIENT THERAPY AND WELLNESS   Physical Therapy Treatment Note     Name: Misti West  Clinic Number: [id number]    Therapy Diagnosis:   Encounter Diagnosis   Name Primary?    Gait abnormality Yes     Physician: Neo Zapata,*    Visit Date: 4/8/2022    Physician Orders: PT Eval and Treat   Medical Diagnosis from Referral: OA of knee.S/P LT TKA   Evaluation Date: 4/1/2022  Authorization Period Expiration: 3/30/23  Plan of Care Expiration: 6/10  Progress Note Due: 4/30/22  Visit # / Visits authorized: 3/ 20  FOTO: 35/100     Precautions: Standard, Fall and cancer    PTA Visit #: 1/5     Time In: 1630  Time Out: 1712  Total Billable Time: 40 minutes    SUBJECTIVE     Pt reports: she is feeling better   She was compliant with home exercise program.  Response to previous treatment: swelling decreasig   Functional change: ongoing improving    Pain: 4/10  Location: left knee and hip      OBJECTIVE     Objective Measures updated at progress report unless specified.     Arrived to session with ace bandage wrapped around knee per patient request.     Treatment     Misti received the treatments listed below:      therapeutic exercises to develop strength, endurance, ROM and flexibility for 40 minutes including:  Bike 5', seat 12 forward and back,unable to turn around  // Bars; mini squats 3x10, gastroc stretch 3x30 B  Mat; QS 30, AP 30, AA SLR X 10  LAQ 20.  PROM FLEX/EXT 3 X 10, HIP ABD 3X10  Extension prop stretch x 4 min   Long sitting calf stretch with belt 3 x 30 sec     Standing SLR 2 x 10 NP  Seated heel slides 2 x 10       gait training to improve functional mobility and safety for 5 minutes, including:  Heel toe gait with focus on extension, flexion and decrease ER LLE, to improve knee range of motion     cold pack for 0 minutes to left knee.      Patient Education and Home Exercises     Home Exercises Provided and Patient Education Provided     Education provided:   -HEP, proper gait pattern to  avoid ER of LLE., propping for swelling and extension     Written Home Exercises Provided: Patient instructed to cont prior HEP. Exercises were reviewed and Misti was able to demonstrate them prior to the end of the session.  Misti demonstrated good  understanding of the education provided. See EMR under Patient Instructions for exercises provided during therapy sessions.     ASSESSMENT     Patient educated on importance of extension propping for range of motion and reduce swelling with the leg elevated above her heart. Misti required verbal cueing throughout session for proper form, especially seated heel slides to avoid hip hiking. Misti challenged with exercises due to swelling, muscle weakness, and muscle spasms in the left hamstring and calf. Patient left session with improved gait pattern and knee range of motion. Continue to progress.    Misti Is progressing well towards her goals.   Pt prognosis is Good.     Pt will continue to benefit from skilled outpatient physical therapy to address the deficits listed in the problem list box on initial evaluation, provide pt/family education and to maximize pt's level of independence in the home and community environment.     Pt's spiritual, cultural and educational needs considered and pt agreeable to plan of care and goals.     Anticipated barriers to physical therapy: none    Goals:   Short Term Goals (STG) # weeks Goal Review Date Reviewed Date Met   Pt will demonstrate independence with initial HEP to facilitate therex progression and improvements in functional mobility 4 Initial 4/1/2022     The patient will increase bilateral lower extremity strength to greater than or equal to 1/2 manual muscle grade for all deficient areas in order to facilitate ambulation x 100 without pain 4 Initial 4/1/2022 .   Pt will start HEP 4 Initial 4/1/2022         Initial 4/1/2022 4/1/2022 4/1/2022 4/1/2022 4/1/2022 4/1/2022            4/1/2022        Long Term Goals (LTG) # weeks Goal Review Date Reviewed Date Met   The patient will improve the Lower Extremity Functional Scale to less than 30% Disability 8 Initial 4/1/2022     The patient will increase bilateral lower extremity strength to greater than or equal to 1.5 manual muscle grade for all deficient areas in order to get in and out of vehicle with no limitations  Pt will be independent with transfers without AD. 8 Initial 4/1/2022     The patient will increase bilateral hip ROM to WFL in order to improve stride length for more normalized gait pattern.0-125 8 Initial 4/1/2022     Pt will ambulate 300' without pain with no AD 8 Initial 4/1/2022     Pt will return to previous LOF 8 Initial. 4/1/2022 4/1/2022 4/1/2022 4/1/2022 4/1/2022 4/1/2022            PLAN     Continue per PT plan of care with focus on left knee strength and range of motion.     Dave Zaman, PT

## 2022-04-11 ENCOUNTER — HOSPITAL ENCOUNTER (OUTPATIENT)
Dept: RADIOLOGY | Facility: HOSPITAL | Age: 57
Discharge: HOME | End: 2022-04-11
Attending: ORTHOPAEDIC SURGERY

## 2022-04-11 ENCOUNTER — OFFICE VISIT (OUTPATIENT)
Dept: ORTHOPEDICS | Facility: CLINIC | Age: 57
End: 2022-04-11

## 2022-04-11 VITALS — HEIGHT: 69 IN | WEIGHT: 157 LBS | BODY MASS INDEX: 23.25 KG/M2 | RESPIRATION RATE: 18 BRPM

## 2022-04-11 DIAGNOSIS — Z96.652 STATUS POST LEFT KNEE REPLACEMENT: Primary | ICD-10-CM

## 2022-04-11 DIAGNOSIS — Z96.652 STATUS POST LEFT KNEE REPLACEMENT: ICD-10-CM

## 2022-04-11 PROCEDURE — 73560 XR KNEE 1 OR 2 VIEW LEFT: ICD-10-PCS | Mod: 26,LT,, | Performed by: RADIOLOGY

## 2022-04-11 PROCEDURE — 99024 PR POST-OP FOLLOW-UP VISIT: ICD-10-PCS | Mod: ,,, | Performed by: ORTHOPAEDIC SURGERY

## 2022-04-11 PROCEDURE — 99024 POSTOP FOLLOW-UP VISIT: CPT | Mod: ,,, | Performed by: ORTHOPAEDIC SURGERY

## 2022-04-11 PROCEDURE — 99999 PR PBB SHADOW E&M-EST. PATIENT-LVL IV: ICD-10-PCS | Mod: PBBFAC,,, | Performed by: ORTHOPAEDIC SURGERY

## 2022-04-11 PROCEDURE — 73560 X-RAY EXAM OF KNEE 1 OR 2: CPT | Mod: 26,LT,, | Performed by: RADIOLOGY

## 2022-04-11 PROCEDURE — 99214 OFFICE O/P EST MOD 30 MIN: CPT | Mod: PBBFAC,PN | Performed by: ORTHOPAEDIC SURGERY

## 2022-04-11 PROCEDURE — 73560 X-RAY EXAM OF KNEE 1 OR 2: CPT | Mod: TC,PN,LT

## 2022-04-11 PROCEDURE — 99999 PR PBB SHADOW E&M-EST. PATIENT-LVL IV: CPT | Mod: PBBFAC,,, | Performed by: ORTHOPAEDIC SURGERY

## 2022-04-11 RX ORDER — HYDROCODONE BITARTRATE AND ACETAMINOPHEN 10; 325 MG/1; MG/1
1 TABLET ORAL EVERY 6 HOURS PRN
Qty: 20 TABLET | Refills: 0 | Status: SHIPPED | OUTPATIENT
Start: 2022-04-11 | End: 2022-07-18

## 2022-04-11 NOTE — PROGRESS NOTES
Past Medical History:   Diagnosis Date    Anxiety     Arthritis     Cancer     cervical    Hepatomegaly     3 liver cyst    Hiatal hernia     Liver lesion     PONV (postoperative nausea and vomiting)        Past Surgical History:   Procedure Laterality Date    CERVIX SURGERY      COLONOSCOPY N/A 8/27/2020    Procedure: COLONOSCOPY;  Surgeon: Tim Aguayo MD;  Location: [facility] ENDO;  Service: Endoscopy;  Laterality: N/A;    CYSTOSCOPY WITH URETHRAL DILATION  10/1/2020    Procedure: CYSTOSCOPY, WITH URETHRAL DILATION;  Surgeon: Bhargav Keane MD;  Location: [facility] OR;  Service: Urology;;    ESOPHAGOGASTRODUODENOSCOPY N/A 8/13/2020    Procedure: EGD (ESOPHAGOGASTRODUODENOSCOPY);  Surgeon: Tim Aguayo MD;  Location: [facility] ENDO;  Service: Endoscopy;  Laterality: N/A;    KNEE ARTHROSCOPY      ROBOTIC ARTHROPLASTY, KNEE Left 3/29/2022    Procedure: ROBOTIC ARTHROPLASTY, KNEE, TOTAL;  Surgeon: Neo Zapata MD;  Location: [facility] OR;  Service: Orthopedics;  Laterality: Left;    SINUS SURGERY      TUBAL LIGATION         Current Outpatient Medications   Medication Sig    ALLERGY RELIEF D12 5-120 mg per tablet TAKE 1 TABLET BY MOUTH TWICE DAILY AS NEEDED FOR ALLERGIES    ALPRAZolam (XANAX) 0.25 MG tablet TAKE 1 TABLET BY MOUTH NIGHTLY AS NEEDED FOR ANXIETY.    ascorbic acid, vitamin C, (VITAMIN C) 500 MG tablet Take 500 mg by mouth once daily.    aspirin (ECOTRIN) 81 MG EC tablet Take 1 tablet (81 mg total) by mouth 2 (two) times a day.    atorvastatin (LIPITOR) 10 MG tablet Take 1 tablet (10 mg total) by mouth once daily.    b complex vitamins tablet Take 1 tablet by mouth once daily.    biotin 1 mg tablet Take 1,000 mcg by mouth 3 (three) times daily.    cholecalciferol, vitamin D3, (VITAMIN D3 ORAL) Take by mouth.    HYDROcodone-acetaminophen (NORCO)  mg per tablet Take 1 tablet by mouth every 6 (six) hours as needed for Pain.    multivitamin capsule Take 1 capsule by mouth  "once daily.    cyclobenzaprine (FLEXERIL) 10 MG tablet Take 1 tablet (10 mg total) by mouth 3 (three) times daily as needed for Muscle spasms. (Patient not taking: Reported on 4/11/2022)     No current facility-administered medications for this visit.       Review of patient's allergies indicates:   Allergen Reactions    Codeine Swelling     "Hives, vomiting"    Morphine Swelling     "Hives, vomiting"    Naproxen sodium Swelling     "Itching, high blood pressure"    Tramadol hcl Swelling     "itching & nausea"    Penicillins     Trintex        Family History   Problem Relation Age of Onset    Diabetes Mother     Cancer Mother     Arthritis Mother     Cancer Father     COPD Father     Hypertension Father     Arthritis Father     Melanoma Father     Breast cancer Sister     Arthritis Sister     Breast cancer Sister     Arthritis Sister     Breast cancer Sister     Kidney disease Sister     Arthritis Sister     Colon polyps Neg Hx     Colon cancer Neg Hx     Crohn's disease Neg Hx     Ulcerative colitis Neg Hx     Stomach cancer Neg Hx     Esophageal cancer Neg Hx     Celiac disease Neg Hx        Social History     Socioeconomic History    Marital status:    Tobacco Use    Smoking status: Current Every Day Smoker     Packs/day: 1.00    Smokeless tobacco: Never Used   Substance and Sexual Activity    Alcohol use: Not Currently    Drug use: Never    Sexual activity: Yes     Social Determinants of Health     Financial Resource Strain: Medium Risk    Difficulty of Paying Living Expenses: Somewhat hard   Food Insecurity: No Food Insecurity    Worried About Running Out of Food in the Last Year: Never true    Ran Out of Food in the Last Year: Never true   Transportation Needs: No Transportation Needs    Lack of Transportation (Medical): No    Lack of Transportation (Non-Medical): No   Physical Activity: Unknown    Days of Exercise per Week: 0 days   Stress: No Stress Concern " Present    Feeling of Stress : Only a little   Social Connections: Unknown    Frequency of Communication with Friends and Family: More than three times a week    Frequency of Social Gatherings with Friends and Family: Once a week    Active Member of Clubs or Organizations: Yes    Attends Club or Organization Meetings: 1 to 4 times per year    Marital Status:    Housing Stability: Unknown    Unable to Pay for Housing in the Last Year: No    Unstable Housing in the Last Year: No       Chief Complaint:   Chief Complaint   Patient presents with    Post-op Evaluation     L TKA, dos 3/29/22       Date of surgery:  March 29, 2022    History of present illness:  56-year-old female underwent left Buzz total knee arthroplasty.  Patient is doing okay.  Having some pain up in the hip as well as in the calf.  Pain is a 4/10.      Review of Systems:    Musculoskeletal:  See HPI        Physical Examination:    Vital Signs:    Vitals:    04/11/22 1001   Resp: 18       Body mass index is 23.18 kg/m².    This a well-developed, well nourished patient in no acute distress.  They are alert and oriented and cooperative to examination.  Pt. walks to some mild antalgic gait using a walker    Examination left knee shows well-healing surgical incisions.  No erythema drainage.  Mild joint swelling.  Range of motion is 0-80 degrees.  Minimal calf pain.    X-rays:  Two views left knee are ordered and reviewed which show well-aligned total knee arthroplasty without complications     Assessment::  Status post left Buzz [place] CR total knee arthroplasty    Plan:  I reviewed the x-rays with her today.  She is doing very well.  We will continue with physical therapy.  Continue with the aspirin for 2 more weeks.  Refilled her pain medicine.  Follow-up in a month.  No x-ray needed.    This note was created using [employer] voice recognition software that occasionally misinterpreted phrases or words.

## 2022-04-12 ENCOUNTER — CLINICAL SUPPORT (OUTPATIENT)
Dept: REHABILITATION | Facility: HOSPITAL | Age: 57
End: 2022-04-12
Attending: ORTHOPAEDIC SURGERY

## 2022-04-12 DIAGNOSIS — R26.9 GAIT ABNORMALITY: Primary | ICD-10-CM

## 2022-04-12 DIAGNOSIS — Z96.652 STATUS POST LEFT KNEE REPLACEMENT: ICD-10-CM

## 2022-04-12 PROCEDURE — 97110 THERAPEUTIC EXERCISES: CPT | Mod: PN,CQ

## 2022-04-12 NOTE — PROGRESS NOTES
[facility]   Physical Therapy Treatment Note     Name: Misti West  Clinic Number: [id number]    Therapy Diagnosis:   Encounter Diagnoses   Name Primary?    Status post left knee replacement     Gait abnormality Yes     Physician: Neo Zapata,*    Visit Date: 4/12/2022    Physician Orders: PT Eval and Treat   Medical Diagnosis from Referral: OA of knee.S/P LT TKA   Evaluation Date: 4/1/2022  Authorization Period Expiration: 3/30/23  Plan of Care Expiration: 6/10  Progress Note Due: 4/30/22  Visit # / Visits authorized: 4/ 20  FOTO: 35/100     Precautions: Standard, Fall and cancer    PTA Visit #: 1/5     Time In: 755 (patient late for 745 appointment)  Time Out: 830  Total Billable Time: 35 minutes    SUBJECTIVE     Pt reports: she went to the doctor and said the knee looked good but he did not get a chance to look at his hip.   She was compliant with home exercise program.  Response to previous treatment: no issues    Functional change: ongoing improving    Pain: 3/10  Location: left hip      OBJECTIVE     Objective Measures updated at progress report unless specified.       Treatment     Misti received the treatments listed below:      therapeutic exercises to develop strength, endurance, ROM and flexibility for 30 minutes including:    Bike 5', seat 12, half revolutions   Seated hamstring stretch 2 x 30 sec  Extension prop stretch x 4 min    // Bars; mini squats 3x10, gastroc stretch 3x30 B    Mat; QS 30,  SLR X 10  LAQ 20.      gait training to improve functional mobility and safety for 5 minutes, including:  Heel toe gait with focus on extension, flexion and decrease ER LLE, to improve knee range of motion in // bars         Patient Education and Home Exercises     Home Exercises Provided and Patient Education Provided     Education provided:   -HEP, proper gait pattern to avoid ER of LLE., propping for swelling and extension     Written Home Exercises Provided: Patient  instructed to cont prior HEP. Exercises were reviewed and Misti was able to demonstrate them prior to the end of the session.  Misti demonstrated good  understanding of the education provided. See EMR under Patient Instructions for exercises provided during therapy sessions.     ASSESSMENT     Patient ambulates with slight ER of right hip with decreased knee flexion and heel strike. Misti was able to tolerate exercises well with ability to perform straight leg raises without assistance. Continue to progress knee flexion and decrease gait deviations. Gait training with cane at next visit. Poor carry over leaving session after gait training.      Misti Is progressing well towards her goals.   Pt prognosis is Good.     Pt will continue to benefit from skilled outpatient physical therapy to address the deficits listed in the problem list box on initial evaluation, provide pt/family education and to maximize pt's level of independence in the home and community environment.     Pt's spiritual, cultural and educational needs considered and pt agreeable to plan of care and goals.     Anticipated barriers to physical therapy: none    Goals:   Short Term Goals (STG) # weeks Goal Review Date Reviewed Date Met   Pt will demonstrate independence with initial HEP to facilitate therex progression and improvements in functional mobility 4 Initial 4/1/2022     The patient will increase bilateral lower extremity strength to greater than or equal to 1/2 manual muscle grade for all deficient areas in order to facilitate ambulation x 100 without pain 4 Initial 4/1/2022 .   Pt will start HEP 4 Initial 4/1/2022         Initial 4/1/2022 4/1/2022 4/1/2022 4/1/2022 4/1/2022 4/1/2022 4/1/2022        Long Term Goals (LTG) # weeks Goal Review Date Reviewed Date Met   The patient will improve the Lower Extremity Functional Scale to less than 30% Disability 8 Initial 4/1/2022     The  patient will increase bilateral lower extremity strength to greater than or equal to 1.5 manual muscle grade for all deficient areas in order to get in and out of vehicle with no limitations  Pt will be independent with transfers without AD. 8 Initial 4/1/2022     The patient will increase bilateral hip ROM to WFL in order to improve stride length for more normalized gait pattern.0-125 8 Initial 4/1/2022     Pt will ambulate 300' without pain with no AD 8 Initial 4/1/2022     Pt will return to previous LOF 8 Initial. 4/1/2022 4/1/2022 4/1/2022 4/1/2022 4/1/2022 4/1/2022            PLAN     Continue per PT plan of care with focus on left knee strength and range of motion.     Rere Dumont, PTA

## 2022-04-14 ENCOUNTER — CLINICAL SUPPORT (OUTPATIENT)
Dept: REHABILITATION | Facility: HOSPITAL | Age: 57
End: 2022-04-14
Attending: ORTHOPAEDIC SURGERY

## 2022-04-14 DIAGNOSIS — Z96.652 STATUS POST LEFT KNEE REPLACEMENT: ICD-10-CM

## 2022-04-14 DIAGNOSIS — R26.9 GAIT ABNORMALITY: Primary | ICD-10-CM

## 2022-04-14 PROCEDURE — 97110 THERAPEUTIC EXERCISES: CPT | Mod: PN,CQ

## 2022-04-14 RX ORDER — CYCLOBENZAPRINE HCL 10 MG
10 TABLET ORAL 3 TIMES DAILY PRN
Qty: 30 TABLET | Refills: 0 | Status: SHIPPED | OUTPATIENT
Start: 2022-04-14 | End: 2022-04-24

## 2022-04-14 NOTE — PROGRESS NOTES
[facility]   Physical Therapy Treatment Note     Name: Misti West  Clinic Number: [id number]    Therapy Diagnosis:   Encounter Diagnosis   Name Primary?    Gait abnormality Yes     Physician: Neo Zapata,*    Visit Date: 4/14/2022    Physician Orders: PT Eval and Treat   Medical Diagnosis from Referral: OA of knee.S/P LT TKA   Evaluation Date: 4/1/2022  Authorization Period Expiration: 3/30/23  Plan of Care Expiration: 6/10  Progress Note Due: 4/30/22  Visit # / Visits authorized: 5/ 20  FOTO: 35/100     Precautions: Standard, Fall and cancer    PTA Visit #: 2/5     Time In: 1200  Time Out: 1250  Total Billable Time: 50 minutes    SUBJECTIVE     Pt reports:  She went to the chiropractor and he helped decrease her hip pain and said it was out about an inch.   She was compliant with home exercise program.  Response to previous treatment: no issues    Functional change: walking better     Pain: 0/10  Location: left hip      OBJECTIVE     Objective Measures updated at progress report unless specified.     Left knee AROM in supine: 7 - 80 degrees      Treatment     Misti received the treatments listed below:      therapeutic exercises to develop strength, endurance, ROM and flexibility for 50 minutes including:    Bike 7', half revolutions     Seated hamstring stretch 2 x 30 sec   Extension prop stretch x 4 min    Mat; QS 30,  SLR  X 5, SAQ x 20   LAQ 20.  Supine heel slides with strap x 20   Sit to stands from edge of mat with UE support  x 15         Patient Education and Home Exercises     Home Exercises Provided and Patient Education Provided     Education provided:   -HEP, proper gait pattern to avoid ER of LLE., propping for swelling and extension     Written Home Exercises Provided: Patient instructed to cont prior HEP. Exercises were reviewed and Misti was able to demonstrate them prior to the end of the session.  Misti demonstrated good  understanding of the education  provided. See EMR under Patient Instructions for exercises provided during therapy sessions.     ASSESSMENT     Patient ambulated into clinic with improved gait mechanics and reciprocal pattern. Patient very apprehensive about pushing her knee flexion since it puts pressure on her left hip despite constant education of the importance. Required excessive education on sit to stands to increase weight bearing on LLE and avoiding shifting and hiking her hip to compensate. Unable to perform straight leg raises without extension lag at this treatment. Continue to progress knee range of motion.     Misti Is progressing well towards her goals.   Pt prognosis is Good.     Pt will continue to benefit from skilled outpatient physical therapy to address the deficits listed in the problem list box on initial evaluation, provide pt/family education and to maximize pt's level of independence in the home and community environment.     Pt's spiritual, cultural and educational needs considered and pt agreeable to plan of care and goals.     Anticipated barriers to physical therapy: none    Goals:   Short Term Goals (STG) # weeks Goal Review Date Reviewed Date Met   Pt will demonstrate independence with initial HEP to facilitate therex progression and improvements in functional mobility 4 Initial 4/1/2022     The patient will increase bilateral lower extremity strength to greater than or equal to 1/2 manual muscle grade for all deficient areas in order to facilitate ambulation x 100 without pain 4 Initial 4/1/2022 .   Pt will start HEP 4 Initial 4/1/2022         Initial 4/1/2022 4/1/2022 4/1/2022 4/1/2022 4/1/2022 4/1/2022 4/1/2022        Long Term Goals (LTG) # weeks Goal Review Date Reviewed Date Met   The patient will improve the Lower Extremity Functional Scale to less than 30% Disability 8 Initial 4/1/2022     The patient will increase bilateral lower extremity strength  to greater than or equal to 1.5 manual muscle grade for all deficient areas in order to get in and out of vehicle with no limitations  Pt will be independent with transfers without AD. 8 Initial 4/1/2022     The patient will increase bilateral hip ROM to WFL in order to improve stride length for more normalized gait pattern.0-125 8 Initial 4/1/2022     Pt will ambulate 300' without pain with no AD 8 Initial 4/1/2022     Pt will return to previous LOF 8 Initial. 4/1/2022 4/1/2022 4/1/2022 4/1/2022 4/1/2022 4/1/2022            PLAN     Continue per PT plan of care with focus on left knee strength and range of motion.     Rere Dumont, PTA

## 2022-04-18 ENCOUNTER — HOSPITAL ENCOUNTER (OUTPATIENT)
Facility: HOSPITAL | Age: 57
Discharge: HOME | End: 2022-04-19
Attending: INTERNAL MEDICINE | Admitting: HOSPITALIST

## 2022-04-18 ENCOUNTER — PATIENT MESSAGE (OUTPATIENT)
Dept: ORTHOPEDICS | Facility: CLINIC | Age: 57
End: 2022-04-18

## 2022-04-18 ENCOUNTER — TELEPHONE (OUTPATIENT)
Dept: FAMILY MEDICINE | Facility: CLINIC | Age: 57
End: 2022-04-18

## 2022-04-18 DIAGNOSIS — T81.72XA: Primary | ICD-10-CM

## 2022-04-18 DIAGNOSIS — I82.409: Primary | ICD-10-CM

## 2022-04-18 DIAGNOSIS — R60.9 SWELLING: ICD-10-CM

## 2022-04-18 DIAGNOSIS — I82.402 LEFT LEG DVT: ICD-10-CM

## 2022-04-18 DIAGNOSIS — Z96.652 STATUS POST TOTAL LEFT KNEE REPLACEMENT: ICD-10-CM

## 2022-04-18 DIAGNOSIS — Z96.652 STATUS POST LEFT KNEE REPLACEMENT: ICD-10-CM

## 2022-04-18 LAB
ALBUMIN SERPL BCP-MCNC: 3.9 G/DL (ref 3.5–5.2)
ALP SERPL-CCNC: 95 U/L (ref 55–135)
ALT SERPL W/O P-5'-P-CCNC: 17 U/L (ref 10–44)
ANION GAP SERPL CALC-SCNC: 10 MMOL/L (ref 8–16)
AST SERPL-CCNC: 23 U/L (ref 10–40)
BASOPHILS # BLD AUTO: 0.08 K/UL (ref 0–0.2)
BASOPHILS NFR BLD: 1 % (ref 0–1.9)
BILIRUB SERPL-MCNC: 0.3 MG/DL (ref 0.1–1)
BUN SERPL-MCNC: 18 MG/DL (ref 6–20)
CALCIUM SERPL-MCNC: 9.9 MG/DL (ref 8.7–10.5)
CHLORIDE SERPL-SCNC: 102 MMOL/L (ref 95–110)
CO2 SERPL-SCNC: 27 MMOL/L (ref 23–29)
CREAT SERPL-MCNC: 0.8 MG/DL (ref 0.5–1.4)
D DIMER PPP IA.FEU-MCNC: 6.38 MG/L FEU
DIFFERENTIAL METHOD: ABNORMAL
EOSINOPHIL # BLD AUTO: 1.1 K/UL (ref 0–0.5)
EOSINOPHIL NFR BLD: 13.3 % (ref 0–8)
ERYTHROCYTE [DISTWIDTH] IN BLOOD BY AUTOMATED COUNT: 13.3 % (ref 11.5–14.5)
EST. GFR  (AFRICAN AMERICAN): >60 ML/MIN/1.73 M^2
EST. GFR  (NON AFRICAN AMERICAN): >60 ML/MIN/1.73 M^2
GLUCOSE SERPL-MCNC: 90 MG/DL (ref 70–110)
HCT VFR BLD AUTO: 35.5 % (ref 37–48.5)
HGB BLD-MCNC: 11.7 G/DL (ref 12–16)
IMM GRANULOCYTES # BLD AUTO: 0.02 K/UL (ref 0–0.04)
IMM GRANULOCYTES NFR BLD AUTO: 0.2 % (ref 0–0.5)
LYMPHOCYTES # BLD AUTO: 2.8 K/UL (ref 1–4.8)
LYMPHOCYTES NFR BLD: 33.1 % (ref 18–48)
MCH RBC QN AUTO: 30.1 PG (ref 27–31)
MCHC RBC AUTO-ENTMCNC: 33 G/DL (ref 32–36)
MCV RBC AUTO: 91 FL (ref 82–98)
MONOCYTES # BLD AUTO: 0.4 K/UL (ref 0.3–1)
MONOCYTES NFR BLD: 4.9 % (ref 4–15)
NEUTROPHILS # BLD AUTO: 4 K/UL (ref 1.8–7.7)
NEUTROPHILS NFR BLD: 47.5 % (ref 38–73)
NRBC BLD-RTO: 0 /100 WBC
PLATELET # BLD AUTO: 444 K/UL (ref 150–450)
PMV BLD AUTO: 8.9 FL (ref 9.2–12.9)
POTASSIUM SERPL-SCNC: 4.2 MMOL/L (ref 3.5–5.1)
PROT SERPL-MCNC: 7.8 G/DL (ref 6–8.4)
RBC # BLD AUTO: 3.89 M/UL (ref 4–5.4)
SODIUM SERPL-SCNC: 139 MMOL/L (ref 136–145)
WBC # BLD AUTO: 8.37 K/UL (ref 3.9–12.7)

## 2022-04-18 PROCEDURE — 93971 EXTREMITY STUDY: CPT | Mod: TC,LT

## 2022-04-18 PROCEDURE — 85025 COMPLETE CBC W/AUTO DIFF WBC: CPT | Performed by: NURSE PRACTITIONER

## 2022-04-18 PROCEDURE — 99285 EMERGENCY DEPT VISIT HI MDM: CPT | Mod: 25

## 2022-04-18 PROCEDURE — 93971 US LOWER EXTREMITY VEINS LEFT: ICD-10-PCS | Mod: 26,LT,, | Performed by: RADIOLOGY

## 2022-04-18 PROCEDURE — 93971 EXTREMITY STUDY: CPT | Mod: 26,LT,, | Performed by: RADIOLOGY

## 2022-04-18 PROCEDURE — 85610 PROTHROMBIN TIME: CPT | Performed by: INTERNAL MEDICINE

## 2022-04-18 PROCEDURE — 80053 COMPREHEN METABOLIC PANEL: CPT | Performed by: NURSE PRACTITIONER

## 2022-04-18 PROCEDURE — 85379 FIBRIN DEGRADATION QUANT: CPT | Performed by: NURSE PRACTITIONER

## 2022-04-18 RX ORDER — CYCLOBENZAPRINE HCL 10 MG
10 TABLET ORAL 3 TIMES DAILY PRN
Qty: 30 TABLET | Refills: 0 | Status: CANCELLED | OUTPATIENT
Start: 2022-04-18 | End: 2022-04-28

## 2022-04-18 RX ORDER — CYCLOBENZAPRINE HCL 10 MG
TABLET ORAL
Qty: 30 TABLET | Refills: 0 | OUTPATIENT
Start: 2022-04-18

## 2022-04-18 NOTE — TELEPHONE ENCOUNTER
Instructed patient to go to the emergency room to be evaluated for a DVT. Patient understood and said she would call her daughter and go

## 2022-04-18 NOTE — TELEPHONE ENCOUNTER
Instructed patient to go to the emergency room to be evaluated for a DVT. Patient understood and said she would call her daughter and head over to the ER

## 2022-04-18 NOTE — TELEPHONE ENCOUNTER
Spoke with patient. Patient is 3 weeks out from TKA with Dr. Zapata. Patient c/o of calf pain, warmness, redness and pain to touch.  Advised patient Dr. Espinal is out of office today. Advised patient to report to local ER to rule out blood clot. Patient voiced understanding

## 2022-04-18 NOTE — TELEPHONE ENCOUNTER
----- Message from Lianet Rodríguez sent at 4/18/2022  4:27 PM CDT -----  Contact: pt  Type: Needs Medical Advice         Who Called: PT  Best Call Back Number:[phone]  Additional Information: Requesting a call back regarding Pt is still having pain and wanting to see if he can approve her to have a scan don on her leg for blood clots. Pt is on finical assistance and cant afford to pay to go to a e.r .  Pt had knee replacement 3 weeks ago.  Pt ortho is out for this week and unable to do so.  Pt would like office call her back.   Please Advise- Thank you

## 2022-04-19 ENCOUNTER — PATIENT MESSAGE (OUTPATIENT)
Dept: ORTHOPEDICS | Facility: CLINIC | Age: 57
End: 2022-04-19

## 2022-04-19 ENCOUNTER — TELEPHONE (OUTPATIENT)
Dept: ORTHOPEDICS | Facility: CLINIC | Age: 57
End: 2022-04-19

## 2022-04-19 VITALS
WEIGHT: 163.13 LBS | HEIGHT: 69 IN | BODY MASS INDEX: 24.16 KG/M2 | HEART RATE: 75 BPM | SYSTOLIC BLOOD PRESSURE: 120 MMHG | RESPIRATION RATE: 17 BRPM | OXYGEN SATURATION: 96 % | DIASTOLIC BLOOD PRESSURE: 74 MMHG | TEMPERATURE: 97 F

## 2022-04-19 PROBLEM — I82.402 ACUTE DEEP VEIN THROMBOSIS (DVT) OF LEFT LOWER EXTREMITY: Status: ACTIVE | Noted: 2022-04-19

## 2022-04-19 PROBLEM — M25.562 LEFT KNEE PAIN: Status: ACTIVE | Noted: 2022-04-19

## 2022-04-19 PROBLEM — Z98.890 HISTORY OF KNEE SURGERY: Status: ACTIVE | Noted: 2022-04-19

## 2022-04-19 LAB
ANION GAP SERPL CALC-SCNC: 9 MMOL/L (ref 8–16)
BASOPHILS # BLD AUTO: 0.05 K/UL (ref 0–0.2)
BASOPHILS NFR BLD: 0.8 % (ref 0–1.9)
BUN SERPL-MCNC: 15 MG/DL (ref 6–20)
CALCIUM SERPL-MCNC: 9.2 MG/DL (ref 8.7–10.5)
CHLORIDE SERPL-SCNC: 104 MMOL/L (ref 95–110)
CO2 SERPL-SCNC: 28 MMOL/L (ref 23–29)
CREAT SERPL-MCNC: 0.7 MG/DL (ref 0.5–1.4)
DIFFERENTIAL METHOD: ABNORMAL
EOSINOPHIL # BLD AUTO: 1 K/UL (ref 0–0.5)
EOSINOPHIL NFR BLD: 15 % (ref 0–8)
ERYTHROCYTE [DISTWIDTH] IN BLOOD BY AUTOMATED COUNT: 13.2 % (ref 11.5–14.5)
EST. GFR  (AFRICAN AMERICAN): >60 ML/MIN/1.73 M^2
EST. GFR  (NON AFRICAN AMERICAN): >60 ML/MIN/1.73 M^2
GLUCOSE SERPL-MCNC: 88 MG/DL (ref 70–110)
HCT VFR BLD AUTO: 32.7 % (ref 37–48.5)
HGB BLD-MCNC: 10.8 G/DL (ref 12–16)
IMM GRANULOCYTES # BLD AUTO: 0.01 K/UL (ref 0–0.04)
IMM GRANULOCYTES NFR BLD AUTO: 0.2 % (ref 0–0.5)
INR PPP: 1 (ref 0.8–1.2)
LYMPHOCYTES # BLD AUTO: 2.4 K/UL (ref 1–4.8)
LYMPHOCYTES NFR BLD: 35.9 % (ref 18–48)
MAGNESIUM SERPL-MCNC: 2 MG/DL (ref 1.6–2.6)
MCH RBC QN AUTO: 30.3 PG (ref 27–31)
MCHC RBC AUTO-ENTMCNC: 33 G/DL (ref 32–36)
MCV RBC AUTO: 92 FL (ref 82–98)
MONOCYTES # BLD AUTO: 0.4 K/UL (ref 0.3–1)
MONOCYTES NFR BLD: 5.5 % (ref 4–15)
NEUTROPHILS # BLD AUTO: 2.8 K/UL (ref 1.8–7.7)
NEUTROPHILS NFR BLD: 42.6 % (ref 38–73)
NRBC BLD-RTO: 0 /100 WBC
PHOSPHATE SERPL-MCNC: 3.4 MG/DL (ref 2.7–4.5)
PLATELET # BLD AUTO: 409 K/UL (ref 150–450)
PMV BLD AUTO: 9.4 FL (ref 9.2–12.9)
POTASSIUM SERPL-SCNC: 4.2 MMOL/L (ref 3.5–5.1)
PROTHROMBIN TIME: 10 SEC (ref 9–12.5)
RBC # BLD AUTO: 3.57 M/UL (ref 4–5.4)
SODIUM SERPL-SCNC: 141 MMOL/L (ref 136–145)
WBC # BLD AUTO: 6.54 K/UL (ref 3.9–12.7)

## 2022-04-19 PROCEDURE — G0378 HOSPITAL OBSERVATION PER HR: HCPCS

## 2022-04-19 PROCEDURE — 25000003 PHARM REV CODE 250: Performed by: HOSPITALIST

## 2022-04-19 PROCEDURE — 99220 PR INITIAL OBSERVATION CARE,LEVL III: CPT | Mod: GT,,, | Performed by: HOSPITALIST

## 2022-04-19 PROCEDURE — 80048 BASIC METABOLIC PNL TOTAL CA: CPT | Performed by: HOSPITALIST

## 2022-04-19 PROCEDURE — 36415 COLL VENOUS BLD VENIPUNCTURE: CPT | Performed by: HOSPITALIST

## 2022-04-19 PROCEDURE — 99220 PR INITIAL OBSERVATION CARE,LEVL III: ICD-10-PCS | Mod: GT,,, | Performed by: HOSPITALIST

## 2022-04-19 PROCEDURE — 36415 COLL VENOUS BLD VENIPUNCTURE: CPT | Performed by: INTERNAL MEDICINE

## 2022-04-19 PROCEDURE — 85025 COMPLETE CBC W/AUTO DIFF WBC: CPT | Performed by: HOSPITALIST

## 2022-04-19 PROCEDURE — 84100 ASSAY OF PHOSPHORUS: CPT | Performed by: HOSPITALIST

## 2022-04-19 PROCEDURE — 83735 ASSAY OF MAGNESIUM: CPT | Performed by: HOSPITALIST

## 2022-04-19 RX ORDER — ALPRAZOLAM 0.25 MG/1
0.25 TABLET ORAL NIGHTLY PRN
Status: DISCONTINUED | OUTPATIENT
Start: 2022-04-19 | End: 2022-04-19 | Stop reason: HOSPADM

## 2022-04-19 RX ORDER — IBUPROFEN 200 MG
16 TABLET ORAL
Status: DISCONTINUED | OUTPATIENT
Start: 2022-04-19 | End: 2022-04-19 | Stop reason: HOSPADM

## 2022-04-19 RX ORDER — ACETAMINOPHEN 325 MG/1
650 TABLET ORAL EVERY 4 HOURS PRN
Status: DISCONTINUED | OUTPATIENT
Start: 2022-04-19 | End: 2022-04-19 | Stop reason: HOSPADM

## 2022-04-19 RX ORDER — HYDROCODONE BITARTRATE AND ACETAMINOPHEN 10; 325 MG/1; MG/1
1 TABLET ORAL EVERY 6 HOURS PRN
Status: DISCONTINUED | OUTPATIENT
Start: 2022-04-19 | End: 2022-04-19 | Stop reason: HOSPADM

## 2022-04-19 RX ORDER — IPRATROPIUM BROMIDE AND ALBUTEROL SULFATE 2.5; .5 MG/3ML; MG/3ML
3 SOLUTION RESPIRATORY (INHALATION) EVERY 4 HOURS PRN
Status: DISCONTINUED | OUTPATIENT
Start: 2022-04-19 | End: 2022-04-19 | Stop reason: HOSPADM

## 2022-04-19 RX ORDER — ASCORBIC ACID 500 MG
500 TABLET ORAL DAILY
Status: DISCONTINUED | OUTPATIENT
Start: 2022-04-19 | End: 2022-04-19 | Stop reason: HOSPADM

## 2022-04-19 RX ORDER — ONDANSETRON 4 MG/1
4 TABLET, ORALLY DISINTEGRATING ORAL EVERY 6 HOURS PRN
Status: DISCONTINUED | OUTPATIENT
Start: 2022-04-19 | End: 2022-04-19 | Stop reason: HOSPADM

## 2022-04-19 RX ORDER — IBUPROFEN 200 MG
24 TABLET ORAL
Status: DISCONTINUED | OUTPATIENT
Start: 2022-04-19 | End: 2022-04-19 | Stop reason: HOSPADM

## 2022-04-19 RX ORDER — GLUCAGON 1 MG
1 KIT INJECTION
Status: DISCONTINUED | OUTPATIENT
Start: 2022-04-19 | End: 2022-04-19 | Stop reason: HOSPADM

## 2022-04-19 RX ORDER — SODIUM CHLORIDE 0.9 % (FLUSH) 0.9 %
10 SYRINGE (ML) INJECTION
Status: DISCONTINUED | OUTPATIENT
Start: 2022-04-19 | End: 2022-04-19 | Stop reason: HOSPADM

## 2022-04-19 RX ORDER — NALOXONE HCL 0.4 MG/ML
0.02 VIAL (ML) INJECTION
Status: DISCONTINUED | OUTPATIENT
Start: 2022-04-19 | End: 2022-04-19 | Stop reason: HOSPADM

## 2022-04-19 RX ORDER — ATORVASTATIN CALCIUM 10 MG/1
10 TABLET, FILM COATED ORAL DAILY
Status: DISCONTINUED | OUTPATIENT
Start: 2022-04-19 | End: 2022-04-19 | Stop reason: HOSPADM

## 2022-04-19 RX ADMIN — HYDROCODONE BITARTRATE AND ACETAMINOPHEN 1 TABLET: 10; 325 TABLET ORAL at 02:04

## 2022-04-19 RX ADMIN — ATORVASTATIN CALCIUM 10 MG: 10 TABLET, FILM COATED ORAL at 08:04

## 2022-04-19 RX ADMIN — APIXABAN 10 MG: 2.5 TABLET, FILM COATED ORAL at 03:04

## 2022-04-19 RX ADMIN — OXYCODONE HYDROCHLORIDE AND ACETAMINOPHEN 500 MG: 500 TABLET ORAL at 08:04

## 2022-04-19 NOTE — PLAN OF CARE
"   04/19/22 0910   Discharge Assessment   Assessment Type Discharge Planning Assessment   Confirmed/corrected address, phone number and insurance Yes   Confirmed Demographics Correct on Facesheet   Source of Information patient   When was your last doctors appointment?   ("Last week with ortho doctor.")   Communicated MARIANA with patient/caregiver Yes   Reason For Admission To rule out blood clot.  Significant knee pain s/p knee replacement surgery (3 weeks ago).   Lives With spouse;child(wali), dependent   Facility Arrived From: Home   Do you expect to return to your current living situation? Yes   Do you have help at home or someone to help you manage your care at home? Yes   Who are your caregiver(s) and their phone number(s)? Spouse, Max West ([phone])   Prior to hospitilization cognitive status: Alert/Oriented   Current cognitive status: Alert/Oriented   Walking or Climbing Stairs Difficulty ambulation difficulty, requires equipment   Mobility Management Uses rolling walker s/p knee replacement surgery.   Dressing/Bathing Difficulty bathing difficulty, requires equipment   Dressing/Bathing Management Uses shower chair for safety s/p knee replacement surgery.   Home Layout Able to live on 1st floor   Equipment Currently Used at Home shower chair;walker, rolling;bedside commode   Readmission within 30 days? No  (Knee Replacement 3 weeks ago.)   Patient currently being followed by outpatient case management? No   Do you currently have service(s) that help you manage your care at home? No   Do you take prescription medications? Yes   Do you have prescription coverage? No   Do you have any problems affording any of your prescribed medications? No  (Prescriptions affordable.)   Is the patient taking medications as prescribed? yes   Who is going to help you get home at discharge? Family   How do you get to doctors appointments? car, drives self  (Drives short distances since knee replacement.)   Are you on dialysis? " No   Do you take coumadin? No   Discharge Plan A Home  (w/out-patient surgery at [facility].)   Discharge Plan B Home  (w/out-patient surgery at [facility].)   DME Needed Upon Discharge  none   Discharge Plan discussed with: Patient   Discharge Barriers Identified None   Relationship/Environment   Name(s) of Who Lives With Patient Spouse, Max Wets and 17 year old daughter   SW spoke with patient to complete discharge planning assessment.  Patient is alert and oriented and independent of ADL's.  She did undergo knee replacement surgery approximately 3 weeks ago and has since been using a rolling walker for safe ambulation.  She also reports having multiple shower chairs and BSC's that were given to her and are in good condition.  Patient lives with supportive spouse and 17 year old daughter.  She has been going to out-patient therapy in [place] but is requesting to move to [facility] for continued out-patient therapy.  Director of out-patient therapy is working with patient to set that up.  Patient is currently receiving financial support through the [facility] [employer].  She may need assistance with medications dependent on outcome of DVT assessment.      LOPEZ will continue to follow.

## 2022-04-19 NOTE — HPI
The patient is a 55 y/o female with PMH of anxiety and HLP who presents with swelling of the left lower extremity. She underwent left knee replacement about 3 weeks about at [facility] and then started with the swelling. She called her orthopedic physician today who was concerned about DVT and advised that she be evaluated. US LE was done which was concerning for DVT. Patient being admitted for initiation of anticoagulation therapy. She reports working well with therapy and is concerned about having to stop her therapy due to the clot. Her mother also had a DVT after surgery. She denies any drainage or erythema from the surgical site, chest pain, SOB, or other symptoms.

## 2022-04-19 NOTE — PLAN OF CARE
Problem: Adult Inpatient Plan of Care  Goal: Plan of Care Review  Outcome: Ongoing, Progressing     Problem: Adult Inpatient Plan of Care  Goal: Patient-Specific Goal (Individualized)  Outcome: Ongoing, Progressing     Problem: Infection  Goal: Absence of Infection Signs and Symptoms  Outcome: Ongoing, Progressing     Problem: VTE (Venous Thromboembolism)  Goal: VTE (Venous Thromboembolism) Symptom Resolution  Outcome: Ongoing, Progressing

## 2022-04-19 NOTE — NURSING
D/C instructions provided and explained to pt and pt's spouse, understanding of D/C instructions verbalized. IV removed, catheter intact. Tolerated well. Telemetry monitor removed and returned to monitor room. VSS. Pt denies complaints. Transported off unit with all belongings in hand.

## 2022-04-19 NOTE — PLAN OF CARE
04/19/22 1202   Final Note   Assessment Type Final Discharge Note   Anticipated Discharge Disposition Home   What phone number can be called within the next 1-3 days to see how you are doing after discharge? [phone]   Hospital Resources/Appts/Education Provided Appointments scheduled and added to AVS;Community resources provided   Post-Acute Status   Discharge Delays None known at this time   Patient discharged to home.  Hospital follow up appointment was scheduled and provided to patient verbally and in writing.  Patient was able to successfully be scheduled to receive out-patient PT s/p knee replacement surgery approximately three weeks ago.  She had been going to [place] for her out-patient services but wanted to switch to [facility] to be closer to her home.      PE ruled out so patient did not need assistance with medications.      No further needs identified.  Patient cleared by Case Management for discharge.

## 2022-04-19 NOTE — ED PROVIDER NOTES
"Encounter Date: 4/18/2022       History     Chief Complaint   Patient presents with    calf pain     Left calf pain s/p left knee replacement approx 3 weeks ago. Pt concerned of blood clot.      The patient had a total lip left knee replacement approximately 3 weeks ago at [place].  She has had pain in the left knee since that time.  She call orthopedic doctor today who thought he may need to have ultrasound because of swelling.  She denies any chest pain shortness of breath.        Review of patient's allergies indicates:   Allergen Reactions    Codeine Swelling     "Hives, vomiting"    Morphine Swelling     "Hives, vomiting"    Naproxen sodium Swelling     "Itching, high blood pressure"    Tramadol hcl Swelling     "itching & nausea"    Penicillins     Trintex      Past Medical History:   Diagnosis Date    Anxiety     Arthritis     Cancer     cervical    Hepatomegaly     3 liver cyst    Hiatal hernia     Liver lesion     PONV (postoperative nausea and vomiting)      Past Surgical History:   Procedure Laterality Date    CERVIX SURGERY      COLONOSCOPY N/A 8/27/2020    Procedure: COLONOSCOPY;  Surgeon: Tim Aguayo MD;  Location: [facility] ENDO;  Service: Endoscopy;  Laterality: N/A;    CYSTOSCOPY WITH URETHRAL DILATION  10/1/2020    Procedure: CYSTOSCOPY, WITH URETHRAL DILATION;  Surgeon: Bhargav Keane MD;  Location: [facility] OR;  Service: Urology;;    ESOPHAGOGASTRODUODENOSCOPY N/A 8/13/2020    Procedure: EGD (ESOPHAGOGASTRODUODENOSCOPY);  Surgeon: Tim Aguayo MD;  Location: [facility];  Service: Endoscopy;  Laterality: N/A;    KNEE ARTHROSCOPY      ROBOTIC ARTHROPLASTY, KNEE Left 3/29/2022    Procedure: ROBOTIC ARTHROPLASTY, KNEE, TOTAL;  Surgeon: Neo Zapata MD;  Location: [facility] OR;  Service: Orthopedics;  Laterality: Left;    SINUS SURGERY      TUBAL LIGATION       Family History   Problem Relation Age of Onset    Diabetes Mother     Cancer Mother     Arthritis " Mother     Cancer Father     COPD Father     Hypertension Father     Arthritis Father     Melanoma Father     Breast cancer Sister     Arthritis Sister     Breast cancer Sister     Arthritis Sister     Breast cancer Sister     Kidney disease Sister     Arthritis Sister     Colon polyps Neg Hx     Colon cancer Neg Hx     Crohn's disease Neg Hx     Ulcerative colitis Neg Hx     Stomach cancer Neg Hx     Esophageal cancer Neg Hx     Celiac disease Neg Hx      Social History     Tobacco Use    Smoking status: Current Every Day Smoker     Packs/day: 1.00    Smokeless tobacco: Never Used   Substance Use Topics    Alcohol use: Not Currently    Drug use: Never     Review of Systems   Constitutional: Negative for fever.   HENT: Negative for sore throat.    Respiratory: Negative for shortness of breath.    Cardiovascular: Negative for chest pain.   Gastrointestinal: Negative for nausea.   Genitourinary: Negative for dysuria.   Musculoskeletal: Negative for back pain.   Skin: Negative for rash.   Neurological: Negative for weakness.   Hematological: Does not bruise/bleed easily.       Physical Exam     Initial Vitals [04/18/22 1911]   BP Pulse Resp Temp SpO2   119/74 92 20 98.6 °F (37 °C) 98 %      MAP       --         Physical Exam    Vitals reviewed.  Constitutional: She appears well-developed.   Eyes: Pupils are equal, round, and reactive to light.   Neck:   Normal range of motion.  Cardiovascular: Normal rate.   Pulmonary/Chest: Breath sounds normal.   Abdominal: Abdomen is soft.   Musculoskeletal:         General: Normal range of motion.      Cervical back: Normal range of motion.      Left lower leg: Swelling present. Edema present.        Legs:       Comments: Left knee surgical wound close but red.  Swelling from the knee down to the foot with calf tenderness and swelling.  Neurovascular and motor normal     Neurological: She is alert.   Skin: Skin is warm.   Psychiatric: She has a normal mood  and affect.         ED Course   Procedures  Labs Reviewed   CBC W/ AUTO DIFFERENTIAL - Abnormal; Notable for the following components:       Result Value    RBC 3.89 (*)     Hemoglobin 11.7 (*)     Hematocrit 35.5 (*)     MPV 8.9 (*)     Eos # 1.1 (*)     Eosinophil % 13.3 (*)     All other components within normal limits   D DIMER, QUANTITATIVE - Abnormal; Notable for the following components:    D-Dimer 6.38 (*)     All other components within normal limits   COMPREHENSIVE METABOLIC PANEL   PROTIME-INR          Imaging Results          US Lower Extremity Veins Left (Preliminary result)  Result time 04/18/22 23:54:06    ED Interpretation by Wei Garcia MD (04/18/22 23:54:06, [facility] Emergency [facility], Emergency Medicine)    THE OFFICIAL [facility] ultrasound report suggest a 5 mm lung defect in the proximal popliteal vein is likely a prominent venous valve, however it is correlated with a pending clinical correlation or re-evaluation in 2 days.                               Medications - No data to display              ED Course as of 04/19/22 0109 Mon Apr 18, 2022 2057 D-Dimer(!): 6.38 [PW]   2110 Comprehensive metabolic panel [PW]   2110 CBC auto differential(!) [PW]   2354 Spoke with orthopedic doctor on-call he suggested hospitalist [PW]      ED Course User Index  [PW] Wei Garcia MD             Clinical Impression:   Final diagnoses:  [R60.9] Swelling  [T81.72XA, I82.409] Postoperative acute deep vein thrombosis (DVT) of lower extremity, initial encounter (Primary)  [Z96.652] Status post total left knee replacement  [I82.402] Left leg DVT          ED Disposition Condition    Observation               Wei Garcia MD  04/19/22 0005       Wei Garcia MD  04/19/22 0109

## 2022-04-19 NOTE — SUBJECTIVE & OBJECTIVE
"Past Medical History:   Diagnosis Date    Anxiety     Arthritis     Cancer     cervical    Hepatomegaly     3 liver cyst    Hiatal hernia     Liver lesion     PONV (postoperative nausea and vomiting)        Past Surgical History:   Procedure Laterality Date    CERVIX SURGERY      COLONOSCOPY N/A 8/27/2020    Procedure: COLONOSCOPY;  Surgeon: Tim Aguayo MD;  Location: [facility] ENDO;  Service: Endoscopy;  Laterality: N/A;    CYSTOSCOPY WITH URETHRAL DILATION  10/1/2020    Procedure: CYSTOSCOPY, WITH URETHRAL DILATION;  Surgeon: Bhargav Keane MD;  Location: [facility] OR;  Service: Urology;;    ESOPHAGOGASTRODUODENOSCOPY N/A 8/13/2020    Procedure: EGD (ESOPHAGOGASTRODUODENOSCOPY);  Surgeon: Tim Aguayo MD;  Location: [facility] ENDO;  Service: Endoscopy;  Laterality: N/A;    KNEE ARTHROSCOPY      ROBOTIC ARTHROPLASTY, KNEE Left 3/29/2022    Procedure: ROBOTIC ARTHROPLASTY, KNEE, TOTAL;  Surgeon: Neo Zapata MD;  Location: [facility] OR;  Service: Orthopedics;  Laterality: Left;    SINUS SURGERY      TUBAL LIGATION         Review of patient's allergies indicates:   Allergen Reactions    Codeine Swelling     "Hives, vomiting"    Morphine Swelling     "Hives, vomiting"    Naproxen sodium Swelling     "Itching, high blood pressure"    Tramadol hcl Swelling     "itching & nausea"    Penicillins     Trintex        No current facility-administered medications on file prior to encounter.     Current Outpatient Medications on File Prior to Encounter   Medication Sig    ALLERGY RELIEF D12 5-120 mg per tablet TAKE 1 TABLET BY MOUTH TWICE DAILY AS NEEDED FOR ALLERGIES    ALPRAZolam (XANAX) 0.25 MG tablet TAKE 1 TABLET BY MOUTH NIGHTLY AS NEEDED FOR ANXIETY.    ascorbic acid, vitamin C, (VITAMIN C) 500 MG tablet Take 500 mg by mouth once daily.    aspirin (ECOTRIN) 81 MG EC tablet Take 1 tablet (81 mg total) by mouth 2 (two) times a day.    atorvastatin (LIPITOR) 10 MG tablet Take 1 tablet (10 mg total) by mouth once daily. "    b complex vitamins tablet Take 1 tablet by mouth once daily.    biotin 1 mg tablet Take 1,000 mcg by mouth 3 (three) times daily.    cholecalciferol, vitamin D3, (VITAMIN D3 ORAL) Take by mouth.    cyclobenzaprine (FLEXERIL) 10 MG tablet Take 1 tablet (10 mg total) by mouth 3 (three) times daily as needed for Muscle spasms.    HYDROcodone-acetaminophen (NORCO)  mg per tablet Take 1 tablet by mouth every 6 (six) hours as needed for Pain.    multivitamin capsule Take 1 capsule by mouth once daily.     Family History       Problem Relation (Age of Onset)    Arthritis Mother, Father, Sister, Sister, Sister    Breast cancer Sister, Sister, Sister    COPD Father    Cancer Mother, Father    Diabetes Mother    Hypertension Father    Kidney disease Sister    Melanoma Father          Tobacco Use    Smoking status: Current Every Day Smoker     Packs/day: 1.00    Smokeless tobacco: Never Used   Substance and Sexual Activity    Alcohol use: Not Currently    Drug use: Never    Sexual activity: Yes     Review of Systems   Constitutional:  Negative for activity change, appetite change, chills, fatigue and fever.   HENT:  Negative for congestion, rhinorrhea, sneezing and sore throat.    Eyes:  Negative for photophobia and visual disturbance.   Respiratory:  Negative for cough, shortness of breath and wheezing.    Cardiovascular:  Positive for leg swelling. Negative for chest pain and palpitations.   Gastrointestinal:  Negative for abdominal pain, blood in stool, constipation, diarrhea, nausea and vomiting.   Endocrine: Negative for polydipsia and polyuria.   Genitourinary:  Negative for difficulty urinating, dysuria, flank pain, frequency and hematuria.   Musculoskeletal:  Negative for arthralgias and myalgias.   Skin:  Negative for rash and wound.   Neurological:  Negative for syncope, weakness, light-headedness and headaches.   Hematological:  Does not bruise/bleed easily.   Psychiatric/Behavioral:  Negative for confusion  and hallucinations.    Objective:     Vital Signs (Most Recent):  Temp: 98.4 °F (36.9 °C) (04/19/22 0154)  Pulse: 81 (04/19/22 0154)  Resp: 18 (04/19/22 0154)  BP: (!) 143/76 (04/19/22 0154)  SpO2: 99 % (04/19/22 0154)   Vital Signs (24h Range):  Temp:  [98.4 °F (36.9 °C)-98.6 °F (37 °C)] 98.4 °F (36.9 °C)  Pulse:  [] 81  Resp:  [14-25] 18  SpO2:  [97 %-99 %] 99 %  BP: (118-143)/(72-83) 143/76     Weight: 72.6 kg (160 lb)  Body mass index is 23.63 kg/m².    Physical Exam  Vitals and nursing note reviewed.   Constitutional:       General: She is not in acute distress.  HENT:      Head: Normocephalic and atraumatic.   Eyes:      General: No scleral icterus.  Cardiovascular:      Rate and Rhythm: Normal rate.   Pulmonary:      Effort: Pulmonary effort is normal. No respiratory distress.   Musculoskeletal:         General: No swelling.      Right lower leg: No edema.      Left lower leg: Edema present.   Skin:     Coloration: Skin is not jaundiced.      Findings: No erythema.      Comments: Surgical site healing well over left knee   Neurological:      General: No focal deficit present.      Mental Status: She is alert and oriented to person, place, and time.   Psychiatric:         Mood and Affect: Mood normal.           Significant Labs: All pertinent labs within the past 24 hours have been reviewed.    Significant Imaging: I have reviewed all pertinent imaging results/findings within the past 24 hours.

## 2022-04-19 NOTE — H&P
[facility] Medicine  History & Physical    Patient Name: Misti West  MRN: [id number]  Admission Date: 4/18/2022  Attending Physician: Gee Onofre MD   Primary Care Provider: Pierre Espinal MD         Patient information was obtained from patient and ER records.       Subjective:     Principal Problem:<principal problem not specified>    Chief Complaint:   Chief Complaint   Patient presents with    calf pain     Left calf pain s/p left knee replacement approx 3 weeks ago. Pt concerned of blood clot.         HPI: The patient is a 55 y/o female with PMH of anxiety and HLP who presents with swelling of the left lower extremity. She underwent left knee replacement about 3 weeks about at [facility] and then started with the swelling. She called her orthopedic physician today who was concerned about DVT and advised that she be evaluated. US LE was done which was concerning for DVT. Patient being admitted for initiation of anticoagulation therapy. She reports working well with therapy and is concerned about having to stop her therapy due to the clot. Her mother also had a DVT after surgery. She denies any drainage or erythema from the surgical site, chest pain, SOB, or other symptoms.       Past Medical History:   Diagnosis Date    Anxiety     Arthritis     Cancer     cervical    Hepatomegaly     3 liver cyst    Hiatal hernia     Liver lesion     PONV (postoperative nausea and vomiting)        Past Surgical History:   Procedure Laterality Date    CERVIX SURGERY      COLONOSCOPY N/A 8/27/2020    Procedure: COLONOSCOPY;  Surgeon: Tim Aguayo MD;  Location: [facility];  Service: Endoscopy;  Laterality: N/A;    CYSTOSCOPY WITH URETHRAL DILATION  10/1/2020    Procedure: CYSTOSCOPY, WITH URETHRAL DILATION;  Surgeon: Bhargav Keane MD;  Location: [facility] OR;  Service: Urology;;    ESOPHAGOGASTRODUODENOSCOPY N/A 8/13/2020    Procedure: EGD (ESOPHAGOGASTRODUODENOSCOPY);  Surgeon:  "Tim Aguayo MD;  Location: [facility] ENDO;  Service: Endoscopy;  Laterality: N/A;    KNEE ARTHROSCOPY      ROBOTIC ARTHROPLASTY, KNEE Left 3/29/2022    Procedure: ROBOTIC ARTHROPLASTY, KNEE, TOTAL;  Surgeon: Neo Zapata MD;  Location: [facility] OR;  Service: Orthopedics;  Laterality: Left;    SINUS SURGERY      TUBAL LIGATION         Review of patient's allergies indicates:   Allergen Reactions    Codeine Swelling     "Hives, vomiting"    Morphine Swelling     "Hives, vomiting"    Naproxen sodium Swelling     "Itching, high blood pressure"    Tramadol hcl Swelling     "itching & nausea"    Penicillins     Trintex        No current facility-administered medications on file prior to encounter.     Current Outpatient Medications on File Prior to Encounter   Medication Sig    ALLERGY RELIEF D12 5-120 mg per tablet TAKE 1 TABLET BY MOUTH TWICE DAILY AS NEEDED FOR ALLERGIES    ALPRAZolam (XANAX) 0.25 MG tablet TAKE 1 TABLET BY MOUTH NIGHTLY AS NEEDED FOR ANXIETY.    ascorbic acid, vitamin C, (VITAMIN C) 500 MG tablet Take 500 mg by mouth once daily.    aspirin (ECOTRIN) 81 MG EC tablet Take 1 tablet (81 mg total) by mouth 2 (two) times a day.    atorvastatin (LIPITOR) 10 MG tablet Take 1 tablet (10 mg total) by mouth once daily.    b complex vitamins tablet Take 1 tablet by mouth once daily.    biotin 1 mg tablet Take 1,000 mcg by mouth 3 (three) times daily.    cholecalciferol, vitamin D3, (VITAMIN D3 ORAL) Take by mouth.    cyclobenzaprine (FLEXERIL) 10 MG tablet Take 1 tablet (10 mg total) by mouth 3 (three) times daily as needed for Muscle spasms.    HYDROcodone-acetaminophen (NORCO)  mg per tablet Take 1 tablet by mouth every 6 (six) hours as needed for Pain.    multivitamin capsule Take 1 capsule by mouth once daily.     Family History       Problem Relation (Age of Onset)    Arthritis Mother, Father, Sister, Sister, Sister    Breast cancer Sister, Sister, Sister    COPD Father    " Cancer Mother, Father    Diabetes Mother    Hypertension Father    Kidney disease Sister    Melanoma Father          Tobacco Use    Smoking status: Current Every Day Smoker     Packs/day: 1.00    Smokeless tobacco: Never Used   Substance and Sexual Activity    Alcohol use: Not Currently    Drug use: Never    Sexual activity: Yes     Review of Systems   Constitutional:  Negative for activity change, appetite change, chills, fatigue and fever.   HENT:  Negative for congestion, rhinorrhea, sneezing and sore throat.    Eyes:  Negative for photophobia and visual disturbance.   Respiratory:  Negative for cough, shortness of breath and wheezing.    Cardiovascular:  Positive for leg swelling. Negative for chest pain and palpitations.   Gastrointestinal:  Negative for abdominal pain, blood in stool, constipation, diarrhea, nausea and vomiting.   Endocrine: Negative for polydipsia and polyuria.   Genitourinary:  Negative for difficulty urinating, dysuria, flank pain, frequency and hematuria.   Musculoskeletal:  Negative for arthralgias and myalgias.   Skin:  Negative for rash and wound.   Neurological:  Negative for syncope, weakness, light-headedness and headaches.   Hematological:  Does not bruise/bleed easily.   Psychiatric/Behavioral:  Negative for confusion and hallucinations.    Objective:     Vital Signs (Most Recent):  Temp: 98.4 °F (36.9 °C) (04/19/22 0154)  Pulse: 81 (04/19/22 0154)  Resp: 18 (04/19/22 0154)  BP: (!) 143/76 (04/19/22 0154)  SpO2: 99 % (04/19/22 0154)   Vital Signs (24h Range):  Temp:  [98.4 °F (36.9 °C)-98.6 °F (37 °C)] 98.4 °F (36.9 °C)  Pulse:  [] 81  Resp:  [14-25] 18  SpO2:  [97 %-99 %] 99 %  BP: (118-143)/(72-83) 143/76     Weight: 72.6 kg (160 lb)  Body mass index is 23.63 kg/m².    Physical Exam  Vitals and nursing note reviewed.   Constitutional:       General: She is not in acute distress.  HENT:      Head: Normocephalic and atraumatic.   Eyes:      General: No scleral  "icterus.  Cardiovascular:      Rate and Rhythm: Normal rate.   Pulmonary:      Effort: Pulmonary effort is normal. No respiratory distress.   Musculoskeletal:         General: No swelling.      Right lower leg: No edema.      Left lower leg: Edema present.   Skin:     Coloration: Skin is not jaundiced.      Findings: No erythema.      Comments: Surgical site healing well over left knee   Neurological:      General: No focal deficit present.      Mental Status: She is alert and oriented to person, place, and time.   Psychiatric:         Mood and Affect: Mood normal.           Significant Labs: All pertinent labs within the past 24 hours have been reviewed.    Significant Imaging: I have reviewed all pertinent imaging results/findings within the past 24 hours.    Assessment/Plan:     Acute deep vein thrombosis (DVT) of left lower extremity  Follow up official read of US  Start anticoagulation with apixaban  LE elevation   Clinically stable       History of knee surgery  Double check with orthopedic surgeon when ok to return to physical therapy program       VTE Risk Mitigation (From admission, onward)         Ordered     apixaban tablet 5 mg  2 times daily        "Followed by" Linked Group Details    04/19/22 0217     apixaban tablet 10 mg  2 times daily        "Followed by" Linked Group Details    04/19/22 0217     Reason for No Pharmacological VTE Prophylaxis  Once        Question:  Reasons:  Answer:  Already adequately anticoagulated on oral Anticoagulants    04/19/22 0214                The attending portion of this evaluation, treatment, and documentation was performed per Gee Onofre MD via Telemedicine AudioVisual using the secure Sim[employer] software platform with 2 way audio/video. The provider was located off-site and the patient is located in the hospital. The aforementioned video software was utilized to document the relevant history and physical exam      Gee Onofre MD  Department of Hospital Medicine "   [facility] Surg

## 2022-04-19 NOTE — DISCHARGE SUMMARY
[facility] Medicine  Discharge Summary      Patient Name: Misti West  MRN: [id number]  Admission Date: 4/18/2022  Hospital Length of Stay: 0 days  Discharge Date and Time:  04/19/2022 10:23 AM  Attending Physician: Gee Onofre MD   Discharging Provider: Harrison Mclean MD  Primary Care Provider: Pierre Espinal MD    ADMITTING DIAGNOSIS:    1. DVT OF LEFT LOWER EXTREMITY    2. RECENT LEFT KNEE SURGERY    FINAL DIAGNOSIS:    1. LEFT KNEE PAIN AND SWELLING    2. RECENT LEFT KNEE SURGERY    REASON FOR HOSPITALIZATION:  TO TREAT DVT OF LEFT LOWER EXTREMITY          HPI:  48-year-old female had left knee replacement surgery 3 weeks ago at [facility] and was on aspirin therapy to prevent DV T. Patient also was having outpatient physical therapy.  She started having swelling of her left lower extremity give several days ago.  Orthopedic surgeon was concerned about her developing DV T. She was sent to the emergency room for evaluation.  PRELIMINARY Venous ultrasound revealed DVT of the left lower extremities.  Hospitalist service was called to start the patient on anticoagulation.      * No surgery found *      Hospital Course:  She was started on Eliquis 10 mg twice a day for 7 days.  She was also given Norco  mg for pain.  final venous ultrasound did not show a DVT.  She was seen and examined this morning and stated that she felt better but is still has swelling of her left lower extremities.  She denied having any fever, cough, chest pain or shortness of breath.  She still has swelling about the right knee and left lower extremities with warmness in the left calf area.  Patient is stable for discharge and treatment will be continued with follow-up by her PCP     Consults:     Final Active Diagnoses:    Diagnosis Date Noted POA    PRINCIPAL PROBLEM:  Acute deep vein thrombosis (DVT) of left lower extremity [I82.402] 04/19/2022 Yes    History of knee surgery [Z98.890]  04/19/2022 Not Applicable      Problems Resolved During this Admission:      Discharged Condition: stable    Disposition: Home or Self Care    Follow Up:    Patient Instructions:   No discharge procedures on file.  Medications:  Reconciled Home Medications:       Significant Diagnostic Studies: Radiology: Ultrasound: No DVT    Pending Diagnostic Studies:     None        Indwelling Lines/Drains at time of discharge:   Lines/Drains/Airways     None             She was told to continue taking her aspirin for prevention of DVT and do not take Eliquis    Time spent on the discharge of patient: 20 minutes         Harrison Mclean MD  Department of Hospital Medicine  [facility]

## 2022-04-19 NOTE — ASSESSMENT & PLAN NOTE
Follow up official read of US  Start anticoagulation with apixaban  LE elevation   Clinically stable

## 2022-04-19 NOTE — TELEPHONE ENCOUNTER
Called and spoke to patient.  Informed her to follow the discharge instructions from the hospital, follow up with her PCP as scheduled.  She will see us as previously scheduled as well.     Asa

## 2022-04-21 ENCOUNTER — TELEPHONE (OUTPATIENT)
Dept: ORTHOPEDICS | Facility: CLINIC | Age: 57
End: 2022-04-21

## 2022-04-21 NOTE — TELEPHONE ENCOUNTER
----- Message from [place] sent at 4/21/2022  9:42 AM CDT -----  Contact: Patient [phone]  Type: Patient Call Back    Who called: Patient     What is the request in detail: Being told by Physical Therapy in [place], Dave Zaman, PT, that she needed to contact doctor who recommended Physical Therapy, to get a Clearance to continue with treatment. Patient states that she's scheduled to go back to Physical Therapy in [place] for tomorrow, 04-22-22. Will also need a referral sent to [facility], due to appointment on Tues., 04-26-22. Please call pt in regards to this.     Would the patient rather a call back or a response via [facility]? Call back    Best call back number: [phone]    Additional Information: Please call pt ASAP in regards to this.

## 2022-04-22 ENCOUNTER — CLINICAL SUPPORT (OUTPATIENT)
Dept: REHABILITATION | Facility: HOSPITAL | Age: 57
End: 2022-04-22
Attending: ORTHOPAEDIC SURGERY

## 2022-04-22 DIAGNOSIS — R26.9 GAIT ABNORMALITY: Primary | ICD-10-CM

## 2022-04-22 PROCEDURE — 97110 THERAPEUTIC EXERCISES: CPT | Mod: PN

## 2022-04-22 NOTE — PROGRESS NOTES
[facility]   Physical Therapy Treatment Note     Name: Misti West  Clinic Number: [id number]    Therapy Diagnosis:   Encounter Diagnosis   Name Primary?    Gait abnormality Yes     Physician: Neo Zapata,*    Visit Date: 4/22/2022    Physician Orders: PT Eval and Treat   Medical Diagnosis from Referral: OA of knee.S/P LT TKA 3/29/22  Evaluation Date: 4/1/2022  Authorization Period Expiration: 3/30/23  Plan of Care Expiration: 6/10  Progress Note Due: 4/30/22  Visit # / Visits authorized: 6/ 20  FOTO: 35/100     Precautions: Standard, Fall and cancer    PTA Visit #: 0/5     Time In: 1630  Time Out: 1715  Total Billable Time: 45 minutes    SUBJECTIVE     Pt reports:  She received medical clearance to cont PT.  She was compliant with home exercise program.  Response to previous treatment: no issues    Functional change: walking better     Pain: 3/10  Location: left knee    OBJECTIVE     Objective Measures updated at progress report unless specified.     Left knee AROM in supine: 4 - 80 degrees      Treatment     Misti received the treatments listed below:      therapeutic exercises to develop strength, endurance, ROM and flexibility for 40 minutes including:    Bike 10', half revolutions     Seated hamstring stretch 2 x 30 sec   Extension prop stretch x 2 min    Mat; QS 30,  SLR  X 10 AA, SAQ x 3X10  LAQ 30.  Supine heel slides AA x 30   PROM,patellar mobs 10'.  Sit to stands from edge of mat with UE support  x 15   Gait training without AD.    Patient Education and Home Exercises     Home Exercises Provided and Patient Education Provided     Education provided:   -HEP, proper gait pattern to avoid ER of LLE., propping for swelling and extension     Written Home Exercises Provided: Patient instructed to cont prior HEP. Exercises were reviewed and Misti was able to demonstrate them prior to the end of the session.  Misti demonstrated good  understanding of the education  provided. See EMR under Patient Instructions for exercises provided during therapy sessions.     ASSESSMENT     Patient ambulated into clinic with improved gait mechanics and reciprocal pattern.. Required excessive education on sit to stands to increase weight bearing on LLE and avoiding shifting and hiking her hip to compensate. Unable to perform straight leg raises without assistance. Continue to progress knee range of motion.     Misti Is progressing well towards her goals.   Pt prognosis is Good.     Pt will continue to benefit from skilled outpatient physical therapy to address the deficits listed in the problem list box on initial evaluation, provide pt/family education and to maximize pt's level of independence in the home and community environment.     Pt's spiritual, cultural and educational needs considered and pt agreeable to plan of care and goals.     Anticipated barriers to physical therapy: none    Goals:   Short Term Goals (STG) # weeks Goal Review Date Reviewed Date Met   Pt will demonstrate independence with initial HEP to facilitate therex progression and improvements in functional mobility 4 Initial 4/1/2022     The patient will increase bilateral lower extremity strength to greater than or equal to 1/2 manual muscle grade for all deficient areas in order to facilitate ambulation x 100 without pain 4 Initial 4/1/2022 .   Pt will start HEP 4 Initial 4/1/2022         Initial 4/1/2022 4/1/2022 4/1/2022 4/1/2022 4/1/2022 4/1/2022 4/1/2022        Long Term Goals (LTG) # weeks Goal Review Date Reviewed Date Met   The patient will improve the Lower Extremity Functional Scale to less than 30% Disability 8 Initial 4/1/2022     The patient will increase bilateral lower extremity strength to greater than or equal to 1.5 manual muscle grade for all deficient areas in order to get in and out of vehicle with no limitations  Pt will be independent  with transfers without AD. 8 Initial 4/1/2022     The patient will increase bilateral hip ROM to WFL in order to improve stride length for more normalized gait pattern.0-125 8 Initial 4/1/2022     Pt will ambulate 300' without pain with no AD 8 Initial 4/1/2022     Pt will return to previous LOF 8 Initial. 4/1/2022 4/1/2022 4/1/2022 4/1/2022 4/1/2022 4/1/2022            PLAN     Continue per PT plan of care with focus on left knee strength and range of motion.    Pt will transfer to facility in [place] closer to home starting next visit.    Dave Zaman, PT

## 2022-04-26 ENCOUNTER — CLINICAL SUPPORT (OUTPATIENT)
Dept: REHABILITATION | Facility: HOSPITAL | Age: 57
End: 2022-04-26

## 2022-04-26 ENCOUNTER — OFFICE VISIT (OUTPATIENT)
Dept: FAMILY MEDICINE | Facility: CLINIC | Age: 57
End: 2022-04-26

## 2022-04-26 VITALS
BODY MASS INDEX: 23.52 KG/M2 | HEIGHT: 69 IN | TEMPERATURE: 98 F | WEIGHT: 158.81 LBS | DIASTOLIC BLOOD PRESSURE: 80 MMHG | RESPIRATION RATE: 14 BRPM | HEART RATE: 81 BPM | OXYGEN SATURATION: 98 % | SYSTOLIC BLOOD PRESSURE: 126 MMHG

## 2022-04-26 DIAGNOSIS — Z09 HOSPITAL DISCHARGE FOLLOW-UP: Primary | ICD-10-CM

## 2022-04-26 DIAGNOSIS — R26.9 GAIT ABNORMALITY: Primary | ICD-10-CM

## 2022-04-26 PROBLEM — I82.402 ACUTE DEEP VEIN THROMBOSIS (DVT) OF LEFT LOWER EXTREMITY: Status: RESOLVED | Noted: 2022-04-19 | Resolved: 2022-04-26

## 2022-04-26 PROCEDURE — 99214 OFFICE O/P EST MOD 30 MIN: CPT | Mod: PBBFAC | Performed by: FAMILY MEDICINE

## 2022-04-26 PROCEDURE — 99999 PR PBB SHADOW E&M-EST. PATIENT-LVL IV: CPT | Mod: PBBFAC,,, | Performed by: FAMILY MEDICINE

## 2022-04-26 PROCEDURE — 99213 PR OFFICE/OUTPT VISIT, EST, LEVL III, 20-29 MIN: ICD-10-PCS | Mod: S$PBB,,, | Performed by: FAMILY MEDICINE

## 2022-04-26 PROCEDURE — 97140 MANUAL THERAPY 1/> REGIONS: CPT

## 2022-04-26 PROCEDURE — 99213 OFFICE O/P EST LOW 20 MIN: CPT | Mod: S$PBB,,, | Performed by: FAMILY MEDICINE

## 2022-04-26 PROCEDURE — 99999 PR PBB SHADOW E&M-EST. PATIENT-LVL IV: ICD-10-PCS | Mod: PBBFAC,,, | Performed by: FAMILY MEDICINE

## 2022-04-26 PROCEDURE — 97110 THERAPEUTIC EXERCISES: CPT

## 2022-04-26 PROCEDURE — 97116 GAIT TRAINING THERAPY: CPT

## 2022-04-26 RX ORDER — LANOLIN ALCOHOL/MO/W.PET/CERES
1 CREAM (GRAM) TOPICAL
Status: ON HOLD | COMMUNITY
End: 2024-01-16

## 2022-04-26 RX ORDER — GLUCOSAMINE/CHONDRO SU A 500-400 MG
1 TABLET ORAL 3 TIMES DAILY
COMMUNITY

## 2022-04-26 NOTE — PROGRESS NOTES
[facility] - Clinic Note    Subjective      Ms. West is a 56 y.o. female who presents to clinic for Hospital Follow Up    Patient was admitted to the hospital from 04/18/2022 to 04/19/2022 for possible acute DVT.  The preliminary read from the hospital showed acute DVT but on the final report there was no DVT present.  She is status post left knee replacement surgery.  She is cleared to go back to physical therapy.  She is taking aspirin daily.  She has had no further symptoms.    PMH Misti has a past medical history of Anxiety, Arthritis, Cancer, Hepatomegaly, Hiatal hernia, Liver lesion, and PONV (postoperative nausea and vomiting).   PSXH Misti has a past surgical history that includes Tubal ligation; Sinus surgery; Knee arthroscopy; Esophagogastroduodenoscopy (N/A, 8/13/2020); Cervix surgery; Colonoscopy (N/A, 8/27/2020); Cystoscopy with urethral dilation (10/1/2020); and robotic arthroplasty, knee (Left, 3/29/2022).    Misti's family history includes Arthritis in her father, mother, sister, sister, and sister; Breast cancer in her sister, sister, and sister; COPD in her father; Cancer in her father and mother; Diabetes in her mother; Hypertension in her father; Kidney disease in her sister; Melanoma in her father.   TERRENCE Chappell reports that she has been smoking. She has been smoking about 1.00 pack per day. She has never used smokeless tobacco. She reports previous alcohol use. She reports that she does not use drugs.   DERRICK Chappell is allergic to codeine, morphine, naproxen sodium, percocet [oxycodone-acetaminophen], tramadol hcl, penicillins, and trintex.   LEILANI Chappell has a current medication list which includes the following prescription(s): ascorbic acid (vitamin c), aspirin, atorvastatin, b complex vitamins, biotin, cholecalciferol (vitamin d3), ferrous sulfate, glucosamine-chondroitin, multivitamin, tumeric-ging-olive-oreg-capryl, allergy relief d12, alprazolam, and hydrocodone-acetaminophen.  "    Review of Systems   Constitutional: Negative for chills and fever.   Respiratory: Negative for shortness of breath.    Cardiovascular: Negative for chest pain.   Musculoskeletal: Positive for arthralgias and gait problem.     Objective     /80 (BP Location: Left arm, Patient Position: Sitting, BP Method: Large (Manual))   Pulse 81   Temp 97.7 °F (36.5 °C) (Temporal)   Resp 14   Ht 5' 9" (1.753 m)   Wt 72 kg (158 lb 12.8 oz)   SpO2 98%   BMI 23.45 kg/m²     Physical Exam  Vitals and nursing note reviewed.   Constitutional:       General: She is not in acute distress.     Appearance: Normal appearance. She is well-developed. She is not diaphoretic.   HENT:      Head: Normocephalic and atraumatic.      Right Ear: External ear normal.      Left Ear: External ear normal.   Eyes:      General:         Right eye: No discharge.         Left eye: No discharge.   Cardiovascular:      Rate and Rhythm: Normal rate and regular rhythm.      Heart sounds: Normal heart sounds.   Pulmonary:      Effort: Pulmonary effort is normal.      Breath sounds: Normal breath sounds. No wheezing or rales.   Skin:     General: Skin is warm and dry.      Comments: Scar on left knee well approximated and healed   Neurological:      Mental Status: She is alert and oriented to person, place, and time. Mental status is at baseline.      Gait: Gait abnormal (Ambulating with walker).   Psychiatric:         Mood and Affect: Mood normal.         Behavior: Behavior normal.         Thought Content: Thought content normal.         Judgment: Judgment normal.        Assessment/Plan     1. Hospital discharge follow-up       Patient doing well post hospitalization.  Continue aspirin.  Keep follow-up with physical therapy and with Dr. Zapata.  Follow-up in 2 months.    Future Appointments   Date Time Provider Department Center   5/2/2022 12:30 PM Marlon Astudillo, PT [facility]   5/4/2022  9:30 AM Marlon Astudillo PT [facility] " [facility]   5/9/2022 10:45 AM Neo Zapata MD [facility]   5/10/2022  9:30 AM Marlon Astudillo, PT [facility]   5/11/2022  9:30 AM Marlon Astudillo, PT [facility]   7/18/2022 10:20 AM Pierre Espinal MD [facility]         Pierre Espinal MD  Family Medicine  [facility]

## 2022-04-26 NOTE — PROGRESS NOTES
"[facility]   Physical Therapy Treatment Note     Name: Misti West  Clinic Number: [id number]    Therapy Diagnosis:   Encounter Diagnosis   Name Primary?    Gait abnormality Yes     Physician: Neo Zapata,*    Visit Date: 4/26/2022    Physician Orders: PT Eval and Treat   Medical Diagnosis from Referral: OA of knee.S/P LT TKA 3/29/22  Evaluation Date: 4/1/2022  Authorization Period Expiration: 3/30/23  Plan of Care Expiration: 6/10  Progress Note Due: 4/30/22  Visit # / Visits authorized: 7/ 20  FOTO: 35/100     Precautions: Standard, Fall and cancer    PTA Visit #: 0/5     Time In: 803 am  Time Out: 9:20 am  Total Billable Time: 53 minutes    SUBJECTIVE     Pt reports: she has felt better since last visit, she "really got stretched good"   She was compliant with home exercise program.  Response to previous treatment: no issues    Functional change: walking better     Pain: 1/10  Location: left knee    OBJECTIVE     Objective Measures updated at progress report unless specified.     Left knee A/PROM in supine taken today:  -5*/-1* ext,  80*/90* flex    Treatment     Misti received the treatments listed below:      therapeutic exercises to develop strength, endurance, ROM and flexibility for 30 minutes including:    Bike 10', half revolutions     Seated hamstring stretch 2 x 30 sec   Supine extension prop stretch (small bolster under heel) with 4# sand weight x 2 min    QS with towel roll under heel with 5" hold x 2 min   SLR 2 x 10, PT cueing for maintaining TKE  SAQ x 3X10  LAQ 30  DNP  Supine AA heel slides with strap on SB x 2 min   Sit to stands from edge of mat with UE support  x 15  DNP    Gait training x 8 min without AD on level surface with verbal cueing for increased left step length and heel-toe gait pattern; patient ambulated up/down clinic steps using B HR's x 4 in alternating gait pattern and requiring PT cueing for sequencing. Will progress gait to using SPC as " strength improves.     Misti received the following manual therapy techniques were performed to increased myofascial/soft tissue length, mobility and pliability, increase PROM, AROM and function as well as to decrease pain for 15 minutes to include: Patellar mobs, passive oscillatory mob at end range, A/P tibiofemoral mob, manual HS sustained stretch, ROM with overpressure, and STM    Patient Education and Home Exercises     Home Exercises Provided and Patient Education Provided     Education provided:   -use of ice packs and elevation to left knee as needed to manage pain and swelling, importance of achieving TKE as soon as possible to prevent contractures and restore normal knee biomechanics during walking and standing, positioning of LLE to facilitate knee extension  -HEP, proper gait pattern to avoid ER of LLE., propping for swelling and extension     Written Home Exercises Provided: Patient instructed to cont prior HEP. Exercises were reviewed and Misti was able to demonstrate them prior to the end of the session.  Misti demonstrated good  understanding of the education provided. See EMR under Patient Instructions for exercises provided during therapy sessions.     ASSESSMENT     Misti is an established PT patient new to me, now 4 weeks post left TKA. She ambulated into clinic using RW and demonstrating continuous reciprocal pattern. With considerable cueing and practice she was able to perform SLR without PT assist demonstrating mild ext lag. Patient appears motivated to increase functional mobility and gait to be able to play with her grandchild.  Continue to progress knee range of motion.     Misti Is progressing well towards her goals.   Pt prognosis is Good.     Pt will continue to benefit from skilled outpatient physical therapy to address the deficits listed in the problem list box on initial evaluation, provide pt/family education and to maximize pt's level of independence in the home and community  environment.     Pt's spiritual, cultural and educational needs considered and pt agreeable to plan of care and goals.     Anticipated barriers to physical therapy: none    Goals:   Short Term Goals (STG) # weeks Goal Review Date Reviewed Date Met   Pt will demonstrate independence with initial HEP to facilitate therex progression and improvements in functional mobility 4 Initial 4/1/2022     The patient will increase bilateral lower extremity strength to greater than or equal to 1/2 manual muscle grade for all deficient areas in order to facilitate ambulation x 100 without pain 4 Initial 4/1/2022 .   Pt will start HEP 4 Initial 4/1/2022         Initial 4/1/2022 4/1/2022 4/1/2022 4/1/2022 4/1/2022 4/1/2022 4/1/2022        Long Term Goals (LTG) # weeks Goal Review Date Reviewed Date Met   The patient will improve the Lower Extremity Functional Scale to less than 30% Disability 8 Initial 4/1/2022     The patient will increase bilateral lower extremity strength to greater than or equal to 1.5 manual muscle grade for all deficient areas in order to get in and out of vehicle with no limitations  Pt will be independent with transfers without AD. 8 Initial 4/1/2022     The patient will increase bilateral hip ROM to WFL in order to improve stride length for more normalized gait pattern.0-125 8 Initial 4/1/2022     Pt will ambulate 300' without pain with no AD 8 Initial 4/1/2022     Pt will return to previous LOF 8 Initial. 4/1/2022 4/1/2022 4/1/2022 4/1/2022 4/1/2022 4/1/2022            PLAN     Continue per PT plan of care with focus on left knee strength and range of motion.      Marlon Astudillo, PT

## 2022-05-04 ENCOUNTER — CLINICAL SUPPORT (OUTPATIENT)
Dept: REHABILITATION | Facility: HOSPITAL | Age: 57
End: 2022-05-04

## 2022-05-04 DIAGNOSIS — R26.9 GAIT ABNORMALITY: Primary | ICD-10-CM

## 2022-05-04 PROCEDURE — 97140 MANUAL THERAPY 1/> REGIONS: CPT

## 2022-05-04 PROCEDURE — 97110 THERAPEUTIC EXERCISES: CPT

## 2022-05-04 PROCEDURE — 97116 GAIT TRAINING THERAPY: CPT

## 2022-05-04 NOTE — PROGRESS NOTES
"[facility]   Physical Therapy Treatment Note     Name: Misti West  Clinic Number: [id number]    Therapy Diagnosis:   Encounter Diagnosis   Name Primary?    Gait abnormality Yes     Physician: Neo Zapata,*    Visit Date: 5/4/2022    Physician Orders: PT Eval and Treat   Medical Diagnosis from Referral: OA of knee.S/P LT TKA 3/29/22  Evaluation Date: 4/1/2022  Authorization Period Expiration: 3/30/23  Plan of Care Expiration: 6/10/22  Visit # / Visits authorized: 8/20  FOTO: 35/100     Precautions: Standard, Fall and cancer    PTA Visit #: 0/5     Time In: 9:35 am  Time Out: 10:26 am  Total Billable Time: 42 minutes    SUBJECTIVE     Pt reports: She had to miss her appt on Monday due to having to help her daughter with a flat tire. Patient also reporting continued swelling left knee despite elevating it at home, and left hip pain secondary to "sciatica".      She was somewhat compliant with home exercise program.  Response to previous treatment: decreased stiffness    Functional change: walking better     Pain: 4/10  Location: left knee    OBJECTIVE     Objective Measures updated at progress report unless specified.     Left knee A/PROM in supine taken today:  -5*/-3* ext,  85*/92* flex  Edema: 34 cm at inferior end of incision, 41.5 cm mid patella, and 42 cm at superior end of  incision    Treatment     Misti received the treatments listed below:      therapeutic exercises to develop strength, endurance, ROM and flexibility for 26 minutes including:    Bike 10', half revolutions   Supine hamstring stretch with strap 3 x 30 sec   Supine extension prop stretch (small bolster under heel) x 2 min    QS with towel roll under heel with 5" hold x 2 min   SLR 3 x 10, PT cueing for maintaining TKE  SAQ x 3 x 10  LAQ 30  DNP  Supine AA heel slides with strap x 10   Sit to stands from edge of mat with UE support  x 15  DNP    Gait training x 8 min: SPC adjusted to correct height and " patient instructed in gait training on level surface and during transfers, with verbal cueing for increased left step length and heel-toe gait pattern; patient ambulated up/down clinic steps using B HR's x 4 in alternating gait pattern and requiring PT cueing for sequencing.      Misti received the following manual therapy techniques were performed to increased myofascial/soft tissue length, mobility and pliability, increase PROM, AROM and function as well as to decrease pain for 8 minutes to include: Patellar mobs, passive oscillatory mob at end range, A/P tibiofemoral mob, manual HS sustained stretch, PROM with overpressure, and STM    Patient Education and Home Exercises     Home Exercises Provided and Patient Education Provided     Education provided:   -use of ice packs and elevation to left knee as needed to manage pain and swelling, importance of achieving TKE as soon as possible to prevent contractures and restore normal knee biomechanics during walking and standing, positioning of LLE to facilitate knee extension  -HEP, proper gait pattern to avoid ER of LLE., propping for swelling and extension     Written Home Exercises Provided: Patient instructed to cont prior HEP. Exercises were reviewed and Misti was able to demonstrate them prior to the end of the session.  Misti demonstrated good  understanding of the education provided. See EMR under Patient Instructions for exercises provided during therapy sessions.     ASSESSMENT     Misti ambulated into clinic w/o AD, carrying 2 SPC's.  She demonstrates continuous reciprocal pattern with decreased TKE and heel strike. Patient has c/o increasing left hip pain when performing exercises at home. She describes pain in left hip radiating anteriorly into groin region, no reported radiating pain down LE. Per patient, she has not had medical evaluation of her hip or her low back to date. Verbal cueing throughout session required for decreased hip hike and  compensation as well as for attention to task and exercise technique. PT stressed importance of consistent visit attendance and compliance with HEP for carryover between treatment sessions and progress towards goals, patient verbalizing understanding.     Misti Is progressing well towards her goals.   Pt prognosis is Good.     Pt will continue to benefit from skilled outpatient physical therapy to address the deficits listed in the problem list box on initial evaluation, provide pt/family education and to maximize pt's level of independence in the home and community environment.     Pt's spiritual, cultural and educational needs considered and pt agreeable to plan of care and goals.     Anticipated barriers to physical therapy: none    Goals:   Short Term Goals (STG) # weeks Goal Review Date Reviewed Date Met   Pt will demonstrate independence with initial HEP to facilitate therex progression and improvements in functional mobility 4 Initial 4/1/2022     The patient will increase bilateral lower extremity strength to greater than or equal to 1/2 manual muscle grade for all deficient areas in order to facilitate ambulation x 100 without pain 4 Initial 4/1/2022 .   Pt will start HEP 4 Initial 4/1/2022         Initial 4/1/2022 4/1/2022 4/1/2022 4/1/2022 4/1/2022 4/1/2022 4/1/2022        Long Term Goals (LTG) # weeks Goal Review Date Reviewed Date Met   The patient will improve the Lower Extremity Functional Scale to less than 30% Disability 8 Initial 4/1/2022     The patient will increase bilateral lower extremity strength to greater than or equal to 1.5 manual muscle grade for all deficient areas in order to get in and out of vehicle with no limitations  Pt will be independent with transfers without AD. 8 Initial 4/1/2022     The patient will increase bilateral hip ROM to WFL in order to improve stride length for more normalized gait pattern.0-125 8 Initial  4/1/2022     Pt will ambulate 300' without pain with no AD 8 Initial 4/1/2022     Pt will return to previous LOF 8 Initial. 4/1/2022 4/1/2022 4/1/2022 4/1/2022 4/1/2022 4/1/2022            PLAN     Continue per PT plan of care with focus on left knee strength and range of motion.      Marlon Astudillo, PT

## 2022-05-09 ENCOUNTER — OFFICE VISIT (OUTPATIENT)
Dept: ORTHOPEDICS | Facility: CLINIC | Age: 57
End: 2022-05-09

## 2022-05-09 VITALS — WEIGHT: 158 LBS | BODY MASS INDEX: 23.4 KG/M2 | RESPIRATION RATE: 18 BRPM | HEIGHT: 69 IN

## 2022-05-09 DIAGNOSIS — Z96.652 STATUS POST LEFT KNEE REPLACEMENT: Primary | ICD-10-CM

## 2022-05-09 PROCEDURE — 99999 PR PBB SHADOW E&M-EST. PATIENT-LVL III: ICD-10-PCS | Mod: PBBFAC,,, | Performed by: ORTHOPAEDIC SURGERY

## 2022-05-09 PROCEDURE — 99024 POSTOP FOLLOW-UP VISIT: CPT | Mod: ,,, | Performed by: ORTHOPAEDIC SURGERY

## 2022-05-09 PROCEDURE — 99024 PR POST-OP FOLLOW-UP VISIT: ICD-10-PCS | Mod: ,,, | Performed by: ORTHOPAEDIC SURGERY

## 2022-05-09 PROCEDURE — 99999 PR PBB SHADOW E&M-EST. PATIENT-LVL III: CPT | Mod: PBBFAC,,, | Performed by: ORTHOPAEDIC SURGERY

## 2022-05-09 PROCEDURE — 99213 OFFICE O/P EST LOW 20 MIN: CPT | Mod: PBBFAC,PN | Performed by: ORTHOPAEDIC SURGERY

## 2022-05-09 NOTE — PROGRESS NOTES
Past Medical History:   Diagnosis Date    Anxiety     Arthritis     Cancer     cervical    Hepatomegaly     3 liver cyst    Hiatal hernia     Liver lesion     PONV (postoperative nausea and vomiting)        Past Surgical History:   Procedure Laterality Date    CERVIX SURGERY      COLONOSCOPY N/A 8/27/2020    Procedure: COLONOSCOPY;  Surgeon: Tim Aguayo MD;  Location: [facility] ENDO;  Service: Endoscopy;  Laterality: N/A;    CYSTOSCOPY WITH URETHRAL DILATION  10/1/2020    Procedure: CYSTOSCOPY, WITH URETHRAL DILATION;  Surgeon: Bhargav Keane MD;  Location: [facility] OR;  Service: Urology;;    ESOPHAGOGASTRODUODENOSCOPY N/A 8/13/2020    Procedure: EGD (ESOPHAGOGASTRODUODENOSCOPY);  Surgeon: Tim Aguayo MD;  Location: [facility] ENDO;  Service: Endoscopy;  Laterality: N/A;    KNEE ARTHROSCOPY      ROBOTIC ARTHROPLASTY, KNEE Left 3/29/2022    Procedure: ROBOTIC ARTHROPLASTY, KNEE, TOTAL;  Surgeon: Neo Zapata MD;  Location: [facility] OR;  Service: Orthopedics;  Laterality: Left;    SINUS SURGERY      TUBAL LIGATION         Current Outpatient Medications   Medication Sig    ascorbic acid, vitamin C, (VITAMIN C) 500 MG tablet Take 500 mg by mouth once daily.    aspirin (ECOTRIN) 81 MG EC tablet Take 1 tablet (81 mg total) by mouth 2 (two) times a day.    atorvastatin (LIPITOR) 10 MG tablet Take 1 tablet (10 mg total) by mouth once daily.    b complex vitamins tablet Take 1 tablet by mouth once daily.    biotin 1 mg tablet Take 1,000 mcg by mouth 3 (three) times daily.    cholecalciferol, vitamin D3, (VITAMIN D3 ORAL) Take by mouth.    ferrous sulfate (FEOSOL) Tab tablet Take 1 tablet by mouth daily with breakfast.    glucosamine-chondroitin 500-400 mg tablet Take 1 tablet by mouth 3 (three) times daily.    HYDROcodone-acetaminophen (NORCO)  mg per tablet Take 1 tablet by mouth every 6 (six) hours as needed for Pain.    multivitamin capsule Take 1 capsule by mouth once daily.     "tumeric-ging-olive-oreg-capryl 100 mg-150 mg- 50 mg-150 mg Cap Take by mouth.    ALLERGY RELIEF D12 5-120 mg per tablet TAKE 1 TABLET BY MOUTH TWICE DAILY AS NEEDED FOR ALLERGIES (Patient not taking: No sig reported)    ALPRAZolam (XANAX) 0.25 MG tablet TAKE 1 TABLET BY MOUTH NIGHTLY AS NEEDED FOR ANXIETY. (Patient not taking: No sig reported)     No current facility-administered medications for this visit.       Review of patient's allergies indicates:   Allergen Reactions    Codeine Swelling     "Hives, vomiting"    Morphine Swelling     "Hives, vomiting"    Naproxen sodium Swelling     "Itching, high blood pressure"    Percocet [oxycodone-acetaminophen] Nausea And Vomiting     LOW HEART RATE    Tramadol hcl Swelling     "itching & nausea"    Penicillins     Trintex        Family History   Problem Relation Age of Onset    Diabetes Mother     Cancer Mother     Arthritis Mother     Cancer Father     COPD Father     Hypertension Father     Arthritis Father     Melanoma Father     Breast cancer Sister     Arthritis Sister     Breast cancer Sister     Arthritis Sister     Breast cancer Sister     Kidney disease Sister     Arthritis Sister     Colon polyps Neg Hx     Colon cancer Neg Hx     Crohn's disease Neg Hx     Ulcerative colitis Neg Hx     Stomach cancer Neg Hx     Esophageal cancer Neg Hx     Celiac disease Neg Hx        Social History     Socioeconomic History    Marital status:    Tobacco Use    Smoking status: Current Every Day Smoker     Packs/day: 1.00    Smokeless tobacco: Never Used   Substance and Sexual Activity    Alcohol use: Not Currently    Drug use: Never    Sexual activity: Yes     Social Determinants of Health     Financial Resource Strain: Medium Risk    Difficulty of Paying Living Expenses: Somewhat hard   Food Insecurity: No Food Insecurity    Worried About Running Out of Food in the Last Year: Never true    Ran Out of Food in the Last Year: Never " true   Transportation Needs: No Transportation Needs    Lack of Transportation (Medical): No    Lack of Transportation (Non-Medical): No   Physical Activity: Unknown    Days of Exercise per Week: 0 days   Stress: No Stress Concern Present    Feeling of Stress : Only a little   Social Connections: Unknown    Frequency of Communication with Friends and Family: More than three times a week    Frequency of Social Gatherings with Friends and Family: Once a week    Active Member of Clubs or Organizations: Yes    Attends Club or Organization Meetings: 1 to 4 times per year    Marital Status:    Housing Stability: Unknown    Unable to Pay for Housing in the Last Year: No    Unstable Housing in the Last Year: No       Chief Complaint:   Chief Complaint   Patient presents with    Left Knee - Post-op Evaluation       Date of surgery:  March 29, 2022    History of present illness:  56-year-old female underwent left Buzz total knee arthroplasty.  Patient is doing okay.  Pain is a 4/10.  Had an ultrasound recently which was negative for DVT.      Review of Systems:    Musculoskeletal:  See HPI        Physical Examination:    Vital Signs:    Vitals:    05/09/22 1010   Resp: 18       Body mass index is 23.33 kg/m².    This a well-developed, well nourished patient in no acute distress.  They are alert and oriented and cooperative to examination.  Pt. walks to some mild antalgic gait using a cane    Examination left knee shows healed surgical incisions.  No erythema drainage.  Mild joint swelling.  Range of motion is 3-100 degrees.      X-rays:  Two views left knee are  reviewed which show well-aligned total knee arthroplasty without complications     Assessment::  Status post left Buzz Abraham CR total knee arthroplasty    Plan:  We will switch her physical therapy to over here in [place].  She is okay to stop the aspirin.  Continue working on range of motion.  Follow-up in 6 weeks with four views of the left knee  at that time.    This note was created using [employer] voice recognition software that occasionally misinterpreted phrases or words.

## 2022-05-11 ENCOUNTER — CLINICAL SUPPORT (OUTPATIENT)
Dept: REHABILITATION | Facility: HOSPITAL | Age: 57
End: 2022-05-11
Attending: ORTHOPAEDIC SURGERY

## 2022-05-11 DIAGNOSIS — R26.9 GAIT ABNORMALITY: Primary | ICD-10-CM

## 2022-05-11 DIAGNOSIS — Z96.652 STATUS POST LEFT KNEE REPLACEMENT: ICD-10-CM

## 2022-05-11 PROCEDURE — 97110 THERAPEUTIC EXERCISES: CPT | Mod: PN

## 2022-05-11 NOTE — PROGRESS NOTES
[facility]   Physical Therapy Treatment Note     Name: Misti West  Clinic Number: [id number]    Therapy Diagnosis:   Encounter Diagnoses   Name Primary?    Status post left knee replacement     Gait abnormality Yes     Physician: Neo Zapata,*    Visit Date: 5/11/2022    Physician Orders: PT Eval and Treat   Medical Diagnosis from Referral: OA of knee.S/P LT TKA 3/29/22  Evaluation Date: 4/1/2022  Authorization Period Expiration: 3/30/23  Plan of Care Expiration: 6/10  Progress Note Due: 4/30/22  Visit # / Visits authorized: 9/ 20  FOTO: 35/100     Precautions: Standard, Fall and cancer    PTA Visit #: 0/5     Time In: 0915  Time Out: 0915  Total Billable Time: 40 minutes    SUBJECTIVE     Pt reports: wants to return for PT treatments to this facility.  She was compliant with home exercise program.  Response to previous treatment: no issues    Functional change: walking with cane    Pain: 0/10  Location: left knee    OBJECTIVE     Objective Measures updated at progress report unless specified.     Left knee AROM in supine: 0-100 degrees      Treatment     Misti received the treatments listed below:      therapeutic exercises to develop strength, endurance, ROM and flexibility for 40 minutes including:    Bike 10', full revolutions   Shuttle 37# 6'  Seated hamstring stretch 3 x 30 sec   Extension prop stretch x 2 min    Mat; QS 30,  SLR 3 X 10 AROM, SAQ x 3X10  LAQ 30.  Supine heel slides AA x 10   PROM,patellar mobs 10'.  Sit to stands from edge of mat with UE support  x 10  // bars;mini squats;30. HR/TR 30,gastroc stretch 3x30  Gait training without AD.    Patient Education and Home Exercises     Home Exercises Provided and Patient Education Provided     Education provided:   -HEP, proper gait pattern to avoid ER of LLE., propping for swelling and extension     Written Home Exercises Provided: Patient instructed to cont prior HEP. Exercises were reviewed and Misti was able  to demonstrate them prior to the end of the session.  Misti demonstrated good  understanding of the education provided. See EMR under Patient Instructions for exercises provided during therapy sessions.     ASSESSMENT     Patient ambulated into clinic with improved gait mechanics and reciprocal pattern.. Required excessive education on sit to stands to increase weight bearing on LLE and avoiding shifting and hiking her hip to compensate. Unable to perform straight leg raises without assistance. Continue to progress knee range of motion.     Misti Is progressing well towards her goals.   Pt prognosis is Good.     Pt will continue to benefit from skilled outpatient physical therapy to address the deficits listed in the problem list box on initial evaluation, provide pt/family education and to maximize pt's level of independence in the home and community environment.     Pt's spiritual, cultural and educational needs considered and pt agreeable to plan of care and goals.     Anticipated barriers to physical therapy: none    Goals:   Short Term Goals (STG) # weeks Goal Review Date Reviewed Date Met   Pt will demonstrate independence with initial HEP to facilitate therex progression and improvements in functional mobility 4 Initial 4/1/2022     The patient will increase bilateral lower extremity strength to greater than or equal to 1/2 manual muscle grade for all deficient areas in order to facilitate ambulation x 100 without pain 4 Initial 4/1/2022 .   Pt will start HEP 4 Initial 4/1/2022         Initial 4/1/2022 4/1/2022 4/1/2022 4/1/2022 4/1/2022 4/1/2022 4/1/2022        Long Term Goals (LTG) # weeks Goal Review Date Reviewed Date Met   The patient will improve the Lower Extremity Functional Scale to less than 30% Disability 8 Initial 4/1/2022     The patient will increase bilateral lower extremity strength to greater than or equal to 1.5 manual muscle  grade for all deficient areas in order to get in and out of vehicle with no limitations  Pt will be independent with transfers without AD. 8 Initial 4/1/2022     The patient will increase bilateral hip ROM to WFL in order to improve stride length for more normalized gait pattern.0-125 8 Initial 4/1/2022     Pt will ambulate 300' without pain with no AD 8 Initial 4/1/2022     Pt will return to previous LOF 8 Initial. 4/1/2022 4/1/2022 4/1/2022 4/1/2022 4/1/2022 4/1/2022            PLAN     Continue per PT plan of care with focus on left knee strength and range of motion.      Dave Zaman, PT

## 2022-05-17 ENCOUNTER — CLINICAL SUPPORT (OUTPATIENT)
Dept: REHABILITATION | Facility: HOSPITAL | Age: 57
End: 2022-05-17
Attending: ORTHOPAEDIC SURGERY

## 2022-05-17 DIAGNOSIS — R26.9 GAIT ABNORMALITY: Primary | ICD-10-CM

## 2022-05-17 PROCEDURE — 97110 THERAPEUTIC EXERCISES: CPT | Mod: PN

## 2022-05-17 NOTE — PROGRESS NOTES
[facility]   Physical Therapy Treatment Note     Name: Misti West  Clinic Number: [id number]    Therapy Diagnosis:   Encounter Diagnosis   Name Primary?    Gait abnormality Yes     Physician: Neo Zapata,*    Visit Date: 5/17/2022    Physician Orders: PT Eval and Treat   Medical Diagnosis from Referral: OA of knee.S/P LT TKA 3/29/22  Evaluation Date: 4/1/2022  Authorization Period Expiration: 3/30/23  Plan of Care Expiration: 6/10/22  Progress Note Due: 4/30/22  Visit # / Visits authorized: 10/ 20  FOTO: 35/100   FOTO VISIT 10;52/100  Precautions: Standard, Fall and cancer    PTA Visit #: 0/5     Time In: 1645  Time Out: 1725  Total Billable Time: 40 minutes    SUBJECTIVE     Pt reports:no pain   She was compliant with home exercise program.  Response to previous treatment: no issues    Functional change: walking with cane    Pain: 0/10  Location: left knee    OBJECTIVE     Objective Measures updated at progress report unless specified.     Left knee AROM in supine: 0-100 degrees      Treatment     Misti received the treatments listed below:      therapeutic exercises to develop strength, endurance, ROM and flexibility for 40 minutes including:    Bike 10', full revolutions   Shuttle 37# 6'  Seated hamstring stretch 3 x 30 sec   Extension prop stretch x 2 min    Mat; QS 30,  SLR 3 X 10 AROM, SAQ x 3X10  LAQ 30.  Supine heel slides AA x 10   PROM,patellar mobs 10'.  Sit to stands from edge of mat with UE support  x 10  // bars;mini squats;30. HR/TR 30,gastroc stretch 3x30  Gait training without AD.    Patient Education and Home Exercises     Home Exercises Provided and Patient Education Provided     Education provided:   -HEP, proper gait pattern to avoid ER of LLE., propping for swelling and extension     Written Home Exercises Provided: Patient instructed to cont prior HEP. Exercises were reviewed and Misti was able to demonstrate them prior to the end of the session.   Misti demonstrated good  understanding of the education provided. See EMR under Patient Instructions for exercises provided during therapy sessions.     ASSESSMENT     Patient ambulated into clinic with improved gait mechanics and reciprocal pattern.. Required excessive education on sit to stands to increase weight bearing on LLE and avoiding shifting and hiking her hip to compensate. Unable to perform straight leg raises without assistance. Continue to progress knee range of motion.     Misti Is progressing well towards her goals.   Pt prognosis is Good.     Pt will continue to benefit from skilled outpatient physical therapy to address the deficits listed in the problem list box on initial evaluation, provide pt/family education and to maximize pt's level of independence in the home and community environment.     Pt's spiritual, cultural and educational needs considered and pt agreeable to plan of care and goals.     Anticipated barriers to physical therapy: none    Goals:   Short Term Goals (STG) # weeks Goal Review Date Reviewed Date Met   Pt will demonstrate independence with initial HEP to facilitate therex progression and improvements in functional mobility 4 Initial 4/1/2022     The patient will increase bilateral lower extremity strength to greater than or equal to 1/2 manual muscle grade for all deficient areas in order to facilitate ambulation x 100 without pain 4 Initial 4/1/2022 .   Pt will start HEP 4 Initial 4/1/2022         Initial 4/1/2022 4/1/2022 4/1/2022 4/1/2022 4/1/2022 4/1/2022 4/1/2022        Long Term Goals (LTG) # weeks Goal Review Date Reviewed Date Met   The patient will improve the Lower Extremity Functional Scale to less than 30% Disability 8 Initial 4/1/2022     The patient will increase bilateral lower extremity strength to greater than or equal to 1.5 manual muscle grade for all deficient areas in order to get in and out  of vehicle with no limitations  Pt will be independent with transfers without AD. 8 Initial 4/1/2022     The patient will increase bilateral hip ROM to WFL in order to improve stride length for more normalized gait pattern.0-125 8 Initial 4/1/2022     Pt will ambulate 300' without pain with no AD 8 Initial 4/1/2022     Pt will return to previous LOF 8 Initial. 4/1/2022 4/1/2022 4/1/2022 4/1/2022 4/1/2022 4/1/2022            PLAN     Continue per PT plan of care with focus on left knee strength and range of motion.      Dave Zaman, PT

## 2022-05-20 ENCOUNTER — CLINICAL SUPPORT (OUTPATIENT)
Dept: REHABILITATION | Facility: HOSPITAL | Age: 57
End: 2022-05-20
Attending: ORTHOPAEDIC SURGERY

## 2022-05-20 DIAGNOSIS — R26.9 GAIT ABNORMALITY: Primary | ICD-10-CM

## 2022-05-20 PROCEDURE — 97110 THERAPEUTIC EXERCISES: CPT | Mod: PN

## 2022-05-20 NOTE — PROGRESS NOTES
[facility]   Physical Therapy Treatment Note     Name: Misti West  Clinic Number: [id number]    Therapy Diagnosis:   Encounter Diagnosis   Name Primary?    Gait abnormality Yes     Physician: Neo Zapata,*    Visit Date: 5/20/2022    Physician Orders: PT Eval and Treat   Medical Diagnosis from Referral: OA of knee.S/P LT TKA 3/29/22  Evaluation Date: 4/1/2022  Authorization Period Expiration: 3/30/23  Plan of Care Expiration: 6/10/22  Progress Note Due: 4/30/22  Visit # / Visits authorized: 11/ 20  FOTO: 35/100   FOTO VISIT 10;52/100  Precautions: Standard, Fall and cancer    PTA Visit #: 0/5     Time In:0745  Time Out: 0825  Total Billable Time: 40 minutes    SUBJECTIVE     Pt reports:no pain   She was compliant with home exercise program.  Response to previous treatment: no issues    Functional change: walking with cane    Pain: 0/10  Location: left knee    OBJECTIVE     Objective Measures updated at progress report unless specified.     Left knee AROM in supine: 0-100 degrees      Treatment     Misti received the treatments listed below:      therapeutic exercises to develop strength, endurance, ROM and flexibility for 40 minutes including:    Bike 10', full revolutions   Shuttle 37# 6'  Seated hamstring stretch 3 x 30 sec   Extension prop stretch x 2 min    Mat; QS 30,  SLR 3 X 10 AROM, SAQ x 3X10  LAQ 30.  Supine heel slides AA x 10   PROM,patellar mobs 10'.  Sit to stands from edge of mat with UE support  x 10  // bars;mini squats;30. HR/TR 30,gastroc stretch 3x30  Gait training without AD.    Patient Education and Home Exercises     Home Exercises Provided and Patient Education Provided     Education provided:   -HEP, proper gait pattern to avoid ER of LLE., propping for swelling and extension     Written Home Exercises Provided: Patient instructed to cont prior HEP. Exercises were reviewed and Misti was able to demonstrate them prior to the end of the session.   Misti demonstrated good  understanding of the education provided. See EMR under Patient Instructions for exercises provided during therapy sessions.     ASSESSMENT     Patient ambulated into clinic with improved gait mechanics and reciprocal pattern.. Required excessive education on sit to stands to increase weight bearing on LLE and avoiding shifting and hiking her hip to compensate. Unable to perform straight leg raises without assistance. Continue to progress knee range of motion.     Misti Is progressing well towards her goals.   Pt prognosis is Good.     Pt will continue to benefit from skilled outpatient physical therapy to address the deficits listed in the problem list box on initial evaluation, provide pt/family education and to maximize pt's level of independence in the home and community environment.     Pt's spiritual, cultural and educational needs considered and pt agreeable to plan of care and goals.     Anticipated barriers to physical therapy: none    Goals:   Short Term Goals (STG) # weeks Goal Review Date Reviewed Date Met   Pt will demonstrate independence with initial HEP to facilitate therex progression and improvements in functional mobility 4 Initial 4/1/2022     The patient will increase bilateral lower extremity strength to greater than or equal to 1/2 manual muscle grade for all deficient areas in order to facilitate ambulation x 100 without pain 4 Initial 4/1/2022 .   Pt will start HEP 4 Initial 4/1/2022         Initial 4/1/2022 4/1/2022 4/1/2022 4/1/2022 4/1/2022 4/1/2022 4/1/2022        Long Term Goals (LTG) # weeks Goal Review Date Reviewed Date Met   The patient will improve the Lower Extremity Functional Scale to less than 30% Disability 8 Initial 4/1/2022     The patient will increase bilateral lower extremity strength to greater than or equal to 1.5 manual muscle grade for all deficient areas in order to get in and out  of vehicle with no limitations  Pt will be independent with transfers without AD. 8 Initial 4/1/2022     The patient will increase bilateral hip ROM to WFL in order to improve stride length for more normalized gait pattern.0-125 8 Initial 4/1/2022     Pt will ambulate 300' without pain with no AD 8 Initial 4/1/2022     Pt will return to previous LOF 8 Initial. 4/1/2022 4/1/2022 4/1/2022 4/1/2022 4/1/2022 4/1/2022            PLAN     Continue per PT plan of care with focus on left knee strength and range of motion.      Dave Zaman, PT

## 2022-05-24 ENCOUNTER — CLINICAL SUPPORT (OUTPATIENT)
Dept: REHABILITATION | Facility: HOSPITAL | Age: 57
End: 2022-05-24
Attending: ORTHOPAEDIC SURGERY

## 2022-05-24 DIAGNOSIS — R26.9 GAIT ABNORMALITY: Primary | ICD-10-CM

## 2022-05-24 PROCEDURE — 97110 THERAPEUTIC EXERCISES: CPT | Mod: PN,CQ

## 2022-05-24 NOTE — PROGRESS NOTES
[facility]   Physical Therapy Treatment Note     Name: Misti West  Clinic Number: [id number]    Therapy Diagnosis:   Encounter Diagnosis   Name Primary?    Gait abnormality Yes     Physician: Neo Zapata,*    Visit Date: 5/24/2022    Physician Orders: PT Eval and Treat   Medical Diagnosis from Referral: OA of knee.S/P LT TKA 3/29/22  Evaluation Date: 4/1/2022  Authorization Period Expiration: 3/30/23  Plan of Care Expiration: 6/10/22  Progress Note Due: 4/30/22  Visit # / Visits authorized: 12/ 20    FOTO: 35/100  FOTO VISIT 10;52/100    Precautions: Standard, Fall and cancer     Time In: 1000  Time Out: 1045  Total Billable Time: 45 minutes      SUBJECTIVE     Pt reports: no pain upon arrival. Sitting down for a long graduation caused some discomfort, but better today. Descending stairs is challenging.   She was compliant with home exercise program.  Response to previous treatment: no issues    Functional change: walking without AD    Pain: 0/10  Location: left knee    OBJECTIVE     Objective Measures updated at progress report unless specified.     Left knee AROM in supine: 0-100 degrees      Treatment     Misti received the treatments listed below:      therapeutic exercises to develop strength, endurance, ROM and flexibility for 45 minutes including:      Bike 10', full revolutions   Shuttle 37# 3' -- manual over pressure into extension for a few reps   Extension prop stretch x 2 min    Mat; QS 30,  SLR 3 X 10, SAQ x 3X10  LAQ 30.  PROM,patellar mobs 10'.      Not performed due to time:   // bars;mini squats;30. HR/TR 30,gastroc stretch 3x30      Patient Education and Home Exercises     Home Exercises Provided and Patient Education Provided     Education provided:   -HEP, proper gait pattern to avoid ER of LLE., propping for swelling and extension     Written Home Exercises Provided: Patient instructed to cont prior HEP. Exercises were reviewed and Misti was able to  demonstrate them prior to the end of the session.  Misti demonstrated good  understanding of the education provided. See EMR under Patient Instructions for exercises provided during therapy sessions.     ASSESSMENT     Patient arrived to session with no AD, noted decreased heel strike and total knee extension. Misti shows good progress with knee range of motion, despite having some swelling in the left knee from being more active. Patient tends to lose count with exercises doing more reps than needed. Cueing during exercises and gait to focus on total knee extension. Continue to progress quad strength.     Misti Is progressing well towards her goals.   Pt prognosis is Good.     Pt will continue to benefit from skilled outpatient physical therapy to address the deficits listed in the problem list box on initial evaluation, provide pt/family education and to maximize pt's level of independence in the home and community environment.     Pt's spiritual, cultural and educational needs considered and pt agreeable to plan of care and goals.     Anticipated barriers to physical therapy: none    Goals:   Short Term Goals (STG) # weeks Goal Review Date Reviewed Date Met   Pt will demonstrate independence with initial HEP to facilitate therex progression and improvements in functional mobility 4 Initial 4/1/2022     The patient will increase bilateral lower extremity strength to greater than or equal to 1/2 manual muscle grade for all deficient areas in order to facilitate ambulation x 100 without pain 4 Initial 4/1/2022 .   Pt will start HEP 4 Initial 4/1/2022         Initial 4/1/2022                                                                          Long Term Goals (LTG) # weeks Goal Review Date Reviewed Date Met   The patient will improve the Lower Extremity Functional Scale to less than 30% Disability 8 Initial 4/1/2022     The patient will increase bilateral lower extremity strength to greater than or equal to  1.5 manual muscle grade for all deficient areas in order to get in and out of vehicle with no limitations  Pt will be independent with transfers without AD. 8 Initial 4/1/2022     The patient will increase bilateral hip ROM to WFL in order to improve stride length for more normalized gait pattern.0-125 8 Initial 4/1/2022     Pt will ambulate 300' without pain with no AD 8 Initial 4/1/2022     Pt will return to previous LOF 8 Initial. 4/1/2022                                                                   PLAN     Continue per PT plan of care with focus on left knee strength and range of motion.      Rere Dumont, PTA

## 2022-05-27 ENCOUNTER — CLINICAL SUPPORT (OUTPATIENT)
Dept: REHABILITATION | Facility: HOSPITAL | Age: 57
End: 2022-05-27
Attending: ORTHOPAEDIC SURGERY

## 2022-05-27 DIAGNOSIS — R26.9 GAIT ABNORMALITY: Primary | ICD-10-CM

## 2022-05-27 PROCEDURE — 97014 ELECTRIC STIMULATION THERAPY: CPT | Mod: PN

## 2022-05-27 PROCEDURE — 97110 THERAPEUTIC EXERCISES: CPT | Mod: PN

## 2022-05-27 NOTE — PROGRESS NOTES
[facility]   Physical Therapy Treatment Note     Name: Misti West  Clinic Number: [id number]    Therapy Diagnosis:   Encounter Diagnosis   Name Primary?    Gait abnormality Yes     Physician: Neo Zapata,*    Visit Date: 5/27/2022    Physician Orders: PT Eval and Treat   Medical Diagnosis from Referral: OA of knee.S/P LT TKA 3/29/22  Evaluation Date: 4/1/2022  Authorization Period Expiration: 3/30/23  Plan of Care Expiration: 6/10/22  Progress Note Due: 4/30/22  Visit # / Visits authorized: 12/ 20    FOTO: 35/100  FOTO VISIT 10;52/100    Precautions: Standard, Fall and cancer     Time In: 0915  Time Out: 0955  Total Billable Time: 38 minutes      SUBJECTIVE     Pt reports: her left knee was hit by her truck door from lateral aspect.  She was compliant with home exercise program.  Response to previous treatment: no issues    Functional change: guarded gait    Pain: 4-5/10  Location: left knee    OBJECTIVE     Girth RT;39.0, LT;42.0 cm.      Treatment     Misti received the treatments listed below:      therapeutic exercises to develop strength, endurance, ROM and flexibility for 45 minutes including:      Bike 5', full revolutions   Shuttle 37# 3' -- manual over pressure into extension for a few reps not performed  Extension prop stretch x 2 min    Mat; QS 5x10,  SLR 3 X 10,  LAQ 30.NP  PROM,patellar mobs 2.      Not performed due to    // bars;mini squats;30. HR/TR 30,gastroc stretch 3x30      Patient Education and Home Exercises     Home Exercises Provided and Patient Education Provided     Education provided:   -HEP, proper gait pattern to avoid ER of LLE., propping for swelling and extension     Written Home Exercises Provided: Patient instructed to cont prior HEP. Exercises were reviewed and Misti was able to demonstrate them prior to the end of the session.  Misti demonstrated good  understanding of the education provided. See EMR under Patient Instructions for  exercises provided during therapy sessions.     ASSESSMENT     Patient arrived to session with no AD, noted decreased heel strike and total knee extension. Misti shows good progress with knee range of motion, despite having some swelling in the left knee from being more active. Patient tends to lose count with exercises doing more reps than needed. Cueing during exercises and gait to focus on total knee extension. Continue to progress quad strength.     Misti Is progressing well towards her goals.   Pt prognosis is Good.     Pt will continue to benefit from skilled outpatient physical therapy to address the deficits listed in the problem list box on initial evaluation, provide pt/family education and to maximize pt's level of independence in the home and community environment.     Pt's spiritual, cultural and educational needs considered and pt agreeable to plan of care and goals.     Anticipated barriers to physical therapy: none    Goals:   Short Term Goals (STG) # weeks Goal Review Date Reviewed Date Met   Pt will demonstrate independence with initial HEP to facilitate therex progression and improvements in functional mobility 4 Initial 4/1/2022     The patient will increase bilateral lower extremity strength to greater than or equal to 1/2 manual muscle grade for all deficient areas in order to facilitate ambulation x 100 without pain 4 Initial 4/1/2022 .   Pt will start HEP 4 Initial 4/1/2022         Initial 4/1/2022                                                                          Long Term Goals (LTG) # weeks Goal Review Date Reviewed Date Met   The patient will improve the Lower Extremity Functional Scale to less than 30% Disability 8 Initial 4/1/2022     The patient will increase bilateral lower extremity strength to greater than or equal to 1.5 manual muscle grade for all deficient areas in order to get in and out of vehicle with no limitations  Pt will be independent with transfers without AD. 8  Initial 4/1/2022     The patient will increase bilateral hip ROM to WFL in order to improve stride length for more normalized gait pattern.0-125 8 Initial 4/1/2022     Pt will ambulate 300' without pain with no AD 8 Initial 4/1/2022     Pt will return to previous LOF 8 Initial. 4/1/2022                                                                   PLAN     Continue per PT plan of care with focus on left knee strength and range of motion.      Dave Zaman, PT

## 2022-05-31 ENCOUNTER — PATIENT MESSAGE (OUTPATIENT)
Dept: ADMINISTRATIVE | Facility: HOSPITAL | Age: 57
End: 2022-05-31

## 2022-05-31 ENCOUNTER — CLINICAL SUPPORT (OUTPATIENT)
Dept: REHABILITATION | Facility: HOSPITAL | Age: 57
End: 2022-05-31
Attending: ORTHOPAEDIC SURGERY

## 2022-05-31 DIAGNOSIS — R26.9 GAIT ABNORMALITY: Primary | ICD-10-CM

## 2022-05-31 PROCEDURE — 97110 THERAPEUTIC EXERCISES: CPT | Mod: PN,CQ

## 2022-05-31 NOTE — PROGRESS NOTES
[facility]   Physical Therapy Treatment Note     Name: Misti West  Clinic Number: [id number]    Therapy Diagnosis:   Encounter Diagnosis   Name Primary?    Gait abnormality Yes     Physician: Neo Zapata,*    Visit Date: 5/31/2022    Physician Orders: PT Eval and Treat   Medical Diagnosis from Referral: OA of knee.S/P LT TKA 3/29/22  Evaluation Date: 4/1/2022  Authorization Period Expiration: 3/30/23  Plan of Care Expiration: 6/10/22  Progress Note Due: 4/30/22  Visit # / Visits authorized: 13/ 20    FOTO: 35/100  FOTO VISIT 10;52/100    Precautions: Standard, Fall and cancer     Time In: 1000  Time Out: 1045  Total Billable Time: 45 minutes      SUBJECTIVE     Pt reports: has her cane with her today, the hip is bothering her a little bit, but the knee is doing well.   She was compliant with home exercise program.  Response to previous treatment: no issues    Functional change: guarded gait    Pain: 0/10  Location: left knee    OBJECTIVE     Girth RT;39.0, LT;42.0 cm.      Treatment     Misti received the treatments listed below:      therapeutic exercises to develop strength, endurance, ROM and flexibility for 45 minutes including:    Bike 10', full revolutions   Shuttle 37# 3'   Shuttle 12# LLE 2'     // bars;  mini squats 30. HR/TR 30,gastroc stretch 3x30  Hip abduction B x 20   Stair climbing on 4-6 inch stair x 30   SLS on LLE 5 x 10-15 sec hold   TKE at the wall with ball [id number] hold         Patient Education and Home Exercises     Home Exercises Provided and Patient Education Provided     Education provided:   -HEP, proper gait pattern to avoid ER of LLE., propping for swelling and extension     Written Home Exercises Provided: Patient instructed to cont prior HEP. Exercises were reviewed and Misti was able to demonstrate them prior to the end of the session.  Misti demonstrated good  understanding of the education provided. See EMR under Patient Instructions for  exercises provided during therapy sessions.     ASSESSMENT     Patient challenged with ascending/descending stairs with focus on LLE to improve range of motion and strength deficits. Slight swelling still evident in left quad limiting full knee extension along with weakness. Misti was encouraged to focus on heel strike with total knee extension and toe off. Continue to progress left knee strength and mobility to increase overall stability.     Misti Is progressing well towards her goals.    Pt prognosis is Good.     Pt will continue to benefit from skilled outpatient physical therapy to address the deficits listed in the problem list box on initial evaluation, provide pt/family education and to maximize pt's level of independence in the home and community environment.     Pt's spiritual, cultural and educational needs considered and pt agreeable to plan of care and goals.     Anticipated barriers to physical therapy: none    Goals:   Short Term Goals (STG) # weeks Goal Review Date Reviewed Date Met   Pt will demonstrate independence with initial HEP to facilitate therex progression and improvements in functional mobility 4 ONGOING  5/31/2022       The patient will increase bilateral lower extremity strength to greater than or equal to 1/2 manual muscle grade for all deficient areas in order to facilitate ambulation x 100 without pain 4 ONGOING  5/31/2022   .   Pt will start HEP 4 ONGOING  5/31/2022                                                                                      Long Term Goals (LTG) # weeks Goal Review Date Reviewed Date Met   The patient will improve the Lower Extremity Functional Scale to less than 30% Disability 8 ONGOING  5/31/2022       The patient will increase bilateral lower extremity strength to greater than or equal to 1.5 manual muscle grade for all deficient areas in order to get in and out of vehicle with no limitations  Pt will be independent with transfers without AD. 8 ONGOING   5/31/2022       The patient will increase bilateral hip ROM to WFL in order to improve stride length for more normalized gait pattern.0-125 8 ONGOING  5/31/2022       Pt will ambulate 300' without pain with no AD 8 ONGOING  5/31/2022       Pt will return to previous LOF 8 ONGOING  5/31/2022                                                                     PLAN     Continue per PT plan of care with focus on left knee strength and range of motion.      Rere Dumont, PTA

## 2022-06-01 ENCOUNTER — PATIENT MESSAGE (OUTPATIENT)
Dept: ADMINISTRATIVE | Facility: HOSPITAL | Age: 57
End: 2022-06-01

## 2022-06-03 ENCOUNTER — CLINICAL SUPPORT (OUTPATIENT)
Dept: REHABILITATION | Facility: HOSPITAL | Age: 57
End: 2022-06-03
Attending: ORTHOPAEDIC SURGERY

## 2022-06-03 DIAGNOSIS — R26.9 GAIT ABNORMALITY: Primary | ICD-10-CM

## 2022-06-03 PROCEDURE — 97110 THERAPEUTIC EXERCISES: CPT | Mod: PN

## 2022-06-03 NOTE — PROGRESS NOTES
[facility]  Physical Therapy Plan of Care Note    Name: Misti West  Clinic Number: [id number]    Therapy Diagnosis:   Encounter Diagnosis   Name Primary?    Gait abnormality Yes     Physician: Neo Zapata,*    Visit Date: 6/3/2022    Physician Orders: PT Eval and Treat   Medical Diagnosis from Referral: S/P LT TKA.  Evaluation Date: 6/3/2022  Authorization Period Expiration: 3/30/23   Plan of Care Expiration: 8/19/22  Progress Note Due: 6/30/22   Visit # / Visits authorized: 15/ 16  FOTO: 36/100    Precautions: Standard  Functional Level Prior to Evaluation:  Independent    SUBJECTIVE      Update:  Pain 5/10. Swelling decreasing.   Pt reports improvement and wants to continue PT.    OBJECTIVE     Update:   Knee  Right   Left  Pain/Dysfunction with Movement    AROM PROM MMT AROM PROM MMT    Flexion    95 105 3    Extension    -10 0 3      Gait without AD antalgic.    ASSESSMENT     Update: Pt is showing good progress but will require additional pt to reach LTGs.    Previous Short Term Goals Status:   Goals met.  New Short Term Goals Status:   ROM 0-120  Long Term Goal Status: continue per initial plan of care.  Reasons for Recertification of Therapy:   Pt presents ROM and strength deficits,gait abnormality,impaired function due to pain and above listed deficits.    GOALS    Long Term Goals (LTG) # weeks Goal Review Date Reviewed Date Met   The patient will improve the Lower Extremity Functional Scale to less than 30% Disability 8 Initial 4/1/2022     The patient will increase bilateral lower extremity strength to greater than or equal to 1.5 manual muscle grade for all deficient areas in order to get in and out of vehicle with no limitations  Pt will be independent with transfers without AD. 8 Initial 4/1/2022     The patient will increase bilateral hip ROM to WFL in order to improve stride length for more normalized gait pattern.0-125 8 Initial 4/1/2022     Pt will ambulate  300' without pain with no AD 8 Initial 4/1/2022     Pt will return to previous LOF 8 Initial. 4/1/2022                   PLAN     Updated Certification Period: 6/13/22 to 8/19/22   Recommended Treatment Plan: 2 times per week for 8 weeks:  Electrical Stimulation PRN, Gait Training, Manual Therapy, Moist Heat/ Ice, Patient Education, Therapeutic Activities, Therapeutic Exercise and dry needling PRN.IMS PRN.  Other Recommendations: HEP.    Dave Zaman, PT    I CERTIFY THE NEED FOR THESE SERVICES FURNISHED UNDER THIS PLAN OF TREATMENT AND WHILE UNDER MY CARE  Physician's comments:      Physician's Signature: ___________________________________________________

## 2022-06-03 NOTE — PROGRESS NOTES
[facility]   Physical Therapy Treatment Note     Name: Misti West  Clinic Number: [id number]    Therapy Diagnosis:   Encounter Diagnosis   Name Primary?    Gait abnormality Yes     Physician: Neo Zapata,*    Visit Date: 6/3/2022    Physician Orders: PT Eval and Treat   Medical Diagnosis from Referral: OA of knee.S/P LT TKA 3/29/22  Evaluation Date: 4/1/2022  Authorization Period Expiration: 3/30/23  Plan of Care Expiration: 6/10/22  Progress Note Due: 4/30/22  Visit # / Visits authorized: 15/ 20    FOTO: 35/100  FOTO VISIT 10;52/100    Precautions: Standard, Fall and cancer     Time In: 1000  Time Out: 1040  Total Billable Time: 40 minutes      SUBJECTIVE     Pt reports: has her cane with her today, the hip is bothering her a little bit, but the knee is doing well.   She was compliant with home exercise program.  Response to previous treatment: no issues    Functional change: guarded gait    Pain: 0/10  Location: left knee    OBJECTIVE     Girth RT;37.2, LT;40.6 cm.      Treatment     Misti received the treatments listed below:      therapeutic exercises to develop strength, endurance, ROM and flexibility for 45 minutes including:    Bike 10', full revolutions seat 8  Shuttle 37# 6'  Shuttle 12# LLE 2'   MTT /ROM 10'  // bars;  mini squats 30. HR/TR 30,gastroc stretch 3x30  Hip abduction B x 20   Stair climbing on 4-6 inch stair x 30   SLS on LLE 5 x 10-15 sec hold   TKE at the wall with ball [id number] hold         Patient Education and Home Exercises     Home Exercises Provided and Patient Education Provided     Education provided:   -HEP, proper gait pattern to avoid ER of LLE., propping for swelling and extension     Written Home Exercises Provided: Patient instructed to cont prior HEP. Exercises were reviewed and Misti was able to demonstrate them prior to the end of the session.  Misti demonstrated good  understanding of the education provided. See EMR under Patient  Instructions for exercises provided during therapy sessions.     ASSESSMENT     Patient challenged with ascending/descending stairs with focus on LLE to improve range of motion and strength deficits. Slight swelling still evident in left quad limiting full knee extension along with weakness. Misti was encouraged to focus on heel strike with total knee extension and toe off. Continue to progress left knee strength and mobility to increase overall stability.     Misti Is progressing well towards her goals.    Pt prognosis is Good.     Pt will continue to benefit from skilled outpatient physical therapy to address the deficits listed in the problem list box on initial evaluation, provide pt/family education and to maximize pt's level of independence in the home and community environment.     Pt's spiritual, cultural and educational needs considered and pt agreeable to plan of care and goals.     Anticipated barriers to physical therapy: none    Goals:   Short Term Goals (STG) # weeks Goal Review Date Reviewed Date Met   Pt will demonstrate independence with initial HEP to facilitate therex progression and improvements in functional mobility 4 ONGOING  6/3/2022       The patient will increase bilateral lower extremity strength to greater than or equal to 1/2 manual muscle grade for all deficient areas in order to facilitate ambulation x 100 without pain 4 ONGOING  6/3/2022   .   Pt will start HEP 4 ONGOING  6/3/2022                                                                                      Long Term Goals (LTG) # weeks Goal Review Date Reviewed Date Met   The patient will improve the Lower Extremity Functional Scale to less than 30% Disability 8 ONGOING  6/3/2022       The patient will increase bilateral lower extremity strength to greater than or equal to 1.5 manual muscle grade for all deficient areas in order to get in and out of vehicle with no limitations  Pt will be independent with transfers without  AD. 8 ONGOING  6/3/2022       The patient will increase bilateral hip ROM to WFL in order to improve stride length for more normalized gait pattern.0-125 8 ONGOING  6/3/2022       Pt will ambulate 300' without pain with no AD 8 ONGOING  6/3/2022       Pt will return to previous LOF 8 ONGOING  6/3/2022                                                                     PLAN     Continue per new plan of care with focus on left knee strength and range of motion.      Dave Zaman, PT

## 2022-06-15 DIAGNOSIS — Z96.652 STATUS POST LEFT KNEE REPLACEMENT: Primary | ICD-10-CM

## 2022-06-16 ENCOUNTER — CLINICAL SUPPORT (OUTPATIENT)
Dept: REHABILITATION | Facility: HOSPITAL | Age: 57
End: 2022-06-16
Attending: ORTHOPAEDIC SURGERY

## 2022-06-16 DIAGNOSIS — R26.9 GAIT ABNORMALITY: Primary | ICD-10-CM

## 2022-06-16 PROCEDURE — 97110 THERAPEUTIC EXERCISES: CPT | Mod: PN

## 2022-06-16 NOTE — PROGRESS NOTES
[facility]   Physical Therapy Treatment Note     Name: Misti West  Clinic Number: [id number]    Therapy Diagnosis:   Encounter Diagnosis   Name Primary?    Gait abnormality Yes     Physician: Neo Zapata,*    Visit Date: 6/16/2022    Physician Orders: PT Eval and Treat   Medical Diagnosis from Referral: OA of knee.S/P LT TKA 3/29/22  Evaluation Date: 4/1/2022  Authorization Period Expiration: 3/30/23  Plan of Care Expiration: 8/19/22  Progress Note Due: 4/30/22  Visit # / Visits authorized: 1/16    FOTO: 35/100  FOTO VISIT 10;52/100    Precautions: Standard, Fall and cancer     Time In: 0827  Time Out:0810  Total Billable Time: 43 minutes      SUBJECTIVE     Pt reports: the hip is bothering her a little bit, but the knee is doing well.   She was compliant with home exercise program.  Response to previous treatment: no issues    Functional change:  gait without AD.    Pain: 3/10  Location: left knee    OBJECTIVE     ROM 0-105       Treatment     Misti received the treatments listed below:      therapeutic exercises to develop strength, endurance, ROM and flexibility for 45 minutes including:    Bike 10', full revolutions seat 9  Shuttle 37# 6'  Shuttle 12# LLE 2'   MTT /ROM 10'  // bars;  mini squats 30. HR/TR 30,gastroc stretch 3x30  Hip abduction B x 20 NP  Stair climbing on 6 inch stair 4 steps x 5  SLS on LLE 5 x 10-15 sec hold NP  TKE at the wall with ball [id number] hold         Patient Education and Home Exercises     Home Exercises Provided and Patient Education Provided     Education provided:   -HEP, proper gait pattern to avoid ER of LLE., propping for swelling and extension     Written Home Exercises Provided: Patient instructed to cont prior HEP. Exercises were reviewed and Misti was able to demonstrate them prior to the end of the session.  Misti demonstrated good  understanding of the education provided. See EMR under Patient Instructions for exercises provided  during therapy sessions.     ASSESSMENT     Patient challenged with ascending/descending stairs with focus on LLE to improve range of motion and strength deficits. Slight swelling still evident in left quad limiting full knee extension along with weakness. Misti was encouraged to focus on heel strike with total knee extension and toe off. Continue to progress left knee strength and mobility to increase overall stability.     Misti Is progressing well towards her goals.    Pt prognosis is Good.     Pt will continue to benefit from skilled outpatient physical therapy to address the deficits listed in the problem list box on initial evaluation, provide pt/family education and to maximize pt's level of independence in the home and community environment.     Pt's spiritual, cultural and educational needs considered and pt agreeable to plan of care and goals.     Anticipated barriers to physical therapy: none    Goals:   Short Term Goals (STG) # weeks Goal Review Date Reviewed Date Met   Pt will demonstrate independence with initial HEP to facilitate therex progression and improvements in functional mobility 4 ONGOING  6/16/2022       The patient will increase bilateral lower extremity strength to greater than or equal to 1/2 manual muscle grade for all deficient areas in order to facilitate ambulation x 100 without pain 4 ONGOING  6/16/2022   .   Pt will start HEP 4 ONGOING  6/16/2022                                                                                      Long Term Goals (LTG) # weeks Goal Review Date Reviewed Date Met   The patient will improve the Lower Extremity Functional Scale to less than 30% Disability 8 ONGOING  6/16/2022       The patient will increase bilateral lower extremity strength to greater than or equal to 1.5 manual muscle grade for all deficient areas in order to get in and out of vehicle with no limitations  Pt will be independent with transfers without AD. 8 ONGOING  6/16/2022        The patient will increase bilateral hip ROM to WFL in order to improve stride length for more normalized gait pattern.0-125 8 ONGOING  6/16/2022       Pt will ambulate 300' without pain with no AD 8 ONGOING  6/16/2022       Pt will return to previous LOF 8 ONGOING  6/16/2022                                                                     PLAN     Continue per new plan of care with focus on left knee strength and range of motion.      Dave Zaman, PT

## 2022-06-20 ENCOUNTER — OFFICE VISIT (OUTPATIENT)
Dept: ORTHOPEDICS | Facility: CLINIC | Age: 57
End: 2022-06-20

## 2022-06-20 ENCOUNTER — HOSPITAL ENCOUNTER (OUTPATIENT)
Dept: RADIOLOGY | Facility: HOSPITAL | Age: 57
Discharge: HOME | End: 2022-06-20
Attending: ORTHOPAEDIC SURGERY

## 2022-06-20 VITALS — WEIGHT: 158 LBS | HEIGHT: 69 IN | BODY MASS INDEX: 23.4 KG/M2 | RESPIRATION RATE: 18 BRPM

## 2022-06-20 DIAGNOSIS — Z96.652 STATUS POST LEFT KNEE REPLACEMENT: Primary | ICD-10-CM

## 2022-06-20 DIAGNOSIS — Z96.652 STATUS POST LEFT KNEE REPLACEMENT: ICD-10-CM

## 2022-06-20 PROCEDURE — 73564 X-RAY EXAM KNEE 4 OR MORE: CPT | Mod: 26,LT,, | Performed by: RADIOLOGY

## 2022-06-20 PROCEDURE — 73562 X-RAY EXAM OF KNEE 3: CPT | Mod: TC,PN,RT

## 2022-06-20 PROCEDURE — 73562 XR KNEE ORTHO LEFT WITH FLEXION: ICD-10-PCS | Mod: 26,RT,, | Performed by: RADIOLOGY

## 2022-06-20 PROCEDURE — 99999 PR PBB SHADOW E&M-EST. PATIENT-LVL III: CPT | Mod: PBBFAC,,, | Performed by: ORTHOPAEDIC SURGERY

## 2022-06-20 PROCEDURE — 99999 PR PBB SHADOW E&M-EST. PATIENT-LVL III: ICD-10-PCS | Mod: PBBFAC,,, | Performed by: ORTHOPAEDIC SURGERY

## 2022-06-20 PROCEDURE — 73562 X-RAY EXAM OF KNEE 3: CPT | Mod: 26,RT,, | Performed by: RADIOLOGY

## 2022-06-20 PROCEDURE — 99024 POSTOP FOLLOW-UP VISIT: CPT | Mod: ,,, | Performed by: ORTHOPAEDIC SURGERY

## 2022-06-20 PROCEDURE — 99213 OFFICE O/P EST LOW 20 MIN: CPT | Mod: PBBFAC,PN | Performed by: ORTHOPAEDIC SURGERY

## 2022-06-20 PROCEDURE — 73564 XR KNEE ORTHO LEFT WITH FLEXION: ICD-10-PCS | Mod: 26,LT,, | Performed by: RADIOLOGY

## 2022-06-20 PROCEDURE — 99024 PR POST-OP FOLLOW-UP VISIT: ICD-10-PCS | Mod: ,,, | Performed by: ORTHOPAEDIC SURGERY

## 2022-06-20 NOTE — PROGRESS NOTES
Past Medical History:   Diagnosis Date    Anxiety     Arthritis     Cancer     cervical    Hepatomegaly     3 liver cyst    Hiatal hernia     Liver lesion     PONV (postoperative nausea and vomiting)        Past Surgical History:   Procedure Laterality Date    CERVIX SURGERY      COLONOSCOPY N/A 8/27/2020    Procedure: COLONOSCOPY;  Surgeon: Tim Aguayo MD;  Location: [facility] ENDO;  Service: Endoscopy;  Laterality: N/A;    CYSTOSCOPY WITH URETHRAL DILATION  10/1/2020    Procedure: CYSTOSCOPY, WITH URETHRAL DILATION;  Surgeon: Bhargav Keane MD;  Location[facility] OR;  Service: Urology;;    ESOPHAGOGASTRODUODENOSCOPY N/A 8/13/2020    Procedure: EGD (ESOPHAGOGASTRODUODENOSCOPY);  Surgeon: Tim Aguayo MD;  Location: [facility] ENDO;  Service: Endoscopy;  Laterality: N/A;    KNEE ARTHROSCOPY      ROBOTIC ARTHROPLASTY, KNEE Left 3/29/2022    Procedure: ROBOTIC ARTHROPLASTY, KNEE, TOTAL;  Surgeon: Neo Zapata MD;  Location: [facility] OR;  Service: Orthopedics;  Laterality: Left;    SINUS SURGERY      TUBAL LIGATION         Current Outpatient Medications   Medication Sig    ascorbic acid, vitamin C, (VITAMIN C) 500 MG tablet Take 500 mg by mouth once daily.    aspirin (ECOTRIN) 81 MG EC tablet Take 1 tablet (81 mg total) by mouth 2 (two) times a day.    atorvastatin (LIPITOR) 10 MG tablet Take 1 tablet (10 mg total) by mouth once daily.    b complex vitamins tablet Take 1 tablet by mouth once daily.    biotin 1 mg tablet Take 1,000 mcg by mouth 3 (three) times daily.    cholecalciferol, vitamin D3, (VITAMIN D3 ORAL) Take by mouth.    ferrous sulfate (FEOSOL) Tab tablet Take 1 tablet by mouth daily with breakfast.    glucosamine-chondroitin 500-400 mg tablet Take 1 tablet by mouth 3 (three) times daily.    HYDROcodone-acetaminophen (NORCO)  mg per tablet Take 1 tablet by mouth every 6 (six) hours as needed for Pain.    multivitamin capsule Take 1 capsule by mouth once daily.     "tumeric-ging-olive-oreg-capryl 100 mg-150 mg- 50 mg-150 mg Cap Take by mouth.    ALLERGY RELIEF D12 5-120 mg per tablet TAKE 1 TABLET BY MOUTH TWICE DAILY AS NEEDED FOR ALLERGIES (Patient not taking: No sig reported)    ALPRAZolam (XANAX) 0.25 MG tablet TAKE 1 TABLET BY MOUTH NIGHTLY AS NEEDED FOR ANXIETY. (Patient not taking: No sig reported)     No current facility-administered medications for this visit.       Review of patient's allergies indicates:   Allergen Reactions    Codeine Swelling     "Hives, vomiting"    Morphine Swelling     "Hives, vomiting"    Naproxen sodium Swelling     "Itching, high blood pressure"    Percocet [oxycodone-acetaminophen] Nausea And Vomiting     LOW HEART RATE    Tramadol hcl Swelling     "itching & nausea"    Penicillins     Trintex        Family History   Problem Relation Age of Onset    Diabetes Mother     Cancer Mother     Arthritis Mother     Cancer Father     COPD Father     Hypertension Father     Arthritis Father     Melanoma Father     Breast cancer Sister     Arthritis Sister     Breast cancer Sister     Arthritis Sister     Breast cancer Sister     Kidney disease Sister     Arthritis Sister     Colon polyps Neg Hx     Colon cancer Neg Hx     Crohn's disease Neg Hx     Ulcerative colitis Neg Hx     Stomach cancer Neg Hx     Esophageal cancer Neg Hx     Celiac disease Neg Hx        Social History     Socioeconomic History    Marital status:    Tobacco Use    Smoking status: Current Every Day Smoker     Packs/day: 1.00    Smokeless tobacco: Never Used   Substance and Sexual Activity    Alcohol use: Not Currently    Drug use: Never    Sexual activity: Yes     Social Determinants of Health     Financial Resource Strain: Medium Risk    Difficulty of Paying Living Expenses: Somewhat hard   Food Insecurity: No Food Insecurity    Worried About Running Out of Food in the Last Year: Never true    Ran Out of Food in the Last Year: Never " true   Transportation Needs: No Transportation Needs    Lack of Transportation (Medical): No    Lack of Transportation (Non-Medical): No   Physical Activity: Unknown    Days of Exercise per Week: 0 days   Stress: No Stress Concern Present    Feeling of Stress : Only a little   Social Connections: Unknown    Frequency of Communication with Friends and Family: More than three times a week    Frequency of Social Gatherings with Friends and Family: Once a week    Active Member of Clubs or Organizations: Yes    Attends Club or Organization Meetings: 1 to 4 times per year    Marital Status:    Housing Stability: Unknown    Unable to Pay for Housing in the Last Year: No    Unstable Housing in the Last Year: No       Chief Complaint:   Chief Complaint   Patient presents with    Left Knee - Post-op Evaluation       Date of surgery:  March 29, 2022    History of present illness:  56-year-old female underwent left Buzz total knee arthroplasty.  Patient is doing okay.  Pain is a 4/10.  Complains of a little pain along the anteromedial knee.  It is a little on the tight side still has a swelling.  This is probably related to her prior open knee surgery that she had and some prior scar tissue.      Review of Systems:    Musculoskeletal:  See HPI        Physical Examination:    Vital Signs:    Vitals:    06/20/22 0939   Resp: 18       Body mass index is 23.33 kg/m².    This a well-developed, well nourished patient in no acute distress.  They are alert and oriented and cooperative to examination.  Pt. walks to some mild antalgic gait using a cane    Examination left knee shows healed surgical incisions.  No erythema drainage.  Mild joint swelling.  Range of motion is 3-100 degrees.      X-rays:  Four views left knee are ordered and reviewed which show well-aligned total knee arthroplasty without complications.  Minimal arthritis in the right knee     Assessment::  Status post left Buzz [place] CR total knee  arthroplasty    Plan:  Continue with physical therapy.  I will check her back in 3 months.  No x-ray needed.    This note was created using [employer] voice recognition software that occasionally misinterpreted phrases or words.

## 2022-06-22 ENCOUNTER — CLINICAL SUPPORT (OUTPATIENT)
Dept: REHABILITATION | Facility: HOSPITAL | Age: 57
End: 2022-06-22
Attending: ORTHOPAEDIC SURGERY

## 2022-06-22 DIAGNOSIS — R26.9 GAIT ABNORMALITY: Primary | ICD-10-CM

## 2022-06-22 PROCEDURE — 97110 THERAPEUTIC EXERCISES: CPT | Mod: PN

## 2022-06-22 NOTE — PROGRESS NOTES
[facility]   Physical Therapy Treatment Note     Name: Misti West  Clinic Number: [id number]    Therapy Diagnosis:   Encounter Diagnosis   Name Primary?    Gait abnormality Yes     Physician: Neo Zapata,*    Visit Date: 6/22/2022    Physician Orders: PT Eval and Treat   Medical Diagnosis from Referral: OA of knee.S/P LT TKA 3/29/22  Evaluation Date: 4/1/2022  Authorization Period Expiration: 3/30/23  Plan of Care Expiration: 8/19/22  Progress Note Due: 4/30/22  Visit # / Visits authorized: 2/16    FOTO: 35/100  FOTO VISIT 10;52/100    Precautions: Standard, Fall and cancer     Time In:1415  Time Out:1455  Total Billable Time: 40 minutes      SUBJECTIVE     Pt reports: the hip is bothering her a little bit, but the knee is doing well.   She was compliant with home exercise program.  Response to previous treatment: no issues    Functional change:  gait without AD.    Pain: 3/10  Location: left knee    OBJECTIVE     ROM 0-105       Treatment     Misti received the treatments listed below:      therapeutic exercises to develop strength, endurance, ROM and flexibility for 45 minutes including:    Bike 10', full revolutions seat 9  Shuttle 37# 6'  Shuttle 12# LLE 2'   MTT /ROM 10'  SLR 2# 3X10  // bars;  mini squats 30. HR/TR 30,gastroc stretch 3x30  Hip abduction B x 20 NP  Stair climbing on 6 inch stair 4 steps x 5  SLS on LLE 5 x 10-15 sec hold NP  TKE at the wall with ball [id number] hold         Patient Education and Home Exercises     Home Exercises Provided and Patient Education Provided     Education provided:   -HEP, proper gait pattern to avoid ER of LLE., propping for swelling and extension     Written Home Exercises Provided: Patient instructed to cont prior HEP. Exercises were reviewed and Misti was able to demonstrate them prior to the end of the session.  Misti demonstrated good  understanding of the education provided. See EMR under Patient Instructions for  exercises provided during therapy sessions.     ASSESSMENT     Patient challenged with ascending/descending stairs with focus on LLE to improve range of motion and strength deficits. Slight swelling still evident in left quad limiting full knee extension along with weakness. Misti was encouraged to focus on heel strike with total knee extension and toe off. Continue to progress left knee strength and mobility to increase overall stability.     Misti Is progressing well towards her goals.    Pt prognosis is Good.     Pt will continue to benefit from skilled outpatient physical therapy to address the deficits listed in the problem list box on initial evaluation, provide pt/family education and to maximize pt's level of independence in the home and community environment.     Pt's spiritual, cultural and educational needs considered and pt agreeable to plan of care and goals.     Anticipated barriers to physical therapy: none    Goals:   Short Term Goals (STG) # weeks Goal Review Date Reviewed Date Met   Pt will demonstrate independence with initial HEP to facilitate therex progression and improvements in functional mobility 4 ONGOING  6/22/2022       The patient will increase bilateral lower extremity strength to greater than or equal to 1/2 manual muscle grade for all deficient areas in order to facilitate ambulation x 100 without pain 4 ONGOING  6/22/2022   .   Pt will start HEP 4 ONGOING  6/22/2022                                                                                      Long Term Goals (LTG) # weeks Goal Review Date Reviewed Date Met   The patient will improve the Lower Extremity Functional Scale to less than 30% Disability 8 ONGOING  6/22/2022       The patient will increase bilateral lower extremity strength to greater than or equal to 1.5 manual muscle grade for all deficient areas in order to get in and out of vehicle with no limitations  Pt will be independent with transfers without AD. 8 ONGOING   6/22/2022       The patient will increase bilateral hip ROM to WFL in order to improve stride length for more normalized gait pattern.0-125 8 ONGOING  6/22/2022       Pt will ambulate 300' without pain with no AD 8 ONGOING  6/22/2022       Pt will return to previous LOF 8 ONGOING  6/22/2022                                                                     PLAN     Continue per new plan of care with focus on left knee strength and range of motion.      Dave Zaman, PT

## 2022-06-24 ENCOUNTER — CLINICAL SUPPORT (OUTPATIENT)
Dept: REHABILITATION | Facility: HOSPITAL | Age: 57
End: 2022-06-24
Attending: ORTHOPAEDIC SURGERY

## 2022-06-24 DIAGNOSIS — R26.9 GAIT ABNORMALITY: Primary | ICD-10-CM

## 2022-06-24 PROCEDURE — 97110 THERAPEUTIC EXERCISES: CPT | Mod: PN

## 2022-06-24 NOTE — PROGRESS NOTES
[facility]   Physical Therapy Treatment Note     Name: Misti West  Clinic Number: [id number]    Therapy Diagnosis:   Encounter Diagnosis   Name Primary?    Gait abnormality Yes     Physician: Neo Zapata,*    Visit Date: 6/24/2022    Physician Orders: PT Eval and Treat   Medical Diagnosis from Referral: OA of knee.S/P LT TKA 3/29/22  Evaluation Date: 4/1/2022  Authorization Period Expiration: 3/30/23  Plan of Care Expiration: 8/19/22  Progress Note Due: 4/30/22  Visit # / Visits authorized: 3/16    FOTO: 35/100  FOTO VISIT 10;52/100    Precautions: Standard, Fall and cancer     Time In:1200  Time Out:1240  Total Billable Time: 40 minutes      SUBJECTIVE     Pt reports: No pain swelling down.  She was compliant with home exercise program.  Response to previous treatment: no issues    Functional change:  gait without AD.    Pain: 0/10  Location: left knee    OBJECTIVE     ROM 0-105     Treatment     Misti received the treatments listed below:      therapeutic exercises to develop strength, endurance, ROM and flexibility for 40 minutes including:    Bike 10', full revolutions seat 7  Shuttle 37# 6'  Shuttle 12# LLE 2'   MTT /ROM 10'  SLR 2# 3X10  // bars;  mini squats 30. HR/TR 30,gastroc stretch 3x30  Hip abduction B x 20 NP  Stair climbing on 6 inch stair 4 steps x 5  SLS on LLE 5 x 10-15 sec hold NP  TKE at the wall with ball [id number] hold         Patient Education and Home Exercises     Home Exercises Provided and Patient Education Provided     Education provided:   -HEP, proper gait pattern to avoid ER of LLE., propping for swelling and extension     Written Home Exercises Provided: Patient instructed to cont prior HEP. Exercises were reviewed and Misti was able to demonstrate them prior to the end of the session.  Misti demonstrated good  understanding of the education provided. See EMR under Patient Instructions for exercises provided during therapy sessions.      ASSESSMENT     Patient challenged with ascending/descending stairs with focus on LLE to improve range of motion and strength deficits. Slight swelling still evident in left quad limiting full knee extension along with weakness. Misti was encouraged to focus on heel strike with total knee extension and toe off. Continue to progress left knee strength and mobility to increase overall stability.     Misti Is progressing well towards her goals.    Pt prognosis is Good.     Pt will continue to benefit from skilled outpatient physical therapy to address the deficits listed in the problem list box on initial evaluation, provide pt/family education and to maximize pt's level of independence in the home and community environment.     Pt's spiritual, cultural and educational needs considered and pt agreeable to plan of care and goals.     Anticipated barriers to physical therapy: none    Goals:   Short Term Goals (STG) # weeks Goal Review Date Reviewed Date Met   Pt will demonstrate independence with initial HEP to facilitate therex progression and improvements in functional mobility 4 ONGOING  6/24/2022       The patient will increase bilateral lower extremity strength to greater than or equal to 1/2 manual muscle grade for all deficient areas in order to facilitate ambulation x 100 without pain 4 ONGOING  6/24/2022   .   Pt will start HEP 4 ONGOING  6/24/2022                                                                                      Long Term Goals (LTG) # weeks Goal Review Date Reviewed Date Met   The patient will improve the Lower Extremity Functional Scale to less than 30% Disability 8 ONGOING  6/24/2022       The patient will increase bilateral lower extremity strength to greater than or equal to 1.5 manual muscle grade for all deficient areas in order to get in and out of vehicle with no limitations  Pt will be independent with transfers without AD. 8 ONGOING  6/24/2022       The patient will increase  bilateral hip ROM to WFL in order to improve stride length for more normalized gait pattern.0-125 8 ONGOING  6/24/2022       Pt will ambulate 300' without pain with no AD 8 ONGOING  6/24/2022       Pt will return to previous LOF 8 ONGOING  6/24/2022                                                                     PLAN     Continue per new plan of care with focus on left knee strength and range of motion.    Dave Zaman, PT

## 2022-06-28 ENCOUNTER — CLINICAL SUPPORT (OUTPATIENT)
Dept: REHABILITATION | Facility: HOSPITAL | Age: 57
End: 2022-06-28
Attending: ORTHOPAEDIC SURGERY

## 2022-06-28 DIAGNOSIS — R26.9 GAIT ABNORMALITY: Primary | ICD-10-CM

## 2022-06-28 PROCEDURE — 97110 THERAPEUTIC EXERCISES: CPT | Mod: PN,CQ

## 2022-06-28 NOTE — PROGRESS NOTES
[facility]   Physical Therapy Treatment Note     Name: Misti West  Clinic Number: [id number]    Therapy Diagnosis:   Encounter Diagnosis   Name Primary?    Gait abnormality Yes     Physician: Neo Zapata,*    Visit Date: 6/28/2022    Physician Orders: PT Eval and Treat   Medical Diagnosis from Referral: OA of knee.S/P LT TKA 3/29/22  Evaluation Date: 4/1/2022  Authorization Period Expiration: 3/30/23  Plan of Care Expiration: 8/19/22  Progress Note Due: 4/30/22  Visit # / Visits authorized: 4/16    FOTO: 35/100  FOTO VISIT 10;52/100    Precautions: Standard, Fall and cancer     Time In:1200  Time Out:1240  Total Billable Time: 40 minutes      SUBJECTIVE     Pt reports: doing well, the swelling is there but she continues to do what she needs to do   She was compliant with home exercise program.  Response to previous treatment: no issues    Functional change: improved tolerance to exercises, driving     Pain: 0/10  Location: left knee    OBJECTIVE     ROM 0-105     Treatment     Misti received the treatments listed below:      therapeutic exercises to develop strength, endurance, ROM and flexibility for 40 minutes including:    Bike 10', full revolutions seat 5  Shuttle 37# 6'  Shuttle 12# LLE 2'   Shuttle 25# calf raises 2'  MTT /ROM 5'  SLR 2# 3X10    // bars;  On airex: mini squats 30. HR/TR 30,  gastroc stretch with half foam roll 3x30  Hip abduction B x 20 NP  Stair climbing on 6 inch stair 4 steps x 5 NP   SLS on LLE 5 x 10-15 sec hold NP  TKE at the wall with ball [id number] hold         Patient Education and Home Exercises     Home Exercises Provided and Patient Education Provided     Education provided:   -HEP, proper gait pattern to avoid ER of LLE., propping for swelling and extension     Written Home Exercises Provided: Patient instructed to cont prior HEP. Exercises were reviewed and Misti was able to demonstrate them prior to the end of the session.  Misti  demonstrated good  understanding of the education provided. See EMR under Patient Instructions for exercises provided during therapy sessions.     ASSESSMENT     Patient continues to have slight swelling in the left knee with minor limitations with range of motion in flexion, but improves after exercises and manual. Continue to progress range of motion and stability in left knee.     Misti Is progressing well towards her goals.    Pt prognosis is Good.     Pt will continue to benefit from skilled outpatient physical therapy to address the deficits listed in the problem list box on initial evaluation, provide pt/family education and to maximize pt's level of independence in the home and community environment.     Pt's spiritual, cultural and educational needs considered and pt agreeable to plan of care and goals.     Anticipated barriers to physical therapy: none    Goals:   Short Term Goals (STG) # weeks Goal Review Date Reviewed Date Met   Pt will demonstrate independence with initial HEP to facilitate therex progression and improvements in functional mobility 4 MET   6/28/2022     The patient will increase bilateral lower extremity strength to greater than or equal to 1/2 manual muscle grade for all deficient areas in order to facilitate ambulation x 100 without pain 4 ONGOING  6/28/2022   .   Pt will start HEP 4 MET   6/28/2022                                                                                    Long Term Goals (LTG) # weeks Goal Review Date Reviewed Date Met   The patient will improve the Lower Extremity Functional Scale to less than 30% Disability 8 ONGOING  6/28/2022       The patient will increase bilateral lower extremity strength to greater than or equal to 1.5 manual muscle grade for all deficient areas in order to get in and out of vehicle with no limitations  Pt will be independent with transfers without AD. 8 ONGOING  6/28/2022       The patient will increase bilateral hip ROM to WFL in  order to improve stride length for more normalized gait pattern.0-125 8 ONGOING  6/28/2022       Pt will ambulate 300' without pain with no AD 8 MET  6/28/2022      Pt will return to previous LOF 8 ONGOING  6/28/2022                                                                     PLAN     Continue per plan of care with focus on left knee strength and range of motion.    Rere Dumont, PTA

## 2022-07-06 ENCOUNTER — CLINICAL SUPPORT (OUTPATIENT)
Dept: REHABILITATION | Facility: HOSPITAL | Age: 57
End: 2022-07-06
Attending: ORTHOPAEDIC SURGERY

## 2022-07-06 DIAGNOSIS — R26.9 GAIT ABNORMALITY: Primary | ICD-10-CM

## 2022-07-06 PROCEDURE — 97110 THERAPEUTIC EXERCISES: CPT | Mod: PN

## 2022-07-06 NOTE — PROGRESS NOTES
[facility]   Physical Therapy Treatment Note     Name: Misti West  Clinic Number: [id number]    Therapy Diagnosis:   Encounter Diagnosis   Name Primary?    Gait abnormality Yes     Physician: Neo Zapata,*    Visit Date: 7/6/2022    Physician Orders: PT Eval and Treat   Medical Diagnosis from Referral: OA of knee.S/P LT TKA 3/29/22  Evaluation Date: 4/1/2022  Authorization Period Expiration: 3/30/23  Plan of Care Expiration: 8/19/22  Progress Note Due: 4/30/22  Visit # / Visits authorized: 4/16    FOTO: 35/100  FOTO VISIT 10;52/100    Precautions: Standard, Fall and cancer     Time In:0915  Time Out:0955  Total Billable Time: 40 minutes      SUBJECTIVE     Pt reports: doing well, the swelling is there but she continues to do what she needs to do   She was compliant with home exercise program.  Response to previous treatment: no issues    Functional change: improved tolerance to exercises, driving     Pain: 0/10  Location: left knee    OBJECTIVE     ROM 0-105     Treatment     Misti received the treatments listed below:      therapeutic exercises to develop strength, endurance, ROM and flexibility for 40 minutes including:    Bike 10', full revolutions seat 5  HS stretch 3x30 B  Shuttle 37# 6'  Shuttle 12# LLE 2' NP  Shuttle 25# calf raises 2' NP  MTT /ROM 5'  SLR 2# 3X10    // bars;  On airex: mini squats 30. HR/TR 30,  gastroc stretch with half foam roll 3x30  Hip abduction B x 20 NP  Stair climbing on 6 inch stair 4 steps x 5 NP   SLS on LLE 5 x 10-15 sec hold NP  TKE at the wall with ball [id number] hold         Patient Education and Home Exercises     Home Exercises Provided and Patient Education Provided     Education provided:   -HEP, proper gait pattern to avoid ER of LLE., propping for swelling and extension     Written Home Exercises Provided: Patient instructed to cont prior HEP. Exercises were reviewed and Misti was able to demonstrate them prior to the end of the  session.  Misti demonstrated good  understanding of the education provided. See EMR under Patient Instructions for exercises provided during therapy sessions.     ASSESSMENT     Patient continues to have slight swelling in the left knee with minor limitations with range of motion in flexion, but improves after exercises and manual. Continue to progress range of motion and stability in left knee.     Misti Is progressing well towards her goals.    Pt prognosis is Good.     Pt will continue to benefit from skilled outpatient physical therapy to address the deficits listed in the problem list box on initial evaluation, provide pt/family education and to maximize pt's level of independence in the home and community environment.     Pt's spiritual, cultural and educational needs considered and pt agreeable to plan of care and goals.     Anticipated barriers to physical therapy: none    Goals:   Short Term Goals (STG) # weeks Goal Review Date Reviewed Date Met   Pt will demonstrate independence with initial HEP to facilitate therex progression and improvements in functional mobility 4 MET   7/6/2022     The patient will increase bilateral lower extremity strength to greater than or equal to 1/2 manual muscle grade for all deficient areas in order to facilitate ambulation x 100 without pain 4 ONGOING  7/6/2022   .   Pt will start HEP 4 MET   7/6/2022                                                                                    Long Term Goals (LTG) # weeks Goal Review Date Reviewed Date Met   The patient will improve the Lower Extremity Functional Scale to less than 30% Disability 8 ONGOING  7/6/2022       The patient will increase bilateral lower extremity strength to greater than or equal to 1.5 manual muscle grade for all deficient areas in order to get in and out of vehicle with no limitations  Pt will be independent with transfers without AD. 8 ONGOING  7/6/2022       The patient will increase bilateral hip ROM  to WFL in order to improve stride length for more normalized gait pattern.0-125 8 ONGOING  7/6/2022       Pt will ambulate 300' without pain with no AD 8 MET  7/6/2022      Pt will return to previous LOF 8 ONGOING  7/6/2022                                                                     PLAN     Continue per plan of care with focus on left knee strength and range of motion.    Dave Zaman, PT

## 2022-07-08 ENCOUNTER — CLINICAL SUPPORT (OUTPATIENT)
Dept: REHABILITATION | Facility: HOSPITAL | Age: 57
End: 2022-07-08
Attending: ORTHOPAEDIC SURGERY

## 2022-07-08 DIAGNOSIS — R26.9 GAIT ABNORMALITY: Primary | ICD-10-CM

## 2022-07-08 PROCEDURE — 97110 THERAPEUTIC EXERCISES: CPT | Mod: PN

## 2022-07-08 NOTE — PROGRESS NOTES
[facility]   Physical Therapy Treatment Note     Name: Misti West  Clinic Number: [id number]    Therapy Diagnosis:   Encounter Diagnosis   Name Primary?    Gait abnormality Yes     Physician: Neo Zapata,*    Visit Date: 7/8/2022    Physician Orders: PT Eval and Treat   Medical Diagnosis from Referral: OA of knee.S/P LT TKA 3/29/22  Evaluation Date: 4/1/2022  Authorization Period Expiration: 3/30/23  Plan of Care Expiration: 8/19/22  Progress Note Due: 4/30/22  Visit # / Visits authorized: 5/16    FOTO: 35/100  FOTO VISIT 10;52/100    Precautions: Standard, Fall and cancer     Time In:1200  Time Out:1240  Total Billable Time: 40 minutes      SUBJECTIVE     Pt reports: doing well,   She was compliant with home exercise program.  Response to previous treatment: no issues    Functional change: improved tolerance to exercises, driving     Pain: 0/10  Location: left knee    OBJECTIVE     ROM 0-110    Treatment     Misti received the treatments listed below:      therapeutic exercises to develop strength, endurance, ROM and flexibility for 40 minutes including:    Bike 10', full revolutions seat 5  HS stretch 3x30 B  Shuttle 37# 6'  Shuttle 12# LLE 2' NP  Shuttle 25# calf raises 2' NP  MTT /ROM 5'  SLR 3# 3X10    // bars;  On airex: mini squats 30. HR/TR 30,  gastroc stretch with half foam roll 3x30  Hip abduction B x 20 NP  Stair climbing on 6 inch stair 4 steps x 5 NP   SLS on LLE 5 x 10-15 sec hold NP  TKE at the wall with ball [id number] hold       Patient Education and Home Exercises     Home Exercises Provided and Patient Education Provided     Education provided:   -HEP, proper gait pattern to avoid ER of LLE., propping for swelling and extension     Written Home Exercises Provided: Patient instructed to cont prior HEP. Exercises were reviewed and Misti was able to demonstrate them prior to the end of the session.  Misti demonstrated good  understanding of the education  provided. See EMR under Patient Instructions for exercises provided during therapy sessions.     ASSESSMENT     Patient continues to have slight swelling in the left knee with minor limitations with range of motion in flexion, but improves after exercises and manual. Continue to progress range of motion and stability in left knee.     Misti Is progressing well towards her goals.    Pt prognosis is Good.     Pt will continue to benefit from skilled outpatient physical therapy to address the deficits listed in the problem list box on initial evaluation, provide pt/family education and to maximize pt's level of independence in the home and community environment.     Pt's spiritual, cultural and educational needs considered and pt agreeable to plan of care and goals.     Anticipated barriers to physical therapy: none    Goals:   Short Term Goals (STG) # weeks Goal Review Date Reviewed Date Met   Pt will demonstrate independence with initial HEP to facilitate therex progression and improvements in functional mobility 4 MET   7/8/2022     The patient will increase bilateral lower extremity strength to greater than or equal to 1/2 manual muscle grade for all deficient areas in order to facilitate ambulation x 100 without pain 4 ONGOING  7/8/2022   .   Pt will start HEP 4 MET   7/8/2022                                                                                    Long Term Goals (LTG) # weeks Goal Review Date Reviewed Date Met   The patient will improve the Lower Extremity Functional Scale to less than 30% Disability 8 ONGOING  7/8/2022       The patient will increase bilateral lower extremity strength to greater than or equal to 1.5 manual muscle grade for all deficient areas in order to get in and out of vehicle with no limitations  Pt will be independent with transfers without AD. 8 ONGOING  7/8/2022       The patient will increase bilateral hip ROM to WFL in order to improve stride length for more normalized gait  pattern.0-125 8 ONGOING  7/8/2022       Pt will ambulate 300' without pain with no AD 8 MET  7/8/2022      Pt will return to previous LOF 8 ONGOING  7/8/2022                                                                     PLAN     Continue per plan of care with focus on left knee strength and range of motion.    Dave Zaman, PT

## 2022-07-12 ENCOUNTER — CLINICAL SUPPORT (OUTPATIENT)
Dept: REHABILITATION | Facility: HOSPITAL | Age: 57
End: 2022-07-12
Attending: ORTHOPAEDIC SURGERY

## 2022-07-12 DIAGNOSIS — R26.9 GAIT ABNORMALITY: Primary | ICD-10-CM

## 2022-07-12 PROCEDURE — 97110 THERAPEUTIC EXERCISES: CPT | Mod: PN,CQ

## 2022-07-12 NOTE — PROGRESS NOTES
"  [facility]   Physical Therapy Treatment Note     Name: Misti West  Clinic Number: [id number]    Therapy Diagnosis:   Encounter Diagnosis   Name Primary?    Gait abnormality Yes     Physician: Neo Zapata,*    Visit Date: 7/12/2022    Physician Orders: PT Eval and Treat   Medical Diagnosis from Referral: OA of knee.S/P LT TKA 3/29/22  Evaluation Date: 4/1/2022  Authorization Period Expiration: 3/30/23  Plan of Care Expiration: 8/19/22  Progress Note Due: 4/30/22  Visit # / Visits authorized: 5/20   (new POC 6/13/22 to 8/19/22; 2 x 8wks)      FOTO: 35/100  FOTO VISIT 10;52/100    Precautions: Standard, Fall and cancer     Time In:1100  Time Out:1153  Total Billable Time: 53 minutes      SUBJECTIVE     Pt reports: she cut grass and trimmed limbs this past weekend and did well with her knee  She was compliant with home exercise program.  Response to previous treatment: positive  Functional change: improved yard work    Pain: 0/10  Location: left knee    OBJECTIVE     ROM 0-110    Treatment     Misti received the treatments listed below:      therapeutic exercises to develop strength, endurance, ROM and flexibility for 53 minutes including:    Bike 10', full revolutions seat 6    HS stretch 3x30 B  NOT PERFORMED time     Shuttle 50 B 3 x10  Shuttle 50# heel raises 3 x 10  Shuttle 25# LLE 3 x 10    SLR 3# 3X10    // bars:  Mini squats on airex x30  HR/TR on airex x30  Gastroc stretch with half foam roll 3x30  SLS on airex 3 x 30 sec  TKE 2" step 3 x 10  Step up 6" x 30      TKE at the wall with ball x 30    MTT /ROM  NOT PERFORMED   Hip abduction B x 20 NOT PERFORMED    Patient Education and Home Exercises     Home Exercises Provided and Patient Education Provided     Education provided:   -HEP, proper gait pattern to avoid ER of LLE., propping for swelling and extension     Written Home Exercises Provided: Patient instructed to cont prior HEP. Exercises were reviewed and Misti " was able to demonstrate them prior to the end of the session.  Misti demonstrated good  understanding of the education provided. See EMR under Patient Instructions for exercises provided during therapy sessions.     ASSESSMENT     Misti presents with increased L knee strength and ROM.  She has increased heel strike and terminal knee extension during ambulation.  She tolerated additional PRE's w/o difficulty, but did report fatigue in her LE and L hip.  Continued LLE strength, balance, and gait training to improve functional activities, hobbies.      Misti Is progressing well towards her goals.    Pt prognosis is Good.     Pt will continue to benefit from skilled outpatient physical therapy to address the deficits listed in the problem list box on initial evaluation, provide pt/family education and to maximize pt's level of independence in the home and community environment.     Pt's spiritual, cultural and educational needs considered and pt agreeable to plan of care and goals.     Anticipated barriers to physical therapy: none    Goals:   Short Term Goals (STG) # weeks Goal Review Date Reviewed Date Met   Pt will demonstrate independence with initial HEP to facilitate therex progression and improvements in functional mobility 4 MET   7/12/2022     The patient will increase bilateral lower extremity strength to greater than or equal to 1/2 manual muscle grade for all deficient areas in order to facilitate ambulation x 100 without pain 4 ONGOING  7/12/2022   .   Pt will start HEP 4 MET   7/12/2022                                                                                    Long Term Goals (LTG) # weeks Goal Review Date Reviewed Date Met   The patient will improve the Lower Extremity Functional Scale to less than 30% Disability 8 ONGOING  7/12/2022       The patient will increase bilateral lower extremity strength to greater than or equal to 1.5 manual muscle grade for all deficient areas in order to get in  and out of vehicle with no limitations  Pt will be independent with transfers without AD. 8 ONGOING  7/12/2022       The patient will increase bilateral hip ROM to WFL in order to improve stride length for more normalized gait pattern.0-125 8 ONGOING  7/12/2022       Pt will ambulate 300' without pain with no AD 8 MET  7/12/2022      Pt will return to previous LOF 8 ONGOING  7/12/2022                                                                     PLAN     Continue per plan of care with focus on left knee strength and range of motion.    Elidia Juarez, PTA

## 2022-07-13 ENCOUNTER — DOCUMENTATION ONLY (OUTPATIENT)
Dept: REHABILITATION | Facility: HOSPITAL | Age: 57
End: 2022-07-13

## 2022-07-13 NOTE — PROGRESS NOTES
PT/PTA met face to face to discuss pt's treatment plan and progress towards established goals. Pt will be seen by a physical therapist minimally every 6th visit or every 30 days.      Rere Dumont PTA

## 2022-07-15 ENCOUNTER — CLINICAL SUPPORT (OUTPATIENT)
Dept: REHABILITATION | Facility: HOSPITAL | Age: 57
End: 2022-07-15
Attending: ORTHOPAEDIC SURGERY

## 2022-07-15 DIAGNOSIS — R26.9 GAIT ABNORMALITY: Primary | ICD-10-CM

## 2022-07-15 PROCEDURE — 97110 THERAPEUTIC EXERCISES: CPT | Mod: PN

## 2022-07-15 NOTE — PROGRESS NOTES
"  [facility]   Physical Therapy Treatment Note     Name: Misti West  Clinic Number: [id number]    Therapy Diagnosis:   Encounter Diagnosis   Name Primary?    Gait abnormality Yes     Physician: Neo Zapata,*    Visit Date: 7/15/2022    Physician Orders: PT Eval and Treat   Medical Diagnosis from Referral: OA of knee.S/P LT TKA 3/29/22  Evaluation Date: 4/1/2022  Authorization Period Expiration: 3/30/23  Plan of Care Expiration: 8/19/22  Progress Note Due: 4/30/22  Visit # / Visits authorized: 6/20   (new POC 6/13/22 to 8/19/22; 2 x 8wks)      FOTO: 35/100  FOTO VISIT 10;52/100    Precautions: Standard, Fall and cancer     Time In:0958  Time Out:1053  Total Billable Time: 55 minutes      SUBJECTIVE     Pt reports: she cut grass and trimmed limbs this past weekend and did well with her knee  She was compliant with home exercise program.  Response to previous treatment: positive  Functional change: improved yard work    Pain: 0/10  Location: left knee    OBJECTIVE     ROM 0-115    Treatment     Misti received the treatments listed below:      therapeutic exercises to develop strength, endurance, ROM and flexibility for 53 minutes including:    Bike 10', full revolutions seat 6    HS stretch 3x30 B      Shuttle 50 B 6'  Shuttle 50# heel raises 3 x 10  Shuttle 25# LLE 3 x 10    SLR 3# 3X10  TKE 4# 3X10  PROM 5'.  // bars:  Mini squats on airex x30  HR/TR on airex x30  Gastroc stretch with half foam roll 3x30  SLS on airex 3 x 30 sec  TKE 2" step 3 x 10  Step up 6" x 30      TKE at the wall with ball x 30    MTT /ROM  NOT PERFORMED   Hip abduction B x 20 NOT PERFORMED    Patient Education and Home Exercises     Home Exercises Provided and Patient Education Provided     Education provided:   -HEP, proper gait pattern to avoid ER of LLE., propping for swelling and extension     Written Home Exercises Provided: Patient instructed to cont prior HEP. Exercises were reviewed and Misti " was able to demonstrate them prior to the end of the session.  Misti demonstrated good  understanding of the education provided. See EMR under Patient Instructions for exercises provided during therapy sessions.     ASSESSMENT     Misti presents with increased L knee strength and ROM.  She has increased heel strike and terminal knee extension during ambulation.  She tolerated additional PRE's w/o difficulty, but did report fatigue in her LE and L hip.  Continued LLE strength, balance, and gait training to improve functional activities, hobbies.      Misti Is progressing well towards her goals.    Pt prognosis is Good.     Pt will continue to benefit from skilled outpatient physical therapy to address the deficits listed in the problem list box on initial evaluation, provide pt/family education and to maximize pt's level of independence in the home and community environment.     Pt's spiritual, cultural and educational needs considered and pt agreeable to plan of care and goals.     Anticipated barriers to physical therapy: none    Goals:   Short Term Goals (STG) # weeks Goal Review Date Reviewed Date Met   Pt will demonstrate independence with initial HEP to facilitate therex progression and improvements in functional mobility 4 MET   7/15/2022     The patient will increase bilateral lower extremity strength to greater than or equal to 1/2 manual muscle grade for all deficient areas in order to facilitate ambulation x 100 without pain 4 ONGOING  7/15/2022   .   Pt will start HEP 4 MET   7/15/2022                                                                                    Long Term Goals (LTG) # weeks Goal Review Date Reviewed Date Met   The patient will improve the Lower Extremity Functional Scale to less than 30% Disability 8 ONGOING  7/15/2022       The patient will increase bilateral lower extremity strength to greater than or equal to 1.5 manual muscle grade for all deficient areas in order to get in  and out of vehicle with no limitations  Pt will be independent with transfers without AD. 8 ONGOING  7/15/2022       The patient will increase bilateral hip ROM to WFL in order to improve stride length for more normalized gait pattern.0-125 8 ONGOING  7/15/2022       Pt will ambulate 300' without pain with no AD 8 MET  7/15/2022      Pt will return to previous LOF 8 ONGOING  7/15/2022                                                                     PLAN     Continue per plan of care with focus on left knee strength and range of motion.    Dave Zaman, PT

## 2022-07-18 ENCOUNTER — OFFICE VISIT (OUTPATIENT)
Dept: FAMILY MEDICINE | Facility: CLINIC | Age: 57
End: 2022-07-18

## 2022-07-18 ENCOUNTER — LAB VISIT (OUTPATIENT)
Dept: LAB | Facility: HOSPITAL | Age: 57
End: 2022-07-18
Attending: FAMILY MEDICINE

## 2022-07-18 VITALS
WEIGHT: 160.38 LBS | TEMPERATURE: 98 F | BODY MASS INDEX: 23.76 KG/M2 | DIASTOLIC BLOOD PRESSURE: 74 MMHG | RESPIRATION RATE: 14 BRPM | SYSTOLIC BLOOD PRESSURE: 126 MMHG | HEIGHT: 69 IN | HEART RATE: 87 BPM | OXYGEN SATURATION: 98 %

## 2022-07-18 DIAGNOSIS — E78.49 OTHER HYPERLIPIDEMIA: ICD-10-CM

## 2022-07-18 DIAGNOSIS — Z12.31 ENCOUNTER FOR SCREENING MAMMOGRAM FOR MALIGNANT NEOPLASM OF BREAST: ICD-10-CM

## 2022-07-18 DIAGNOSIS — Z00.00 ANNUAL PHYSICAL EXAM: Primary | ICD-10-CM

## 2022-07-18 DIAGNOSIS — K76.9 LIVER LESION: ICD-10-CM

## 2022-07-18 DIAGNOSIS — E78.5 HYPERLIPIDEMIA, UNSPECIFIED HYPERLIPIDEMIA TYPE: ICD-10-CM

## 2022-07-18 DIAGNOSIS — M19.90 ARTHRITIS: ICD-10-CM

## 2022-07-18 DIAGNOSIS — Z00.00 ANNUAL PHYSICAL EXAM: ICD-10-CM

## 2022-07-18 DIAGNOSIS — J30.2 SEASONAL ALLERGIES: ICD-10-CM

## 2022-07-18 LAB
ALBUMIN SERPL BCP-MCNC: 3.9 G/DL (ref 3.5–5.2)
ALP SERPL-CCNC: 78 U/L (ref 55–135)
ALT SERPL W/O P-5'-P-CCNC: 16 U/L (ref 10–44)
ANION GAP SERPL CALC-SCNC: 10 MMOL/L (ref 8–16)
AST SERPL-CCNC: 19 U/L (ref 10–40)
BASOPHILS # BLD AUTO: 0.06 K/UL (ref 0–0.2)
BASOPHILS NFR BLD: 0.9 % (ref 0–1.9)
BILIRUB SERPL-MCNC: 0.4 MG/DL (ref 0.1–1)
BUN SERPL-MCNC: 11 MG/DL (ref 6–20)
CALCIUM SERPL-MCNC: 9.1 MG/DL (ref 8.7–10.5)
CHLORIDE SERPL-SCNC: 107 MMOL/L (ref 95–110)
CHOLEST SERPL-MCNC: 173 MG/DL (ref 120–199)
CHOLEST/HDLC SERPL: 3.3 {RATIO} (ref 2–5)
CO2 SERPL-SCNC: 25 MMOL/L (ref 23–29)
CREAT SERPL-MCNC: 0.7 MG/DL (ref 0.5–1.4)
CRP SERPL-MCNC: 0.8 MG/L (ref 0–8.2)
DIFFERENTIAL METHOD: ABNORMAL
EOSINOPHIL # BLD AUTO: 0.8 K/UL (ref 0–0.5)
EOSINOPHIL NFR BLD: 11.7 % (ref 0–8)
ERYTHROCYTE [DISTWIDTH] IN BLOOD BY AUTOMATED COUNT: 14.6 % (ref 11.5–14.5)
ERYTHROCYTE [SEDIMENTATION RATE] IN BLOOD BY WESTERGREN METHOD: 10 MM/HR (ref 0–20)
EST. GFR  (AFRICAN AMERICAN): >60 ML/MIN/1.73 M^2
EST. GFR  (NON AFRICAN AMERICAN): >60 ML/MIN/1.73 M^2
ESTIMATED AVG GLUCOSE: 100 MG/DL (ref 68–131)
GLUCOSE SERPL-MCNC: 91 MG/DL (ref 70–110)
HBA1C MFR BLD: 5.1 % (ref 4–5.6)
HCT VFR BLD AUTO: 38.3 % (ref 37–48.5)
HDLC SERPL-MCNC: 53 MG/DL (ref 40–75)
HDLC SERPL: 30.6 % (ref 20–50)
HGB BLD-MCNC: 12.8 G/DL (ref 12–16)
IMM GRANULOCYTES # BLD AUTO: 0.01 K/UL (ref 0–0.04)
IMM GRANULOCYTES NFR BLD AUTO: 0.2 % (ref 0–0.5)
LDLC SERPL CALC-MCNC: 104.8 MG/DL (ref 63–159)
LYMPHOCYTES # BLD AUTO: 2.7 K/UL (ref 1–4.8)
LYMPHOCYTES NFR BLD: 40.9 % (ref 18–48)
MCH RBC QN AUTO: 30.5 PG (ref 27–31)
MCHC RBC AUTO-ENTMCNC: 33.4 G/DL (ref 32–36)
MCV RBC AUTO: 91 FL (ref 82–98)
MONOCYTES # BLD AUTO: 0.4 K/UL (ref 0.3–1)
MONOCYTES NFR BLD: 5.9 % (ref 4–15)
NEUTROPHILS # BLD AUTO: 2.7 K/UL (ref 1.8–7.7)
NEUTROPHILS NFR BLD: 40.4 % (ref 38–73)
NONHDLC SERPL-MCNC: 120 MG/DL
NRBC BLD-RTO: 0 /100 WBC
PLATELET # BLD AUTO: 266 K/UL (ref 150–450)
PMV BLD AUTO: 9.8 FL (ref 9.2–12.9)
POTASSIUM SERPL-SCNC: 4 MMOL/L (ref 3.5–5.1)
PROT SERPL-MCNC: 6.7 G/DL (ref 6–8.4)
RBC # BLD AUTO: 4.2 M/UL (ref 4–5.4)
SODIUM SERPL-SCNC: 142 MMOL/L (ref 136–145)
TRIGL SERPL-MCNC: 76 MG/DL (ref 30–150)
TSH SERPL DL<=0.005 MIU/L-ACNC: 1.38 UIU/ML (ref 0.4–4)
WBC # BLD AUTO: 6.6 K/UL (ref 3.9–12.7)

## 2022-07-18 PROCEDURE — 83036 HEMOGLOBIN GLYCOSYLATED A1C: CPT | Performed by: FAMILY MEDICINE

## 2022-07-18 PROCEDURE — 80061 LIPID PANEL: CPT | Performed by: FAMILY MEDICINE

## 2022-07-18 PROCEDURE — 85025 COMPLETE CBC W/AUTO DIFF WBC: CPT | Performed by: FAMILY MEDICINE

## 2022-07-18 PROCEDURE — 84443 ASSAY THYROID STIM HORMONE: CPT | Performed by: FAMILY MEDICINE

## 2022-07-18 PROCEDURE — 99396 PR PREVENTIVE VISIT,EST,40-64: ICD-10-PCS | Mod: S$PBB,,, | Performed by: FAMILY MEDICINE

## 2022-07-18 PROCEDURE — 99396 PREV VISIT EST AGE 40-64: CPT | Mod: S$PBB,,, | Performed by: FAMILY MEDICINE

## 2022-07-18 PROCEDURE — 99999 PR PBB SHADOW E&M-EST. PATIENT-LVL V: CPT | Mod: PBBFAC,,, | Performed by: FAMILY MEDICINE

## 2022-07-18 PROCEDURE — 86140 C-REACTIVE PROTEIN: CPT | Performed by: FAMILY MEDICINE

## 2022-07-18 PROCEDURE — 80053 COMPREHEN METABOLIC PANEL: CPT | Performed by: FAMILY MEDICINE

## 2022-07-18 PROCEDURE — 99999 PR PBB SHADOW E&M-EST. PATIENT-LVL V: ICD-10-PCS | Mod: PBBFAC,,, | Performed by: FAMILY MEDICINE

## 2022-07-18 PROCEDURE — 99215 OFFICE O/P EST HI 40 MIN: CPT | Mod: PBBFAC | Performed by: FAMILY MEDICINE

## 2022-07-18 PROCEDURE — 36415 COLL VENOUS BLD VENIPUNCTURE: CPT | Performed by: FAMILY MEDICINE

## 2022-07-18 PROCEDURE — 85651 RBC SED RATE NONAUTOMATED: CPT | Performed by: FAMILY MEDICINE

## 2022-07-18 RX ORDER — 5-HYDROXYTRYPTOPHAN (5-HTP) 100 MG
CAPSULE ORAL
Qty: 20 TABLET | Refills: 2 | Status: SHIPPED | OUTPATIENT
Start: 2022-07-18 | End: 2022-07-21 | Stop reason: SDUPTHER

## 2022-07-18 RX ORDER — FLUTICASONE PROPIONATE 50 MCG
1 SPRAY, SUSPENSION (ML) NASAL 2 TIMES DAILY
Qty: 16 G | Refills: 0 | Status: SHIPPED | OUTPATIENT
Start: 2022-07-18 | End: 2022-07-21 | Stop reason: SDUPTHER

## 2022-07-18 RX ORDER — 5-HYDROXYTRYPTOPHAN (5-HTP) 100 MG
1 CAPSULE ORAL 2 TIMES DAILY PRN
Qty: 20 TABLET | Refills: 2 | Status: SHIPPED | OUTPATIENT
Start: 2022-07-18 | End: 2022-07-18

## 2022-07-18 RX ORDER — 5-HYDROXYTRYPTOPHAN (5-HTP) 100 MG
CAPSULE ORAL
Qty: 20 TABLET | Refills: 2 | Status: SHIPPED | OUTPATIENT
Start: 2022-07-18 | End: 2022-07-18

## 2022-07-18 NOTE — PROGRESS NOTES
[facility] - Clinic Note    Subjective      Ms. West is a 56 y.o. female who presents to clinic for Follow-up (6mth follow up)    Patient has been doing well with regard to the knee surgery.  She is having some arthritis symptoms.  Otherwise has been having some allergy symptoms.  The Claritin-D has helped.    PMYI Chappell has a past medical history of Anxiety, Arthritis, Cancer, Hepatomegaly, Hiatal hernia, Liver lesion, and PONV (postoperative nausea and vomiting).   PSXH Misti has a past surgical history that includes Tubal ligation; Sinus surgery; Knee arthroscopy; Esophagogastroduodenoscopy (N/A, 8/13/2020); Cervix surgery; Colonoscopy (N/A, 8/27/2020); Cystoscopy with urethral dilation (10/1/2020); and robotic arthroplasty, knee (Left, 3/29/2022).    Misti's family history includes Arthritis in her father, mother, sister, sister, and sister; Breast cancer in her sister, sister, and sister; COPD in her father; Cancer in her father and mother; Diabetes in her mother; Hypertension in her father; Kidney disease in her sister; Melanoma in her father.   SH Misti reports that she has been smoking. She has been smoking about 1.00 pack per day. She has never used smokeless tobacco. She reports previous alcohol use. She reports that she does not use drugs.   ALG Misti is allergic to codeine, morphine, naproxen sodium, percocet [oxycodone-acetaminophen], tramadol hcl, penicillins, and trintex.   MED Misti has a current medication list which includes the following prescription(s): ascorbic acid (vitamin c), aspirin, atorvastatin, b complex vitamins, biotin, cholecalciferol (vitamin d3), ferrous sulfate, glucosamine-chondroitin, multivitamin, tumeric-ging-olive-oreg-capryl, alprazolam, fluticasone propionate, and allergy relief d12.     Review of Systems   Constitutional: Negative for chills and fever.   HENT: Negative for congestion and rhinorrhea.    Eyes: Negative for visual disturbance.   Respiratory: Negative  "for cough and shortness of breath.    Cardiovascular: Negative for chest pain.   Gastrointestinal: Negative for abdominal pain, constipation, diarrhea, nausea and vomiting.   Genitourinary: Negative for dysuria.   Musculoskeletal: Negative for myalgias.   Skin: Negative for rash.   Allergic/Immunologic: Positive for environmental allergies.   Neurological: Negative for weakness and headaches.     Objective     /74 (BP Location: Left arm, Patient Position: Sitting, BP Method: Large (Manual))   Pulse 87   Temp 98.1 °F (36.7 °C) (Temporal)   Resp 14   Ht 5' 9" (1.753 m)   Wt 72.8 kg (160 lb 6.4 oz)   SpO2 98%   BMI 23.69 kg/m²     Physical Exam  Vitals and nursing note reviewed.   Constitutional:       General: She is not in acute distress.     Appearance: Normal appearance. She is well-developed. She is not diaphoretic.   HENT:      Head: Normocephalic and atraumatic.      Right Ear: External ear normal.      Left Ear: External ear normal.   Eyes:      General:         Right eye: No discharge.         Left eye: No discharge.   Cardiovascular:      Rate and Rhythm: Normal rate and regular rhythm.      Heart sounds: Normal heart sounds.   Pulmonary:      Effort: Pulmonary effort is normal.      Breath sounds: Normal breath sounds. No wheezing or rales.   Skin:     General: Skin is warm and dry.   Neurological:      Mental Status: She is alert and oriented to person, place, and time. Mental status is at baseline.   Psychiatric:         Mood and Affect: Mood normal.         Behavior: Behavior normal.         Thought Content: Thought content normal.         Judgment: Judgment normal.        Assessment/Plan     1. Annual physical exam  Lipid Panel    Comprehensive Metabolic Panel    CBC Auto Differential    Hemoglobin A1C    TSH   2. Seasonal allergies  fluticasone propionate (FLONASE) 50 mcg/actuation nasal spray    loratadine-pseudoephedrine 5-120 mg (ALLERGY RELIEF D12) 5-120 mg per tablet    DISCONTINUED: " loratadine-pseudoephedrine 5-120 mg (ALLERGY RELIEF D12) 5-120 mg per tablet   3. Other hyperlipidemia  Lipid Panel    Comprehensive Metabolic Panel    CBC Auto Differential   4. Encounter for screening mammogram for malignant neoplasm of breast  Mammo Digital Screening Bilat w/ Lam   5. Liver lesion  CT Abdomen Pelvis With Contrast   6. Arthritis  Sedimentation rate    C-reactive protein     Due for annual labs as above.  Also due for mammogram.  Will also order CT of the abdomen pelvis to assess liver given history of liver cyst.  Last CT scan was 2 years ago.  Refilled Claritin D. Prescribed Flonase to use as needed.  Follow-up in 6 months.    Future Appointments   Date Time Provider Department Center   7/19/2022 10:30 AM Rere Dumont PTA [facility] [place] [facility]   7/22/2022 10:00 AM Dave Zaman, PT [facility] [place] [facility]   7/26/2022 10:30 AM Rere Dumont PTA [facility] [place] [facility]   7/29/2022 10:00 AM Dave Zaman, PT [facility] [place] [facility]   8/2/2022 10:30 AM Rere Dumont PTA [facility] [place] [facility]   8/3/2022  9:30 AM [facility] MAMMO1 [facility] MAMMO [facility]   8/5/2022 10:00 AM Elidia Juarez PTA [facility] [place] [facility]   8/9/2022 10:30 AM Rere Dumont PTA [facility] [place] [facility]   8/12/2022 10:00 AM Elidia Juarez PTA [facility] [place] [facility]   8/22/2022 10:15 AM Neo Zapata MD [facility] [place] [facility]   8/31/2022 10:00 AM [facility] CT1 [facility] CT [facility]   1/19/2023 10:00 AM Pierre Espinal MD [facility]         Pierre Espinal MD  Family Medicine  [facility]

## 2022-07-19 ENCOUNTER — CLINICAL SUPPORT (OUTPATIENT)
Dept: REHABILITATION | Facility: HOSPITAL | Age: 57
End: 2022-07-19
Attending: ORTHOPAEDIC SURGERY

## 2022-07-19 DIAGNOSIS — R26.9 GAIT ABNORMALITY: Primary | ICD-10-CM

## 2022-07-19 PROCEDURE — 97110 THERAPEUTIC EXERCISES: CPT | Mod: PN,CQ

## 2022-07-19 NOTE — PROGRESS NOTES
"  [facility]   Physical Therapy Treatment Note     Name: Misti West  Clinic Number: [id number]    Therapy Diagnosis:   Encounter Diagnosis   Name Primary?    Gait abnormality Yes     Physician: Neo Zapata,*    Visit Date: 7/19/2022    Physician Orders: PT Eval and Treat   Medical Diagnosis from Referral: OA of knee.S/P LT TKA 3/29/22  Evaluation Date: 4/1/2022  Authorization Period Expiration: 3/30/23  Plan of Care Expiration: 8/19/22  Progress Note Due: 4/30/22  Visit # / Visits authorized: 7/20   (new POC 6/13/22 to 8/19/22; 2 x 8wks)      FOTO: 35/100  FOTO VISIT 10;52/100    Precautions: Standard, Fall and cancer     Time In:1020  Time Out:1113  Total Billable Time: 53 minutes      SUBJECTIVE     Pt reports: some discomfort in the knee, but she played with the grandchild the other day.   She was compliant with home exercise program.  Response to previous treatment: positive  Functional change: improved weight shifting to the left   Pain: 0/10  Location: left knee    OBJECTIVE     ROM 0-115    Treatment     Misti received the treatments listed below:      therapeutic exercises to develop strength, endurance, ROM and flexibility for 53 minutes including:    Bike 10', full revolutions seat 6    HS stretch 3x30 B      Shuttle 50# B 6'  Shuttle 50# heel raises 3 x 10  Shuttle 25# LLE 3 x 10      // bars:  Mini squats on airex x30  HR/TR on airex x30  Gastroc stretch with half roll 3x30  TKE 2" step 3 x 10  Step up and down 6" x 30  TKE at the wall with ball x 30  SLS rebounder with green ball 3 x 10       Not performed today:  MTT /ROM   Hip abduction B x 20  PROM 5'   SLR 3# 3X10  TKE 4# 3X10        Patient Education and Home Exercises     Home Exercises Provided and Patient Education Provided     Education provided:   -HEP, proper gait pattern to avoid ER of LLE., propping for swelling and extension     Written Home Exercises Provided: Patient instructed to cont prior HEP. " Exercises were reviewed and Misti was able to demonstrate them prior to the end of the session.  Misti demonstrated good  understanding of the education provided. See EMR under Patient Instructions for exercises provided during therapy sessions.     ASSESSMENT     Patient continues to be challenged with equal weights to left side. Tolerated session well with focus on LLE strength and flexibility. Focus on descending stairs today. Continue to progress knee stability and focus on descending stairs.     Misti Is progressing well towards her goals.    Pt prognosis is Good.     Pt will continue to benefit from skilled outpatient physical therapy to address the deficits listed in the problem list box on initial evaluation, provide pt/family education and to maximize pt's level of independence in the home and community environment.     Pt's spiritual, cultural and educational needs considered and pt agreeable to plan of care and goals.     Anticipated barriers to physical therapy: none    Goals:   Short Term Goals (STG) # weeks Goal Review Date Reviewed Date Met   Pt will demonstrate independence with initial HEP to facilitate therex progression and improvements in functional mobility 4 MET   7/19/2022     The patient will increase bilateral lower extremity strength to greater than or equal to 1/2 manual muscle grade for all deficient areas in order to facilitate ambulation x 100 without pain 4 ONGOING  7/19/2022   .   Pt will start HEP 4 MET   7/19/2022                                                                                    Long Term Goals (LTG) # weeks Goal Review Date Reviewed Date Met   The patient will improve the Lower Extremity Functional Scale to less than 30% Disability 8 ONGOING  7/19/2022       The patient will increase bilateral lower extremity strength to greater than or equal to 1.5 manual muscle grade for all deficient areas in order to get in and out of vehicle with no limitations  Pt will be  independent with transfers without AD. 8 ONGOING  7/19/2022       The patient will increase bilateral hip ROM to WFL in order to improve stride length for more normalized gait pattern.0-125 8 ONGOING  7/19/2022       Pt will ambulate 300' without pain with no AD 8 MET  7/19/2022      Pt will return to previous LOF 8 ONGOING  7/19/2022                                                                     PLAN     Continue per plan of care with focus on left knee strength and range of motion.    Rere Dumont, PTA

## 2022-07-22 ENCOUNTER — CLINICAL SUPPORT (OUTPATIENT)
Dept: REHABILITATION | Facility: HOSPITAL | Age: 57
End: 2022-07-22
Attending: ORTHOPAEDIC SURGERY

## 2022-07-22 DIAGNOSIS — R26.9 GAIT ABNORMALITY: Primary | ICD-10-CM

## 2022-07-22 PROCEDURE — 97110 THERAPEUTIC EXERCISES: CPT | Mod: PN

## 2022-07-22 NOTE — PROGRESS NOTES
"  [facility]   Physical Therapy Treatment Note     Name: Misti West  Clinic Number: [id number]    Therapy Diagnosis:   Encounter Diagnosis   Name Primary?    Gait abnormality Yes     Physician: Neo Zapata,*    Visit Date: 7/22/2022    Physician Orders: PT Eval and Treat   Medical Diagnosis from Referral: OA of knee.S/P LT TKA 3/29/22  Evaluation Date: 4/1/2022  Authorization Period Expiration: 3/30/23  Plan of Care Expiration: 8/19/22  Progress Note Due: 4/30/22  Visit # / Visits authorized: 7/20   (new POC 6/13/22 to 8/19/22; 2 x 8wks)      FOTO: 35/100  FOTO VISIT 10;52/100    Precautions: Standard, Fall and cancer     Time In:0955  Time Out:1050  Total Billable Time: 55 minutes      SUBJECTIVE     Pt reports: some discomfort in the knee,    She was compliant with home exercise program.  Response to previous treatment: positive  Functional change: improved weight shifting to the left   Pain: 1/10  Location: left knee    OBJECTIVE     ROM 0-115    Treatment     Misti received the treatments listed below:      therapeutic exercises to develop strength, endurance, ROM and flexibility for 53 minutes including:    Bike 10', full revolutions seat 6  EFX 3'  HS stretch 3x30 B    PROM/MTT 4'   Shuttle 50# B 6'  Shuttle 50# heel raises 3 x 10  Shuttle 25# LLE 3 x 10    // bars:  Mini squats on airex x30  HR/TR on airex x30  Gastroc stretch with half roll 3x30  TKE 2" step 3 x 10  Step up and down 6" x 30 NP  TKE at the wall with ball x 30  SLS rebounder with green ball 3 x 10 NP.    Hip abduction B x 20  SLR 3# 3X10  TKE 4# 3X10        Patient Education and Home Exercises     Home Exercises Provided and Patient Education Provided     Education provided:   -HEP, proper gait pattern to avoid ER of LLE., propping for swelling and extension     Written Home Exercises Provided: Patient instructed to cont prior HEP. Exercises were reviewed and Misti was able to demonstrate them prior to " the end of the session.  Misti demonstrated good  understanding of the education provided. See EMR under Patient Instructions for exercises provided during therapy sessions.     ASSESSMENT     Patient continues to be challenged with equal weights to left side. Tolerated session well with focus on LLE strength and flexibility. Focus on descending stairs today. Continue to progress knee stability and focus on descending stairs.     Misti Is progressing well towards her goals.    Pt prognosis is Good.     Pt will continue to benefit from skilled outpatient physical therapy to address the deficits listed in the problem list box on initial evaluation, provide pt/family education and to maximize pt's level of independence in the home and community environment.     Pt's spiritual, cultural and educational needs considered and pt agreeable to plan of care and goals.     Anticipated barriers to physical therapy: none    Goals:   Short Term Goals (STG) # weeks Goal Review Date Reviewed Date Met   Pt will demonstrate independence with initial HEP to facilitate therex progression and improvements in functional mobility 4 MET   7/22/2022     The patient will increase bilateral lower extremity strength to greater than or equal to 1/2 manual muscle grade for all deficient areas in order to facilitate ambulation x 100 without pain 4 ONGOING  7/22/2022   .   Pt will start HEP 4 MET   7/22/2022                                                                                    Long Term Goals (LTG) # weeks Goal Review Date Reviewed Date Met   The patient will improve the Lower Extremity Functional Scale to less than 30% Disability 8 ONGOING  7/22/2022       The patient will increase bilateral lower extremity strength to greater than or equal to 1.5 manual muscle grade for all deficient areas in order to get in and out of vehicle with no limitations  Pt will be independent with transfers without AD. 8 ONGOING  7/22/2022       The  patient will increase bilateral hip ROM to WFL in order to improve stride length for more normalized gait pattern.0-125 8 ONGOING  7/22/2022       Pt will ambulate 300' without pain with no AD 8 MET  7/22/2022      Pt will return to previous LOF 8 ONGOING  7/22/2022                                                                     PLAN     Continue per plan of care with focus on left knee strength and range of motion.    Dave Zaman, PT

## 2022-07-26 ENCOUNTER — CLINICAL SUPPORT (OUTPATIENT)
Dept: REHABILITATION | Facility: HOSPITAL | Age: 57
End: 2022-07-26
Attending: ORTHOPAEDIC SURGERY

## 2022-07-26 DIAGNOSIS — R26.9 GAIT ABNORMALITY: Primary | ICD-10-CM

## 2022-07-26 PROCEDURE — 97110 THERAPEUTIC EXERCISES: CPT | Mod: PN,CQ

## 2022-07-26 NOTE — PROGRESS NOTES
[facility]   Physical Therapy Treatment Note     Name: Misti West  Clinic Number: [id number]    Therapy Diagnosis:   Encounter Diagnosis   Name Primary?    Gait abnormality Yes     Physician: Neo Zapata,*    Visit Date: 7/26/2022    Physician Orders: PT Eval and Treat   Medical Diagnosis from Referral: OA of knee.S/P LT TKA 3/29/22  Evaluation Date: 4/1/2022  Authorization Period Expiration: 3/30/23  Plan of Care Expiration: 8/19/22  Progress Note Due: 4/30/22  Visit # / Visits authorized: 8/20   (new POC 6/13/22 to 8/19/22; 2 x 8wks)    FOTO: 35/100  FOTO VISIT 10;52/100    Precautions: Standard, Fall and cancer     Time In:1030  Time Out:1110  Total Billable Time: 55 minutes      SUBJECTIVE     Pt reports: bilateral low back pain upon arrival. Still struggles with her left hip and knee flexion.   She was compliant with home exercise program.  Response to previous treatment: no issues   Functional change: can get up and down off the floor     Pain: 3/10  Location: bilateral low back pain and knee     OBJECTIVE     ROM 0-115    Treatment     Misti received the treatments listed below:      therapeutic exercises to develop strength, endurance, ROM and flexibility for 55 minutes including:    Bike 10', full revolutions seat 6  EFX 3'  HS stretch 3x30 B    Bridges staggered stance 3 x 10   Jethro test stretch 3 x 30 sec LLE   Shuttle 50# B staggered stance 3 x 10   Shuttle 37# LLE 3 x 10  Standing abduction x 20 B   Standing extension x 20 B       Patient Education and Home Exercises     Home Exercises Provided and Patient Education Provided     Education provided:   -HEP, proper gait pattern to avoid ER of LLE., propping for swelling and extension     Written Home Exercises Provided: Patient instructed to cont prior HEP. Exercises were reviewed and Misti was able to demonstrate them prior to the end of the session.  Misti demonstrated good  understanding of the education  provided. See EMR under Patient Instructions for exercises provided during therapy sessions.     ASSESSMENT     Continue to focus on descending stairs to improve reciprocal gait within tolerance to patients left hip. Patient continues to require cueing for gait mechanics especially when her left hip is irritating her. Shuttle and bridges focused on a staggered stance with LLE back to increase weight through it. Progress strength in left hip and knee.     Misti Is progressing well towards her goals.    Pt prognosis is Good.     Pt will continue to benefit from skilled outpatient physical therapy to address the deficits listed in the problem list box on initial evaluation, provide pt/family education and to maximize pt's level of independence in the home and community environment.     Pt's spiritual, cultural and educational needs considered and pt agreeable to plan of care and goals.     Anticipated barriers to physical therapy: none    Goals:   Short Term Goals (STG) # weeks Goal Review Date Reviewed Date Met   Pt will demonstrate independence with initial HEP to facilitate therex progression and improvements in functional mobility 4 MET   7/26/2022     The patient will increase bilateral lower extremity strength to greater than or equal to 1/2 manual muscle grade for all deficient areas in order to facilitate ambulation x 100 without pain 4 ONGOING  7/26/2022   .   Pt will start HEP 4 MET   7/26/2022                                                                                    Long Term Goals (LTG) # weeks Goal Review Date Reviewed Date Met   The patient will improve the Lower Extremity Functional Scale to less than 30% Disability 8 ONGOING  7/26/2022       The patient will increase bilateral lower extremity strength to greater than or equal to 1.5 manual muscle grade for all deficient areas in order to get in and out of vehicle with no limitations  Pt will be independent with transfers without AD. 8 ONGOING   7/26/2022       The patient will increase bilateral hip ROM to WFL in order to improve stride length for more normalized gait pattern.0-125 8 ONGOING  7/26/2022       Pt will ambulate 300' without pain with no AD 8 MET  7/26/2022      Pt will return to previous LOF 8 ONGOING  7/26/2022                                                                     PLAN     Continue per plan of care with focus on left knee strength and range of motion.    Rere Dumont, PTA

## 2022-07-29 ENCOUNTER — CLINICAL SUPPORT (OUTPATIENT)
Dept: REHABILITATION | Facility: HOSPITAL | Age: 57
End: 2022-07-29
Attending: ORTHOPAEDIC SURGERY

## 2022-07-29 DIAGNOSIS — R26.9 GAIT ABNORMALITY: Primary | ICD-10-CM

## 2022-07-29 PROCEDURE — 97110 THERAPEUTIC EXERCISES: CPT | Mod: PN

## 2022-07-29 NOTE — PROGRESS NOTES
[facility]   Physical Therapy Treatment Note     Name: Misti West  Clinic Number: [id number]    Therapy Diagnosis:   Encounter Diagnosis   Name Primary?    Gait abnormality Yes     Physician: Neo Zapata,*    Visit Date: 7/29/2022    Physician Orders: PT Eval and Treat   Medical Diagnosis from Referral: OA of knee.S/P LT TKA 3/29/22  Evaluation Date: 4/1/2022  Authorization Period Expiration: 3/30/23  Plan of Care Expiration: 8/19/22  Progress Note Due: 4/30/22  Visit # / Visits authorized: 8/20   (new POC 6/13/22 to 8/19/22; 2 x 8wks)    FOTO: 35/100  FOTO VISIT 10;52/100    Precautions: Standard, Fall and cancer     Time In:0957  Time Out:1050  Total Billable Time: 53 minutes      SUBJECTIVE     Pt reports: chiropractor for left hip yesterday.  She was compliant with home exercise program.  Response to previous treatment: no issues   Functional change: can get up and down off the floor     Pain: 3/10  Location: bilateral low back pain and left knee     OBJECTIVE     ROM 0-115    Treatment     Misti received the treatments listed below:      therapeutic exercises to develop strength, endurance, ROM and flexibility for 55 minutes including:    Bike 10', full revolutions seat 6  EFX 4'  PROM 6'    // bars;mini squats 30 on airex, HR/TR 30, gastroc stretch 3x30  HS stretch 3x30 B    Bridges staggered stance 3 x 10   Jethro test stretch 3 x 30 sec LLE   Shuttle 50# B 6'  Shuttle 37# LLE 3 x 10  Standing abduction x 20 B   Standing extension x 20 B       Patient Education and Home Exercises     Home Exercises Provided and Patient Education Provided     Education provided:   -HEP, proper gait pattern to avoid ER of LLE., propping for swelling and extension     Written Home Exercises Provided: Patient instructed to cont prior HEP. Exercises were reviewed and Misti was able to demonstrate them prior to the end of the session.  Misti demonstrated good  understanding of the  education provided. See EMR under Patient Instructions for exercises provided during therapy sessions.     ASSESSMENT     Continue to focus on descending stairs to improve reciprocal gait within tolerance to patients left hip. Patient continues to require cueing for gait mechanics especially when her left hip is irritating her. Shuttle and bridges focused on a staggered stance with LLE back to increase weight through it. Progress strength in left hip and knee.     Misti Is progressing well towards her goals.    Pt prognosis is Good.     Pt will continue to benefit from skilled outpatient physical therapy to address the deficits listed in the problem list box on initial evaluation, provide pt/family education and to maximize pt's level of independence in the home and community environment.     Pt's spiritual, cultural and educational needs considered and pt agreeable to plan of care and goals.     Anticipated barriers to physical therapy: none    Goals:   Short Term Goals (STG) # weeks Goal Review Date Reviewed Date Met   Pt will demonstrate independence with initial HEP to facilitate therex progression and improvements in functional mobility 4 MET   7/29/2022     The patient will increase bilateral lower extremity strength to greater than or equal to 1/2 manual muscle grade for all deficient areas in order to facilitate ambulation x 100 without pain 4 ONGOING  7/29/2022   .   Pt will start HEP 4 MET   7/29/2022                                                                                    Long Term Goals (LTG) # weeks Goal Review Date Reviewed Date Met   The patient will improve the Lower Extremity Functional Scale to less than 30% Disability 8 ONGOING  7/29/2022       The patient will increase bilateral lower extremity strength to greater than or equal to 1.5 manual muscle grade for all deficient areas in order to get in and out of vehicle with no limitations  Pt will be independent with transfers without AD.  8 ONGOING  7/29/2022       The patient will increase bilateral hip ROM to WFL in order to improve stride length for more normalized gait pattern.0-125 8 ONGOING  7/29/2022       Pt will ambulate 300' without pain with no AD 8 MET  7/29/2022      Pt will return to previous LOF 8 ONGOING  7/29/2022                                                                     PLAN     Continue per plan of care with focus on left knee strength and range of motion.    Dave Zaman, PT

## 2022-08-03 ENCOUNTER — HOSPITAL ENCOUNTER (OUTPATIENT)
Dept: RADIOLOGY | Facility: HOSPITAL | Age: 57
Discharge: HOME | End: 2022-08-03
Attending: FAMILY MEDICINE

## 2022-08-03 DIAGNOSIS — Z12.31 ENCOUNTER FOR SCREENING MAMMOGRAM FOR MALIGNANT NEOPLASM OF BREAST: ICD-10-CM

## 2022-08-03 PROCEDURE — 77067 SCR MAMMO BI INCL CAD: CPT | Mod: 26,,, | Performed by: RADIOLOGY

## 2022-08-03 PROCEDURE — 77067 MAMMO DIGITAL SCREENING BILAT WITH TOMO: ICD-10-PCS | Mod: 26,,, | Performed by: RADIOLOGY

## 2022-08-03 PROCEDURE — 77063 MAMMO DIGITAL SCREENING BILAT WITH TOMO: ICD-10-PCS | Mod: 26,,, | Performed by: RADIOLOGY

## 2022-08-03 PROCEDURE — 77063 BREAST TOMOSYNTHESIS BI: CPT | Mod: TC

## 2022-08-03 PROCEDURE — 77063 BREAST TOMOSYNTHESIS BI: CPT | Mod: 26,,, | Performed by: RADIOLOGY

## 2022-08-05 ENCOUNTER — CLINICAL SUPPORT (OUTPATIENT)
Dept: REHABILITATION | Facility: HOSPITAL | Age: 57
End: 2022-08-05
Attending: ORTHOPAEDIC SURGERY

## 2022-08-05 DIAGNOSIS — R26.9 GAIT ABNORMALITY: Primary | ICD-10-CM

## 2022-08-05 PROCEDURE — 97110 THERAPEUTIC EXERCISES: CPT | Mod: PN,CQ

## 2022-08-05 PROCEDURE — 97140 MANUAL THERAPY 1/> REGIONS: CPT | Mod: PN,CQ

## 2022-08-05 NOTE — PROGRESS NOTES
"  [facility]   Physical Therapy Treatment Note     Name: Misti West  Clinic Number: [id number]    Therapy Diagnosis:   Encounter Diagnosis   Name Primary?    Gait abnormality Yes     Physician: Neo Zapata,*    Visit Date: 8/5/2022    Physician Orders: PT Eval and Treat   Medical Diagnosis from Referral: OA of knee.S/P LT TKA 3/29/22  Evaluation Date: 4/1/2022  Authorization Period Expiration: 3/30/23  Plan of Care Expiration: 8/19/22  Progress Note Due: 4/30/22  Visit # / Visits authorized: 28/20   (new POC 6/13/22 to 8/19/22; 2 x 8wks)    FOTO: 35/100  FOTO VISIT 10;52/100    Precautions: Standard, Fall and cancer     Time In:1004  Time Out:1100  Total Billable Time: 56 minutes      SUBJECTIVE     Pt reports: she is having L hip pain, "It feels like it is stuck up in the joint"  She was compliant with home exercise program.  Response to previous treatment: positive  Functional change: can get up and down off the floor     Pain: 2/10  Location: bilateral low back pain and left knee     OBJECTIVE     ROM 0-115    Treatment     Misti received the treatments listed below:      therapeutic exercises to develop strength, endurance, ROM and flexibility for 46 minutes including:    Bike 10', full revolutions seat 6 -- seat @ 11; x 5 minutes today  EFX 4' NOT PERFORMED time       // bars;  SLS on airex 3 x 20 sec  Mini squats on airex x 30 NOT PERFORMED time   HR/TR 30 NOT PERFORMED time   Slant board gastroc stretch 3x30    Heel slides with strap x 7, with occasional overpressure  NOT PERFORMED time   HS stretch 3x30 B    Bridges staggered stance 3 x 10   Jethro test stretch 3 x 30 sec LLE     NOT PERFORMED time   Shuttle 50# B 6'  Shuttle 37# LLE 3 x 10  Standing abduction x 20 B   Standing extension x 20 B     Manual therapy: x 10 minutes  Passive knee extension  IASTM to L distal quads  Contract/relax knee flexion    Patient Education and Home Exercises     Home Exercises " Provided and Patient Education Provided     Education provided:   -HEP, proper gait pattern to avoid ER of LLE., propping for swelling and extension   -Additional HOME EXERCISE PROGRAM issued and reviewed, pt verbalized understanding    Written Home Exercises Provided: Patient instructed to cont prior HEP. Exercises were reviewed and Misti was able to demonstrate them prior to the end of the session.  Misti demonstrated good  understanding of the education provided. See EMR under Patient Instructions for exercises provided during therapy sessions.     ASSESSMENT     Focused on knee RANGE OF MOTION today. She lacks terminal knee extension and heel strike.  Limited knee flexion and toe off is affecting her hip and increasing her pain.  Reviewed stretches for flexion and extension and issued HOME EXERCISE PROGRAM for this.  Pt reported increased knee flexion after Manual Therapy.  Continued RANGE OF MOTION to improve gait pattern and decrease her pain.    Misti Is progressing well towards her goals.    Pt prognosis is Good.     Pt will continue to benefit from skilled outpatient physical therapy to address the deficits listed in the problem list box on initial evaluation, provide pt/family education and to maximize pt's level of independence in the home and community environment.     Pt's spiritual, cultural and educational needs considered and pt agreeable to plan of care and goals.     Anticipated barriers to physical therapy: none    Goals:   Short Term Goals (STG) # weeks Goal Review Date Reviewed Date Met   Pt will demonstrate independence with initial HEP to facilitate therex progression and improvements in functional mobility 4 MET   8/5/2022     The patient will increase bilateral lower extremity strength to greater than or equal to 1/2 manual muscle grade for all deficient areas in order to facilitate ambulation x 100 without pain 4 ONGOING  8/5/2022   .   Pt will start HEP 4 MET   8/5/2022                                                                                     Long Term Goals (LTG) # weeks Goal Review Date Reviewed Date Met   The patient will improve the Lower Extremity Functional Scale to less than 30% Disability 8 ONGOING  8/5/2022       The patient will increase bilateral lower extremity strength to greater than or equal to 1.5 manual muscle grade for all deficient areas in order to get in and out of vehicle with no limitations  Pt will be independent with transfers without AD. 8 ONGOING  8/5/2022       The patient will increase bilateral hip ROM to WFL in order to improve stride length for more normalized gait pattern.0-125 8 ONGOING  8/5/2022       Pt will ambulate 300' without pain with no AD 8 MET  8/5/2022      Pt will return to previous LOF 8 ONGOING  8/5/2022                                                                     PLAN     Continue per plan of care with focus on left knee strength and range of motion.    Elidia Juarez, PTA

## 2022-08-09 ENCOUNTER — CLINICAL SUPPORT (OUTPATIENT)
Dept: REHABILITATION | Facility: HOSPITAL | Age: 57
End: 2022-08-09
Attending: ORTHOPAEDIC SURGERY

## 2022-08-09 ENCOUNTER — DOCUMENTATION ONLY (OUTPATIENT)
Dept: REHABILITATION | Facility: HOSPITAL | Age: 57
End: 2022-08-09

## 2022-08-09 DIAGNOSIS — R26.9 GAIT ABNORMALITY: Primary | ICD-10-CM

## 2022-08-09 PROCEDURE — 97110 THERAPEUTIC EXERCISES: CPT | Mod: PN,CQ

## 2022-08-09 PROCEDURE — 97140 MANUAL THERAPY 1/> REGIONS: CPT | Mod: PN,CQ

## 2022-08-09 NOTE — PROGRESS NOTES
30 day visit PT-PTA face-face discussion with Dave cifuentes: patient status, POC, and plan for progression done 8/9/22.  Elidia Juarez, PTA

## 2022-08-09 NOTE — PROGRESS NOTES
[facility]   Physical Therapy Treatment Note     Name: Misti West  Clinic Number: [id number]    Therapy Diagnosis:   Encounter Diagnosis   Name Primary?    Gait abnormality Yes     Physician: Neo Zapata,*    Visit Date: 8/9/2022    Physician Orders: PT Eval and Treat   Medical Diagnosis from Referral: OA of knee.S/P LT TKA 3/29/22  Evaluation Date: 4/1/2022  Authorization Period Expiration: 3/30/23  Plan of Care Expiration: 8/19/22  Progress Note Due: 4/30/22  Visit # / Visits authorized: 29/20   (new POC 6/13/22 to 8/19/22; 2 x 8wks)    FOTO: 35/100  FOTO VISIT 10;52/100    Precautions: Standard, Fall and cancer     Time In:1030  Time Out:1130  Total Billable Time: 60 minutes      SUBJECTIVE     Pt reports: wants to keep working on her scar tissue.   She was compliant with home exercise program.  Response to previous treatment: very good!   Functional change: can get up and down off the floor     Pain: 2/10  Location: bilateral low back pain and left knee     OBJECTIVE     ROM 0-115    Treatment     Misti received the treatments listed below:      therapeutic exercises to develop strength, endurance, ROM and flexibility for 25 minutes including:    Bike 10', full revolutions seat 6 -- seat @ 11; x 8 minutes     Prone extension hang x 4 min with 3# weight   Gastroc stretch 3 x 30 sec hold       Manual therapy: x 30 minutes  Passive knee extension  IASTM to L distal quads, gastroc and hamstring  Contract/relax knee flexion          Not performed today:   // bars;  SLS on airex 3 x 20 sec  Mini squats on airex x 30   HR/TR 30    HS stretch 3x30 B    Bridges staggered stance 3 x 10   Jethro test stretch 3 x 30 sec LLE       Shuttle 50# B 6'  Shuttle 37# LLE 3 x 10  Standing abduction x 20 B   Standing extension x 20 B       Patient Education and Home Exercises     Home Exercises Provided and Patient Education Provided     Education provided:   -HEP, proper gait pattern to  avoid ER of LLE., propping for swelling and extension   -Additional HOME EXERCISE PROGRAM issued and reviewed, pt verbalized understanding    Written Home Exercises Provided: Patient instructed to cont prior HEP. Exercises were reviewed and Misti was able to demonstrate them prior to the end of the session.  Misti demonstrated good  understanding of the education provided. See EMR under Patient Instructions for exercises provided during therapy sessions.     ASSESSMENT     Manual therapy focused on more today to increase knee range of motion and improve total knee extension. Increased tenderness to medial aspect of knee with increased discomfort when leaving session.     Misti Is progressing well towards her goals.    Pt prognosis is Good.     Pt will continue to benefit from skilled outpatient physical therapy to address the deficits listed in the problem list box on initial evaluation, provide pt/family education and to maximize pt's level of independence in the home and community environment.     Pt's spiritual, cultural and educational needs considered and pt agreeable to plan of care and goals.     Anticipated barriers to physical therapy: none    Goals:   Short Term Goals (STG) # weeks Goal Review Date Reviewed Date Met   Pt will demonstrate independence with initial HEP to facilitate therex progression and improvements in functional mobility 4 MET   8/9/2022     The patient will increase bilateral lower extremity strength to greater than or equal to 1/2 manual muscle grade for all deficient areas in order to facilitate ambulation x 100 without pain 4 ONGOING  8/9/2022   .   Pt will start HEP 4 MET   8/9/2022                                                                                    Long Term Goals (LTG) # weeks Goal Review Date Reviewed Date Met   The patient will improve the Lower Extremity Functional Scale to less than 30% Disability 8 ONGOING  8/9/2022       The patient will increase bilateral  lower extremity strength to greater than or equal to 1.5 manual muscle grade for all deficient areas in order to get in and out of vehicle with no limitations  Pt will be independent with transfers without AD. 8 ONGOING  8/9/2022       The patient will increase bilateral hip ROM to WFL in order to improve stride length for more normalized gait pattern.0-125 8 ONGOING  8/9/2022       Pt will ambulate 300' without pain with no AD 8 MET  8/9/2022      Pt will return to previous LOF 8 ONGOING  8/9/2022                                                                     PLAN     Continue per plan of care with focus on left knee strength and range of motion.    Rere Dumont, PTA

## 2022-08-12 ENCOUNTER — CLINICAL SUPPORT (OUTPATIENT)
Dept: REHABILITATION | Facility: HOSPITAL | Age: 57
End: 2022-08-12
Attending: ORTHOPAEDIC SURGERY

## 2022-08-12 DIAGNOSIS — R26.9 GAIT ABNORMALITY: Primary | ICD-10-CM

## 2022-08-12 PROCEDURE — 97110 THERAPEUTIC EXERCISES: CPT | Mod: PN

## 2022-08-12 NOTE — PROGRESS NOTES
[facility]  PT Discharge Note    Name: Misti West  Clinic Number: [id number]    Therapy Diagnosis:   Encounter Diagnosis   Name Primary?    Gait abnormality Yes     Physician: Neo Zapata,*    Physician Orders: Eval and treat  Medical Diagnosis: s/p lt TKA.  Evaluation Date: 4/1/22      Date of Last visit: 8/12/22  Total Visits Received: 30    ASSESSMENT      Pt reached MMI.    Discharge reason: Patient has reached the maximum rehab potential for the present time    Discharge FOTO Score: 64/100    Goals: Pain 0/10  ROM 0-115.  Strength 5/5 in range available.  Gait;guarded,without AD.  Pt is independent with all ADLs.    PLAN   This patient is discharged from Physical Therapy with HEP PRN.      Dave Zaman, PT

## 2022-08-12 NOTE — PROGRESS NOTES
[facility]   Physical Therapy Treatment Note     Name: Misti West  Clinic Number: [id number]    Therapy Diagnosis:   Encounter Diagnosis   Name Primary?    Gait abnormality Yes     Physician: Neo Zapata,*    Visit Date: 8/12/2022    Physician Orders: PT Eval and Treat   Medical Diagnosis from Referral: OA of knee.S/P LT TKA 3/29/22  Evaluation Date: 4/1/2022  Authorization Period Expiration: 3/30/23  Plan of Care Expiration: 8/19/22  Progress Note Due: 4/30/22  Visit # / Visits authorized: 30/20   (new POC 6/13/22 to 8/19/22; 2 x 8wks)    FOTO: 35/100  FOTO VISIT 10;52/100    Precautions: Standard, Fall and cancer     Time In:0950  Time Out:0943  Total Billable Time: 53 minutes      SUBJECTIVE     Pt reports: no pain today  She was compliant with home exercise program.  Response to previous treatment: very good!   Functional change: can get up and down off the floor     Pain: 0/10  Location: bilateral low back pain and left knee     OBJECTIVE     ROM 0-115    Treatment     Misti received the treatments listed below:      therapeutic exercises to develop strength, endurance, ROM and flexibility for 25 minutes including:    Bike 10', full revolutions seat 6 -- seat @ 9; x 8 minutes     Prone extension hang x 4 min with 3# weight   Gastroc stretch 3 x 30 sec hold       Manual therapy: x 30 minutes NP  Passive knee extension  IASTM to L distal quads, gastroc and hamstring  Contract/relax knee flexion    // bars;  SLS on airex 3 x 20 sec  Mini squats on airex x 30   HR/TR 30  Gastroc stretch;3x30   HS stretch 3x30 B    Bridges staggered stance 3 x 10   Jethro test stretch 3 x 30 sec LLE       Shuttle 50# B 6'  Shuttle 37# LLE 3 x 10  Standing abduction x 20 B   Standing extension x 20 B       Patient Education and Home Exercises     Home Exercises Provided and Patient Education Provided     Education provided:   -HEP, proper gait pattern to avoid ER of LLE., propping for  swelling and extension   -Additional HOME EXERCISE PROGRAM issued and reviewed, pt verbalized understanding    Written Home Exercises Provided: Patient instructed to cont prior HEP. Exercises were reviewed and Misti was able to demonstrate them prior to the end of the session.  Misit demonstrated good  understanding of the education provided. See EMR under Patient Instructions for exercises provided during therapy sessions.     ASSESSMENT     Manual therapy focused on more today to increase knee range of motion and improve total knee extension. Increased tenderness to medial aspect of knee with increased discomfort when leaving session.     Misti Is progressing well towards her goals.    Pt prognosis is Good.     Pt will continue to benefit from skilled outpatient physical therapy to address the deficits listed in the problem list box on initial evaluation, provide pt/family education and to maximize pt's level of independence in the home and community environment.     Pt's spiritual, cultural and educational needs considered and pt agreeable to plan of care and goals.     Anticipated barriers to physical therapy: none    Goals:   Short Term Goals (STG) # weeks Goal Review Date Reviewed Date Met   Pt will demonstrate independence with initial HEP to facilitate therex progression and improvements in functional mobility 4 MET   8/12/2022     The patient will increase bilateral lower extremity strength to greater than or equal to 1/2 manual muscle grade for all deficient areas in order to facilitate ambulation x 100 without pain 4 ONGOING  8/12/2022   .   Pt will start HEP 4 MET   8/12/2022                                                                                    Long Term Goals (LTG) # weeks Goal Review Date Reviewed Date Met   The patient will improve the Lower Extremity Functional Scale to less than 30% Disability 8 ONGOING  8/12/2022       The patient will increase bilateral lower extremity strength to  greater than or equal to 1.5 manual muscle grade for all deficient areas in order to get in and out of vehicle with no limitations  Pt will be independent with transfers without AD. 8 ONGOING  8/12/2022       The patient will increase bilateral hip ROM to WFL in order to improve stride length for more normalized gait pattern.0-125 8 ONGOING  8/12/2022       Pt will ambulate 300' without pain with no AD 8 MET  8/12/2022      Pt will return to previous LOF 8 ONGOING  8/12/2022                                                                     PLAN     DC per POC.    Dave Zaman, PT

## 2022-08-22 ENCOUNTER — OFFICE VISIT (OUTPATIENT)
Dept: ORTHOPEDICS | Facility: CLINIC | Age: 57
End: 2022-08-22

## 2022-08-22 VITALS — RESPIRATION RATE: 18 BRPM | BODY MASS INDEX: 23.7 KG/M2 | WEIGHT: 160 LBS | HEIGHT: 69 IN

## 2022-08-22 DIAGNOSIS — Z96.652 STATUS POST LEFT KNEE REPLACEMENT: Primary | ICD-10-CM

## 2022-08-22 PROCEDURE — 99212 OFFICE O/P EST SF 10 MIN: CPT | Mod: PBBFAC,PN | Performed by: ORTHOPAEDIC SURGERY

## 2022-08-22 PROCEDURE — 99999 PR PBB SHADOW E&M-EST. PATIENT-LVL II: CPT | Mod: PBBFAC,,, | Performed by: ORTHOPAEDIC SURGERY

## 2022-08-22 PROCEDURE — 99999 PR PBB SHADOW E&M-EST. PATIENT-LVL II: ICD-10-PCS | Mod: PBBFAC,,, | Performed by: ORTHOPAEDIC SURGERY

## 2022-08-22 PROCEDURE — 99213 PR OFFICE/OUTPT VISIT, EST, LEVL III, 20-29 MIN: ICD-10-PCS | Mod: S$PBB,,, | Performed by: ORTHOPAEDIC SURGERY

## 2022-08-22 PROCEDURE — 99213 OFFICE O/P EST LOW 20 MIN: CPT | Mod: S$PBB,,, | Performed by: ORTHOPAEDIC SURGERY

## 2022-08-22 NOTE — PROGRESS NOTES
Past Medical History:   Diagnosis Date    Anxiety     Arthritis     Cancer     cervical    Hepatomegaly     3 liver cyst    Hiatal hernia     Liver lesion     PONV (postoperative nausea and vomiting)        Past Surgical History:   Procedure Laterality Date    CERVIX SURGERY      COLONOSCOPY N/A 8/27/2020    Procedure: COLONOSCOPY;  Surgeon: Tim Aguayo MD;  Location: [facility] ENDO;  Service: Endoscopy;  Laterality: N/A;    CYSTOSCOPY WITH URETHRAL DILATION  10/1/2020    Procedure: CYSTOSCOPY, WITH URETHRAL DILATION;  Surgeon: Bhargav Keane MD;  Location: [facility] OR;  Service: Urology;;    ESOPHAGOGASTRODUODENOSCOPY N/A 8/13/2020    Procedure: EGD (ESOPHAGOGASTRODUODENOSCOPY);  Surgeon: Tim Aguayo MD;  Location: [facility] ENDO;  Service: Endoscopy;  Laterality: N/A;    KNEE ARTHROSCOPY      ROBOTIC ARTHROPLASTY, KNEE Left 3/29/2022    Procedure: ROBOTIC ARTHROPLASTY, KNEE, TOTAL;  Surgeon: Neo Zapata MD;  Location: [facility] OR;  Service: Orthopedics;  Laterality: Left;    SINUS SURGERY      TUBAL LIGATION         Current Outpatient Medications   Medication Sig    ALPRAZolam (XANAX) 0.25 MG tablet TAKE 1 TABLET BY MOUTH NIGHTLY AS NEEDED FOR ANXIETY. (Patient not taking: No sig reported)    ascorbic acid, vitamin C, (VITAMIN C) 500 MG tablet Take 500 mg by mouth once daily.    aspirin (ECOTRIN) 81 MG EC tablet Take 1 tablet (81 mg total) by mouth 2 (two) times a day.    atorvastatin (LIPITOR) 10 MG tablet Take 1 tablet (10 mg total) by mouth once daily.    b complex vitamins tablet Take 1 tablet by mouth once daily.    biotin 1 mg tablet Take 1,000 mcg by mouth 3 (three) times daily.    cholecalciferol, vitamin D3, (VITAMIN D3 ORAL) Take by mouth.    ferrous sulfate (FEOSOL) Tab tablet Take 1 tablet by mouth daily with breakfast.    fluticasone propionate (FLONASE) 50 mcg/actuation nasal spray 1 spray (50 mcg total) by Each Nostril route 2 (two) times daily.     "glucosamine-chondroitin 500-400 mg tablet Take 1 tablet by mouth 3 (three) times daily.    loratadine-pseudoephedrine 5-120 mg (ALLERGY RELIEF D12) 5-120 mg per tablet TAKE 1 TABLET BY MOUTH TWICE DAILY AS NEEDED FOR ALLERGIES    multivitamin capsule Take 1 capsule by mouth once daily.    tumeric-ging-olive-oreg-capryl 100 mg-150 mg- 50 mg-150 mg Cap Take by mouth.     No current facility-administered medications for this visit.       Review of patient's allergies indicates:   Allergen Reactions    Codeine Swelling     "Hives, vomiting"    Morphine Swelling     "Hives, vomiting"    Naproxen sodium Swelling     "Itching, high blood pressure"    Percocet [oxycodone-acetaminophen] Nausea And Vomiting     LOW HEART RATE    Tramadol hcl Swelling     "itching & nausea"    Penicillins     Trintex        Family History   Problem Relation Age of Onset    Diabetes Mother     Cancer Mother     Arthritis Mother     Cancer Father     COPD Father     Hypertension Father     Arthritis Father     Melanoma Father     Breast cancer Sister     Arthritis Sister     Breast cancer Sister     Arthritis Sister     Breast cancer Sister     Kidney disease Sister     Arthritis Sister     Colon polyps Neg Hx     Colon cancer Neg Hx     Crohn's disease Neg Hx     Ulcerative colitis Neg Hx     Stomach cancer Neg Hx     Esophageal cancer Neg Hx     Celiac disease Neg Hx        Social History     Socioeconomic History    Marital status:    Tobacco Use    Smoking status: Current Every Day Smoker     Packs/day: 1.00    Smokeless tobacco: Never Used   Substance and Sexual Activity    Alcohol use: Not Currently    Drug use: Never    Sexual activity: Yes     Social Determinants of Health     Financial Resource Strain: Medium Risk    Difficulty of Paying Living Expenses: Somewhat hard   Food Insecurity: No Food Insecurity    Worried About Running Out of Food in the Last Year: Never true    Ran Out of Food " in the Last Year: Never true   Transportation Needs: No Transportation Needs    Lack of Transportation (Medical): No    Lack of Transportation (Non-Medical): No   Physical Activity: Unknown    Days of Exercise per Week: 0 days   Stress: No Stress Concern Present    Feeling of Stress : Only a little   Social Connections: Unknown    Frequency of Communication with Friends and Family: More than three times a week    Frequency of Social Gatherings with Friends and Family: Once a week    Active Member of Clubs or Organizations: Yes    Attends Club or Organization Meetings: 1 to 4 times per year    Marital Status:    Housing Stability: Unknown    Unable to Pay for Housing in the Last Year: No    Unstable Housing in the Last Year: No       Chief Complaint:   Chief Complaint   Patient presents with    Left Knee - Post-op Evaluation       Date of surgery:  March 29, 2022    History of present illness:  56-year-old female underwent left Buzz total knee arthroplasty.  Patient is doing okay.  She finished the formal physical therapy and is doing home exercises.    Review of Systems:    Musculoskeletal:  See HPI        Physical Examination:    Vital Signs:    Vitals:    08/22/22 0947   Resp: 18       Body mass index is 23.63 kg/m².    This a well-developed, well nourished patient in no acute distress.  They are alert and oriented and cooperative to examination.  Pt. walks to some mild antalgic gait using a cane    Examination left knee shows healed surgical incisions.  No erythema drainage.  Mild joint swelling.  Range of motion is 3-100 degrees.      X-rays:  Four views left knee are  reviewed which show well-aligned total knee arthroplasty without complications.  Minimal arthritis in the right knee     Assessment::  Status post left Buzz [place] CR total knee arthroplasty    Plan:  She is doing well.  Continue with the home exercise program.  I gave her an IT band guide to start working on as well since most  of the pain is in her hip.  Follow-up for annual follow-up in March with x-rays of her left knee.    This note was created using M Modal voice recognition software that occasionally misinterpreted phrases or words.

## 2022-08-31 ENCOUNTER — HOSPITAL ENCOUNTER (OUTPATIENT)
Dept: RADIOLOGY | Facility: HOSPITAL | Age: 57
Discharge: HOME | End: 2022-08-31
Attending: FAMILY MEDICINE

## 2022-08-31 DIAGNOSIS — K76.9 LIVER LESION: ICD-10-CM

## 2022-08-31 PROCEDURE — 74177 CT ABD & PELVIS W/CONTRAST: CPT | Mod: TC

## 2022-08-31 PROCEDURE — 25500020 PHARM REV CODE 255: Performed by: FAMILY MEDICINE

## 2022-08-31 PROCEDURE — A9698 NON-RAD CONTRAST MATERIALNOC: HCPCS | Performed by: FAMILY MEDICINE

## 2022-08-31 PROCEDURE — 74177 CT ABD & PELVIS W/CONTRAST: CPT | Mod: 26,,, | Performed by: RADIOLOGY

## 2022-08-31 PROCEDURE — 74177 CT ABDOMEN PELVIS WITH CONTRAST: ICD-10-PCS | Mod: 26,,, | Performed by: RADIOLOGY

## 2022-08-31 RX ADMIN — IOHEXOL 75 ML: 350 INJECTION, SOLUTION INTRAVENOUS at 10:08

## 2022-08-31 RX ADMIN — IOHEXOL 500 ML: 9 SOLUTION ORAL at 10:08

## 2022-09-07 ENCOUNTER — PATIENT MESSAGE (OUTPATIENT)
Dept: FAMILY MEDICINE | Facility: CLINIC | Age: 57
End: 2022-09-07

## 2023-01-11 ENCOUNTER — TELEPHONE (OUTPATIENT)
Dept: FAMILY MEDICINE | Facility: CLINIC | Age: 58
End: 2023-01-11

## 2023-01-11 DIAGNOSIS — U07.1 COVID: Primary | ICD-10-CM

## 2023-01-11 NOTE — TELEPHONE ENCOUNTER
----- Message from Laurie Diaz sent at 1/11/2023  9:44 AM CST -----  Who Called:pt      What is the reqeust in detail: pt is requesting call back , she tested positive for covid and wants to know next step . Pt is also having horrible pain , and lots of mucus .  And was requesting all her rx to be sent in so she can have everything on hand . Please advise       [facility] - [place], [place] [id number] [address]  Phone: [phone] Fax: [phone]  Hours: Not open 24 hours        Can the clinic reply by [facility]? No       Best Call Back Number: [phone] (home)         Additional Information:

## 2023-01-11 NOTE — TELEPHONE ENCOUNTER
Spoke with pt. Pt covid positive 1.11.23, chest congestion, sinus congestion, fever 101,body aches, denies SOB,. Please advise     Pt is taking Mucinex 600 mg and Tylenol  at this time. Please advise

## 2023-01-19 ENCOUNTER — OFFICE VISIT (OUTPATIENT)
Dept: FAMILY MEDICINE | Facility: CLINIC | Age: 58
End: 2023-01-19

## 2023-01-19 VITALS
WEIGHT: 157.81 LBS | SYSTOLIC BLOOD PRESSURE: 122 MMHG | HEART RATE: 76 BPM | DIASTOLIC BLOOD PRESSURE: 78 MMHG | OXYGEN SATURATION: 98 % | TEMPERATURE: 98 F | BODY MASS INDEX: 23.37 KG/M2 | HEIGHT: 69 IN | RESPIRATION RATE: 14 BRPM

## 2023-01-19 DIAGNOSIS — R39.11 URINARY HESITANCY: Primary | ICD-10-CM

## 2023-01-19 LAB
BACTERIA #/AREA URNS HPF: NORMAL /HPF
BILIRUB UR QL STRIP: NEGATIVE
CLARITY UR: CLEAR
COLOR UR: YELLOW
GLUCOSE UR QL STRIP: NEGATIVE
HGB UR QL STRIP: NEGATIVE
KETONES UR QL STRIP: NEGATIVE
LEUKOCYTE ESTERASE UR QL STRIP: ABNORMAL
MICROSCOPIC COMMENT: NORMAL
NITRITE UR QL STRIP: NEGATIVE
PH UR STRIP: 6 [PH] (ref 5–8)
PROT UR QL STRIP: NEGATIVE
RBC #/AREA URNS HPF: 2 /HPF (ref 0–4)
SP GR UR STRIP: 1.02 (ref 1–1.03)
URN SPEC COLLECT METH UR: ABNORMAL
UROBILINOGEN UR STRIP-ACNC: NEGATIVE EU/DL
WBC #/AREA URNS HPF: 2 /HPF (ref 0–5)

## 2023-01-19 PROCEDURE — 99999 PR PBB SHADOW E&M-EST. PATIENT-LVL IV: ICD-10-PCS | Mod: PBBFAC,,, | Performed by: FAMILY MEDICINE

## 2023-01-19 PROCEDURE — 99213 OFFICE O/P EST LOW 20 MIN: CPT | Mod: S$PBB,,, | Performed by: FAMILY MEDICINE

## 2023-01-19 PROCEDURE — 99999 PR PBB SHADOW E&M-EST. PATIENT-LVL IV: CPT | Mod: PBBFAC,,, | Performed by: FAMILY MEDICINE

## 2023-01-19 PROCEDURE — 99214 OFFICE O/P EST MOD 30 MIN: CPT | Mod: PBBFAC | Performed by: FAMILY MEDICINE

## 2023-01-19 PROCEDURE — 81000 URINALYSIS NONAUTO W/SCOPE: CPT | Performed by: FAMILY MEDICINE

## 2023-01-19 PROCEDURE — 99213 PR OFFICE/OUTPT VISIT, EST, LEVL III, 20-29 MIN: ICD-10-PCS | Mod: S$PBB,,, | Performed by: FAMILY MEDICINE

## 2023-01-19 NOTE — PROGRESS NOTES
[facility] - Clinic Note    Subjective      Ms. West is a 57 y.o. female who presents to clinic for Follow-up (6mth follow up)    Patient has recovered from COVID.  She is having some urinary hesitancy.    [facility] Misti has a past medical history of Anxiety, Arthritis, Cancer, Hepatomegaly, Hiatal hernia, Liver lesion, and PONV (postoperative nausea and vomiting).   [facility] Misti has a past surgical history that includes Tubal ligation; Sinus surgery; Knee arthroscopy; Esophagogastroduodenoscopy (N/A, 8/13/2020); Cervix surgery; Colonoscopy (N/A, 8/27/2020); Cystoscopy with urethral dilation (10/1/2020); and robotic arthroplasty, knee (Left, 3/29/2022).    Misti's family history includes Arthritis in her father, mother, sister, sister, and sister; Breast cancer in her sister, sister, and sister; COPD in her father; Cancer in her father and mother; Diabetes in her mother; Hypertension in her father; Kidney disease in her sister; Melanoma in her father.    Misti reports that she has been smoking. She has been smoking an average of 1 pack per day. She has never used smokeless tobacco. She reports that she does not currently use alcohol. She reports that she does not use drugs.   DERRICK Chappell is allergic to codeine, morphine, naproxen sodium, percocet [oxycodone-acetaminophen], tramadol hcl, penicillins, and trintex.   LEILANI Chappell has a current medication list which includes the following prescription(s): ascorbic acid (vitamin c), atorvastatin, b complex vitamins, biotin, cholecalciferol (vitamin d3), fluticasone propionate, glucosamine-chondroitin, allergy relief d12, multivitamin, tumeric-ging-olive-oreg-capryl, alprazolam, aspirin, and ferrous sulfate.     Review of Systems   Constitutional:  Negative for chills and fever.   HENT:  Negative for congestion and rhinorrhea.    Eyes:  Negative for visual disturbance.   Respiratory:  Negative for cough and shortness of breath.    Cardiovascular:  Negative for chest  "pain.   Gastrointestinal:  Negative for abdominal pain, constipation, diarrhea, nausea and vomiting.   Genitourinary:  Positive for difficulty urinating. Negative for dysuria.   Musculoskeletal:  Negative for myalgias.   Skin:  Negative for rash.   Allergic/Immunologic: Negative for environmental allergies.   Neurological:  Negative for weakness and headaches.   Objective     /78 (BP Location: Right arm, Patient Position: Sitting, BP Method: Large (Manual))   Pulse 76   Temp 97.9 °F (36.6 °C) (Oral)   Resp 14   Ht 5' 9" (1.753 m)   Wt 71.6 kg (157 lb 12.8 oz)   SpO2 98%   BMI 23.30 kg/m²     Physical Exam  Vitals and nursing note reviewed.   Constitutional:       General: She is not in acute distress.     Appearance: Normal appearance. She is well-developed. She is not diaphoretic.   HENT:      Head: Normocephalic and atraumatic.      Right Ear: External ear normal.      Left Ear: External ear normal.   Eyes:      General:         Right eye: No discharge.         Left eye: No discharge.   Cardiovascular:      Rate and Rhythm: Normal rate and regular rhythm.      Heart sounds: Normal heart sounds.   Pulmonary:      Effort: Pulmonary effort is normal.      Breath sounds: Normal breath sounds. No wheezing or rales.   Skin:     General: Skin is warm and dry.   Neurological:      Mental Status: She is alert and oriented to person, place, and time. Mental status is at baseline.   Psychiatric:         Mood and Affect: Mood normal.         Behavior: Behavior normal.         Thought Content: Thought content normal.         Judgment: Judgment normal.      Assessment/Plan     1. Urinary hesitancy  Urinalysis, Reflex to Urine Culture    Urinalysis, Reflex to Urine Culture        Check urinalysis.  Otherwise continue current medications as prescribed.    Future Appointments   Date Time Provider Department Center   7/20/2023  9:20 AM Pierre Espinal MD [facility]         Pierre Espinal, " MD  Family Medicine  [facility]

## 2023-03-08 DIAGNOSIS — Z96.652 STATUS POST LEFT KNEE REPLACEMENT: Primary | ICD-10-CM

## 2023-03-13 ENCOUNTER — OFFICE VISIT (OUTPATIENT)
Dept: ORTHOPEDICS | Facility: CLINIC | Age: 58
End: 2023-03-13

## 2023-03-13 ENCOUNTER — HOSPITAL ENCOUNTER (OUTPATIENT)
Dept: RADIOLOGY | Facility: HOSPITAL | Age: 58
Discharge: HOME | End: 2023-03-13
Attending: ORTHOPAEDIC SURGERY

## 2023-03-13 VITALS — WEIGHT: 157 LBS | RESPIRATION RATE: 18 BRPM | BODY MASS INDEX: 23.25 KG/M2 | HEIGHT: 69 IN

## 2023-03-13 DIAGNOSIS — Z96.652 STATUS POST LEFT KNEE REPLACEMENT: ICD-10-CM

## 2023-03-13 DIAGNOSIS — Z96.652 STATUS POST LEFT KNEE REPLACEMENT: Primary | ICD-10-CM

## 2023-03-13 PROCEDURE — 99213 PR OFFICE/OUTPT VISIT, EST, LEVL III, 20-29 MIN: ICD-10-PCS | Mod: S$PBB,,, | Performed by: ORTHOPAEDIC SURGERY

## 2023-03-13 PROCEDURE — 73562 X-RAY EXAM OF KNEE 3: CPT | Mod: 26,RT,, | Performed by: RADIOLOGY

## 2023-03-13 PROCEDURE — 73564 XR KNEE ORTHO LEFT WITH FLEXION: ICD-10-PCS | Mod: 26,LT,, | Performed by: RADIOLOGY

## 2023-03-13 PROCEDURE — 73562 XR KNEE ORTHO LEFT WITH FLEXION: ICD-10-PCS | Mod: 26,RT,, | Performed by: RADIOLOGY

## 2023-03-13 PROCEDURE — 99999 PR PBB SHADOW E&M-EST. PATIENT-LVL III: ICD-10-PCS | Mod: PBBFAC,,, | Performed by: ORTHOPAEDIC SURGERY

## 2023-03-13 PROCEDURE — 99213 OFFICE O/P EST LOW 20 MIN: CPT | Mod: S$PBB,,, | Performed by: ORTHOPAEDIC SURGERY

## 2023-03-13 PROCEDURE — 73564 X-RAY EXAM KNEE 4 OR MORE: CPT | Mod: TC,PN,LT

## 2023-03-13 PROCEDURE — 99999 PR PBB SHADOW E&M-EST. PATIENT-LVL III: CPT | Mod: PBBFAC,,, | Performed by: ORTHOPAEDIC SURGERY

## 2023-03-13 PROCEDURE — 73564 X-RAY EXAM KNEE 4 OR MORE: CPT | Mod: 26,LT,, | Performed by: RADIOLOGY

## 2023-03-13 PROCEDURE — 99213 OFFICE O/P EST LOW 20 MIN: CPT | Mod: PBBFAC,PN | Performed by: ORTHOPAEDIC SURGERY

## 2023-03-13 NOTE — PROGRESS NOTES
Past Medical History:   Diagnosis Date    Anxiety     Arthritis     Cancer     cervical    Hepatomegaly     3 liver cyst    Hiatal hernia     Liver lesion     PONV (postoperative nausea and vomiting)        Past Surgical History:   Procedure Laterality Date    CERVIX SURGERY      COLONOSCOPY N/A 8/27/2020    Procedure: COLONOSCOPY;  Surgeon: Tim Aguayo MD;  Location: [facility] ENDO;  Service: Endoscopy;  Laterality: N/A;    CYSTOSCOPY WITH URETHRAL DILATION  10/1/2020    Procedure: CYSTOSCOPY, WITH URETHRAL DILATION;  Surgeon: Bhargav Keane MD;  Location: [facility] OR;  Service: Urology;;    ESOPHAGOGASTRODUODENOSCOPY N/A 8/13/2020    Procedure: EGD (ESOPHAGOGASTRODUODENOSCOPY);  Surgeon: Tim Aguayo MD;  Location: [facility] ENDO;  Service: Endoscopy;  Laterality: N/A;    KNEE ARTHROSCOPY      ROBOTIC ARTHROPLASTY, KNEE Left 3/29/2022    Procedure: ROBOTIC ARTHROPLASTY, KNEE, TOTAL;  Surgeon: Neo Zapata MD;  Location: [facility] OR;  Service: Orthopedics;  Laterality: Left;    SINUS SURGERY      TUBAL LIGATION         Current Outpatient Medications   Medication Sig    ascorbic acid, vitamin C, (VITAMIN C) 500 MG tablet Take 500 mg by mouth once daily.    atorvastatin (LIPITOR) 10 MG tablet Take 1 tablet (10 mg total) by mouth once daily.    b complex vitamins tablet Take 1 tablet by mouth once daily.    biotin 1 mg tablet Take 1,000 mcg by mouth 3 (three) times daily.    cholecalciferol, vitamin D3, (VITAMIN D3 ORAL) Take by mouth.    ferrous sulfate (FEOSOL) Tab tablet Take 1 tablet by mouth daily with breakfast.    fluticasone propionate (FLONASE) 50 mcg/actuation nasal spray 1 spray (50 mcg total) by Each Nostril route 2 (two) times daily.    glucosamine-chondroitin 500-400 mg tablet Take 1 tablet by mouth 3 (three) times daily.    loratadine-pseudoephedrine 5-120 mg (ALLERGY RELIEF D12) 5-120 mg per tablet TAKE 1 TABLET BY MOUTH TWICE DAILY AS NEEDED FOR ALLERGIES    multivitamin capsule Take 1  "capsule by mouth once daily.    tumeric-ging-olive-oreg-capryl 100 mg-150 mg- 50 mg-150 mg Cap Take by mouth.     No current facility-administered medications for this visit.       Review of patient's allergies indicates:   Allergen Reactions    Codeine Swelling     "Hives, vomiting"    Morphine Swelling     "Hives, vomiting"    Naproxen sodium Swelling     "Itching, high blood pressure"    Percocet [oxycodone-acetaminophen] Nausea And Vomiting     LOW HEART RATE    Tramadol hcl Swelling     "itching & nausea"    Penicillins     Trintex        Family History   Problem Relation Age of Onset    Diabetes Mother     Cancer Mother     Arthritis Mother     Cancer Father     COPD Father     Hypertension Father     Arthritis Father     Melanoma Father     Breast cancer Sister     Arthritis Sister     Breast cancer Sister     Arthritis Sister     Breast cancer Sister     Kidney disease Sister     Arthritis Sister     Colon polyps Neg Hx     Colon cancer Neg Hx     Crohn's disease Neg Hx     Ulcerative colitis Neg Hx     Stomach cancer Neg Hx     Esophageal cancer Neg Hx     Celiac disease Neg Hx        Social History     Socioeconomic History    Marital status:    Tobacco Use    Smoking status: Every Day     Packs/day: 1.00     Types: Cigarettes    Smokeless tobacco: Never   Substance and Sexual Activity    Alcohol use: Not Currently    Drug use: Never    Sexual activity: Yes     Social Determinants of Health     Financial Resource Strain: Medium Risk    Difficulty of Paying Living Expenses: Somewhat hard   Food Insecurity: No Food Insecurity    Worried About Running Out of Food in the Last Year: Never true    Ran Out of Food in the Last Year: Never true   Transportation Needs: No Transportation Needs    Lack of Transportation (Medical): No    Lack of Transportation (Non-Medical): No   Physical Activity: Unknown    Days of Exercise per Week: 0 days   Stress: No Stress Concern Present    Feeling of Stress : Only a " little   Social Connections: Unknown    Frequency of Communication with Friends and Family: More than three times a week    Frequency of Social Gatherings with Friends and Family: Once a week    Active Member of Clubs or Organizations: Yes    Attends Club or Organization Meetings: 1 to 4 times per year    Marital Status:    Housing Stability: Unknown    Unable to Pay for Housing in the Last Year: No    Unstable Housing in the Last Year: No       Chief Complaint:   Chief Complaint   Patient presents with    Knee Pain     follow up for left knee. s/p left TKA 3/29/22       Date of surgery:  March 29, 2022    History of present illness:  57-year-old female underwent left Buzz total knee arthroplasty.  She is doing pretty well.  Was just a little stiff immediately after surgery and has never really gotten as much flexion as she wants.  Has some pain around the IT band.  Pain going up and down stairs.  Pain is a 2/10.    Review of Systems:    Musculoskeletal:  See HPI        Physical Examination:    Vital Signs:    Vitals:    03/13/23 0907   Resp: 18       Body mass index is 23.18 kg/m².    This a well-developed, well nourished patient in no acute distress.  They are alert and oriented and cooperative to examination.  Pt. walks to some mild antalgic gait using a cane    Examination left knee shows healed surgical incisions.  No erythema drainage.  Mild joint swelling.  Range of motion is 0-110 degrees.      X-rays:  Four views left knee are  ordered and reviewed which show well-aligned total knee arthroplasty without complications.  Minimal arthritis in the right knee     Assessment::  Status post left Buzz [place] CR total knee arthroplasty    Plan:  She is doing well.  Gave her some stretching exercises to continue to work on to try and get a little more mobility.  Continue with some soft tissue and scar massage.  Follow-up in a year with x-rays of both knees.    This note was created using M Modal voice  recognition software that occasionally misinterpreted phrases or words.

## 2023-07-20 ENCOUNTER — OFFICE VISIT (OUTPATIENT)
Dept: FAMILY MEDICINE | Facility: CLINIC | Age: 58
End: 2023-07-20

## 2023-07-20 ENCOUNTER — LAB VISIT (OUTPATIENT)
Dept: LAB | Facility: HOSPITAL | Age: 58
End: 2023-07-20
Attending: FAMILY MEDICINE

## 2023-07-20 ENCOUNTER — PATIENT MESSAGE (OUTPATIENT)
Dept: FAMILY MEDICINE | Facility: CLINIC | Age: 58
End: 2023-07-20

## 2023-07-20 VITALS
HEIGHT: 69 IN | SYSTOLIC BLOOD PRESSURE: 114 MMHG | BODY MASS INDEX: 24.44 KG/M2 | TEMPERATURE: 98 F | OXYGEN SATURATION: 98 % | DIASTOLIC BLOOD PRESSURE: 72 MMHG | WEIGHT: 165 LBS | HEART RATE: 59 BPM

## 2023-07-20 DIAGNOSIS — R30.0 DYSURIA: Primary | ICD-10-CM

## 2023-07-20 DIAGNOSIS — R10.13 EPIGASTRIC PAIN: ICD-10-CM

## 2023-07-20 DIAGNOSIS — Z00.00 ANNUAL PHYSICAL EXAM: ICD-10-CM

## 2023-07-20 DIAGNOSIS — Z12.31 ENCOUNTER FOR SCREENING MAMMOGRAM FOR MALIGNANT NEOPLASM OF BREAST: ICD-10-CM

## 2023-07-20 DIAGNOSIS — R91.1 SOLITARY PULMONARY NODULE: ICD-10-CM

## 2023-07-20 DIAGNOSIS — Z00.00 ANNUAL PHYSICAL EXAM: Primary | ICD-10-CM

## 2023-07-20 LAB
ALBUMIN SERPL BCP-MCNC: 3.9 G/DL (ref 3.5–5.2)
ALP SERPL-CCNC: 73 U/L (ref 55–135)
ALT SERPL W/O P-5'-P-CCNC: 19 U/L (ref 10–44)
ANION GAP SERPL CALC-SCNC: 9 MMOL/L (ref 8–16)
AST SERPL-CCNC: 23 U/L (ref 10–40)
BASOPHILS # BLD AUTO: 0.05 K/UL (ref 0–0.2)
BASOPHILS NFR BLD: 0.8 % (ref 0–1.9)
BILIRUB SERPL-MCNC: 0.4 MG/DL (ref 0.1–1)
BUN SERPL-MCNC: 16 MG/DL (ref 6–20)
CALCIUM SERPL-MCNC: 9 MG/DL (ref 8.7–10.5)
CHLORIDE SERPL-SCNC: 106 MMOL/L (ref 95–110)
CHOLEST SERPL-MCNC: 167 MG/DL (ref 120–199)
CHOLEST/HDLC SERPL: 2.4 {RATIO} (ref 2–5)
CO2 SERPL-SCNC: 24 MMOL/L (ref 23–29)
CREAT SERPL-MCNC: 0.8 MG/DL (ref 0.5–1.4)
DIFFERENTIAL METHOD: NORMAL
EOSINOPHIL # BLD AUTO: 0.4 K/UL (ref 0–0.5)
EOSINOPHIL NFR BLD: 6.3 % (ref 0–8)
ERYTHROCYTE [DISTWIDTH] IN BLOOD BY AUTOMATED COUNT: 14.2 % (ref 11.5–14.5)
EST. GFR  (NO RACE VARIABLE): >60 ML/MIN/1.73 M^2
ESTIMATED AVG GLUCOSE: 103 MG/DL (ref 68–131)
GLUCOSE SERPL-MCNC: 96 MG/DL (ref 70–110)
HBA1C MFR BLD: 5.2 % (ref 4–5.6)
HCT VFR BLD AUTO: 39.7 % (ref 37–48.5)
HDLC SERPL-MCNC: 70 MG/DL (ref 40–75)
HDLC SERPL: 41.9 % (ref 20–50)
HGB BLD-MCNC: 13.3 G/DL (ref 12–16)
IMM GRANULOCYTES # BLD AUTO: 0.02 K/UL (ref 0–0.04)
IMM GRANULOCYTES NFR BLD AUTO: 0.3 % (ref 0–0.5)
LDLC SERPL CALC-MCNC: 85.4 MG/DL (ref 63–159)
LIPASE SERPL-CCNC: 20 U/L (ref 4–60)
LYMPHOCYTES # BLD AUTO: 2.1 K/UL (ref 1–4.8)
LYMPHOCYTES NFR BLD: 34.8 % (ref 18–48)
MCH RBC QN AUTO: 30.9 PG (ref 27–31)
MCHC RBC AUTO-ENTMCNC: 33.5 G/DL (ref 32–36)
MCV RBC AUTO: 92 FL (ref 82–98)
MONOCYTES # BLD AUTO: 0.4 K/UL (ref 0.3–1)
MONOCYTES NFR BLD: 6.4 % (ref 4–15)
NEUTROPHILS # BLD AUTO: 3.1 K/UL (ref 1.8–7.7)
NEUTROPHILS NFR BLD: 51.4 % (ref 38–73)
NONHDLC SERPL-MCNC: 97 MG/DL
NRBC BLD-RTO: 0 /100 WBC
PLATELET # BLD AUTO: 233 K/UL (ref 150–450)
PMV BLD AUTO: 9.3 FL (ref 9.2–12.9)
POTASSIUM SERPL-SCNC: 4.2 MMOL/L (ref 3.5–5.1)
PROT SERPL-MCNC: 6.7 G/DL (ref 6–8.4)
RBC # BLD AUTO: 4.31 M/UL (ref 4–5.4)
SODIUM SERPL-SCNC: 139 MMOL/L (ref 136–145)
TRIGL SERPL-MCNC: 58 MG/DL (ref 30–150)
TSH SERPL DL<=0.005 MIU/L-ACNC: 1.17 UIU/ML (ref 0.4–4)
WBC # BLD AUTO: 6.07 K/UL (ref 3.9–12.7)

## 2023-07-20 PROCEDURE — 80061 LIPID PANEL: CPT | Performed by: FAMILY MEDICINE

## 2023-07-20 PROCEDURE — 99396 PR PREVENTIVE VISIT,EST,40-64: ICD-10-PCS | Mod: S$PBB,,, | Performed by: FAMILY MEDICINE

## 2023-07-20 PROCEDURE — 99999 PR PBB SHADOW E&M-EST. PATIENT-LVL V: ICD-10-PCS | Mod: PBBFAC,,, | Performed by: FAMILY MEDICINE

## 2023-07-20 PROCEDURE — 36415 COLL VENOUS BLD VENIPUNCTURE: CPT | Performed by: FAMILY MEDICINE

## 2023-07-20 PROCEDURE — 83690 ASSAY OF LIPASE: CPT | Performed by: FAMILY MEDICINE

## 2023-07-20 PROCEDURE — 99396 PREV VISIT EST AGE 40-64: CPT | Mod: S$PBB,,, | Performed by: FAMILY MEDICINE

## 2023-07-20 PROCEDURE — 84443 ASSAY THYROID STIM HORMONE: CPT | Performed by: FAMILY MEDICINE

## 2023-07-20 PROCEDURE — 99999 PR PBB SHADOW E&M-EST. PATIENT-LVL V: CPT | Mod: PBBFAC,,, | Performed by: FAMILY MEDICINE

## 2023-07-20 PROCEDURE — 83036 HEMOGLOBIN GLYCOSYLATED A1C: CPT | Performed by: FAMILY MEDICINE

## 2023-07-20 PROCEDURE — 99215 OFFICE O/P EST HI 40 MIN: CPT | Mod: PBBFAC | Performed by: FAMILY MEDICINE

## 2023-07-20 PROCEDURE — 85025 COMPLETE CBC W/AUTO DIFF WBC: CPT | Performed by: FAMILY MEDICINE

## 2023-07-20 PROCEDURE — 80053 COMPREHEN METABOLIC PANEL: CPT | Performed by: FAMILY MEDICINE

## 2023-07-20 RX ORDER — EVENING PRIMROSE OIL 1300 MG
CAPSULE ORAL
COMMUNITY

## 2023-07-21 NOTE — PROGRESS NOTES
[facility] - Clinic Note    Subjective      Ms. West is a 57 y.o. female who presents to clinic for Follow-up (6mth follow up)    Patient has been doing well with regard to the knee surgery.  She is having some arthritis symptoms.     [facility] Misti has a past medical history of Anxiety, Arthritis, Cancer, Hepatomegaly, Hiatal hernia, Liver lesion, and PONV (postoperative nausea and vomiting).   PSXH Misti has a past surgical history that includes Tubal ligation; Sinus surgery; Knee arthroscopy; Esophagogastroduodenoscopy (N/A, 8/13/2020); Cervix surgery; Colonoscopy (N/A, 8/27/2020); Cystoscopy with urethral dilation (10/1/2020); and robotic arthroplasty, knee (Left, 3/29/2022).    Misti's family history includes Arthritis in her father, mother, sister, sister, and sister; Breast cancer in her sister, sister, and sister; COPD in her father; Cancer in her father and mother; Diabetes in her mother; Hypertension in her father; Kidney disease in her sister; Melanoma in her father.   TERRENCE Chappell reports that she has been smoking cigarettes. She has been smoking an average of 1 pack per day. She has never used smokeless tobacco. She reports that she does not currently use alcohol. She reports that she does not use drugs.   DERRICK Chappell is allergic to codeine, morphine, naproxen sodium, percocet [oxycodone-acetaminophen], tramadol hcl, penicillins, and trintex.   LEILANI Chappell has a current medication list which includes the following prescription(s): ascorbic acid (vitamin c), atorvastatin, b complex vitamins, biotin, cholecalciferol (vitamin d3), evening primrose oil, ferrous sulfate, fluticasone propionate, glucosamine-chondroitin, allergy relief d12, multivitamin, and tumeric-ging-olive-oreg-capryl.     Review of Systems   Constitutional:  Negative for chills and fever.   HENT:  Negative for congestion and rhinorrhea.    Eyes:  Negative for visual disturbance.   Respiratory:  Negative for cough and shortness of breath.   "  Cardiovascular:  Negative for chest pain.   Gastrointestinal:  Negative for abdominal pain, constipation, diarrhea, nausea and vomiting.   Genitourinary:  Negative for dysuria.   Musculoskeletal:  Negative for myalgias.   Skin:  Negative for rash.   Allergic/Immunologic: Negative for environmental allergies.   Neurological:  Negative for weakness and headaches.   Objective     /72 (BP Location: Right arm, Patient Position: Sitting, BP Method: Large (Manual))   Pulse (!) 59   Temp 97.9 °F (36.6 °C) (Temporal)   Ht 5' 9" (1.753 m)   Wt 74.8 kg (165 lb)   SpO2 98%   BMI 24.37 kg/m²     Physical Exam  Vitals and nursing note reviewed.   Constitutional:       General: She is not in acute distress.     Appearance: Normal appearance. She is well-developed. She is not diaphoretic.   HENT:      Head: Normocephalic and atraumatic.      Right Ear: External ear normal.      Left Ear: External ear normal.   Eyes:      General:         Right eye: No discharge.         Left eye: No discharge.   Cardiovascular:      Rate and Rhythm: Normal rate and regular rhythm.      Heart sounds: Normal heart sounds.   Pulmonary:      Effort: Pulmonary effort is normal.      Breath sounds: Normal breath sounds. No wheezing or rales.   Skin:     General: Skin is warm and dry.   Neurological:      Mental Status: She is alert and oriented to person, place, and time. Mental status is at baseline.   Psychiatric:         Mood and Affect: Mood normal.         Behavior: Behavior normal.         Thought Content: Thought content normal.         Judgment: Judgment normal.      Assessment/Plan     1. Annual physical exam  Lipid Panel    Comprehensive Metabolic Panel    CBC Auto Differential    Hemoglobin A1C    TSH      2. Solitary pulmonary nodule  CT Chest Without Contrast      3. Epigastric pain  Lipase      4. Encounter for screening mammogram for malignant neoplasm of breast  Mammo Digital Screening Bilat w/ Lam        Due for annual " labs as above.  Also due for mammogram.  Check lipase level given epigastric pain.  Also due for CT chest to evaluate pulmonary nodule.  Follow-up in 3 months.    Future Appointments   Date Time Provider Department Center   7/26/2023  8:30 AM [facility] CT1 [facility] CT [facility]   8/31/2023  2:40 PM [facility] MAMMO1 [facility] MAMMO [facility]   10/26/2023 10:00 AM Pierre Espinal MD [facility]         Pierre Espinal MD  Family Medicine  [facility]

## 2023-07-26 ENCOUNTER — PATIENT MESSAGE (OUTPATIENT)
Dept: FAMILY MEDICINE | Facility: CLINIC | Age: 58
End: 2023-07-26

## 2023-07-26 ENCOUNTER — HOSPITAL ENCOUNTER (OUTPATIENT)
Dept: RADIOLOGY | Facility: HOSPITAL | Age: 58
Discharge: HOME | End: 2023-07-26
Attending: FAMILY MEDICINE

## 2023-07-26 DIAGNOSIS — J30.2 SEASONAL ALLERGIES: ICD-10-CM

## 2023-07-26 DIAGNOSIS — R91.1 SOLITARY PULMONARY NODULE: ICD-10-CM

## 2023-07-26 DIAGNOSIS — J40 BRONCHITIS: Primary | ICD-10-CM

## 2023-07-26 PROCEDURE — 71250 CT CHEST WITHOUT CONTRAST: ICD-10-PCS | Mod: 26,,, | Performed by: RADIOLOGY

## 2023-07-26 PROCEDURE — 71250 CT THORAX DX C-: CPT | Mod: 26,,, | Performed by: RADIOLOGY

## 2023-07-26 PROCEDURE — 71250 CT THORAX DX C-: CPT | Mod: TC

## 2023-07-26 RX ORDER — 5-HYDROXYTRYPTOPHAN (5-HTP) 100 MG
CAPSULE ORAL
Qty: 20 TABLET | Refills: 2 | Status: SHIPPED | OUTPATIENT
Start: 2023-07-26 | End: 2023-07-26

## 2023-07-26 RX ORDER — AZITHROMYCIN 250 MG/1
TABLET, FILM COATED ORAL
Qty: 6 TABLET | Refills: 0 | Status: SHIPPED | OUTPATIENT
Start: 2023-07-26 | End: 2023-07-31

## 2023-07-26 RX ORDER — 5-HYDROXYTRYPTOPHAN (5-HTP) 100 MG
1 CAPSULE ORAL 2 TIMES DAILY PRN
Qty: 20 TABLET | Refills: 2 | Status: SHIPPED | OUTPATIENT
Start: 2023-07-26 | End: 2023-07-26

## 2023-07-26 RX ORDER — 5-HYDROXYTRYPTOPHAN (5-HTP) 100 MG
1 CAPSULE ORAL 2 TIMES DAILY PRN
Qty: 20 TABLET | Refills: 0 | Status: SHIPPED | OUTPATIENT
Start: 2023-07-26 | End: 2023-09-07 | Stop reason: SDUPTHER

## 2023-07-29 ENCOUNTER — PATIENT MESSAGE (OUTPATIENT)
Dept: FAMILY MEDICINE | Facility: CLINIC | Age: 58
End: 2023-07-29

## 2023-09-07 ENCOUNTER — HOSPITAL ENCOUNTER (OUTPATIENT)
Dept: RADIOLOGY | Facility: HOSPITAL | Age: 58
Discharge: HOME | End: 2023-09-07
Attending: FAMILY MEDICINE

## 2023-09-07 ENCOUNTER — OFFICE VISIT (OUTPATIENT)
Dept: FAMILY MEDICINE | Facility: CLINIC | Age: 58
End: 2023-09-07

## 2023-09-07 VITALS
HEART RATE: 59 BPM | BODY MASS INDEX: 23.99 KG/M2 | OXYGEN SATURATION: 98 % | HEIGHT: 69 IN | SYSTOLIC BLOOD PRESSURE: 122 MMHG | DIASTOLIC BLOOD PRESSURE: 62 MMHG | WEIGHT: 162 LBS

## 2023-09-07 DIAGNOSIS — J30.2 SEASONAL ALLERGIES: ICD-10-CM

## 2023-09-07 DIAGNOSIS — Z12.31 ENCOUNTER FOR SCREENING MAMMOGRAM FOR MALIGNANT NEOPLASM OF BREAST: ICD-10-CM

## 2023-09-07 DIAGNOSIS — J01.00 ACUTE NON-RECURRENT MAXILLARY SINUSITIS: Primary | ICD-10-CM

## 2023-09-07 PROCEDURE — 99999 PR PBB SHADOW E&M-EST. PATIENT-LVL III: ICD-10-PCS | Mod: PBBFAC,,, | Performed by: FAMILY MEDICINE

## 2023-09-07 PROCEDURE — 99214 PR OFFICE/OUTPT VISIT, EST, LEVL IV, 30-39 MIN: ICD-10-PCS | Mod: S$PBB,,, | Performed by: FAMILY MEDICINE

## 2023-09-07 PROCEDURE — 77063 BREAST TOMOSYNTHESIS BI: CPT | Mod: 26,,, | Performed by: RADIOLOGY

## 2023-09-07 PROCEDURE — 77067 SCR MAMMO BI INCL CAD: CPT | Mod: 26,,, | Performed by: RADIOLOGY

## 2023-09-07 PROCEDURE — 77067 MAMMO DIGITAL SCREENING BILAT WITH TOMO: ICD-10-PCS | Mod: 26,,, | Performed by: RADIOLOGY

## 2023-09-07 PROCEDURE — 99999 PR PBB SHADOW E&M-EST. PATIENT-LVL III: CPT | Mod: PBBFAC,,, | Performed by: FAMILY MEDICINE

## 2023-09-07 PROCEDURE — 99213 OFFICE O/P EST LOW 20 MIN: CPT | Mod: PBBFAC | Performed by: FAMILY MEDICINE

## 2023-09-07 PROCEDURE — 77067 SCR MAMMO BI INCL CAD: CPT | Mod: TC

## 2023-09-07 PROCEDURE — 77063 MAMMO DIGITAL SCREENING BILAT WITH TOMO: ICD-10-PCS | Mod: 26,,, | Performed by: RADIOLOGY

## 2023-09-07 PROCEDURE — 99214 OFFICE O/P EST MOD 30 MIN: CPT | Mod: S$PBB,,, | Performed by: FAMILY MEDICINE

## 2023-09-07 RX ORDER — LORATADINE AND PSEUDOEPHEDRINE SULFATE 5; 120 MG/1; MG/1
1 TABLET, EXTENDED RELEASE ORAL 2 TIMES DAILY PRN
Qty: 20 TABLET | Refills: 0 | Status: SHIPPED | OUTPATIENT
Start: 2023-09-07 | End: 2023-09-17

## 2023-09-07 RX ORDER — DOXYCYCLINE 100 MG/1
100 CAPSULE ORAL 2 TIMES DAILY
Qty: 20 CAPSULE | Refills: 0 | Status: SHIPPED | OUTPATIENT
Start: 2023-09-07 | End: 2023-09-17

## 2023-09-07 RX ORDER — 5-HYDROXYTRYPTOPHAN (5-HTP) 100 MG
1 CAPSULE ORAL 2 TIMES DAILY PRN
Qty: 20 TABLET | Refills: 0 | Status: SHIPPED | OUTPATIENT
Start: 2023-09-07 | End: 2023-09-07

## 2023-09-07 NOTE — PROGRESS NOTES
[facility] - Clinic Note    Subjective      Ms. West is a 57 y.o. female who presents to clinic with ear pain.     Reports bilateral ear pain for the past 1.5 weeks.   Associated with bilateral ear itching, left sided headache, post-nasal drip, and sinus congestion.   She has been taking flonase and dimetapp OTC with minimal relief. She has a history of chronic seasonal allergies and had 1 claritin D left which she took and helped.   Denies fevers.   No known sick contacts.    [facility] Misti has a past medical history of Anxiety, Arthritis, Cancer, Hepatomegaly, Hiatal hernia, Liver lesion, and PONV (postoperative nausea and vomiting).   PSXH Misti has a past surgical history that includes Tubal ligation; Sinus surgery; Knee arthroscopy; Esophagogastroduodenoscopy (N/A, 8/13/2020); Cervix surgery; Colonoscopy (N/A, 8/27/2020); Cystoscopy with urethral dilation (10/1/2020); and robotic arthroplasty, knee (Left, 3/29/2022).    Misti's family history includes Arthritis in her father, mother, sister, sister, and sister; Breast cancer in her sister, sister, and sister; COPD in her father; Cancer in her father and mother; Diabetes in her mother; Hypertension in her father; Kidney disease in her sister; Melanoma in her father.   TERRENCE Chappell reports that she has been smoking cigarettes. She has never used smokeless tobacco. She reports that she does not currently use alcohol. She reports that she does not use drugs.   DERRICK Chappell is allergic to codeine, morphine, naproxen sodium, percocet [oxycodone-acetaminophen], tramadol hcl, penicillins, and trintex.   MED Misti has a current medication list which includes the following prescription(s): ascorbic acid (vitamin c), atorvastatin, b complex vitamins, biotin, cholecalciferol (vitamin d3), evening primrose oil, ferrous sulfate, fluticasone propionate, glucosamine-chondroitin, multivitamin, tumeric-ging-olive-oreg-capryl, doxycycline, and claritin-d 12 hour.     Review of  "Systems   Constitutional:  Negative for activity change, appetite change, chills, fatigue and fever.   HENT:  Positive for congestion, ear pain, postnasal drip, rhinorrhea, sinus pressure and sinus pain. Negative for ear discharge and sore throat.    Eyes:  Negative for visual disturbance.   Respiratory:  Negative for cough and shortness of breath.    Cardiovascular:  Negative for chest pain, palpitations and leg swelling.   Gastrointestinal:  Negative for abdominal pain, nausea and vomiting.   Skin:  Negative for wound.   Neurological:  Positive for headaches. Negative for dizziness.   Psychiatric/Behavioral:  Negative for confusion.      Objective     /62 (BP Location: Left arm, Patient Position: Sitting, BP Method: Medium (Manual))   Pulse (!) 59   Ht 5' 9" (1.753 m)   Wt 73.5 kg (162 lb)   SpO2 98%   BMI 23.92 kg/m²     Physical Exam   Constitutional: normal appearance. She appears well-developed and well-nourished.  Non-toxic appearance. No distress. She does not appear ill.   HENT:   Head: Normocephalic and atraumatic.   Right Ear: External ear normal. There is swelling. A middle ear effusion is present.   Left Ear: External ear normal. There is swelling. A middle ear effusion is present.   Nose: Nasal congestion present.   Mouth/Throat: Mucous membranes are moist. No oropharyngeal exudate or posterior oropharyngeal erythema. Oropharynx is clear.   Cobblestoning noted in the posterior oropharynx   Eyes: Conjunctivae are normal. Right eye exhibits no discharge. Left eye exhibits no discharge. No scleral icterus.   Cardiovascular: Normal rate, regular rhythm, normal heart sounds and normal pulses. Exam reveals no gallop and no friction rub.   No murmur heard.Pulmonary:      Effort: Pulmonary effort is normal. No respiratory distress.      Breath sounds: Normal breath sounds. No wheezing, rhonchi or rales.     Abdominal: Normal appearance.   Musculoskeletal:      Cervical back: Neck supple. "   Lymphadenopathy:     She has no cervical adenopathy.   Neurological: She is alert.   Skin: Skin is warm and dry. Capillary refill takes less than 2 seconds. She is not diaphoretic.   Psychiatric: Her behavior is normal. Mood, judgment and thought content normal.   Vitals reviewed.     Assessment/Plan     Misti was seen today for acute sinusitis.    Diagnoses and all orders for this visit:  -New patient and new problem to me    Acute non-recurrent maxillary sinusitis  -     doxycycline (VIBRAMYCIN) 100 MG Cap; Take 1 capsule (100 mg total) by mouth 2 (two) times daily. for 10 days  -     loratadine-pseudoephedrine 5-120 mg (CLARITIN-D 12 HOUR) 5-120 mg per tablet; Take 1 tablet by mouth 2 (two) times daily as needed for Allergies (sinus congestion).    Seasonal allergies  -     loratadine-pseudoephedrine 5-120 mg (CLARITIN-D 12 HOUR) 5-120 mg per tablet; Take 1 tablet by mouth 2 (two) times daily as needed for Allergies (sinus congestion).    -continue flonase daily    Follow up if symptoms worsen or fail to improve.    Future Appointments   Date Time Provider Department Center   9/7/2023  4:00 PM Michael Gamboa MD [facility]   10/17/2023  8:00 AM Pierre Espinal MD [facility]       Michael Gamboa MD  Family Medicine  [facility]

## 2023-10-17 ENCOUNTER — OFFICE VISIT (OUTPATIENT)
Dept: FAMILY MEDICINE | Facility: CLINIC | Age: 58
End: 2023-10-17

## 2023-10-17 VITALS
RESPIRATION RATE: 16 BRPM | BODY MASS INDEX: 23.67 KG/M2 | HEIGHT: 69 IN | HEART RATE: 64 BPM | TEMPERATURE: 98 F | DIASTOLIC BLOOD PRESSURE: 76 MMHG | WEIGHT: 159.81 LBS | SYSTOLIC BLOOD PRESSURE: 122 MMHG | OXYGEN SATURATION: 96 %

## 2023-10-17 DIAGNOSIS — F41.9 ANXIETY: Primary | ICD-10-CM

## 2023-10-17 DIAGNOSIS — J30.2 SEASONAL ALLERGIES: ICD-10-CM

## 2023-10-17 DIAGNOSIS — R91.1 SOLITARY PULMONARY NODULE: ICD-10-CM

## 2023-10-17 PROCEDURE — 99999 PR PBB SHADOW E&M-EST. PATIENT-LVL V: ICD-10-PCS | Mod: PBBFAC,,, | Performed by: FAMILY MEDICINE

## 2023-10-17 PROCEDURE — 99215 OFFICE O/P EST HI 40 MIN: CPT | Mod: PBBFAC | Performed by: FAMILY MEDICINE

## 2023-10-17 PROCEDURE — 99214 OFFICE O/P EST MOD 30 MIN: CPT | Mod: S$PBB,,, | Performed by: FAMILY MEDICINE

## 2023-10-17 PROCEDURE — 99999 PR PBB SHADOW E&M-EST. PATIENT-LVL V: CPT | Mod: PBBFAC,,, | Performed by: FAMILY MEDICINE

## 2023-10-17 PROCEDURE — 99214 PR OFFICE/OUTPT VISIT, EST, LEVL IV, 30-39 MIN: ICD-10-PCS | Mod: S$PBB,,, | Performed by: FAMILY MEDICINE

## 2023-10-17 RX ORDER — FLUTICASONE PROPIONATE 50 MCG
1 SPRAY, SUSPENSION (ML) NASAL 2 TIMES DAILY
Qty: 16 G | Refills: 3 | Status: ON HOLD | OUTPATIENT
Start: 2023-10-17 | End: 2024-01-16

## 2023-10-17 RX ORDER — LORATADINE AND PSEUDOEPHEDRINE SULFATE 5; 120 MG/1; MG/1
1 TABLET, EXTENDED RELEASE ORAL 2 TIMES DAILY
Qty: 30 TABLET | Refills: 2 | COMMUNITY
Start: 2023-10-17 | End: 2023-10-23 | Stop reason: SDUPTHER

## 2023-10-17 RX ORDER — ALPRAZOLAM 0.25 MG/1
0.25 TABLET ORAL NIGHTLY PRN
Qty: 30 TABLET | Refills: 0 | Status: SHIPPED | OUTPATIENT
Start: 2023-10-17 | End: 2024-01-10

## 2023-10-17 NOTE — PROGRESS NOTES
[facility] - Clinic Note    Subjective      Ms. West is a 58 y.o. female who presents to clinic for Follow-up (3mth follow up/Refills/)    Has been having a lot of allergies.  The Claritin D helps in addition to the Flonase.    PM[facility] Misti has a past medical history of Anxiety, Arthritis, Cancer, Hepatomegaly, Hiatal hernia, Liver lesion, and PONV (postoperative nausea and vomiting).   PSXH Misti has a past surgical history that includes Tubal ligation; Sinus surgery; Knee arthroscopy; Esophagogastroduodenoscopy (N/A, 8/13/2020); Cervix surgery; Colonoscopy (N/A, 8/27/2020); Cystoscopy with urethral dilation (10/1/2020); and robotic arthroplasty, knee (Left, 3/29/2022).    Misti's family history includes Arthritis in her father, mother, sister, sister, and sister; Breast cancer in her sister, sister, and sister; COPD in her father; Cancer in her father and mother; Diabetes in her mother; Hypertension in her father; Kidney disease in her sister; Melanoma in her father.   SH Misti reports that she has been smoking cigarettes. She has never used smokeless tobacco. She reports that she does not currently use alcohol. She reports that she does not use drugs.   DERRICK Chappell is allergic to codeine, morphine, naproxen sodium, percocet [oxycodone-acetaminophen], tramadol hcl, penicillins, and trintex.   LEILANI Chappell has a current medication list which includes the following prescription(s): ascorbic acid (vitamin c), b complex vitamins, biotin, cholecalciferol (vitamin d3), evening primrose oil, ferrous sulfate, glucosamine-chondroitin, multivitamin, tumeric-ging-olive-oreg-capryl, alprazolam, atorvastatin, fluticasone propionate, and claritin-d 12 hour.     Review of Systems   Constitutional:  Negative for chills and fever.   HENT:  Negative for congestion and rhinorrhea.    Eyes:  Negative for visual disturbance.   Respiratory:  Negative for cough and shortness of breath.    Cardiovascular:  Negative for chest pain.  "  Gastrointestinal:  Negative for abdominal pain, constipation, diarrhea, nausea and vomiting.   Genitourinary:  Negative for dysuria.   Musculoskeletal:  Negative for myalgias.   Skin:  Negative for rash.   Allergic/Immunologic: Positive for environmental allergies.   Neurological:  Negative for weakness and headaches.     Objective     /76 (BP Location: Left arm, Patient Position: Sitting, BP Method: Large (Manual))   Pulse 64   Temp 97.6 °F (36.4 °C) (Temporal)   Resp 16   Ht 5' 9" (1.753 m)   Wt 72.5 kg (159 lb 12.8 oz)   SpO2 96%   BMI 23.60 kg/m²     Physical Exam  Vitals and nursing note reviewed.   Constitutional:       General: She is not in acute distress.     Appearance: Normal appearance. She is well-developed. She is not diaphoretic.   HENT:      Head: Normocephalic and atraumatic.      Right Ear: External ear normal.      Left Ear: External ear normal.   Eyes:      General:         Right eye: No discharge.         Left eye: No discharge.   Cardiovascular:      Rate and Rhythm: Normal rate and regular rhythm.      Heart sounds: Normal heart sounds.   Pulmonary:      Effort: Pulmonary effort is normal.      Breath sounds: Normal breath sounds. No wheezing or rales.   Skin:     General: Skin is warm and dry.   Neurological:      Mental Status: She is alert and oriented to person, place, and time. Mental status is at baseline.   Psychiatric:         Mood and Affect: Mood normal.         Behavior: Behavior normal.         Thought Content: Thought content normal.         Judgment: Judgment normal.        Assessment/Plan     1. Anxiety  ALPRAZolam (XANAX) 0.25 MG tablet      2. Seasonal allergies  loratadine-pseudoephedrine 5-120 mg (CLARITIN-D 12 HOUR) 5-120 mg per tablet    fluticasone propionate (FLONASE) 50 mcg/actuation nasal spray      3. Solitary pulmonary nodule  CT Chest Without Contrast        Refilled medication as above.  Patient's son is currently deployed and she is experiencing " anxiety because of this.  Refilled alprazolam.  reviewed. No red flags. Will also repeat CT chest given that the previous scan did not evaluate the pulmonary nodule or was obscured by the bronchitis that was present.    Future Appointments   Date Time Provider Department Center   11/7/2023  8:15 AM [facility] CT1 [facility] CT [facility]         Pierre Espinal MD  Family Medicine  [facility]

## 2023-10-23 DIAGNOSIS — E78.5 HYPERLIPIDEMIA, UNSPECIFIED HYPERLIPIDEMIA TYPE: ICD-10-CM

## 2023-10-23 DIAGNOSIS — J30.2 SEASONAL ALLERGIES: ICD-10-CM

## 2023-10-23 NOTE — TELEPHONE ENCOUNTER
----- Message from Merissa Weiss sent at 10/23/2023  2:51 PM CDT -----  Contact: PT  Type:  RX Refill Request    Who Called: PT   Refill or New Rx: 2 REFILLS     RX Name and Strength:loratadine-pseudoephedrine 5-120 mg (CLARITIN-D 12 HOUR) 5-120 mg per tablet  How is the patient currently taking it? (ex. 1XDay): ONE TAB AT NIGHT   Is this a 30 day or 90 day RX: 30    RX Name and Strength:atorvastatin (LIPITOR) 10 MG tablet  How is the patient currently taking it? (ex. 1XDay): ONE TAB ONCE A DAY   Is this a 30 day or 90 day RX:90    Preferred Pharmacy with phone number:    [facility] - [place] [id number] [address]  Phone: [phone] Fax: [phone]  Local or Mail Order:LOCAL  Ordering Provider: SHARAD  Would the patient rather a call back or a response via [facility]? CALL  Best Call Back Number: [phone] (home)   Additional Information: THANK YOU

## 2023-10-24 RX ORDER — ATORVASTATIN CALCIUM 10 MG/1
10 TABLET, FILM COATED ORAL DAILY
Qty: 90 TABLET | Refills: 3 | Status: SHIPPED | OUTPATIENT
Start: 2023-10-24 | End: 2024-10-23

## 2023-10-24 RX ORDER — LORATADINE AND PSEUDOEPHEDRINE SULFATE 5; 120 MG/1; MG/1
1 TABLET, EXTENDED RELEASE ORAL 2 TIMES DAILY
Qty: 30 TABLET | Refills: 2 | Status: SHIPPED | OUTPATIENT
Start: 2023-10-24 | End: 2023-12-05

## 2023-10-30 ENCOUNTER — PATIENT MESSAGE (OUTPATIENT)
Dept: GASTROENTEROLOGY | Facility: CLINIC | Age: 58
End: 2023-10-30

## 2023-10-30 DIAGNOSIS — Z86.010 HISTORY OF COLON POLYPS: Primary | ICD-10-CM

## 2023-11-05 NOTE — DISCHARGE INSTRUCTIONS
DO NOT TAKE ELIQUIS.  CONTINUE TAKING ASPIRIN TO PREVENT DVT   Emergency Department Resident Note    Patient: Nabila Ramirez Age: 76 year old Sex: female  MRN: [id number] : 1947    Chief Complaint and Brief History of Present Illness  This is a 76 year old female ***    History obtained by {mlhistoryobtainedby:[id number]}  Specific social determinants of health  -  reports that she has been smoking cigarettes. She has never used smokeless tobacco. She reports that she does not drink alcohol and does not use drugs.  I independently reviewed the patient's prior external ED visits, hospital admissions, and documents in Care Everywhere.     Pertinent Physical Exam   Visit Vitals  /71 (BP Location: RUE - Right upper extremity, Patient Position: Supine)   Pulse 93   Temp 99.5 °F (37.5 °C)   Resp 18   SpO2 94%     General: Patient is ***  HEENT: Pupils equally round and reactive to light bilaterally, extraocular muscles intact  Cardiovascular: Regular rate and rhythm  Pulmonary: No increased work of breathing  Gastrointestinal: Abdomen is nondistended  Musculoskeletal: No clear bony abnormalities  Skin: Dry  Neuro: Patient is spontaneously moving all limbs with no focal neurologic abnormalities    Labs/Imaging: See MDM and ED course for results independently interpreted in the ED record in conjunction with the official radiology report.     Medical Decision Making  This is a 76 year old female with ***    Critical Care Documentation  *** minutes of critical care time spent at the bedside performing history, physical, diagnostic evaluation, complex data interpretation, discussion with consultants, creating a diagnostic and therapeutic plan for this critically ill patient at risk of significant or life threatening decompensation and/or imminent organ failure. During this time I devoted my full attention to the patient and was immediately available to intervene at any time. This was excluding time spent performing procedures    The patient is at high risk of morbidity from  additional treatment and diagnostic testing that requires close hemodynamic monitoring, telemetry, and frequent reassessments.     ***The patient has an acute illness that poses a threat to life/bodily function and requires admission to the hospital.   ***The patient has a chronic illness with severe exacerbation/progression of disease that requires admission to the hospital.    ED Course as of 11/22/23 1609   Sat Nov 04, 2023   2246 Quad swab negative, urinalysis with moderate blood and a few bacteria in a patient without obvious UTI symptoms.  However because he is neutropenic fever status post recent chemotherapy along with oral sores, has been ordered for cefepime as well as Diflucan, which will cover.    Still pending CT abdomen/pelvis given her right lower quadrant abdominal pain complaints [CC]      ED Course User Index  [CC] Zeenat Conrad MD       Past History  PMH in [facility]  Past Surgical History:   Procedure Laterality Date   • Appendectomy     • Brain surgery     • Eye surgery       Social History     Socioeconomic History   • Marital status:      Spouse name: Not on file   • Number of children: Not on file   • Years of education: Not on file   • Highest education level: Not on file   Occupational History   • Not on file   Tobacco Use   • Smoking status: Some Days     Types: Cigarettes   • Smokeless tobacco: Never   Vaping Use   • Vaping Use: never used   Substance and Sexual Activity   • Alcohol use: Never   • Drug use: Never   • Sexual activity: Not on file   Other Topics Concern   • Not on file   Social History Narrative   • Not on file     Social Determinants of Health     Financial Resource Strain: Low Risk  (10/24/2023)    Financial Resource Strain    • Social Determinants: Financial Resource Strain: None   Food Insecurity: No Food Insecurity (10/20/2023)    Food Insecurity    • Social Determinants: Food Insecurity: Never   Transportation Needs: No Transportation Needs (10/27/2023)    OASIS  : Transportation    • Lack of Transportation (Medical): No    • Lack of Transportation (Non-Medical): No    • Patient Unable or Declines to Respond: No   Physical Activity: Not on file   Stress: Not on file   Social Connections: Feeling Somewhat Isolated (10/27/2023)    OASIS : Social Isolation    • Frequency of experiencing loneliness or isolation: Sometimes   Intimate Partner Violence: Not At Risk (10/20/2023)    Intimate Partner Violence    • Social Determinants: Intimate Partner Violence Past Fear: No    • Social Determinants: Intimate Partner Violence Current Fear: No     ALLERGIES:   Allergen Reactions   • Shellfish Allergy   (Food Or Med) ANAPHYLAXIS   • Alcohol Base PRURITUS   • Amoxicillin-Pot Clavulanate RASH   • Clindamycin PRURITUS   • Mushroom   (Food) RASH   • Nitrofurantoin RASH   • Penicillins RASH   • Tomato (Diagnostic) RASH   • Vancomycin RASH     Current Outpatient Medications   Medication Instructions   • acetaminophen (TYLENOL) 650 mg, Oral, EVERY 4 HOURS PRN   • alendronate (FOSAMAX) 70 mg, Oral, 1 DAY A WEEK, Friday   • ALPRAZolam (XANAX) 0.25 mg, Oral, AT BEDTIME PRN   • benzocaine (ORAJEL) 10 % oral gel Apply to mouth as needed for pain.   • budesonide-formoterol (SYMBICORT) 160-4.5 MCG/ACT inhaler 2 puffs, Inhalation, 2 TIMES DAILY   • butalbital-acetaminophen-caffeine-codeine -08-30 MG capsule 1 capsule, Oral, 2 TIMES DAILY PRN   • Calcium Carb-Cholecalciferol 600-10 MG-MCG Tab 1 tablet, Oral, 2 TIMES DAILY   • diphenhydrAMINE (BENADRYL) 25 mg, Oral, EVERY 4 HOURS PRN   • docusate sodium (COLACE) 100 mg, Oral, PRN   • EPINEPHrine 0.3 mg, Intramuscular, ONCE PRN   • gabapentin (NEURONTIN) 300 mg, Oral, EVERY 8 HOURS SCHEDULED   • lidocaine (LIDOCARE) 4 % patch 1 patch, Transdermal, EVERY 24 HOURS, Left femor   • naproxen (NAPROSYN) 375 MG tablet 1 tablet, Oral, 2 TIMES DAILY PRN   • ondansetron (ZOFRAN ODT) 4 MG disintegrating tablet place 1 tablet (4 mg) and place on top  of the tongue where it will dissolve, then swallow by translingual route 4 times per day as needed   • oxyCODONE-acetaminophen (PERCOCET)  MG per tablet 1 tablet, Oral, EVERY 6 HOURS PRN   • pantoprazole (PROTONIX) 40 mg, Oral, DAILY   • polyethylene glycol (MIRALAX) 17 g, Oral, EVERY OTHER DAY, Stir and dissolve powder in any 4 to 8 ounces of beverage, then drink.   • predniSONE (DELTASONE) 20 MG tablet TAKE 5 TALBETS (100 mg) BY MOUTH As Directed. Take on days 1-5 of a 21-day cycle.   • prochlorperazine (COMPAZINE) 10 mg, Oral, EVERY 6 HOURS PRN        Diagnosis  No diagnosis found.    Disposition:    ***        Uzair Finley MD  Emergency Medicine PGY-2  [facility]        Note to patient: The 21st Century Cures Act requires that Medical Documents like this be available to you for transparency and improved communication about your care.  Please be advised this is a Medical Document and is specifically meant to convey medical facts and clinical opinions between your healthcare providers.  It uses medical language, abbreviations and wording that may be unfamiliar, and may appear blunt, direct, or unclear.  Please contact your healthcare provider if you have any questions or concerns about this document.    Portions of this note were generated with voice recognition software.  Unfortunately, this can occasionally lead to typographical errors.  There may be multiple grammatical and punctuation errors, improper verb tenses, misspellings, and gender designation errors.

## 2023-11-20 ENCOUNTER — HOSPITAL ENCOUNTER (OUTPATIENT)
Dept: RADIOLOGY | Facility: HOSPITAL | Age: 58
Discharge: HOME | End: 2023-11-20
Attending: FAMILY MEDICINE

## 2023-11-20 DIAGNOSIS — R91.1 SOLITARY PULMONARY NODULE: ICD-10-CM

## 2023-11-20 PROCEDURE — 71250 CT CHEST WITHOUT CONTRAST: ICD-10-PCS | Mod: 26,,, | Performed by: RADIOLOGY

## 2023-11-20 PROCEDURE — 71250 CT THORAX DX C-: CPT | Mod: 26,,, | Performed by: RADIOLOGY

## 2023-11-20 PROCEDURE — 71250 CT THORAX DX C-: CPT | Mod: TC

## 2023-12-05 ENCOUNTER — OFFICE VISIT (OUTPATIENT)
Dept: FAMILY MEDICINE | Facility: CLINIC | Age: 58
End: 2023-12-05

## 2023-12-05 VITALS
OXYGEN SATURATION: 96 % | SYSTOLIC BLOOD PRESSURE: 132 MMHG | RESPIRATION RATE: 20 BRPM | BODY MASS INDEX: 23.43 KG/M2 | HEART RATE: 56 BPM | WEIGHT: 158.19 LBS | DIASTOLIC BLOOD PRESSURE: 82 MMHG | HEIGHT: 69 IN

## 2023-12-05 DIAGNOSIS — J06.9 UPPER RESPIRATORY TRACT INFECTION, UNSPECIFIED TYPE: ICD-10-CM

## 2023-12-05 DIAGNOSIS — R50.9 SUBJECTIVE FEVER: ICD-10-CM

## 2023-12-05 DIAGNOSIS — R05.1 ACUTE COUGH: Primary | ICD-10-CM

## 2023-12-05 DIAGNOSIS — J30.1 SEASONAL ALLERGIC RHINITIS DUE TO POLLEN: ICD-10-CM

## 2023-12-05 DIAGNOSIS — R30.0 DYSURIA: ICD-10-CM

## 2023-12-05 DIAGNOSIS — N81.10 BLADDER PROLAPSE, FEMALE, ACQUIRED: ICD-10-CM

## 2023-12-05 LAB
BILIRUB UR QL STRIP: NEGATIVE
CLARITY UR: ABNORMAL
COLOR UR: ABNORMAL
CTP QC/QA: YES
CTP QC/QA: YES
GLUCOSE UR QL STRIP: NEGATIVE
HGB UR QL STRIP: NEGATIVE
KETONES UR QL STRIP: NEGATIVE
LEUKOCYTE ESTERASE UR QL STRIP: NEGATIVE
NITRITE UR QL STRIP: NEGATIVE
PH UR STRIP: 7 [PH] (ref 5–8)
POC MOLECULAR INFLUENZA A AGN: NEGATIVE
POC MOLECULAR INFLUENZA B AGN: NEGATIVE
PROT UR QL STRIP: NEGATIVE
SARS-COV-2 RDRP RESP QL NAA+PROBE: NEGATIVE
SP GR UR STRIP: <=1.005 (ref 1–1.03)
URN SPEC COLLECT METH UR: ABNORMAL
UROBILINOGEN UR STRIP-ACNC: NEGATIVE EU/DL

## 2023-12-05 PROCEDURE — 99214 OFFICE O/P EST MOD 30 MIN: CPT | Mod: PBBFAC | Performed by: FAMILY MEDICINE

## 2023-12-05 PROCEDURE — 99214 OFFICE O/P EST MOD 30 MIN: CPT | Mod: S$PBB,,, | Performed by: FAMILY MEDICINE

## 2023-12-05 PROCEDURE — 87502 INFLUENZA DNA AMP PROBE: CPT | Mod: PBBFAC | Performed by: FAMILY MEDICINE

## 2023-12-05 PROCEDURE — 99999PBSHW POCT INFLUENZA A/B MOLECULAR: Mod: PBBFAC,,,

## 2023-12-05 PROCEDURE — 99999PBSHW: Mod: PBBFAC,,,

## 2023-12-05 PROCEDURE — 81003 URINALYSIS AUTO W/O SCOPE: CPT | Performed by: FAMILY MEDICINE

## 2023-12-05 PROCEDURE — 99999PBSHW POCT INFLUENZA A/B MOLECULAR: ICD-10-PCS | Mod: PBBFAC,,,

## 2023-12-05 PROCEDURE — 99999 PR PBB SHADOW E&M-EST. PATIENT-LVL IV: CPT | Mod: PBBFAC,,, | Performed by: FAMILY MEDICINE

## 2023-12-05 PROCEDURE — 99999 PR PBB SHADOW E&M-EST. PATIENT-LVL IV: ICD-10-PCS | Mod: PBBFAC,,, | Performed by: FAMILY MEDICINE

## 2023-12-05 PROCEDURE — 99214 PR OFFICE/OUTPT VISIT, EST, LEVL IV, 30-39 MIN: ICD-10-PCS | Mod: S$PBB,,, | Performed by: FAMILY MEDICINE

## 2023-12-05 PROCEDURE — 87635 SARS-COV-2 COVID-19 AMP PRB: CPT | Mod: PBBFAC | Performed by: FAMILY MEDICINE

## 2023-12-05 RX ORDER — MONTELUKAST SODIUM 10 MG/1
10 TABLET ORAL NIGHTLY
Qty: 90 TABLET | Refills: 3 | Status: SHIPPED | OUTPATIENT
Start: 2023-12-05 | End: 2024-01-04

## 2023-12-05 RX ORDER — DOXYCYCLINE HYCLATE 100 MG
100 TABLET ORAL 2 TIMES DAILY
Qty: 20 TABLET | Refills: 0 | Status: SHIPPED | OUTPATIENT
Start: 2023-12-05

## 2023-12-05 RX ORDER — LEVOCETIRIZINE DIHYDROCHLORIDE 5 MG/1
5 TABLET, FILM COATED ORAL NIGHTLY
Qty: 30 TABLET | Refills: 11 | Status: SHIPPED | OUTPATIENT
Start: 2023-12-05 | End: 2024-12-04

## 2023-12-05 NOTE — PROGRESS NOTES
Subjective:       Patient ID: Misti West is a 58 y.o. female.    Chief Complaint: Diarrhea and URI    Ms. Jones is a 58-year-old female with recurrent UTIs, tobacco dependence due to cigarettes, arthritis, hyperlipidemia, epigastric pain, who presents today with cough cold congestion since Thursday of last week.  She also has diarrhea that started 2 days ago.  She complains of dysuria and states that her bladder sometimes falls out of her vagina or nearly falls out of her vagina.  She also states that she would a CT scan ordered by Dr. Knight but she does not know the results of that and she would like to know the results of her CT scan. These results were printed out and reviewed with the patient    Diarrhea   Associated symptoms include chills, coughing, a fever and a URI. Pertinent negatives include no abdominal pain.   URI   Associated symptoms include congestion, coughing, diarrhea, dysuria, rhinorrhea and a sore throat. Pertinent negatives include no abdominal pain, chest pain or rash.     Review of Systems   Constitutional:  Positive for chills and fever. Negative for activity change, appetite change and diaphoresis.   HENT:  Positive for congestion, postnasal drip, rhinorrhea, sinus pressure and sore throat.    Respiratory:  Positive for cough. Negative for shortness of breath.    Cardiovascular:  Negative for chest pain, palpitations and leg swelling.   Gastrointestinal:  Positive for diarrhea. Negative for abdominal distention and abdominal pain.   Genitourinary:  Positive for difficulty urinating and dysuria.   Skin:  Negative for color change, rash and wound.       Patient Active Problem List   Diagnosis    Epigastric pain    Change in bowel habits    Recurrent UTI    Osteoarthritis of knee    Tobacco dependence due to cigarettes    Bradycardia    Gait abnormality    History of knee surgery    Left knee pain    Liver lesion    Arthritis    Other hyperlipidemia    Seasonal allergies       Objective:     "  Physical Exam  Vitals and nursing note reviewed.   Constitutional:       Appearance: She is well-developed.   HENT:      Right Ear: Tympanic membrane and ear canal normal.      Left Ear: Tympanic membrane and ear canal normal.      Nose: Mucosal edema and rhinorrhea present.      Right Sinus: No maxillary sinus tenderness or frontal sinus tenderness.      Left Sinus: No maxillary sinus tenderness or frontal sinus tenderness.      Mouth/Throat:      Pharynx: Posterior oropharyngeal erythema present. No oropharyngeal exudate.      Tonsils: No tonsillar abscesses.   Cardiovascular:      Rate and Rhythm: Normal rate and regular rhythm.      Heart sounds: Normal heart sounds.   Pulmonary:      Effort: Pulmonary effort is normal.      Breath sounds: Normal breath sounds.   Skin:     General: Skin is warm and dry.         Lab Results   Component Value Date    WBC 6.07 07/20/2023    HGB 13.3 07/20/2023    HCT 39.7 07/20/2023     07/20/2023    CHOL 167 07/20/2023    TRIG 58 07/20/2023    HDL 70 07/20/2023    ALT 19 07/20/2023    AST 23 07/20/2023     07/20/2023    K 4.2 07/20/2023     07/20/2023    CREATININE 0.8 07/20/2023    BUN 16 07/20/2023    CO2 24 07/20/2023    TSH 1.168 07/20/2023    INR 1.0 04/18/2022    HGBA1C 5.2 07/20/2023     The 10-year ASCVD risk score (Sindhu DILLARD, et al., 2019) is: 5.4%    Values used to calculate the score:      Age: 58 years      Sex: Female      Is Non- : No      Diabetic: No      Tobacco smoker: Yes      Systolic Blood Pressure: 132 mmHg      Is BP treated: No      HDL Cholesterol: 70 mg/dL      Total Cholesterol: 167 mg/dL  Visit Vitals  /82 (BP Location: Right arm, Patient Position: Sitting, BP Method: Medium (Manual))   Pulse (!) 56   Resp 20   Ht 5' 9" (1.753 m)   Wt 71.8 kg (158 lb 3.2 oz)   SpO2 96%   BMI 23.36 kg/m²      Assessment:       1. Acute cough    2. Subjective fever    3. Dysuria    4. Bladder prolapse, female, acquired  "   5. Upper respiratory tract infection, unspecified type    6. Seasonal allergic rhinitis due to pollen        Plan:       1. Acute cough  -     POCT Influenza A/B Molecular  -     POCT COVID-19 Rapid Screening  -     doxycycline (VIBRA-TABS) 100 MG tablet; Take 1 tablet (100 mg total) by mouth 2 (two) times daily.  Dispense: 20 tablet; Refill: 0    2. Subjective fever  -     POCT Influenza A/B Molecular  -     POCT COVID-19 Rapid Screening    3. Dysuria  -     Urinalysis, Reflex to Urine Culture Urine, Clean Catch  -     Ambulatory referral/consult to Urology; Future; Expected date: 12/12/2023    4. Bladder prolapse, female, acquired  -     Ambulatory referral/consult to Urology; Future; Expected date: 12/12/2023    5. Upper respiratory tract infection, unspecified type  -     doxycycline (VIBRA-TABS) 100 MG tablet; Take 1 tablet (100 mg total) by mouth 2 (two) times daily.  Dispense: 20 tablet; Refill: 0    6. Seasonal allergic rhinitis due to pollen  -     levocetirizine (XYZAL) 5 MG tablet; Take 1 tablet (5 mg total) by mouth every evening.  Dispense: 30 tablet; Refill: 11  -     montelukast (SINGULAIR) 10 mg tablet; Take 1 tablet (10 mg total) by mouth every evening.  Dispense: 90 tablet; Refill: 3       Follow up if symptoms worsen or fail to improve.

## 2023-12-11 ENCOUNTER — TELEPHONE (OUTPATIENT)
Dept: FAMILY MEDICINE | Facility: CLINIC | Age: 58
End: 2023-12-11

## 2023-12-11 DIAGNOSIS — R05.1 ACUTE COUGH: Primary | ICD-10-CM

## 2023-12-11 DIAGNOSIS — J30.1 SEASONAL ALLERGIC RHINITIS DUE TO POLLEN: ICD-10-CM

## 2023-12-11 NOTE — TELEPHONE ENCOUNTER
----- Message from Shiloh Phan sent at 12/11/2023 11:52 AM CST -----  Contact: pt [phone]  Type: Needs Medical Advice  Who Called:  Pt   Symptoms (please be specific):  hacking cough/ hard to catch breath/ Coughing causing bladder prolapse     Best Call Back Number: [phone]    Additional Information: Pt stated she has been taking xyzal/ singular and mucenx and its not helping. Pt stated she has been coughing so bad she has not smoked in 1 week. Pt wanting to know if she is taking her meds correctly or will she need something else called. Pls call back and advise

## 2023-12-11 NOTE — TELEPHONE ENCOUNTER
"Spoke with pt. Pt reports, " I am taking my medication. My mucous went from clear/green/clear. I am having strangling cough spells. I need something else to help me get over this. " Pt is still taking prescribed medications from 12.5.23. pt requesting additional medication. Please advise  "

## 2023-12-12 ENCOUNTER — HOSPITAL ENCOUNTER (OUTPATIENT)
Dept: RADIOLOGY | Facility: HOSPITAL | Age: 58
Discharge: HOME | End: 2023-12-12
Attending: FAMILY MEDICINE

## 2023-12-12 DIAGNOSIS — R05.1 ACUTE COUGH: ICD-10-CM

## 2023-12-12 DIAGNOSIS — J30.1 SEASONAL ALLERGIC RHINITIS DUE TO POLLEN: ICD-10-CM

## 2023-12-12 PROCEDURE — 70220 X-RAY EXAM OF SINUSES: CPT | Mod: TC

## 2023-12-12 PROCEDURE — 71046 X-RAY EXAM CHEST 2 VIEWS: CPT | Mod: TC

## 2023-12-12 PROCEDURE — 71046 XR CHEST PA AND LATERAL: ICD-10-PCS | Mod: 26,,, | Performed by: RADIOLOGY

## 2023-12-12 PROCEDURE — 71046 X-RAY EXAM CHEST 2 VIEWS: CPT | Mod: 26,,, | Performed by: RADIOLOGY

## 2023-12-12 PROCEDURE — 70220 X-RAY EXAM OF SINUSES: CPT | Mod: 26,,, | Performed by: RADIOLOGY

## 2023-12-12 PROCEDURE — 70220 XR SINUSES MIN 3 VIEWS: ICD-10-PCS | Mod: 26,,, | Performed by: RADIOLOGY

## 2023-12-13 ENCOUNTER — PATIENT MESSAGE (OUTPATIENT)
Dept: FAMILY MEDICINE | Facility: CLINIC | Age: 58
End: 2023-12-13

## 2023-12-22 ENCOUNTER — PATIENT MESSAGE (OUTPATIENT)
Dept: FAMILY MEDICINE | Facility: CLINIC | Age: 58
End: 2023-12-22

## 2023-12-22 DIAGNOSIS — J30.2 SEASONAL ALLERGIES: Primary | ICD-10-CM

## 2023-12-22 RX ORDER — IPRATROPIUM BROMIDE 21 UG/1
2 SPRAY, METERED NASAL 3 TIMES DAILY
Qty: 60 ML | Refills: 0 | Status: SHIPPED | OUTPATIENT
Start: 2023-12-22

## 2023-12-22 NOTE — TELEPHONE ENCOUNTER
Dear Dr. Cisneros,     In the absence of Dr. Ruiz, can you please address this patients concern or request?    Thank you,  Grazyna Camarillo LPN

## 2024-01-10 ENCOUNTER — OFFICE VISIT (OUTPATIENT)
Dept: UROGYNECOLOGY | Facility: CLINIC | Age: 59
End: 2024-01-10

## 2024-01-10 VITALS
HEIGHT: 69 IN | SYSTOLIC BLOOD PRESSURE: 121 MMHG | DIASTOLIC BLOOD PRESSURE: 68 MMHG | WEIGHT: 167.56 LBS | BODY MASS INDEX: 24.82 KG/M2 | HEART RATE: 62 BPM

## 2024-01-10 DIAGNOSIS — N81.6 RECTOCELE: ICD-10-CM

## 2024-01-10 DIAGNOSIS — N39.3 STRESS INCONTINENCE: ICD-10-CM

## 2024-01-10 DIAGNOSIS — R30.0 DYSURIA: ICD-10-CM

## 2024-01-10 DIAGNOSIS — N81.11 CYSTOCELE, MIDLINE: Primary | ICD-10-CM

## 2024-01-10 DIAGNOSIS — N81.2 INCOMPLETE UTEROVAGINAL PROLAPSE: ICD-10-CM

## 2024-01-10 DIAGNOSIS — F17.200 SMOKER: ICD-10-CM

## 2024-01-10 DIAGNOSIS — N81.10 BLADDER PROLAPSE, FEMALE, ACQUIRED: ICD-10-CM

## 2024-01-10 DIAGNOSIS — N39.41 URGE INCONTINENCE OF URINE: ICD-10-CM

## 2024-01-10 LAB
BILIRUB SERPL-MCNC: NEGATIVE MG/DL
BLOOD URINE, POC: NEGATIVE
CLARITY, POC UA: CLEAR
COLOR, POC UA: YELLOW
GLUCOSE UR QL STRIP: NORMAL
KETONES UR QL STRIP: NEGATIVE
LEUKOCYTE ESTERASE URINE, POC: NEGATIVE
NITRITE, POC UA: NEGATIVE
PH, POC UA: 7
POC RESIDUAL URINE VOLUME: 2 ML (ref 0–100)
PROTEIN, POC: NEGATIVE
SPECIFIC GRAVITY, POC UA: 1
UROBILINOGEN, POC UA: NORMAL

## 2024-01-10 PROCEDURE — 81002 URINALYSIS NONAUTO W/O SCOPE: CPT | Mod: PBBFAC | Performed by: OBSTETRICS & GYNECOLOGY

## 2024-01-10 PROCEDURE — 99999 PR PBB SHADOW E&M-EST. PATIENT-LVL IV: CPT | Mod: PBBFAC,,, | Performed by: OBSTETRICS & GYNECOLOGY

## 2024-01-10 PROCEDURE — 99999PBSHW POCT URINE DIPSTICK WITHOUT MICROSCOPE: Mod: PBBFAC,,,

## 2024-01-10 PROCEDURE — 99205 OFFICE O/P NEW HI 60 MIN: CPT | Mod: S$PBB,,, | Performed by: OBSTETRICS & GYNECOLOGY

## 2024-01-10 PROCEDURE — 99214 OFFICE O/P EST MOD 30 MIN: CPT | Mod: PBBFAC | Performed by: OBSTETRICS & GYNECOLOGY

## 2024-01-10 RX ORDER — MONTELUKAST SODIUM 5 MG/1
5 TABLET, CHEWABLE ORAL NIGHTLY
COMMUNITY

## 2024-01-10 RX ORDER — DOCUSATE SODIUM 100 MG/1
100 CAPSULE, LIQUID FILLED ORAL DAILY
COMMUNITY

## 2024-01-10 NOTE — PROGRESS NOTES
"Chief Complaint   Patient presents with    Vaginal Prolapse        HPI: Patient is a 58 y.o. female  with a h/o cervical cancer s/p a cone biopsy in  who presents today with c/o vaginal prolapse. She reports that the vaginal bulge has been present since 2023. She states that she noticed it when she was in the shower. Prior to noticing the bulge she had a cough from a viral upper respiratory illness. She reports the vaginal bulge size varies depending on how long she is on her feet or the types of activities she does. [place] and when she does a lot of heavy lifting the bulge gets bigger. She will stop and elevate her legs when the bulge is uncomfortable. She tries not to strain and push as she finds that with these actions the bulge also gets bigger. She sometimes notices that she has difficulty starting her urine stream. When this occurs she will also lie down. Feels that she empties better at nighttime.   Reports that she had a pap smear which was normal recently. States that her old gyn who delivered her children that her mentioned she had uterine prolapse but this was about 20 years ago.     She reports urinary urgency and occasional urge incontinence. She at times has daytime urinary frequency, voiding every 1-2 hours. She wakes 2-3 times over night to void. She wakes due to the urge to void. She notices loss of urine with a hard cough. Does not notice loss of urine with sneezing or lifting. After she urinates she at times experiences post void dribbling.     Reports a h/o a urologist "stretching her bladder" about 2-4 years ago due to recurrent bladder infections.     She has a bowel movements about every 3-4 days. They are formed but she feels that she has a blockage. She is scheduled for a colonoscopy next week. She has a h/o colon polyps and hemorrhoids. She denies accidental bowel leakage.       Review of Systems   Constitutional:  Positive for activity change and unexpected " weight change. Negative for appetite change, chills, fatigue and fever.   HENT:  Positive for nasal congestion, hearing loss and mouth dryness. Negative for dental problem and sore throat.    Eyes:  Negative for pain and eye dryness.   Respiratory:  Negative for cough, shortness of breath and wheezing.    Cardiovascular:  Positive for leg swelling. Negative for chest pain and palpitations.   Gastrointestinal:  Positive for abdominal distention and constipation. Negative for abdominal pain, blood in stool and rectal pain.   Endocrine: Positive for cold intolerance. Negative for heat intolerance.   Genitourinary:  Positive for vaginal dryness. Negative for dyspareunia and hot flashes.        Decreased libido   Musculoskeletal:  Positive for myalgias. Negative for arthralgias, back pain, gait problem, neck pain and neck stiffness.   Integumentary:  Negative for rash.   Neurological:  Negative for dizziness, tremors, seizures, speech difficulty, weakness, light-headedness, numbness and headaches.   Psychiatric/Behavioral:  Negative for confusion, dysphoric mood and sleep disturbance. The patient is not nervous/anxious.         Past Medical History:   Diagnosis Date    Anxiety     Arthritis     Cancer     cervical    Hepatomegaly     3 liver cyst    Hiatal hernia     High cholesterol     Liver lesion     PONV (postoperative nausea and vomiting)     Seasonal allergies        Past Surgical History:   Procedure Laterality Date    CERVIX SURGERY      COLONOSCOPY N/A 08/27/2020    Procedure: COLONOSCOPY;  Surgeon: Tim Aguayo MD;  Location: [facility];  Service: Endoscopy;  Laterality: N/A;    CYSTOSCOPY WITH URETHRAL DILATION  10/01/2020    Procedure: CYSTOSCOPY, WITH URETHRAL DILATION;  Surgeon: Bhargav Keane MD;  Location: [facility];  Service: Urology;;    ESOPHAGOGASTRODUODENOSCOPY N/A 08/13/2020    Procedure: EGD (ESOPHAGOGASTRODUODENOSCOPY);  Surgeon: Tim Aguayo MD;  Location: [facility];  Service:  Endoscopy;  Laterality: N/A;    KNEE ARTHROSCOPY      TKR    ROBOTIC ARTHROPLASTY, KNEE Left 03/29/2022    Procedure: ROBOTIC ARTHROPLASTY, KNEE, TOTAL;  Surgeon: Neo Zapata MD;  Location: [facility];  Service: Orthopedics;  Laterality: Left;    SINUS SURGERY      TUBAL LIGATION           Current Outpatient Medications:     ALPRAZolam (XANAX) 0.25 MG tablet, Take 1 tablet (0.25 mg total) by mouth nightly as needed for Anxiety., Disp: 30 tablet, Rfl: 0    ascorbic acid, vitamin C, (VITAMIN C) 500 MG tablet, Take 500 mg by mouth once daily., Disp: , Rfl:     atorvastatin (LIPITOR) 10 MG tablet, Take 1 tablet (10 mg total) by mouth once daily., Disp: 90 tablet, Rfl: 3    b complex vitamins tablet, Take 1 tablet by mouth once daily., Disp: , Rfl:     biotin 1 mg tablet, Take 1,000 mcg by mouth 3 (three) times daily., Disp: , Rfl:     cholecalciferol, vitamin D3, (VITAMIN D3 ORAL), Take by mouth., Disp: , Rfl:     dextromethorphan-guaiFENesin  mg (MUCINEX DM)  mg per 12 hr tablet, Take 1 tablet by mouth every 12 (twelve) hours., Disp: , Rfl:     docusate sodium (COLACE) 100 MG capsule, Take 100 mg by mouth once daily., Disp: , Rfl:     doxycycline (VIBRA-TABS) 100 MG tablet, Take 1 tablet (100 mg total) by mouth 2 (two) times daily., Disp: 20 tablet, Rfl: 0    evening primrose oil 1,300 mg Cap, Take by mouth., Disp: , Rfl:     glucosamine-chondroitin 500-400 mg tablet, Take 1 tablet by mouth 3 (three) times daily., Disp: , Rfl:     ipratropium (ATROVENT) 21 mcg (0.03 %) nasal spray, 2 sprays by Each Nostril route 3 (three) times daily., Disp: 60 mL, Rfl: 0    levocetirizine (XYZAL) 5 MG tablet, Take 1 tablet (5 mg total) by mouth every evening., Disp: 30 tablet, Rfl: 11    montelukast (SINGULAIR) 5 MG chewable tablet, Take 5 mg by mouth every evening., Disp: , Rfl:     multivitamin capsule, Take 1 capsule by mouth once daily., Disp: , Rfl:     tumeric-ging-olive-oreg-capryl 100 mg-150 mg- 50 mg-150  "mg Cap, Take by mouth., Disp: , Rfl:     ferrous sulfate (FEOSOL) Tab tablet, Take 1 tablet by mouth daily with breakfast., Disp: , Rfl:     fluticasone propionate (FLONASE) 50 mcg/actuation nasal spray, 1 spray (50 mcg total) by Each Nostril route 2 (two) times daily. (Patient not taking: Reported on 1/10/2024), Disp: 16 g, Rfl: 3    Review of patient's allergies indicates:   Allergen Reactions    Codeine Swelling     "Hives, vomiting"    Morphine Swelling     "Hives, vomiting"    Naproxen sodium Swelling     "Itching, high blood pressure"    Percocet [oxycodone-acetaminophen] Nausea And Vomiting     LOW HEART RATE    Tramadol hcl Swelling     "itching & nausea"    Penicillins     Trintex        Family History   Problem Relation Age of Onset    Diabetes Mother     Cancer Mother         bladder    Arthritis Mother     Cancer Father         lung    COPD Father     Hypertension Father     Arthritis Father     Melanoma Father     Breast cancer Sister     Arthritis Sister     Breast cancer Sister     Arthritis Sister     Breast cancer Sister     Kidney disease Sister     Arthritis Sister     Colon polyps Neg Hx     Colon cancer Neg Hx     Crohn's disease Neg Hx     Ulcerative colitis Neg Hx     Stomach cancer Neg Hx     Esophageal cancer Neg Hx     Celiac disease Neg Hx        Social History     Socioeconomic History    Marital status:    Tobacco Use    Smoking status: Every Day     Current packs/day: 1.00     Types: Cigarettes    Smokeless tobacco: Never   Substance and Sexual Activity    Alcohol use: Yes     Comment: rare    Drug use: Never    Sexual activity: Not Currently     Partners: Male   Social History Narrative    [place] with spouse [place] a farm. 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      Social Determinants of Health     Financial Resource Strain: Medium Risk (3/9/2022)    Overall Financial Resource Strain (CARDIA)     Difficulty of Paying Living Expenses: Somewhat hard   Food Insecurity: No Food Insecurity (3/9/2022) "    Hunger Vital Sign     Worried About Running Out of Food in the Last Year: Never true     Ran Out of Food in the Last Year: Never true   Transportation Needs: No Transportation Needs (3/9/2022)    PRAPARE - Transportation     Lack of Transportation (Medical): No     Lack of Transportation (Non-Medical): No   Physical Activity: Unknown (3/9/2022)    Exercise Vital Sign     Days of Exercise per Week: 0 days   Stress: No Stress Concern Present (3/9/2022)    [employer] [place] [employer] - Occupational Stress Questionnaire     Feeling of Stress : Only a little   Social Connections: Unknown (3/9/2022)    Social Connection and Isolation Panel [NHANES]     Frequency of Communication with Friends and Family: More than three times a week     Frequency of Social Gatherings with Friends and Family: Once a week     Active Member of Clubs or Organizations: Yes     Attends Club or Organization Meetings: 1 to 4 times per year     Marital Status:    Housing Stability: Unknown (3/9/2022)    Housing Stability Vital Sign     Unable to Pay for Housing in the Last Year: No     Unstable Housing in the Last Year: No       OB History          3    Para   3    Term   3            AB        Living   2         SAB        IAB        Ectopic        Multiple        Live Births               Obstetric Comments    x 3               Gyn History    Mammogram: 23 BIRADS 1, negative  Pap smear: 21 negative  LMP: No LMP recorded. Patient is postmenopausal.   Postmenopausal bleeding: denies      INITIAL PHYSICAL EXAMINATION    Vitals:    01/10/24 0818   BP: 121/68   Pulse: 62      General: Healthy in appearance, Well nourished, Affect Normal, NAD.  Neck: Supple, No masses, Trachea midline, Thyroid normal size,  non-tender, no masses or nodules.  Nodes: No clavicular/cervical adenopathy.  Skin: Normal temperature, No atypical lesions or rash.  Heart: Normal sounds, no murmurs  Lungs: Normal respiratory  effort.  Gastrointestinal: Non tender, Non distended, No masses, guarding or  rebound, No hepatosplenomegally, No hernia.  Ext: No clubbing, cyanosis, edema or varicosities.  DTR's: 2+ bilaterally  Strength 5/5 bilateral upper and lower extremities    Genitourinary-  Vulva: normal without lesions, masses, atrophy or pain  Urethra: meatus central and normal in appearance, no prolapse/caruncle, no masses or discharge. Empty supine stress test was negative.  Bladder: non-tender, no masses  Vagina: No discharge or lesions, severe atrophy, no masses appreciated.  [See POP-Q]  Cervix: no lesions, no discharge  Uterus:  non-tender, anteverted, approximately 4 weeks size  Adnexa: no masses, non tender.  Rectal: deferred   Neuro: S2-4- pin prick and light touch intact, Anal wink present  Levator strength: 2/5  Levator tenderness: left 0/10 and right 3/10    POP-Q Exam- pelvic organ prolapse quantitative    Aa +3  Anterior Wall Ba +3  Anterior wall C -2.5  Cervix or cuff   Gh 5.5  Genital hiatus pb 2  perineal body tvl 10.5  Total vaginal length   Ap -1.5  Posterior wall Bp -1.5  Posterior wall D -4  Posterior fornix     with Uterus    POP-Q Stage:3      Office Visit on 01/10/2024   Component Date Value Ref Range Status    Color, UA 01/10/2024 Yellow   Final    pH, UA 01/10/2024 7   Final    WBC, UA 01/10/2024 Negative   Final    Nitrite, UA 01/10/2024 Negative   Final    Protein, POC 01/10/2024 Negative   Final    Glucose, UA 01/10/2024 Normal   Final    Ketones, UA 01/10/2024 Negative   Final    Urobilinogen, UA 01/10/2024 Normal   Final    Bilirubin, POC 01/10/2024 Negative   Final    Blood, UA 01/10/2024 Negative   Final    Clarity, UA 01/10/2024 Clear   Final    Spec Grav UA 01/10/2024 1.000   Final    POC Residual Urine Volume 01/10/2024 2  0 - 100 mL Final        ASSESSMENT & PLAN:    Cystocele, midline  -     POCT URINE DIPSTICK WITHOUT MICROSCOPE  -     Simple Urodynamics; Future; Expected date:  01/11/2024    Incomplete uterovaginal prolapse  -     Simple Urodynamics; Future; Expected date: 01/11/2024    Rectocele    Stress incontinence  -     Simple Urodynamics; Future; Expected date: 01/11/2024    Urge incontinence of urine  -     Simple Urodynamics; Future; Expected date: 01/11/2024    Smoker       Exam findings and treatment options were discussed in detail with the patient. Her symptoms and exam findings are consistent with mixed urinary incontinence and pelvic organ prolapse. We discussed various surgical and nonsurgical management options including pessary use, pelvic floor rehabilitation, and reconstructive surgery. In terms of surgery, vaginal, abdominal and laparoscopic approaches were considered. She feels that her condition is severe enough to consider surgical correction. The planned surgical procedure includes LSH, possible bilateral salpingectomy, sacrocervicopexy, possible anterior/ posterior repair, perineorrhaphy, cystoscopy, possible anti-incontinence procedure (Bulkamid versus Midurethral sling). Risks and benefits of the surgery were explained in detail and further information was provided. Patient was provided the opportunity to ask questions and further discussion occurred as necessary. She was instructed to make a follow-up appointment for further counseling and urodynamic testing as indicated.    Patient is a current smoker- 1/2 to 1 ppd. Counseled strongly to quit smoking for 6 weeks minimum prior to surgery given increased risk of mesh erosion and poor wound healing. Reviewed that she will also need medical clearance from her PCP.     All questions were answered today. The patient was encouraged to contact the office as needed with any additional questions or concerns.     Total time spent on visit was 60 minutes.  This includes face to face time and non-face to face time preparing to see the patient (eg, review of tests), Obtaining and/or reviewing separately obtained history,  Documenting clinical information in the electronic or other health record, Independently interpreting resultsand communicating results to the patient/family/caregiver, or Care coordination.    Angeles Last MD

## 2024-01-16 ENCOUNTER — ANESTHESIA EVENT (OUTPATIENT)
Dept: ENDOSCOPY | Facility: HOSPITAL | Age: 59
End: 2024-01-16

## 2024-01-16 ENCOUNTER — ANESTHESIA (OUTPATIENT)
Dept: ENDOSCOPY | Facility: HOSPITAL | Age: 59
End: 2024-01-16

## 2024-01-16 ENCOUNTER — HOSPITAL ENCOUNTER (OUTPATIENT)
Facility: HOSPITAL | Age: 59
Discharge: HOME | End: 2024-01-16
Attending: INTERNAL MEDICINE | Admitting: INTERNAL MEDICINE

## 2024-01-16 DIAGNOSIS — K57.90 DIVERTICULOSIS: ICD-10-CM

## 2024-01-16 DIAGNOSIS — K63.5 POLYP OF COLON, UNSPECIFIED PART OF COLON, UNSPECIFIED TYPE: Primary | ICD-10-CM

## 2024-01-16 DIAGNOSIS — Z86.010 HISTORY OF COLON POLYPS: ICD-10-CM

## 2024-01-16 DIAGNOSIS — K64.8 INTERNAL HEMORRHOIDS: ICD-10-CM

## 2024-01-16 PROCEDURE — 25000003 PHARM REV CODE 250: Performed by: STUDENT IN AN ORGANIZED HEALTH CARE EDUCATION/TRAINING PROGRAM

## 2024-01-16 PROCEDURE — 45385 COLONOSCOPY W/LESION REMOVAL: CPT | Mod: PT,22,, | Performed by: INTERNAL MEDICINE

## 2024-01-16 PROCEDURE — D9220A PRA ANESTHESIA: Mod: ,,, | Performed by: ANESTHESIOLOGY

## 2024-01-16 PROCEDURE — 27201089 HC SNARE, DISP (ANY): Performed by: INTERNAL MEDICINE

## 2024-01-16 PROCEDURE — 63600175 PHARM REV CODE 636 W HCPCS: Performed by: STUDENT IN AN ORGANIZED HEALTH CARE EDUCATION/TRAINING PROGRAM

## 2024-01-16 PROCEDURE — 37000009 HC ANESTHESIA EA ADD 15 MINS: Performed by: INTERNAL MEDICINE

## 2024-01-16 PROCEDURE — 37000008 HC ANESTHESIA 1ST 15 MINUTES: Performed by: INTERNAL MEDICINE

## 2024-01-16 PROCEDURE — 45385 COLONOSCOPY W/LESION REMOVAL: CPT | Mod: PT | Performed by: INTERNAL MEDICINE

## 2024-01-16 PROCEDURE — 25000003 PHARM REV CODE 250: Performed by: INTERNAL MEDICINE

## 2024-01-16 RX ORDER — SODIUM CHLORIDE 9 MG/ML
INJECTION, SOLUTION INTRAVENOUS CONTINUOUS
Status: DISCONTINUED | OUTPATIENT
Start: 2024-01-16 | End: 2024-01-16 | Stop reason: HOSPADM

## 2024-01-16 RX ORDER — PROPOFOL 10 MG/ML
VIAL (ML) INTRAVENOUS
Status: DISCONTINUED | OUTPATIENT
Start: 2024-01-16 | End: 2024-01-16

## 2024-01-16 RX ORDER — LIDOCAINE HYDROCHLORIDE 20 MG/ML
INJECTION INTRAVENOUS
Status: DISCONTINUED | OUTPATIENT
Start: 2024-01-16 | End: 2024-01-16

## 2024-01-16 RX ADMIN — PROPOFOL 20 MG: 10 INJECTION, EMULSION INTRAVENOUS at 10:01

## 2024-01-16 RX ADMIN — LIDOCAINE HYDROCHLORIDE 100 MG: 20 INJECTION, SOLUTION INTRAVENOUS at 10:01

## 2024-01-16 RX ADMIN — PROPOFOL 100 MG: 10 INJECTION, EMULSION INTRAVENOUS at 10:01

## 2024-01-16 RX ADMIN — SODIUM CHLORIDE: 9 INJECTION, SOLUTION INTRAVENOUS at 09:01

## 2024-01-16 NOTE — ANESTHESIA PREPROCEDURE EVALUATION
01/16/2024  Misti West is a 58 y.o., female.      Pre-op Assessment    I have reviewed the Patient Summary Reports.     I have reviewed the Nursing Notes. I have reviewed the NPO Status.   I have reviewed the Medications.     Review of Systems  Anesthesia Hx:             Denies Family Hx of Anesthesia complications.   Personal Hx of Anesthesia complications, Post-Operative Nausea/Vomiting, in the past, but not with recent anesthetics / prophylaxis                    Social:  Smoker       Hepatic/GI:  Bowel Prep.  Hiatal Hernia,              Musculoskeletal:  Arthritis                   Physical Exam  General: Well nourished, Cooperative, Alert and Oriented    Airway:  Mallampati: II   Mouth Opening: Normal  TM Distance: Normal  Tongue: Normal  Neck ROM: Normal ROM        Anesthesia Plan  Type of Anesthesia, risks & benefits discussed:    Anesthesia Type: Gen Natural Airway  Intra-op Monitoring Plan: Standard ASA Monitors  Induction:  IV  Informed Consent: Informed consent signed with the Patient and all parties understand the risks and agree with anesthesia plan.  All questions answered.   ASA Score: 2    Ready For Surgery From Anesthesia Perspective.     .

## 2024-01-16 NOTE — PROVATION PATIENT INSTRUCTIONS
Discharge Summary/Instructions after an Endoscopic Procedure  Patient Name: iMsti West  Patient MRN: [id number]  Patient YOB: 1965  Tuesday, January 16, 2024  Tim Jorge MD  Dear patient,  As a result of recent federal legislation (The Federal Cures Act), you may   receive lab or pathology results from your procedure in your [facility]   account before your physician is able to contact you. Your physician or   their representative will relay the results to you with their   recommendations at their soonest availability.  Thank you,  RESTRICTIONS:  During your procedure today, you received medications for sedation.  These   medications may affect your judgment, balance and coordination.  Therefore,   for 24 hours, you have the following restrictions:   - DO NOT drive a car, operate machinery, make legal/financial decisions,   sign important papers or drink alcohol.    ACTIVITY:  Today: no heavy lifting, straining or running due to procedural   sedation/anesthesia.  The following day: return to full activity including work.  DIET:  Eat and drink normally unless instructed otherwise.     TREATMENT FOR COMMON SIDE EFFECTS:  - Mild abdominal pain, nausea, belching, bloating or excessive gas:  rest,   eat lightly and use a heating pad.  - Sore Throat: treat with throat lozenges and/or gargle with warm salt   water.  - Because air was used during the procedure, expelling large amounts of air   from your rectum or belching is normal.  - If a bowel prep was taken, you may not have a bowel movement for 1-3 days.    This is normal.  SYMPTOMS TO WATCH FOR AND REPORT TO YOUR PHYSICIAN:  1. Abdominal pain or bloating, other than gas cramps.  2. Chest pain.  3. Back pain.  4. Signs of infection such as: chills or fever occurring within 24 hours   after the procedure.  5. Rectal bleeding, which would show as bright red, maroon, or black stools.   (A tablespoon of blood from the rectum is not serious, especially  if   hemorrhoids are present.)  6. Vomiting.  7. Weakness or dizziness.  GO DIRECTLY TO THE NEAREST EMERGENCY ROOM IF YOU HAVE ANY OF THE FOLLOWING:      Difficulty breathing              Chills and/or fever over 101 F   Persistent vomiting and/or vomiting blood   Severe abdominal pain   Severe chest pain   Black, tarry stools   Bleeding- more than one tablespoon   Any other symptom or condition that you feel may need urgent attention  Your doctor recommends these additional instructions:  If any biopsies were taken, your doctors clinic will contact you in 1 to 2   weeks with any results.  - Patient has a contact number available for emergencies.  The signs and   symptoms of potential delayed complications were discussed with the   patient.  Return to normal activities tomorrow.  Written discharge   instructions were provided to the patient.   - High fiber diet.   - Continue present medications.   - Await pathology results.   - Repeat colonoscopy in 5 years for surveillance.   - Discharge patient to home (ambulatory).   - Return to GI office PRN.  For questions, problems or results please call your physician - Tim Jorge MD at Work:  [phone].  [facility] PHONE NUMBER: [phone]  IF A COMPLICATION OR EMERGENCY SITUATION ARISES AND YOU ARE UNABLE TO REACH   YOUR PHYSICIAN - GO DIRECTLY TO THE EMERGENCY ROOM.  Tim Jorge MD  1/16/2024 10:39:58 AM  This report has been verified and signed electronically.  Dear patient,  As a result of recent federal legislation (The Federal Cures Act), you may   receive lab or pathology results from your procedure in your [facility]   account before your physician is able to contact you. Your physician or   their representative will relay the results to you with their   recommendations at their soonest availability.  Thank you,  [employer]OVATION

## 2024-01-16 NOTE — TRANSFER OF CARE
"Anesthesia Transfer of Care Note    Patient: Misti West    Procedure(s) Performed: Procedure(s) (LRB):  COLONOSCOPY (N/A)    Patient location: GI    Anesthesia Type: general    Transport from OR: Transported from OR on room air with adequate spontaneous ventilation    Post pain: adequate analgesia    Post assessment: no apparent anesthetic complications and tolerated procedure well    Post vital signs: stable    Level of consciousness: awake    Nausea/Vomiting: no nausea/vomiting    Complications: none    Transfer of care protocol was followed      Last vitals: Visit Vitals  BP (!) 103/56 (BP Location: Left arm, Patient Position: Lying)   Pulse 60   Temp 36.8 °C (98.2 °F) (Skin)   Resp 20   Ht 5' 9" (1.753 m)   Wt 75.8 kg (167 lb)   SpO2 97%   BMI 24.66 kg/m²     "

## 2024-01-16 NOTE — H&P
CC: History of colon polyps - last scope 2020    58 year old female with above. States that symptoms are absent, no alleviating/exacerbating factors. No family history of colorectal CA. Positive personal history of polyps. No bleeding or weight loss.     ROS:  No headache, no fever/chills, no chest pain/SOB, no nausea/vomiting/diarrhea/constipation/GI bleeding/abdominal pain, no dysuria/hematuria.    VSSAF   Exam:   Alert and oriented x 3; no apparent distress   PERRLA, sclera anicteric  CV: Regular rate/rhythm, normal PMI   Lungs: Clear bilaterally with no wheeze/rales   Abdomen: Soft, NT/ND, normal bowel sounds   Ext: No cyanosis, clubbing     Impression:   As above    Plan:   Proceed with endoscopy. Further recs to follow.

## 2024-01-17 VITALS
RESPIRATION RATE: 12 BRPM | BODY MASS INDEX: 24.73 KG/M2 | TEMPERATURE: 98 F | DIASTOLIC BLOOD PRESSURE: 90 MMHG | HEART RATE: 62 BPM | WEIGHT: 167 LBS | SYSTOLIC BLOOD PRESSURE: 130 MMHG | HEIGHT: 69 IN | OXYGEN SATURATION: 98 %

## 2024-01-24 DIAGNOSIS — N81.11 CYSTOCELE, MIDLINE: Primary | ICD-10-CM

## 2024-01-24 DIAGNOSIS — N39.3 STRESS INCONTINENCE: ICD-10-CM

## 2024-01-24 DIAGNOSIS — N81.2 INCOMPLETE UTEROVAGINAL PROLAPSE: ICD-10-CM

## 2024-01-24 DIAGNOSIS — N39.41 URGE INCONTINENCE OF URINE: ICD-10-CM

## 2024-01-24 DIAGNOSIS — N81.6 RECTOCELE: ICD-10-CM

## 2024-01-25 ENCOUNTER — PROCEDURE VISIT (OUTPATIENT)
Dept: UROGYNECOLOGY | Facility: CLINIC | Age: 59
End: 2024-01-25

## 2024-01-25 ENCOUNTER — OFFICE VISIT (OUTPATIENT)
Dept: UROGYNECOLOGY | Facility: CLINIC | Age: 59
End: 2024-01-25

## 2024-01-25 VITALS
DIASTOLIC BLOOD PRESSURE: 70 MMHG | SYSTOLIC BLOOD PRESSURE: 120 MMHG | WEIGHT: 168.63 LBS | HEIGHT: 69 IN | BODY MASS INDEX: 24.98 KG/M2

## 2024-01-25 VITALS
WEIGHT: 168.63 LBS | SYSTOLIC BLOOD PRESSURE: 120 MMHG | BODY MASS INDEX: 24.98 KG/M2 | HEIGHT: 69 IN | DIASTOLIC BLOOD PRESSURE: 70 MMHG

## 2024-01-25 DIAGNOSIS — N39.3 STRESS INCONTINENCE: ICD-10-CM

## 2024-01-25 DIAGNOSIS — N81.2 INCOMPLETE UTEROVAGINAL PROLAPSE: Primary | ICD-10-CM

## 2024-01-25 DIAGNOSIS — N39.41 URGE INCONTINENCE OF URINE: ICD-10-CM

## 2024-01-25 DIAGNOSIS — N81.11 CYSTOCELE, MIDLINE: ICD-10-CM

## 2024-01-25 DIAGNOSIS — N81.6 RECTOCELE: ICD-10-CM

## 2024-01-25 DIAGNOSIS — F17.200 SMOKER: ICD-10-CM

## 2024-01-25 LAB
BILIRUB SERPL-MCNC: NORMAL MG/DL
BLOOD URINE, POC: NORMAL
CLARITY, POC UA: CLEAR
COLOR, POC UA: NORMAL
GLUCOSE UR QL STRIP: NORMAL
KETONES UR QL STRIP: NORMAL
LEUKOCYTE ESTERASE URINE, POC: NORMAL
NITRITE, POC UA: NORMAL
PH, POC UA: 5
PROTEIN, POC: NORMAL
SPECIFIC GRAVITY, POC UA: 1.01
UROBILINOGEN, POC UA: NORMAL

## 2024-01-25 PROCEDURE — 51741 ELECTRO-UROFLOWMETRY FIRST: CPT | Mod: 26,S$PBB,51, | Performed by: OBSTETRICS & GYNECOLOGY

## 2024-01-25 PROCEDURE — 81002 URINALYSIS NONAUTO W/O SCOPE: CPT | Mod: PBBFAC

## 2024-01-25 PROCEDURE — 81002 URINALYSIS NONAUTO W/O SCOPE: CPT | Mod: PBBFAC | Performed by: OBSTETRICS & GYNECOLOGY

## 2024-01-25 PROCEDURE — 51797 INTRAABDOMINAL PRESSURE TEST: CPT | Mod: 26,S$PBB,, | Performed by: OBSTETRICS & GYNECOLOGY

## 2024-01-25 PROCEDURE — 99999 PR PBB SHADOW E&M-EST. PATIENT-LVL III: CPT | Mod: PBBFAC,,, | Performed by: OBSTETRICS & GYNECOLOGY

## 2024-01-25 PROCEDURE — 51741 ELECTRO-UROFLOWMETRY FIRST: CPT | Mod: PBBFAC | Performed by: OBSTETRICS & GYNECOLOGY

## 2024-01-25 PROCEDURE — 51784 ANAL/URINARY MUSCLE STUDY: CPT | Mod: 26,S$PBB,51, | Performed by: OBSTETRICS & GYNECOLOGY

## 2024-01-25 PROCEDURE — 99999PBSHW POCT URINE DIPSTICK WITHOUT MICROSCOPE: Mod: PBBFAC,,,

## 2024-01-25 PROCEDURE — 51728 CYSTOMETROGRAM W/VP: CPT | Mod: 26,S$PBB,, | Performed by: OBSTETRICS & GYNECOLOGY

## 2024-01-25 PROCEDURE — 99499 UNLISTED E&M SERVICE: CPT | Mod: S$PBB,,, | Performed by: OBSTETRICS & GYNECOLOGY

## 2024-01-25 PROCEDURE — 51728 CYSTOMETROGRAM W/VP: CPT | Mod: PBBFAC | Performed by: OBSTETRICS & GYNECOLOGY

## 2024-01-25 PROCEDURE — 99999PBSHW PR PBB SHADOW TECHNICAL ONLY FILED TO HB: Mod: PBBFAC,,,

## 2024-01-25 PROCEDURE — 51784 ANAL/URINARY MUSCLE STUDY: CPT | Mod: PBBFAC | Performed by: OBSTETRICS & GYNECOLOGY

## 2024-01-25 PROCEDURE — 99213 OFFICE O/P EST LOW 20 MIN: CPT | Mod: PBBFAC,25 | Performed by: OBSTETRICS & GYNECOLOGY

## 2024-01-25 NOTE — PROGRESS NOTES
Please see the procedure note for the assessment and plan for this visit.     Total time spent on visit was 30 minutes.  This includes face to face time and non-face to face time preparing to see the patient (eg, review of tests), Obtaining and/or reviewing separately obtained history, Documenting clinical information in the electronic or other health record, Independently interpreting resultsand communicating results to the patient/family/caregiver, or Care coordination.    Angeles Last MD, MPH  Division of Urogynecology  [address] [place] [address], [address] [id number] [phone]

## 2024-01-25 NOTE — PROCEDURES
Complex Urodynamics    Date/Time: 1/25/2024 10:00 AM    Performed by: Angeles Last MD  Authorized by: Angeles Last MD  Preparation: Patient was prepped and draped in the usual sterile fashion.  Local anesthesia used: no    Anesthesia:  Local anesthesia used: no    Sedation:  Patient sedated: no    Patient tolerance: patient tolerated the procedure well with no immediate complications    TITLE OF OPERATION:  Complex cystometry.  Complex uroflowmetry.  Electromyography with surface electrodes.  Pressure voiding flow study.  Abdominal pressure measurement.  Leak point pressure measurement.      SURGEONS NARRATIVE:  A time out was performed in which the patient identity and procedure were confirmed.  Urodynamic evaluation was performed using a computerized system ([employer]  Uroflowmetry was performed on the patient in the sitting position without catheters in place.  Subsequent urodynamic testing was performed with the patient in the lithotomy position at 45 degrees. Water charged catheters were used with sterile water as the infusion medium. Vesical and abdominal (rectal) pressures were measured, and detrusor pressure was calculated. EMG activity was recorded with surface electrodes. During filling, room temperature sterile water was infused at a rate of 30 cubic centimeters per minute. The patient was asked cough after instillation of each 100cc volume. Two Valsalva leak point pressures and two cough leak point pressures were performed with the catheters in place at 300 cubic centimeters and again at maximum capacity. Valsalva leak point pressure was defined as the difference between vesical pressure at which leakage was noted (visualized at the external urethral meatus) and the baseline vesical pressure. Following urodynamic testing, a pressure flow study was performed with the patient in the sitting position. Vesical and abdominal pressures were monitored and detrusor pressures were  calculated. After the pressure flow study, the catheters were then removed. The patient tolerated the procedure well.       1.  VOIDING PHASE:      a.  Uroflowmetry:  Prolapse reduction: No  Voided volume:  243 mL   Voiding time:   25 seconds  Max flow:  28 mL/s  Avg flow:   10.9 mL/s   PVR:   25 mL    The overall configuration of this uroflow study was  continuous.     b.  Pressure flow:  Prolapse reduction: No  Voided volume:   779 mL  Voiding time:   1:17 min:seconds  Peak flow:  43 mL/s   Avg flow:  10.6 mL/s  Max det pressure:   44.6 cm H20  Det pressure at max flow: 65 cm H20  Void initiated by  valsalva.    Urethral relaxation (EMG):  no.      The overall configuration of this pressure flow study appears to be continuous but as patient was voiding catheter fell out.      2.  FILLING PHASE:  1st sensation: 141 mL  1st desire:  200 mL  Strong desire:  400 mL  Urgency/ Capacity:  589 mL  EMG activity during filling:   quiet until the end  Detrusor contractions observed: No.      3.  URETHRAL FUNCTION/STORAGE PHASE:    a.  WITH prolapse reduction:  CLPP (300 mL): Negative  at  164 cm H20  VLPP (300 mL): Negative  at  91 cm H20   CLPP (MAX ):    Negative  at  148 cm H20  VLPP (MAX):     Negative  at  88 cm H20  NO leak at capacity with catheters removed.     These findings are consistent with Negative urodynamic stress incontinence.    Assessment:  UF  is continuous.  PF  is difficult to interpret as catheters fell out.   Max capacity is above normal (>500 mL).  DO (--).  FEDERICO (--).      Plan:  Incomplete uterovaginal prolapse  -     Simple Urodynamics  -     Simple urodynamics  -     POCT URINE DIPSTICK WITHOUT MICROSCOPE    Cystocele, midline  -     Simple Urodynamics  -     Simple urodynamics  -     POCT URINE DIPSTICK WITHOUT MICROSCOPE    Stress incontinence  -     Simple Urodynamics  -     Simple urodynamics  -     POCT URINE DIPSTICK WITHOUT MICROSCOPE    Urge incontinence of urine  -     Simple  Urodynamics  -     Simple urodynamics  -     POCT URINE DIPSTICK WITHOUT MICROSCOPE    Rectocele  -     Simple urodynamics  -     POCT URINE DIPSTICK WITHOUT MICROSCOPE    Smoker      59 yo with stage 3 POP presented today for urodynamic testing. Reviewed the test results in detail with the patient. Testing showed normal sensations and an above average bladder capacity. No detrusor instability or stress urinary incontinence was noted.     Reviewed symptoms with patient once more and she states that she has occasional urge incontinence and rare stress urinary incontinence. Has only noticed stress urinary incontinence with a deep cough. Discussed that based on testing and her rare symptoms I would not recommend an anti-incontinence procedure at the time of surgery but it is possible she could have symptoms following surgery of either urgency urinary incontinence which we would treat potentially with medication or of stress urinary incontinence (Up to 20%) and we may need to return for another surgical procedure. However, given that these surgeries provide additional risk that it may not be worthwhile to do concurrently and to re-assess postoperatively. Patient verbalized understanding and was in agreement with the decision/ plan.     Reviewed the stage of her prolapse and structures involved. Discussed the plan for surgery which would include LSH, bilateral salpingectomy of residual tubes, sacrocervicopexy with mesh, perineorrhaphy, possible anterior/ posterior repair and cystoscopy.   Patient has not had a recent abnormal pap smear.     We reviewed the risks and benefits of the planned surgical procedure which included but were not limited to the potential conversion to an open or vaginal procedure, pelvic pain, pain with intercourse, infection, bleeding, need for transfusion, risks of transfusion, injury to bowel, urinary tract, blood vessels or nerves, urinary retention, prolonged catheterization, mesh erosion or  infection, no change in symptoms, new onset FEDERICO or UUI, recurrence of prolapse, change in urine stream, recurrent UTI's, fistula formation, need for ostomy (colostomy/ileostomy/ nephrostomy/ ileostomy) and need for further surgery. Patient was provided the opportunity to ask questions, All questions were answered. The informed consent was signed and a copy was provided to the patient.     Total time spent on visit was 30 minutes.  This includes face to face time and non-face to face time preparing to see the patient (eg, review of tests), Obtaining and/or reviewing separately obtained history, Documenting clinical information in the electronic or other health record, Independently interpreting resultsand communicating results to the patient/family/caregiver, or Care coordination.    Angeles Last MD, MPH  Division of Urogynecology  [address] [place] [address], [facility] [address]  [place] [address]  [phone]

## 2024-01-26 ENCOUNTER — TELEPHONE (OUTPATIENT)
Dept: UROGYNECOLOGY | Facility: CLINIC | Age: 59
End: 2024-01-26

## 2024-01-27 ENCOUNTER — PATIENT MESSAGE (OUTPATIENT)
Dept: UROGYNECOLOGY | Facility: CLINIC | Age: 59
End: 2024-01-27

## 2024-01-27 DIAGNOSIS — N81.6 RECTOCELE: ICD-10-CM

## 2024-01-27 DIAGNOSIS — N81.11 CYSTOCELE, MIDLINE: ICD-10-CM

## 2024-01-27 DIAGNOSIS — N81.2 INCOMPLETE UTEROVAGINAL PROLAPSE: Primary | ICD-10-CM

## 2024-01-29 ENCOUNTER — TELEPHONE (OUTPATIENT)
Dept: FAMILY MEDICINE | Facility: CLINIC | Age: 59
End: 2024-01-29

## 2024-01-29 NOTE — TELEPHONE ENCOUNTER
----- Message from Irene Mello sent at 1/27/2024  8:42 AM CST -----  Contact: self  Type:  Needs Medical Advice    Who Called: self  Symptoms (please be specific): pt has covid and wants to know if she can take Paxloviv while she takes xyzal 5mg and singular 10 mg and nose spray xpratropiumbromide. Pt is on Lipitor (Atrovastatin) 10 mg as well. Please call pt with more info.    Would the patient rather a call back or a response via [facility]? call  Best Call Back Number: [phone] (home)       Additional Information: please advise and thank you.

## 2024-01-29 NOTE — TELEPHONE ENCOUNTER
Pt is covid positive. Pt would like to know if she should take Paxlovid w/ xyzal 5mg, singulair 10 mg, (Atrovastatin) 10 mg.     Pt should hold Atorvastatin, correct?    Please advise.

## 2024-02-06 ENCOUNTER — TELEPHONE (OUTPATIENT)
Dept: UROGYNECOLOGY | Facility: CLINIC | Age: 59
End: 2024-02-06

## 2024-02-06 ENCOUNTER — PATIENT MESSAGE (OUTPATIENT)
Dept: FAMILY MEDICINE | Facility: CLINIC | Age: 59
End: 2024-02-06

## 2024-02-06 DIAGNOSIS — Z01.818 PREOP TESTING: Primary | ICD-10-CM

## 2024-02-07 ENCOUNTER — TELEPHONE (OUTPATIENT)
Dept: FAMILY MEDICINE | Facility: CLINIC | Age: 59
End: 2024-02-07

## 2024-02-07 NOTE — TELEPHONE ENCOUNTER
----- Message from Anna Marie Johnson NP sent at 2/6/2024  7:09 PM CST -----  Misti Pina is scheduled to have a laparoscopic supracervical hysterectomy, cervicopexy, anterior/posterior repair, perineorrhaphy, and cystoscopy with Dr. Last on 05/02/2024.  Please assist with surgical clearance.   We request CBC, BMP, 12 lead EKG, CXR, and any other testing you deem necessary. When completed,  please place a note in Epic stating whether the patient is cleared for surgery. If you have any questions our office number is [phone], feel free to contact us.     Sincerely,     ARACELIS Carter-BC  Female Pelvic Medicine& Reconstructive surgery  [facility], Department of Urogynecology       no change in level of consciousness/no dizziness/no vomiting/no nausea/no blurred vision

## 2024-03-05 DIAGNOSIS — M25.569 KNEE PAIN, UNSPECIFIED CHRONICITY, UNSPECIFIED LATERALITY: Primary | ICD-10-CM

## 2024-04-15 ENCOUNTER — TELEPHONE (OUTPATIENT)
Dept: OBSTETRICS AND GYNECOLOGY | Facility: CLINIC | Age: 59
End: 2024-04-15

## 2024-04-15 NOTE — TELEPHONE ENCOUNTER
Pt states she had a message stating her pre-op appointment was going to be changed. Pre-op is scheduled for 4/19 10:30. Told the patient I will have our  contact her .

## 2024-04-15 NOTE — TELEPHONE ENCOUNTER
----- Message from Vargas Steiner sent at 4/12/2024 12:41 PM CDT -----      Name of Who is Calling: SILVA POSEY [[id number]]      What is the request in detail: Pt returned call to the office.Please contact to further discuss and advise.          Can the clinic reply by [facility]: Y      What Number [facility] Back [facility]  [phone]

## 2024-04-18 ENCOUNTER — PATIENT MESSAGE (OUTPATIENT)
Dept: FAMILY MEDICINE | Facility: CLINIC | Age: 59
End: 2024-04-18

## 2024-04-18 ENCOUNTER — PATIENT MESSAGE (OUTPATIENT)
Dept: UROGYNECOLOGY | Facility: CLINIC | Age: 59
End: 2024-04-18

## 2024-04-18 ENCOUNTER — OFFICE VISIT (OUTPATIENT)
Dept: FAMILY MEDICINE | Facility: CLINIC | Age: 59
End: 2024-04-18

## 2024-04-18 VITALS
SYSTOLIC BLOOD PRESSURE: 112 MMHG | DIASTOLIC BLOOD PRESSURE: 78 MMHG | HEIGHT: 69 IN | HEART RATE: 73 BPM | WEIGHT: 168.38 LBS | BODY MASS INDEX: 24.94 KG/M2 | OXYGEN SATURATION: 97 %

## 2024-04-18 DIAGNOSIS — J30.2 SEASONAL ALLERGIES: ICD-10-CM

## 2024-04-18 DIAGNOSIS — G47.39 SLEEP APNEA-LIKE BEHAVIOR: Primary | ICD-10-CM

## 2024-04-18 DIAGNOSIS — M19.90 ARTHRITIS: ICD-10-CM

## 2024-04-18 PROCEDURE — 99999 PR PBB SHADOW E&M-EST. PATIENT-LVL IV: CPT | Mod: PBBFAC,,, | Performed by: STUDENT IN AN ORGANIZED HEALTH CARE EDUCATION/TRAINING PROGRAM

## 2024-04-18 PROCEDURE — 99214 OFFICE O/P EST MOD 30 MIN: CPT | Mod: PBBFAC | Performed by: STUDENT IN AN ORGANIZED HEALTH CARE EDUCATION/TRAINING PROGRAM

## 2024-04-18 PROCEDURE — 99214 OFFICE O/P EST MOD 30 MIN: CPT | Mod: S$PBB,,, | Performed by: STUDENT IN AN ORGANIZED HEALTH CARE EDUCATION/TRAINING PROGRAM

## 2024-04-18 RX ORDER — IPRATROPIUM BROMIDE 21 UG/1
2 SPRAY, METERED NASAL 3 TIMES DAILY
Qty: 60 ML | Refills: 0 | Status: SHIPPED | OUTPATIENT
Start: 2024-04-18

## 2024-04-18 NOTE — PATIENT INSTRUCTIONS
Griffin Chappell,     If you are due for any health screening(s) below please notify me so we can arrange them to be ordered and scheduled. Most healthy patients at your age complete them, but you are free to accept or refuse.     If you can't do it, I'll definitely understand. If you can, I'd certainly appreciate it!    Tests to Keep You Healthy    Mammogram: Met on 9/7/2023  Colon Cancer Screening: Met on 1/16/2024  Cervical Cancer Screening: Met on 11/18/2021  Tobacco Cessation: NO      Were here to help you quit smoking     Our records indicated that you are still smoking. One of the best things you can do for your health is to stop smoking and we are here to help.     Talk with your provider about our Smoking Cessation Program and how we can support you on your journey.

## 2024-04-18 NOTE — PROGRESS NOTES
[facility] Note    Subjective:    Chief Complaint: pre-op exam, medical Optimization   274}    HPI:    Misti is a pleasant intelligent patient who is here for pre-operative examination.    HPI: I had seen this pleasant 58 y.o. female in the pre-op clinic today prior to her elective hysterectomy    Active Cardiac Conditions: History is not suggestive of unstable coronary syndrome, decompensated heart failure, significant arrhythmias, severe valvular disease.    Exercise Tolerance: Pt can undertake activities such as cooking, cleaning, vacuuming, showering, dressing, shopping, walking 1-2 blocks, flights of stairs, golfing, running, and mowing without chest pain or shortness of breath making her exercise tolerance more than 4 METs.     The patient denies chest pain upon exertion, denies dyspnea, denies nausea, denies vomiting, denies diaphoresis, denies syncope, denies radiation to the arm/jaw/back, denies ripping or tearing sensation, denies pleuritic chest pain, denies unilateral leg swelling, and denies leg pain.      Anesthesia: No personal or family history of complications with anesthesia.    The following portions of the patient's history were reviewed and updated as appropriate: allergies, current medications, past family history, past medical history, past social history, past surgical history and problem list.  Past Medical History:   Diagnosis Date    Anxiety     Arthritis     Cancer     cervical    Female bladder prolapse     Hepatomegaly     3 liver cyst    Hiatal hernia     High cholesterol     Liver lesion     PONV (postoperative nausea and vomiting)     Seasonal allergies      Past Surgical History:   Procedure Laterality Date    CERVIX SURGERY      COLONOSCOPY N/A 08/27/2020    Procedure: COLONOSCOPY;  Surgeon: Tim Aguayo MD;  Location: [facility];  Service: Endoscopy;  Laterality: N/A;    COLONOSCOPY N/A 1/16/2024    Procedure: COLONOSCOPY;  Surgeon: iTm Aguayo MD;   "Location: [facility] ENDO;  Service: Endoscopy;  Laterality: N/A;    CYSTOSCOPY WITH URETHRAL DILATION  10/01/2020    Procedure: CYSTOSCOPY, WITH URETHRAL DILATION;  Surgeon: Bhargav Keane MD;  Location: [facility] OR;  Service: Urology;;    ESOPHAGOGASTRODUODENOSCOPY N/A 08/13/2020    Procedure: EGD (ESOPHAGOGASTRODUODENOSCOPY);  Surgeon: Tim Aguayo MD;  Location: [facility] ENDO;  Service: Endoscopy;  Laterality: N/A;    KNEE ARTHROSCOPY      TKR    ROBOTIC ARTHROPLASTY, KNEE Left 03/29/2022    Procedure: ROBOTIC ARTHROPLASTY, KNEE, TOTAL;  Surgeon: Neo Zapata MD;  Location: [facility] OR;  Service: Orthopedics;  Laterality: Left;    SINUS SURGERY      TUBAL LIGATION       Social History  Social History     Tobacco Use    Smoking status: Every Day     Current packs/day: 1.00     Types: Cigarettes    Smokeless tobacco: Never   Substance Use Topics    Alcohol use: Yes     Comment: rare    Drug use: Never     Family History   Problem Relation Name Age of Onset    Diabetes Mother      Cancer Mother          bladder    Arthritis Mother      Cancer Father          lung    COPD Father      Hypertension Father      Arthritis Father      Melanoma Father      Breast cancer Sister      Arthritis Sister      Breast cancer Sister      Arthritis Sister      Breast cancer Sister      Kidney disease Sister      Arthritis Sister      Colon polyps Neg Hx      Colon cancer Neg Hx      Crohn's disease Neg Hx      Ulcerative colitis Neg Hx      Stomach cancer Neg Hx      Esophageal cancer Neg Hx      Celiac disease Neg Hx       Review of patient's allergies indicates:   Allergen Reactions    Codeine Swelling     "Hives, vomiting"    Morphine Swelling     "Hives, vomiting"    Naproxen sodium Swelling     "Itching, high blood pressure"    Percocet [oxycodone-acetaminophen] Nausea And Vomiting     LOW HEART RATE    Tramadol hcl Swelling     "itching & nausea"    Ciprofloxacin Other (See Comments)     Low BP    Penicillins     Trintex  " "      Physical Examination  /78 (BP Location: Left arm, Patient Position: Sitting, BP Method: Medium (Manual))   Pulse 73   Ht 5' 9" (1.753 m)   Wt 76.4 kg (168 lb 6.4 oz)   SpO2 97%   BMI 24.87 kg/m²    274}  Wt Readings from Last 3 Encounters:   04/18/24 76.4 kg (168 lb 6.4 oz)   01/25/24 76.5 kg (168 lb 10.4 oz)   01/25/24 76.5 kg (168 lb 10.4 oz)     BP Readings from Last 3 Encounters:   04/18/24 112/78   01/25/24 120/70   01/25/24 120/70     Estimated body mass index is 24.87 kg/m² as calculated from the following:    Height as of this encounter: 5' 9" (1.753 m).    Weight as of this encounter: 76.4 kg (168 lb 6.4 oz).     General appearance: alert, cooperative, no distress  Neck: no thyromegaly, no neck stiffness  Lungs: clear to auscultation, no wheezes, rales or rhonchi, symmetric air entry  Heart: normal rate, regular rhythm, normal S1, S2, no murmurs, rubs, clicks or gallops  Abdomen: soft, nontender, nondistended, no rigidity, rebound, or guarding.   Back: no point tenderness over spine  Extremities: peripheral pulses normal, no unilateral leg swelling or calf tenderness   Neurological:alert, oriented, normal speech, no new focal findings or movement disorder noted from baseline    Data reviewed 274}  Previous medical records reviewed and summarized in HPI.     Laboratory  274}  I have reviewed old labs below:  Lab Results   Component Value Date    WBC 5.59 12/12/2023    HGB 12.5 12/12/2023    HCT 38.7 12/12/2023    MCV 94 12/12/2023     12/12/2023     07/20/2023    K 4.2 07/20/2023     07/20/2023    CALCIUM 9.0 07/20/2023    PHOS 3.4 04/19/2022    CO2 24 07/20/2023    GLU 96 07/20/2023    BUN 16 07/20/2023    CREATININE 0.8 07/20/2023    ANIONGAP 9 07/20/2023    ESTGFRAFRICA >60.0 07/18/2022    EGFRNONAA >60.0 07/18/2022    PROT 6.7 07/20/2023    ALBUMIN 3.9 07/20/2023    BILITOT 0.4 07/20/2023    ALKPHOS 73 07/20/2023    ALT 19 07/20/2023    AST 23 07/20/2023    INR 1.0 " "04/18/2022    CHOL 167 07/20/2023    TRIG 58 07/20/2023    HDL 70 07/20/2023    LDLCALC 85.4 07/20/2023    TSH 1.168 07/20/2023    HGBA1C 5.2 07/20/2023     Lab reviewed by me: Particular labs of significance that I will monitor, workup, or treat to improve are mentioned below in diagnostic impression remarks.  :[id number]}274}  Imaging/EKG: I have reviewed the pertinent results/findings and my personal findings are noted below in diagnostic impression remarks.  274}    Assessment/Plan          274}    Misti West is a 58 y.o. female who presents to clinic with:    1. Sleep apnea-like behavior    2. Arthritis    3. Seasonal allergies         Diagnostic impression remarks     Documentation entered by me for this encounter may have been done in part using speech-recognition technology. Although I have made an effort to ensure accuracy, "sound like" errors may exist and should be interpreted in context.     Preop examination: Per evaluation, patient is low  risk for a low risk surgery. Patient is medically optimized for surgery at this time. Instructed as above on elen-operative medication recommendations and further risk reduction. All questions answered.   RCRI: 0  Patient endorses that she has apneic episode with snoring and day time tiredness and does not wake up restful   Patient has pain on her tarsal region on her left foot and wish to go see podiatrist      Per ACC/AHA elen-operative guidelines, moderate risk surgery and METS >=4 is low risk for MACE and no further cardiac testing is required. Pt has no active cardiac condition (ACS/USA, decompenstated CHF, ventricular arrhythmias or severe valvular disease) and can easily achieve > 4 METS.  Pt does not require further cardiac evaluation prior to undergoing surgical procedure. These recommendations follow the 2014 ACC/AHA Guideline on Perioperative Cardiovascular Evaluation and Management of Patients Undergoing Noncardiac Surgery. (JACC Vol. 64 No. 22, Dec 9, 2014, " pp o90-d913).    Evaluating Risk in Surgery is a combination of patients risk factors and surgical risk factors. Patient is being evaluated for medical optimization and not surgical clearance. MACE is the major adverse cardiovascular event risk, and can be assessed based on the Revised Cardiac Risk Index score.   274}    BMI Goal    RCRI = rate of major cardiac event   Six independent predictors of major cardiac complications:   (-) High-risk type of surgery (examples include vascular surgery and any open intraperitoneal or intrathoracic procedures)    (-) History of ischemic heart disease   (-) History of HF    (-) History of cerebrovascular disease    (-) Diabetes mellitus requiring treatment with insulin    (-) Preoperative serum creatinine >2.0 mg/dL (177 micromol/L)   ?  Rate of cardiac death, nonfatal myocardial infarction, and nonfatal cardiac arrest according to the number of predictors:  0 = 0.4 percent (95% CI: 0.1-0.8)   1 = 0.9 percent (95% CI: 0.5-1.4)   2 = 6.6 percent (95% CI: 1.3-3.5) .  3+ = 11 percent (95% CI: 2.8-7.9)   ?    Medication Monitoring: In today's visit, monitoring for drug toxicity was accomplished. Proper use of medications was discussed.     Counseling: Counseling included discussion regarding imaging findings, diagnosis, possibilities, treatment options, medications, risks, and benefits. She had many questions regarding the options and long-term effects. All questions were answered. She expressed understanding after counseling regarding the diagnosis and recommendations. She was capable and demonstrated competence with understanding of these options. Shared decision making was performed resulting in her choosing the current treatment plan.     She was counseled about the importance of healthy dietary habits as well as routine physical activity and exercise for better health outcomes. I also discussed the importance of cancer screening.     I also discussed the importance of close  follow up to discuss labs, change or modify her medications if needed, monitor side effects, and further evaluation of medical problems.     Additional workup planned: see labs ordered below.    See below for labs and meds ordered with associated diagnosis    1. Sleep apnea-like behavior  - Ambulatory referral/consult to Sleep Disorders; Future  - Polysomnogram (CPAP will be added if patient meets diagnostic criteria.); Future    2. Arthritis  - Ambulatory referral/consult to Podiatry; Future    3. Seasonal allergies  - ipratropium (ATROVENT) 21 mcg (0.03 %) nasal spray; 2 sprays by Each Nostril route 3 (three) times daily.  Dispense: 60 mL; Refill: 0       Medication List with Changes/Refills   Current Medications    ALPRAZOLAM (XANAX) 0.25 MG TABLET    Take 1 tablet (0.25 mg total) by mouth nightly as needed for Anxiety.    ASCORBIC ACID, VITAMIN C, (VITAMIN C) 500 MG TABLET    Take 500 mg by mouth once daily.    ATORVASTATIN (LIPITOR) 10 MG TABLET    Take 1 tablet (10 mg total) by mouth once daily.    B COMPLEX VITAMINS TABLET    Take 1 tablet by mouth once daily.    BIOTIN 1 MG TABLET    Take 1,000 mcg by mouth 3 (three) times daily.    CHOLECALCIFEROL, VITAMIN D3, (VITAMIN D3 ORAL)    Take by mouth.    DOCUSATE SODIUM (COLACE) 100 MG CAPSULE    Take 100 mg by mouth once daily.    EVENING PRIMROSE OIL 1,300 MG CAP    Take by mouth.    GLUCOSAMINE-CHONDROITIN 500-400 MG TABLET    Take 1 tablet by mouth 3 (three) times daily.    LEVOCETIRIZINE (XYZAL) 5 MG TABLET    Take 1 tablet (5 mg total) by mouth every evening.    MONTELUKAST (SINGULAIR) 5 MG CHEWABLE TABLET    Take 5 mg by mouth every evening.    MULTIVITAMIN CAPSULE    Take 1 capsule by mouth once daily.    TUMERIC-GING-OLIVE-OREG-CAPRYL 100 MG-150 MG- 50 MG-150 MG CAP    Take by mouth.    UNABLE TO FIND    medication name: MILK THISTLE SUPPLEMENT   Changed and/or Refilled Medications    Modified Medication Previous Medication    IPRATROPIUM (ATROVENT) 21  MCG (0.03 %) NASAL SPRAY ipratropium (ATROVENT) 21 mcg (0.03 %) nasal spray       2 sprays by Each Nostril route 3 (three) times daily.    2 sprays by Each Nostril route 3 (three) times daily.   Discontinued Medications    DOXYCYCLINE (VIBRA-TABS) 100 MG TABLET    Take 1 tablet (100 mg total) by mouth 2 (two) times daily.     Modified Medications    Modified Medication Previous Medication    IPRATROPIUM (ATROVENT) 21 MCG (0.03 %) NASAL SPRAY ipratropium (ATROVENT) 21 mcg (0.03 %) nasal spray       2 sprays by Each Nostril route 3 (three) times daily.    2 sprays by Each Nostril route 3 (three) times daily.       No follow-ups on file. for further workup and reassessment if labs and tests obtained are stable or sooner as needed. She was instructed to call the clinic or go to the emergency department if her symptoms do not improve, worsens, or if new symptoms develop. As we discussed that symptoms could worsen over the next 24 hours she was advised that if any increased swelling, pain, or numbness arise to go immediately to the ED. Patient knows to call any time if an emergency arises. Shared decision making occurred and she verbalized understanding in agreement with this plan.     Lizet Landers MD   274}  04/18/2024     If you are due for any health screening(s) below please notify me so we can arrange them to be ordered and scheduled to maintain your health. Most healthy patients at your age complete them, but you are free to accept or refuse.   Tests to Keep You Healthy    Mammogram: Met on 9/7/2023  Colon Cancer Screening: Met on 1/16/2024  Cervical Cancer Screening: Met on 11/18/2021  Tobacco Cessation: NO

## 2024-04-19 ENCOUNTER — TELEPHONE (OUTPATIENT)
Dept: ORTHOPEDICS | Facility: CLINIC | Age: 59
End: 2024-04-19

## 2024-04-19 NOTE — TELEPHONE ENCOUNTER
----- Message from Tali Garcia MA sent at 4/19/2024  3:05 PM CDT -----  Regarding: carolann. knee x-rays  Rescheduled her appointment and asking for x-rays to be scheduled prior to office visit.    Patient would like a call back.    Contact Information  [phone]

## 2024-04-22 ENCOUNTER — OFFICE VISIT (OUTPATIENT)
Dept: PODIATRY | Facility: CLINIC | Age: 59
End: 2024-04-22

## 2024-04-22 VITALS — HEIGHT: 69 IN | WEIGHT: 169.38 LBS | BODY MASS INDEX: 25.09 KG/M2

## 2024-04-22 DIAGNOSIS — M25.572 SINUS TARSI SYNDROME, LEFT: Primary | ICD-10-CM

## 2024-04-22 DIAGNOSIS — M19.90 ARTHRITIS: ICD-10-CM

## 2024-04-22 PROCEDURE — 99213 OFFICE O/P EST LOW 20 MIN: CPT | Mod: 25,S$GLB,, | Performed by: PODIATRIST

## 2024-04-22 PROCEDURE — 20605 DRAIN/INJ JOINT/BURSA W/O US: CPT | Mod: LT,S$GLB,, | Performed by: PODIATRIST

## 2024-04-22 RX ADMIN — LIDOCAINE HYDROCHLORIDE 1 ML: 10 INJECTION INFILTRATION; PERINEURAL at 02:04

## 2024-04-22 RX ADMIN — DEXAMETHASONE SODIUM PHOSPHATE 4 MG: 4 INJECTION, SOLUTION INTRA-ARTICULAR; INTRALESIONAL; INTRAMUSCULAR; INTRAVENOUS; SOFT TISSUE at 02:04

## 2024-04-23 ENCOUNTER — ANESTHESIA EVENT (OUTPATIENT)
Dept: SURGERY | Facility: OTHER | Age: 59
End: 2024-04-23

## 2024-04-23 RX ORDER — LIDOCAINE HYDROCHLORIDE 10 MG/ML
0.5 INJECTION, SOLUTION EPIDURAL; INFILTRATION; INTRACAUDAL; PERINEURAL ONCE
Status: CANCELLED | OUTPATIENT
Start: 2024-04-23 | End: 2024-04-23

## 2024-04-23 RX ORDER — PREGABALIN 75 MG/1
75 CAPSULE ORAL ONCE
Status: CANCELLED | OUTPATIENT
Start: 2024-04-23 | End: 2024-04-23

## 2024-04-23 RX ORDER — ACETAMINOPHEN 500 MG
1000 TABLET ORAL
Status: CANCELLED | OUTPATIENT
Start: 2024-04-23 | End: 2024-04-23

## 2024-04-23 RX ORDER — SODIUM CHLORIDE, SODIUM LACTATE, POTASSIUM CHLORIDE, CALCIUM CHLORIDE 600; 310; 30; 20 MG/100ML; MG/100ML; MG/100ML; MG/100ML
INJECTION, SOLUTION INTRAVENOUS CONTINUOUS
Status: CANCELLED | OUTPATIENT
Start: 2024-04-23

## 2024-04-24 ENCOUNTER — PATIENT MESSAGE (OUTPATIENT)
Dept: ORTHOPEDICS | Facility: CLINIC | Age: 59
End: 2024-04-24

## 2024-04-25 ENCOUNTER — HOSPITAL ENCOUNTER (OUTPATIENT)
Dept: PREADMISSION TESTING | Facility: OTHER | Age: 59
Discharge: HOME | End: 2024-04-25
Attending: OBSTETRICS & GYNECOLOGY

## 2024-04-25 ENCOUNTER — OFFICE VISIT (OUTPATIENT)
Dept: UROGYNECOLOGY | Facility: CLINIC | Age: 59
End: 2024-04-25

## 2024-04-25 VITALS
OXYGEN SATURATION: 98 % | DIASTOLIC BLOOD PRESSURE: 72 MMHG | BODY MASS INDEX: 25.03 KG/M2 | RESPIRATION RATE: 16 BRPM | SYSTOLIC BLOOD PRESSURE: 125 MMHG | TEMPERATURE: 98 F | HEIGHT: 69 IN | WEIGHT: 169 LBS | HEART RATE: 60 BPM

## 2024-04-25 VITALS
BODY MASS INDEX: 25.04 KG/M2 | DIASTOLIC BLOOD PRESSURE: 72 MMHG | SYSTOLIC BLOOD PRESSURE: 125 MMHG | HEIGHT: 69 IN | WEIGHT: 169.06 LBS

## 2024-04-25 DIAGNOSIS — Z01.818 PREOP TESTING: ICD-10-CM

## 2024-04-25 DIAGNOSIS — N81.2 INCOMPLETE UTEROVAGINAL PROLAPSE: ICD-10-CM

## 2024-04-25 DIAGNOSIS — N39.41 URGE INCONTINENCE OF URINE: ICD-10-CM

## 2024-04-25 DIAGNOSIS — N81.11 CYSTOCELE, MIDLINE: ICD-10-CM

## 2024-04-25 DIAGNOSIS — Z01.818 PREOP TESTING: Primary | ICD-10-CM

## 2024-04-25 DIAGNOSIS — N81.6 RECTOCELE: ICD-10-CM

## 2024-04-25 LAB
ABO + RH BLD: NORMAL
BLD GP AB SCN CELLS X3 SERPL QL: NORMAL
SPECIMEN OUTDATE: NORMAL

## 2024-04-25 PROCEDURE — 87086 URINE CULTURE/COLONY COUNT: CPT | Performed by: NURSE PRACTITIONER

## 2024-04-25 PROCEDURE — 99215 OFFICE O/P EST HI 40 MIN: CPT | Mod: PBBFAC,25 | Performed by: NURSE PRACTITIONER

## 2024-04-25 PROCEDURE — 86901 BLOOD TYPING SEROLOGIC RH(D): CPT | Performed by: NURSE PRACTITIONER

## 2024-04-25 PROCEDURE — 99999 PR PBB SHADOW E&M-EST. PATIENT-LVL V: CPT | Mod: PBBFAC,,, | Performed by: NURSE PRACTITIONER

## 2024-04-25 PROCEDURE — 36415 COLL VENOUS BLD VENIPUNCTURE: CPT | Performed by: NURSE PRACTITIONER

## 2024-04-25 NOTE — PROGRESS NOTES
"  Urogyn follow up  2024  .  [facility] - UROGYNECOLOGY  [address] [address]    Misti West  [id number]  1965      Misti West is a 58 y.o. here for a urogyn preop visit    Last HPI from 01/10/2024  HPI: Patient is a 58 y.o. female  with a h/o cervical cancer s/p a cone biopsy in  who presents today with c/o vaginal prolapse. She reports that the vaginal bulge has been present since 2023. She states that she noticed it when she was in the shower. Prior to noticing the bulge she had a cough from a viral upper respiratory illness. She reports the vaginal bulge size varies depending on how long she is on her feet or the types of activities she does. [place] and when she does a lot of heavy lifting the bulge gets bigger. She will stop and elevate her legs when the bulge is uncomfortable. She tries not to strain and push as she finds that with these actions the bulge also gets bigger. She sometimes notices that she has difficulty starting her urine stream. When this occurs she will also lie down. Feels that she empties better at nighttime.   Reports that she had a pap smear which was normal recently. States that her old gyn who delivered her children that her mentioned she had uterine prolapse but this was about 20 years ago.      She reports urinary urgency and occasional urge incontinence. She at times has daytime urinary frequency, voiding every 1-2 hours. She wakes 2-3 times over night to void. She wakes due to the urge to void. She notices loss of urine with a hard cough. Does not notice loss of urine with sneezing or lifting. After she urinates she at times experiences post void dribbling.      Reports a h/o a urologist "stretching her bladder" about 2-4 years ago due to recurrent bladder infections.      She has a bowel movements about every 3-4 days. They are formed but she feels that she has a blockage. She is scheduled for a " colonoscopy next week. She has a h/o colon polyps and hemorrhoids. She denies accidental bowel leakage.         POP-Q Exam- pelvic organ prolapse quantitative     Aa +3  Anterior Wall Ba +3  Anterior wall C -2.5  Cervix or cuff   Gh 5.5  Genital hiatus pb 2  perineal body tvl 10.5  Total vaginal length   Ap -1.5  Posterior wall Bp -1.5  Posterior wall D -4  Posterior fornix      with Uterus     POP-Q Stage:3    Pvr 2 ml per bladder scan    01/25/2024  [place]     1.  VOIDING PHASE:       a.  Uroflowmetry:  Prolapse reduction: No  Voided volume:  243 mL   Voiding time:   25 seconds  Max flow:  28 mL/s  Avg flow:   10.9 mL/s   PVR:   25 mL     The overall configuration of this uroflow study was  continuous.      b.  Pressure flow:  Prolapse reduction: No  Voided volume:   779 mL  Voiding time:   1:17 min:seconds  Peak flow:  43 mL/s   Avg flow:  10.6 mL/s  Max det pressure:   44.6 cm H20  Det pressure at max flow: 65 cm H20  Void initiated by  valsalva.    Urethral relaxation (EMG):  no.       The overall configuration of this pressure flow study appears to be continuous but as patient was voiding catheter fell out.       2.  FILLING PHASE:  1st sensation: 141 mL  1st desire:  200 mL  Strong desire:  400 mL  Urgency/ Capacity:  589 mL  EMG activity during filling:   quiet until the end  Detrusor contractions observed: No.       3.  URETHRAL FUNCTION/STORAGE PHASE:     a.  WITH prolapse reduction:  CLPP (300 mL): Negative  at  164 cm H20  VLPP (300 mL): Negative  at  91 cm H20   CLPP (MAX ):    Negative  at  148 cm H20  VLPP (MAX):     Negative  at  88 cm H20  NO leak at capacity with catheters removed.      These findings are consistent with Negative urodynamic stress incontinence.     Assessment:  UF  is continuous.  PF  is difficult to interpret as catheters fell out.   Max capacity is above normal (>500 mL).  DO (--).  FEDERICO (--).         Changes from last visit:  Rare UUI if bladder full.  Voiding every 2-3 hours  during the day  + constipation --taking colace    Past Medical History:   Diagnosis Date    Anxiety     Arthritis     Cancer     cervical    Female bladder prolapse     Hepatomegaly     3 liver cyst    Hiatal hernia     High cholesterol     Liver lesion     PONV (postoperative nausea and vomiting)     Seasonal allergies        Past Surgical History:   Procedure Laterality Date    CERVIX SURGERY      COLONOSCOPY N/A 08/27/2020    Procedure: COLONOSCOPY;  Surgeon: Tim Aguayo MD;  Location: [facility] ENDO;  Service: Endoscopy;  Laterality: N/A;    COLONOSCOPY N/A 1/16/2024    Procedure: COLONOSCOPY;  Surgeon: Tim Aguayo MD;  Location: [facility] ENDO;  Service: Endoscopy;  Laterality: N/A;    CYSTOSCOPY WITH URETHRAL DILATION  10/01/2020    Procedure: CYSTOSCOPY, WITH URETHRAL DILATION;  Surgeon: Bhargav Keane MD;  Location: [facility] OR;  Service: Urology;;    ESOPHAGOGASTRODUODENOSCOPY N/A 08/13/2020    Procedure: EGD (ESOPHAGOGASTRODUODENOSCOPY);  Surgeon: Tim Aguayo MD;  Location: [facility];  Service: Endoscopy;  Laterality: N/A;    KNEE ARTHROSCOPY      TKR    ROBOTIC ARTHROPLASTY, KNEE Left 03/29/2022    Procedure: ROBOTIC ARTHROPLASTY, KNEE, TOTAL;  Surgeon: Neo Zapata MD;  Location: [facility] OR;  Service: Orthopedics;  Laterality: Left;    SINUS SURGERY      TUBAL LIGATION         Family History   Problem Relation Name Age of Onset    Diabetes Mother      Cancer Mother          bladder    Arthritis Mother      Cancer Father          lung    COPD Father      Hypertension Father      Arthritis Father      Melanoma Father      Breast cancer Sister      Arthritis Sister      Breast cancer Sister      Arthritis Sister      Breast cancer Sister      Kidney disease Sister      Arthritis Sister      Colon polyps Neg Hx      Colon cancer Neg Hx      Crohn's disease Neg Hx      Ulcerative colitis Neg Hx      Stomach cancer Neg Hx      Esophageal cancer Neg Hx      Celiac disease Neg Hx      Seizures  Neg Hx         Social History     Socioeconomic History    Marital status:    Tobacco Use    Smoking status: Every Day     Current packs/day: 1.00     Types: Cigarettes    Smokeless tobacco: Never   Substance and Sexual Activity    Alcohol use: Yes     Comment: rare    Drug use: Never    Sexual activity: Not Currently     Partners: Male   Social History Narrative    [place] with spouse [place] a farm. 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      Social Determinants of Health     Financial Resource Strain: Medium Risk (3/9/2022)    Overall Financial Resource Strain (CARDIA)     Difficulty of Paying Living Expenses: Somewhat hard   Food Insecurity: No Food Insecurity (3/9/2022)    Hunger Vital Sign     Worried About Running Out of Food in the Last Year: Never true     Ran Out of Food in the Last Year: Never true   Transportation Needs: No Transportation Needs (3/9/2022)    PRAPARE - Transportation     Lack of Transportation (Medical): No     Lack of Transportation (Non-Medical): No   Physical Activity: Unknown (3/9/2022)    Exercise Vital Sign     Days of Exercise per Week: 0 days   Stress: No Stress Concern Present (3/9/2022)    [employer] - Occupational Stress Questionnaire     Feeling of Stress : Only a little   Social Connections: Unknown (3/9/2022)    Social Connection and Isolation Panel [NHANES]     Frequency of Communication with Friends and Family: More than three times a week     Frequency of Social Gatherings with Friends and Family: Once a week     Active Member of Clubs or Organizations: Yes     Attends Club or Organization Meetings: 1 to 4 times per year     Marital Status:    Housing Stability: Unknown (3/9/2022)    Housing Stability Vital Sign     Unable to Pay for Housing in the Last Year: No     Unstable Housing in the Last Year: No       Current Outpatient Medications   Medication Sig Dispense Refill    ascorbic acid, vitamin C, (VITAMIN C) 500 MG tablet Take 500 mg by mouth  "once daily.      atorvastatin (LIPITOR) 10 MG tablet Take 1 tablet (10 mg total) by mouth once daily. 90 tablet 3    b complex vitamins tablet Take 1 tablet by mouth once daily.      biotin 1 mg tablet Take 1,000 mcg by mouth 3 (three) times daily.      cholecalciferol, vitamin D3, (VITAMIN D3 ORAL) Take by mouth.      docusate sodium (COLACE) 100 MG capsule Take 100 mg by mouth once daily.      evening primrose oil 1,300 mg Cap Take by mouth.      glucosamine-chondroitin 500-400 mg tablet Take 1 tablet by mouth 3 (three) times daily.      ipratropium (ATROVENT) 21 mcg (0.03 %) nasal spray 2 sprays by Each Nostril route 3 (three) times daily. 60 mL 0    levocetirizine (XYZAL) 5 MG tablet Take 1 tablet (5 mg total) by mouth every evening. 30 tablet 11    montelukast (SINGULAIR) 5 MG chewable tablet Take 5 mg by mouth every evening.      multivitamin capsule Take 1 capsule by mouth once daily.      tumeric-ging-olive-oreg-capryl 100 mg-150 mg- 50 mg-150 mg Cap Take by mouth.      UNABLE TO FIND medication name: MILK THISTLE SUPPLEMENT       No current facility-administered medications for this visit.       Review of patient's allergies indicates:   Allergen Reactions    Codeine Swelling     "Hives, vomiting"  Can take hydrocodone & tylenol    Morphine Swelling     "Hives, vomiting"    Naproxen sodium Swelling     "Itching, high blood pressure"    Percocet [oxycodone-acetaminophen] Nausea And Vomiting     LOW HEART RATE  Can take hydrocodone & tylenol    Tramadol hcl Swelling     "itching & nausea"    Ciprofloxacin Other (See Comments)     Low BP    Penicillins     Trintex        Well woman:  Pap:11/2021 normal HPV negative  Mammo:09/2023 normal  Colonoscopy:01/2024 internal bleeding, diverticulosis, polyps  Dexa:n/a    ROS:  As per HPI.      Exam  /72 (BP Location: Right arm, Patient Position: Sitting, BP Method: Medium (Manual))   Ht 5' 9" (1.753 m)   Wt 76.7 kg (169 lb 1.5 oz)   BMI 24.97 kg/m²   General: " alert and oriented, no acute distress  Respiratory: normal respiratory effort  Abd: soft, non-tender, non-distended    Pelvic--deferred    Impression  No diagnosis found.  We reviewed the above issues and discussed options for short-term versus long-term management of her problems.   Plan:   Reviewed preop/post op instructions.  Medically optimized for surgery  T&S, urine HCG on DOS  VTE Prophylaxis:  lovenox 40 mg sq daily + SCDs  Patient instructed on bowel prep and chlorahexadine/dial soap prep to perform day before & AM of surgery.   Proceed to OR for above-mentioned procedure.      20 minutes were spent in face to face time with this patient  90 % of this time was spent in counseling and/or coordination of care      ARACELIS Carter-BC  [facility]  Division of Female Pelvic Medicine and Reconstructive Surgery  Department of Obstetrics & Gynecology

## 2024-04-25 NOTE — ANESTHESIA PREPROCEDURE EVALUATION
04/25/2024  Misti West is a 58 y.o., female.      Pre-op Assessment    I have reviewed the Patient Summary Reports.     I have reviewed the Nursing Notes. I have reviewed the NPO Status.   I have reviewed the Medications.     Review of Systems  Anesthesia Hx:  No problems with previous Anesthesia             Denies Family Hx of Anesthesia complications.    Denies Personal Hx of Anesthesia complications.                    Social:  Smoker       Hematology/Oncology:  Hematology Normal   Oncology Normal                                   EENT/Dental:  EENT/Dental Normal           Cardiovascular:  Cardiovascular Normal                                            Pulmonary:  Pulmonary Normal                       Renal/:  Renal/ Normal                 Hepatic/GI:  Hepatic/GI Normal                 Musculoskeletal:  Arthritis               Neurological:  Neurology Normal                                      Endocrine:  Endocrine Normal            Dermatological:  Skin Normal    Psych:  Psychiatric Normal                    Physical Exam  General: Well nourished and Alert    Airway:  Mallampati: II   Mouth Opening: Normal  Tongue: Normal    Dental:  Intact        Anesthesia Plan  Type of Anesthesia, risks & benefits discussed:    Anesthesia Type: Gen ETT  Intra-op Monitoring Plan: Standard ASA Monitors  Post Op Pain Control Plan: multimodal analgesia  Induction:  IV  Airway Plan: Video  Informed Consent: Informed consent signed with the Patient and all parties understand the risks and agree with anesthesia plan.  All questions answered.   ASA Score: 2  Anesthesia Plan Notes: Labs per surgeon    Ready For Surgery From Anesthesia Perspective.     .

## 2024-04-25 NOTE — PATIENT INSTRUCTIONS
Constipation:  --hydrate well  --  Start daily fiber.  Take 1 tsp of fiber powder (psyllium or other sugar-free powder).  Mix in 8 oz of water.  Take x 3-5 days.  Then, increase fiber by 1 tsp every 3-5 days until stool is easy to pass.  Stop and continue at that dose.   Do not exceed 6 tsps/day.    --continue colace  --important to control to prevent future prolapse

## 2024-04-25 NOTE — DISCHARGE INSTRUCTIONS
Information to Prepare you for your Surgery    PRE-ADMIT TESTING   [address] [facility]       Your surgery has been scheduled at [facility]. We are pleased to have the opportunity to serve you. For Further Information please call [phone].    On the day of surgery please report to the Information Desk on the 1st floor.    CONTACT YOUR PHYSICIAN'S OFFICE THE DAY PRIOR TO YOUR SURGERY TO OBTAIN YOUR ARRIVAL TIME.     The evening before surgery do not eat anything after 9 p.m. ( this includes hard candy, chewing gum and mints).  You may only have GATORADE, POWERADE AND WATER  from 9 p.m. until you leave your home.   DO NOT DRINK ANY LIQUIDS ON THE WAY TO THE HOSPITAL.      Why does your anesthesiologist allow you to drink Gatorade/Powerade before surgery?  Gatorade/Powerade helps to increase your comfort before surgery and to decrease your nausea after surgery.   The carbohydrates in Gatorade/Powerade help reduce your body's stress response to surgery.  If you are a diabetic-drink only water prior to surgery.    Outpatient Surgery- May allow 2 adults (18 and older)/ Support Persons (1 being the designated ) for all surgical/procedural patients. A breastfeeding mother will be allowed her infant and 2 adult Support Persons. No one under the age of 18 will be allowed in the building.      SPECIAL MEDICATION INSTRUCTIONS: TAKE medications checked off by the Anesthesiologist on your Medication List.    Surgery Patients:  If you take ASPIRIN - Your PHYSICIAN/SURGEON will need to inform you IF/OR when you need to stop taking aspirin prior to your surgery.     Starting the week prior to surgery, do not take any medications containing IBUPROFEN or NSAIDS (Advil, Aleve, BC, Goody's, Ketorolac, Meloxicam, Mobic, Motrin, Naproxen, Toradol, etc).  If you are not sure if you should take a medicine please call your surgeon's office.  You may take Tylenol.    Do Not Wear any make-up  (especially eye make-up) to surgery. Please remove any false eyelashes or eyelash extensions. If you arrive the day of surgery with makeup/eyelashes on you will be required to remove prior to surgery. (There is a risk of corneal abrasions if eye makeup/eyelash extensions are not removed)    Leave all valuables at home.   Do Not wear any jewelry or watches, including any metal in body piercings. Jewelry must be removed prior to coming to the hospital.  There is a possibility that rings that are unable to be removed may be cut off if they are on the surgical extremity.    Please remove all hair extensions, wigs, clips and any other metal accessories/ ornaments from your hair.  These items may pose a flammable/fire risk in Surgery and must be removed.    Do not shave your surgical area at least 5 days prior to your surgery. The surgical prep will be performed at the hospital according to Infection Control regulations.    Contact Lens must be removed before surgery. Either do not wear the contact lens or bring a case and solution for storage.  Please bring a container for eyeglasses or dentures as required.  Bring any paperwork your physician has provided, such as consent forms,  history and physicals, doctor's orders, etc.   Bring comfortable clothes that are loose fitting to wear upon discharge. Take into consideration the type of surgery being performed.  Maintain your diet as advised per your physician the day prior to surgery.    Adequate rest the night before surgery is advised.   Park in the Parking lot behind the hospital or in the [facility] across the street from the parking lot. Parking is complimentary.  If you will be discharged the same day as your procedure, please arrange for a responsible adult to drive you home or to accompany you if traveling by taxi.   YOU WILL NOT BE PERMITTED TO DRIVE OR TO LEAVE THE HOSPITAL ALONE AFTER SURGERY.   If you are being discharged the same day, it is  strongly recommended that you arrange for someone to remain with you for the first 24 hrs following your surgery.    The Surgeon will speak to your family/visitor after your surgery regarding the outcome of your surgery and post op care.  The Surgeon may speak to you after your surgery, but there is a possibility you may not remember the details.  Please check with your family members regarding the conversation with the Surgeon.    We strongly recommend whoever is bringing you home be present for discharge instructions.  This will ensure a thorough understanding for your post op home care.              Bathing Instructions with Hibiclens  Shower the evening before and morning of your procedure with Chlorhexidine (Hibiclens)    Do not use Chlorhexidine on your face or genitals. Do not get in your eyes.  Wash your face with water and your regular face wash/soap  Use your regular shampoo  Apply Chlorhexidine (Hibiclens) directly on your skin or on a wet washcloth and wash gently. When showering: Move away from the shower stream when applying Chlorhexidine (Hibiclens) to avoid rinsing off too soon.  Rinse thoroughly with warm water  Do not dilute Chlorhexidine (Hibiclens)   Dry off as usual, do not use any deodorant, powder, body lotions, perfume, after shave or cologne.

## 2024-04-26 LAB — BACTERIA UR CULT: NO GROWTH

## 2024-05-02 ENCOUNTER — ANESTHESIA (OUTPATIENT)
Dept: SURGERY | Facility: OTHER | Age: 59
End: 2024-05-02

## 2024-05-02 ENCOUNTER — HOSPITAL ENCOUNTER (OUTPATIENT)
Facility: OTHER | Age: 59
Discharge: HOME | End: 2024-05-03
Attending: OBSTETRICS & GYNECOLOGY | Admitting: OBSTETRICS & GYNECOLOGY

## 2024-05-02 DIAGNOSIS — R07.9 CHEST PAIN: ICD-10-CM

## 2024-05-02 DIAGNOSIS — N81.2 INCOMPLETE UTEROVAGINAL PROLAPSE: ICD-10-CM

## 2024-05-02 DIAGNOSIS — Z98.890 STATUS POST SACROCOLPOPEXY: Primary | ICD-10-CM

## 2024-05-02 PROBLEM — N81.10 VAGINAL PROLAPSE: Status: ACTIVE | Noted: 2024-05-02

## 2024-05-02 LAB
ANION GAP SERPL CALC-SCNC: 8 MMOL/L (ref 8–16)
BASOPHILS # BLD AUTO: 0.05 K/UL (ref 0–0.2)
BASOPHILS NFR BLD: 1.2 % (ref 0–1.9)
BUN SERPL-MCNC: 9 MG/DL (ref 6–20)
CALCIUM SERPL-MCNC: 8.8 MG/DL (ref 8.7–10.5)
CHLORIDE SERPL-SCNC: 109 MMOL/L (ref 95–110)
CO2 SERPL-SCNC: 24 MMOL/L (ref 23–29)
CREAT SERPL-MCNC: 0.7 MG/DL (ref 0.5–1.4)
DIFFERENTIAL METHOD BLD: ABNORMAL
EOSINOPHIL # BLD AUTO: 0.2 K/UL (ref 0–0.5)
EOSINOPHIL NFR BLD: 5.6 % (ref 0–8)
ERYTHROCYTE [DISTWIDTH] IN BLOOD BY AUTOMATED COUNT: 14.2 % (ref 11.5–14.5)
EST. GFR  (NO RACE VARIABLE): >60 ML/MIN/1.73 M^2
GLUCOSE SERPL-MCNC: 94 MG/DL (ref 70–110)
HCT VFR BLD AUTO: 36.7 % (ref 37–48.5)
HGB BLD-MCNC: 12 G/DL (ref 12–16)
IMM GRANULOCYTES # BLD AUTO: 0.01 K/UL (ref 0–0.04)
IMM GRANULOCYTES NFR BLD AUTO: 0.2 % (ref 0–0.5)
LYMPHOCYTES # BLD AUTO: 2.1 K/UL (ref 1–4.8)
LYMPHOCYTES NFR BLD: 47.7 % (ref 18–48)
MCH RBC QN AUTO: 30.1 PG (ref 27–31)
MCHC RBC AUTO-ENTMCNC: 32.7 G/DL (ref 32–36)
MCV RBC AUTO: 92 FL (ref 82–98)
MONOCYTES # BLD AUTO: 0.3 K/UL (ref 0.3–1)
MONOCYTES NFR BLD: 7.4 % (ref 4–15)
NEUTROPHILS # BLD AUTO: 1.6 K/UL (ref 1.8–7.7)
NEUTROPHILS NFR BLD: 37.9 % (ref 38–73)
NRBC BLD-RTO: 0 /100 WBC
PLATELET # BLD AUTO: 227 K/UL (ref 150–450)
PMV BLD AUTO: 9.5 FL (ref 9.2–12.9)
POCT GLUCOSE: 84 MG/DL (ref 70–110)
POTASSIUM SERPL-SCNC: 4 MMOL/L (ref 3.5–5.1)
RBC # BLD AUTO: 3.99 M/UL (ref 4–5.4)
SODIUM SERPL-SCNC: 141 MMOL/L (ref 136–145)
WBC # BLD AUTO: 4.3 K/UL (ref 3.9–12.7)

## 2024-05-02 PROCEDURE — 94799 UNLISTED PULMONARY SVC/PX: CPT

## 2024-05-02 PROCEDURE — 88112 CYTOPATH CELL ENHANCE TECH: CPT | Mod: 26,,, | Performed by: PATHOLOGY

## 2024-05-02 PROCEDURE — 94761 N-INVAS EAR/PLS OXIMETRY MLT: CPT

## 2024-05-02 PROCEDURE — 63600175 PHARM REV CODE 636 W HCPCS

## 2024-05-02 PROCEDURE — 25000003 PHARM REV CODE 250: Performed by: ANESTHESIOLOGY

## 2024-05-02 PROCEDURE — 25000003 PHARM REV CODE 250: Performed by: STUDENT IN AN ORGANIZED HEALTH CARE EDUCATION/TRAINING PROGRAM

## 2024-05-02 PROCEDURE — 88331 PATH CONSLTJ SURG 1 BLK 1SPC: CPT | Performed by: PATHOLOGY

## 2024-05-02 PROCEDURE — 63600175 PHARM REV CODE 636 W HCPCS: Performed by: OBSTETRICS & GYNECOLOGY

## 2024-05-02 PROCEDURE — 36000710: Performed by: OBSTETRICS & GYNECOLOGY

## 2024-05-02 PROCEDURE — 58542 LSH W/T/O UT 250 G OR LESS: CPT | Mod: 51,,, | Performed by: OBSTETRICS & GYNECOLOGY

## 2024-05-02 PROCEDURE — 71000033 HC RECOVERY, INTIAL HOUR: Performed by: OBSTETRICS & GYNECOLOGY

## 2024-05-02 PROCEDURE — 63600175 PHARM REV CODE 636 W HCPCS: Performed by: ANESTHESIOLOGY

## 2024-05-02 PROCEDURE — D9220A PRA ANESTHESIA: Mod: ,,, | Performed by: ANESTHESIOLOGY

## 2024-05-02 PROCEDURE — 88331 PATH CONSLTJ SURG 1 BLK 1SPC: CPT | Mod: 26,,, | Performed by: PATHOLOGY

## 2024-05-02 PROCEDURE — 36000711: Performed by: OBSTETRICS & GYNECOLOGY

## 2024-05-02 PROCEDURE — 88305 TISSUE EXAM BY PATHOLOGIST: CPT | Mod: 26,,, | Performed by: PATHOLOGY

## 2024-05-02 PROCEDURE — 88112 CYTOPATH CELL ENHANCE TECH: CPT | Performed by: PATHOLOGY

## 2024-05-02 PROCEDURE — 88341 IMHCHEM/IMCYTCHM EA ADD ANTB: CPT | Performed by: PATHOLOGY

## 2024-05-02 PROCEDURE — 88342 IMHCHEM/IMCYTCHM 1ST ANTB: CPT | Performed by: PATHOLOGY

## 2024-05-02 PROCEDURE — 88305 TISSUE EXAM BY PATHOLOGIST: CPT | Mod: 59 | Performed by: PATHOLOGY

## 2024-05-02 PROCEDURE — P9045 ALBUMIN (HUMAN), 5%, 250 ML: HCPCS | Mod: JZ,JG | Performed by: STUDENT IN AN ORGANIZED HEALTH CARE EDUCATION/TRAINING PROGRAM

## 2024-05-02 PROCEDURE — 37000008 HC ANESTHESIA 1ST 15 MINUTES: Performed by: OBSTETRICS & GYNECOLOGY

## 2024-05-02 PROCEDURE — 25000003 PHARM REV CODE 250: Performed by: OBSTETRICS & GYNECOLOGY

## 2024-05-02 PROCEDURE — 63600175 PHARM REV CODE 636 W HCPCS: Performed by: STUDENT IN AN ORGANIZED HEALTH CARE EDUCATION/TRAINING PROGRAM

## 2024-05-02 PROCEDURE — 88341 IMHCHEM/IMCYTCHM EA ADD ANTB: CPT | Mod: 26,,, | Performed by: PATHOLOGY

## 2024-05-02 PROCEDURE — 57425 LAPAROSCOPY SURG COLPOPEXY: CPT | Mod: ,,, | Performed by: OBSTETRICS & GYNECOLOGY

## 2024-05-02 PROCEDURE — 71000039 HC RECOVERY, EACH ADD'L HOUR: Performed by: OBSTETRICS & GYNECOLOGY

## 2024-05-02 PROCEDURE — 27201423 OPTIME MED/SURG SUP & DEVICES STERILE SUPPLY: Performed by: OBSTETRICS & GYNECOLOGY

## 2024-05-02 PROCEDURE — 80048 BASIC METABOLIC PNL TOTAL CA: CPT | Performed by: OBSTETRICS & GYNECOLOGY

## 2024-05-02 PROCEDURE — 85025 COMPLETE CBC W/AUTO DIFF WBC: CPT | Performed by: OBSTETRICS & GYNECOLOGY

## 2024-05-02 PROCEDURE — 36415 COLL VENOUS BLD VENIPUNCTURE: CPT | Performed by: OBSTETRICS & GYNECOLOGY

## 2024-05-02 PROCEDURE — 37000009 HC ANESTHESIA EA ADD 15 MINS: Performed by: OBSTETRICS & GYNECOLOGY

## 2024-05-02 PROCEDURE — 25000242 PHARM REV CODE 250 ALT 637 W/ HCPCS: Performed by: STUDENT IN AN ORGANIZED HEALTH CARE EDUCATION/TRAINING PROGRAM

## 2024-05-02 PROCEDURE — 63600175 PHARM REV CODE 636 W HCPCS: Mod: JZ,JG | Performed by: OBSTETRICS & GYNECOLOGY

## 2024-05-02 PROCEDURE — 88342 IMHCHEM/IMCYTCHM 1ST ANTB: CPT | Mod: 26,,, | Performed by: PATHOLOGY

## 2024-05-02 PROCEDURE — C1781 MESH (IMPLANTABLE): HCPCS | Performed by: OBSTETRICS & GYNECOLOGY

## 2024-05-02 DEVICE — TRADITIONAL Y MESH
Type: IMPLANTABLE DEVICE | Site: PELVIS | Status: FUNCTIONAL
Brand: UPSYLON™

## 2024-05-02 RX ORDER — ADHESIVE BANDAGE
30 BANDAGE TOPICAL 2 TIMES DAILY
Status: DISCONTINUED | OUTPATIENT
Start: 2024-05-02 | End: 2024-05-03 | Stop reason: HOSPADM

## 2024-05-02 RX ORDER — METHYLENE BLUE 5 MG/ML
INJECTION INTRAVENOUS
Status: DISCONTINUED | OUTPATIENT
Start: 2024-05-02 | End: 2024-05-02

## 2024-05-02 RX ORDER — HYDROCODONE BITARTRATE AND ACETAMINOPHEN 5; 325 MG/1; MG/1
1 TABLET ORAL EVERY 4 HOURS PRN
Status: DISCONTINUED | OUTPATIENT
Start: 2024-05-02 | End: 2024-05-03 | Stop reason: HOSPADM

## 2024-05-02 RX ORDER — DEXAMETHASONE SODIUM PHOSPHATE 4 MG/ML
INJECTION, SOLUTION INTRA-ARTICULAR; INTRALESIONAL; INTRAMUSCULAR; INTRAVENOUS; SOFT TISSUE
Status: DISCONTINUED | OUTPATIENT
Start: 2024-05-02 | End: 2024-05-02

## 2024-05-02 RX ORDER — SODIUM CHLORIDE, SODIUM LACTATE, POTASSIUM CHLORIDE, CALCIUM CHLORIDE 600; 310; 30; 20 MG/100ML; MG/100ML; MG/100ML; MG/100ML
INJECTION, SOLUTION INTRAVENOUS CONTINUOUS
Status: DISCONTINUED | OUTPATIENT
Start: 2024-05-02 | End: 2024-05-03

## 2024-05-02 RX ORDER — BUPIVACAINE HYDROCHLORIDE 5 MG/ML
INJECTION, SOLUTION EPIDURAL; INTRACAUDAL
Status: DISCONTINUED | OUTPATIENT
Start: 2024-05-02 | End: 2024-05-02 | Stop reason: HOSPADM

## 2024-05-02 RX ORDER — FAMOTIDINE 20 MG/1
20 TABLET, FILM COATED ORAL
Status: COMPLETED | OUTPATIENT
Start: 2024-05-02 | End: 2024-05-02

## 2024-05-02 RX ORDER — EPHEDRINE SULFATE 50 MG/ML
INJECTION, SOLUTION INTRAVENOUS
Status: DISCONTINUED | OUTPATIENT
Start: 2024-05-02 | End: 2024-05-02

## 2024-05-02 RX ORDER — HYDROMORPHONE HYDROCHLORIDE 2 MG/ML
0.4 INJECTION, SOLUTION INTRAMUSCULAR; INTRAVENOUS; SUBCUTANEOUS EVERY 5 MIN PRN
Status: DISCONTINUED | OUTPATIENT
Start: 2024-05-02 | End: 2024-05-02 | Stop reason: HOSPADM

## 2024-05-02 RX ORDER — KETAMINE HCL IN 0.9 % NACL 50 MG/5 ML
SYRINGE (ML) INTRAVENOUS
Status: DISCONTINUED | OUTPATIENT
Start: 2024-05-02 | End: 2024-05-02

## 2024-05-02 RX ORDER — MUPIROCIN 20 MG/G
OINTMENT TOPICAL
Status: DISCONTINUED | OUTPATIENT
Start: 2024-05-02 | End: 2024-05-02 | Stop reason: HOSPADM

## 2024-05-02 RX ORDER — ALBUTEROL SULFATE 90 UG/1
AEROSOL, METERED RESPIRATORY (INHALATION)
Status: DISCONTINUED | OUTPATIENT
Start: 2024-05-02 | End: 2024-05-02

## 2024-05-02 RX ORDER — MUPIROCIN 20 MG/G
OINTMENT TOPICAL 2 TIMES DAILY
Status: DISCONTINUED | OUTPATIENT
Start: 2024-05-02 | End: 2024-05-03 | Stop reason: HOSPADM

## 2024-05-02 RX ORDER — DIPHENHYDRAMINE HYDROCHLORIDE 50 MG/ML
25 INJECTION INTRAMUSCULAR; INTRAVENOUS EVERY 6 HOURS PRN
Status: DISCONTINUED | OUTPATIENT
Start: 2024-05-02 | End: 2024-05-02 | Stop reason: HOSPADM

## 2024-05-02 RX ORDER — MIDAZOLAM HYDROCHLORIDE 1 MG/ML
INJECTION INTRAMUSCULAR; INTRAVENOUS
Status: DISCONTINUED | OUTPATIENT
Start: 2024-05-02 | End: 2024-05-02

## 2024-05-02 RX ORDER — HEPARIN SODIUM 5000 [USP'U]/ML
5000 INJECTION, SOLUTION INTRAVENOUS; SUBCUTANEOUS EVERY 8 HOURS
Status: DISCONTINUED | OUTPATIENT
Start: 2024-05-02 | End: 2024-05-02

## 2024-05-02 RX ORDER — ATORVASTATIN CALCIUM 10 MG/1
10 TABLET, FILM COATED ORAL DAILY
Status: DISCONTINUED | OUTPATIENT
Start: 2024-05-02 | End: 2024-05-03 | Stop reason: HOSPADM

## 2024-05-02 RX ORDER — LIDOCAINE HYDROCHLORIDE 20 MG/ML
INJECTION INTRAVENOUS
Status: DISCONTINUED | OUTPATIENT
Start: 2024-05-02 | End: 2024-05-02

## 2024-05-02 RX ORDER — IBUPROFEN 600 MG/1
600 TABLET ORAL EVERY 6 HOURS
Status: DISCONTINUED | OUTPATIENT
Start: 2024-05-02 | End: 2024-05-03 | Stop reason: HOSPADM

## 2024-05-02 RX ORDER — SODIUM CHLORIDE 0.9 % (FLUSH) 0.9 %
3 SYRINGE (ML) INJECTION
Status: DISCONTINUED | OUTPATIENT
Start: 2024-05-02 | End: 2024-05-02 | Stop reason: HOSPADM

## 2024-05-02 RX ORDER — ROCURONIUM BROMIDE 10 MG/ML
INJECTION, SOLUTION INTRAVENOUS
Status: DISCONTINUED | OUTPATIENT
Start: 2024-05-02 | End: 2024-05-02

## 2024-05-02 RX ORDER — ALBUMIN HUMAN 50 G/1000ML
SOLUTION INTRAVENOUS
Status: DISCONTINUED | OUTPATIENT
Start: 2024-05-02 | End: 2024-05-02

## 2024-05-02 RX ORDER — SENNOSIDES 8.6 MG/1
8.6 TABLET ORAL 2 TIMES DAILY
Status: DISCONTINUED | OUTPATIENT
Start: 2024-05-02 | End: 2024-05-03 | Stop reason: HOSPADM

## 2024-05-02 RX ORDER — NALOXONE HCL 0.4 MG/ML
0.02 VIAL (ML) INJECTION
Status: DISCONTINUED | OUTPATIENT
Start: 2024-05-02 | End: 2024-05-03 | Stop reason: HOSPADM

## 2024-05-02 RX ORDER — ACETAMINOPHEN 500 MG
1000 TABLET ORAL
Status: COMPLETED | OUTPATIENT
Start: 2024-05-02 | End: 2024-05-02

## 2024-05-02 RX ORDER — PREGABALIN 75 MG/1
75 CAPSULE ORAL ONCE
Status: COMPLETED | OUTPATIENT
Start: 2024-05-02 | End: 2024-05-02

## 2024-05-02 RX ORDER — PROCHLORPERAZINE EDISYLATE 5 MG/ML
5 INJECTION INTRAMUSCULAR; INTRAVENOUS EVERY 30 MIN PRN
Status: DISCONTINUED | OUTPATIENT
Start: 2024-05-02 | End: 2024-05-02 | Stop reason: HOSPADM

## 2024-05-02 RX ORDER — CYCLOBENZAPRINE HCL 5 MG
5 TABLET ORAL ONCE
Status: COMPLETED | OUTPATIENT
Start: 2024-05-02 | End: 2024-05-02

## 2024-05-02 RX ORDER — HEPARIN SODIUM 5000 [USP'U]/ML
5000 INJECTION, SOLUTION INTRAVENOUS; SUBCUTANEOUS EVERY 8 HOURS
Status: DISCONTINUED | OUTPATIENT
Start: 2024-05-02 | End: 2024-05-03 | Stop reason: HOSPADM

## 2024-05-02 RX ORDER — HEPARIN SODIUM 5000 [USP'U]/ML
5000 INJECTION, SOLUTION INTRAVENOUS; SUBCUTANEOUS
Status: DISCONTINUED | OUTPATIENT
Start: 2024-05-02 | End: 2024-05-02

## 2024-05-02 RX ORDER — MEPERIDINE HYDROCHLORIDE 25 MG/ML
12.5 INJECTION INTRAMUSCULAR; INTRAVENOUS; SUBCUTANEOUS ONCE AS NEEDED
Status: DISCONTINUED | OUTPATIENT
Start: 2024-05-02 | End: 2024-05-02 | Stop reason: HOSPADM

## 2024-05-02 RX ORDER — LABETALOL HYDROCHLORIDE 5 MG/ML
INJECTION, SOLUTION INTRAVENOUS
Status: DISCONTINUED | OUTPATIENT
Start: 2024-05-02 | End: 2024-05-02

## 2024-05-02 RX ORDER — FENTANYL CITRATE 50 UG/ML
INJECTION, SOLUTION INTRAMUSCULAR; INTRAVENOUS
Status: DISCONTINUED | OUTPATIENT
Start: 2024-05-02 | End: 2024-05-02

## 2024-05-02 RX ORDER — PROCHLORPERAZINE EDISYLATE 5 MG/ML
5 INJECTION INTRAMUSCULAR; INTRAVENOUS EVERY 6 HOURS PRN
Status: DISCONTINUED | OUTPATIENT
Start: 2024-05-02 | End: 2024-05-03 | Stop reason: HOSPADM

## 2024-05-02 RX ORDER — PROPOFOL 10 MG/ML
VIAL (ML) INTRAVENOUS CONTINUOUS PRN
Status: DISCONTINUED | OUTPATIENT
Start: 2024-05-02 | End: 2024-05-02

## 2024-05-02 RX ORDER — ONDANSETRON HYDROCHLORIDE 2 MG/ML
4 INJECTION, SOLUTION INTRAVENOUS EVERY 6 HOURS PRN
Status: DISCONTINUED | OUTPATIENT
Start: 2024-05-02 | End: 2024-05-03 | Stop reason: HOSPADM

## 2024-05-02 RX ORDER — ZOLPIDEM TARTRATE 5 MG/1
5 TABLET ORAL NIGHTLY PRN
Status: DISCONTINUED | OUTPATIENT
Start: 2024-05-02 | End: 2024-05-03 | Stop reason: HOSPADM

## 2024-05-02 RX ORDER — LIDOCAINE HYDROCHLORIDE 10 MG/ML
0.5 INJECTION, SOLUTION EPIDURAL; INFILTRATION; INTRACAUDAL; PERINEURAL ONCE
Status: DISCONTINUED | OUTPATIENT
Start: 2024-05-02 | End: 2024-05-02 | Stop reason: HOSPADM

## 2024-05-02 RX ORDER — HYDROCODONE BITARTRATE AND ACETAMINOPHEN 10; 325 MG/1; MG/1
1 TABLET ORAL EVERY 4 HOURS PRN
Status: DISCONTINUED | OUTPATIENT
Start: 2024-05-02 | End: 2024-05-03 | Stop reason: HOSPADM

## 2024-05-02 RX ORDER — BUPIVACAINE HYDROCHLORIDE AND EPINEPHRINE 2.5; 5 MG/ML; UG/ML
INJECTION, SOLUTION EPIDURAL; INFILTRATION; INTRACAUDAL; PERINEURAL
Status: DISCONTINUED | OUTPATIENT
Start: 2024-05-02 | End: 2024-05-02 | Stop reason: HOSPADM

## 2024-05-02 RX ORDER — VECURONIUM BROMIDE FOR INJECTION 1 MG/ML
INJECTION, POWDER, LYOPHILIZED, FOR SOLUTION INTRAVENOUS
Status: DISCONTINUED | OUTPATIENT
Start: 2024-05-02 | End: 2024-05-02

## 2024-05-02 RX ORDER — ONDANSETRON HYDROCHLORIDE 2 MG/ML
INJECTION, SOLUTION INTRAVENOUS
Status: DISCONTINUED | OUTPATIENT
Start: 2024-05-02 | End: 2024-05-02

## 2024-05-02 RX ORDER — CLINDAMYCIN PHOSPHATE 900 MG/50ML
900 INJECTION, SOLUTION INTRAVENOUS
Status: COMPLETED | OUTPATIENT
Start: 2024-05-02 | End: 2024-05-02

## 2024-05-02 RX ORDER — PROPOFOL 10 MG/ML
VIAL (ML) INTRAVENOUS
Status: DISCONTINUED | OUTPATIENT
Start: 2024-05-02 | End: 2024-05-02

## 2024-05-02 RX ORDER — SODIUM CHLORIDE, SODIUM LACTATE, POTASSIUM CHLORIDE, CALCIUM CHLORIDE 600; 310; 30; 20 MG/100ML; MG/100ML; MG/100ML; MG/100ML
INJECTION, SOLUTION INTRAVENOUS CONTINUOUS
Status: DISCONTINUED | OUTPATIENT
Start: 2024-05-02 | End: 2024-05-02

## 2024-05-02 RX ORDER — HYDROCODONE BITARTRATE AND ACETAMINOPHEN 5; 325 MG/1; MG/1
1 TABLET ORAL EVERY 6 HOURS PRN
Status: DISCONTINUED | OUTPATIENT
Start: 2024-05-02 | End: 2024-05-03 | Stop reason: HOSPADM

## 2024-05-02 RX ORDER — IPRATROPIUM BROMIDE 42 UG/1
2 SPRAY, METERED NASAL 3 TIMES DAILY
Status: DISCONTINUED | OUTPATIENT
Start: 2024-05-02 | End: 2024-05-03 | Stop reason: HOSPADM

## 2024-05-02 RX ORDER — SIMETHICONE 80 MG
1 TABLET,CHEWABLE ORAL ONCE
Status: COMPLETED | OUTPATIENT
Start: 2024-05-02 | End: 2024-05-02

## 2024-05-02 RX ADMIN — PROPOFOL 200 MG: 10 INJECTION, EMULSION INTRAVENOUS at 07:05

## 2024-05-02 RX ADMIN — PROPOFOL 75 MCG/KG/MIN: 10 INJECTION, EMULSION INTRAVENOUS at 07:05

## 2024-05-02 RX ADMIN — HEPARIN SODIUM 5000 UNITS: 5000 INJECTION INTRAVENOUS; SUBCUTANEOUS at 09:05

## 2024-05-02 RX ADMIN — MIDAZOLAM HYDROCHLORIDE 1 MG: 1 INJECTION, SOLUTION INTRAMUSCULAR; INTRAVENOUS at 06:05

## 2024-05-02 RX ADMIN — GENTAMICIN SULFATE 352 MG: 40 INJECTION, SOLUTION INTRAMUSCULAR; INTRAVENOUS at 07:05

## 2024-05-02 RX ADMIN — VECURONIUM BROMIDE FOR INJECTION 2 MG: 1 INJECTION, POWDER, LYOPHILIZED, FOR SOLUTION INTRAVENOUS at 07:05

## 2024-05-02 RX ADMIN — SIMETHICONE 80 MG: 80 TABLET, CHEWABLE ORAL at 02:05

## 2024-05-02 RX ADMIN — IPRATROPIUM BROMIDE 2 SPRAY: 42 SPRAY, METERED NASAL at 05:05

## 2024-05-02 RX ADMIN — CYCLOBENZAPRINE HYDROCHLORIDE 5 MG: 5 TABLET, FILM COATED ORAL at 02:05

## 2024-05-02 RX ADMIN — VECURONIUM BROMIDE FOR INJECTION 2 MG: 1 INJECTION, POWDER, LYOPHILIZED, FOR SOLUTION INTRAVENOUS at 10:05

## 2024-05-02 RX ADMIN — DEXAMETHASONE SODIUM PHOSPHATE 4 MG: 4 INJECTION, SOLUTION INTRAMUSCULAR; INTRAVENOUS at 07:05

## 2024-05-02 RX ADMIN — HEPARIN SODIUM 5000 UNITS: 5000 INJECTION INTRAVENOUS; SUBCUTANEOUS at 05:05

## 2024-05-02 RX ADMIN — SUGAMMADEX 200 MG: 100 INJECTION, SOLUTION INTRAVENOUS at 12:05

## 2024-05-02 RX ADMIN — ALBUMIN (HUMAN) 500 ML: 12.5 SOLUTION INTRAVENOUS at 07:05

## 2024-05-02 RX ADMIN — IPRATROPIUM BROMIDE 2 SPRAY: 42 SPRAY, METERED NASAL at 08:05

## 2024-05-02 RX ADMIN — EPHEDRINE SULFATE 10 MG: 50 INJECTION INTRAVENOUS at 07:05

## 2024-05-02 RX ADMIN — ALBUTEROL SULFATE 7 PUFF: 90 AEROSOL, METERED RESPIRATORY (INHALATION) at 12:05

## 2024-05-02 RX ADMIN — EPHEDRINE SULFATE 5 MG: 50 INJECTION INTRAVENOUS at 07:05

## 2024-05-02 RX ADMIN — ATORVASTATIN CALCIUM 10 MG: 10 TABLET, FILM COATED ORAL at 05:05

## 2024-05-02 RX ADMIN — PREGABALIN 75 MG: 75 CAPSULE ORAL at 05:05

## 2024-05-02 RX ADMIN — LABETALOL HYDROCHLORIDE 10 MG: 5 INJECTION INTRAVENOUS at 07:05

## 2024-05-02 RX ADMIN — ALBUMIN (HUMAN) 500 ML: 12.5 SOLUTION INTRAVENOUS at 08:05

## 2024-05-02 RX ADMIN — ONDANSETRON HYDROCHLORIDE 4 MG: 2 INJECTION INTRAMUSCULAR; INTRAVENOUS at 07:05

## 2024-05-02 RX ADMIN — LIDOCAINE HYDROCHLORIDE 60 MG: 20 INJECTION, SOLUTION INTRAVENOUS at 07:05

## 2024-05-02 RX ADMIN — FAMOTIDINE 20 MG: 20 TABLET ORAL at 05:05

## 2024-05-02 RX ADMIN — GLYCOPYRROLATE 0.2 MG: 0.2 INJECTION, SOLUTION INTRAMUSCULAR; INTRAVITREAL at 07:05

## 2024-05-02 RX ADMIN — FENTANYL CITRATE 100 MCG: 50 INJECTION, SOLUTION INTRAMUSCULAR; INTRAVENOUS at 07:05

## 2024-05-02 RX ADMIN — VECURONIUM BROMIDE FOR INJECTION 2 MG: 1 INJECTION, POWDER, LYOPHILIZED, FOR SOLUTION INTRAVENOUS at 09:05

## 2024-05-02 RX ADMIN — EPHEDRINE SULFATE 5 MG: 50 INJECTION INTRAVENOUS at 08:05

## 2024-05-02 RX ADMIN — SENNOSIDES 8.6 MG: 8.6 TABLET, FILM COATED ORAL at 08:05

## 2024-05-02 RX ADMIN — HYDROCODONE BITARTRATE AND ACETAMINOPHEN 1 TABLET: 5; 325 TABLET ORAL at 02:05

## 2024-05-02 RX ADMIN — VECURONIUM BROMIDE FOR INJECTION 1 MG: 1 INJECTION, POWDER, LYOPHILIZED, FOR SOLUTION INTRAVENOUS at 08:05

## 2024-05-02 RX ADMIN — DEXAMETHASONE SODIUM PHOSPHATE 4 MG: 4 INJECTION, SOLUTION INTRAMUSCULAR; INTRAVENOUS at 10:05

## 2024-05-02 RX ADMIN — ROCURONIUM BROMIDE 50 MG: 10 INJECTION, SOLUTION INTRAVENOUS at 07:05

## 2024-05-02 RX ADMIN — MAGNESIUM HYDROXIDE 2400 MG: 400 SUSPENSION ORAL at 08:05

## 2024-05-02 RX ADMIN — EPHEDRINE SULFATE 15 MG: 50 INJECTION INTRAVENOUS at 07:05

## 2024-05-02 RX ADMIN — METHYLENE BLUE 40 MG: 5 INJECTION INTRAVENOUS at 12:05

## 2024-05-02 RX ADMIN — MUPIROCIN: 20 OINTMENT TOPICAL at 08:05

## 2024-05-02 RX ADMIN — CLINDAMYCIN PHOSPHATE 900 MG: 18 INJECTION, SOLUTION INTRAVENOUS at 07:05

## 2024-05-02 RX ADMIN — Medication 25 MG: at 07:05

## 2024-05-02 RX ADMIN — MUPIROCIN: 20 OINTMENT TOPICAL at 05:05

## 2024-05-02 RX ADMIN — SODIUM CHLORIDE, SODIUM LACTATE, POTASSIUM CHLORIDE, AND CALCIUM CHLORIDE: 600; 310; 30; 20 INJECTION, SOLUTION INTRAVENOUS at 08:05

## 2024-05-02 RX ADMIN — SODIUM CHLORIDE, POTASSIUM CHLORIDE, SODIUM LACTATE AND CALCIUM CHLORIDE: 600; 310; 30; 20 INJECTION, SOLUTION INTRAVENOUS at 04:05

## 2024-05-02 RX ADMIN — ACETAMINOPHEN 1000 MG: 500 TABLET ORAL at 05:05

## 2024-05-02 RX ADMIN — VECURONIUM BROMIDE FOR INJECTION 1 MG: 1 INJECTION, POWDER, LYOPHILIZED, FOR SOLUTION INTRAVENOUS at 11:05

## 2024-05-02 RX ADMIN — SODIUM CHLORIDE, SODIUM LACTATE, POTASSIUM CHLORIDE, AND CALCIUM CHLORIDE: 600; 310; 30; 20 INJECTION, SOLUTION INTRAVENOUS at 06:05

## 2024-05-02 RX ADMIN — SODIUM CHLORIDE, SODIUM LACTATE, POTASSIUM CHLORIDE, AND CALCIUM CHLORIDE: 600; 310; 30; 20 INJECTION, SOLUTION INTRAVENOUS at 12:05

## 2024-05-02 NOTE — OPERATIVE NOTE ADDENDUM
Certification of Assistant at Surgery       Surgery Date: 5/2/2024     Participating Surgeons:  Surgeons and Role:     * Angeles Last MD - Primary    Procedures:  Procedure(s) (LRB):  HYSTERECTOMY,TOTAL,LAPAROSCOPIC,WITH SALPINGECTOMY (Bilateral)  SACROCOLPOPEXY, LAPAROSCOPIC (N/A)  COLPORRHAPHY, COMBINED ANTEROPOSTERIOR (N/A)  PERINEOPLASTY (N/A)  CYSTOSCOPY (N/A)  OOPHORECTOMY (Left)    Assistant Surgeon's Certification of Necessity:  I understand that section 1842 (b) (6) (d) of the Social Security Act generally prohibits Medicare Part B reasonable charge payment for the services of assistants at surgery in teaching hospitals when qualified residents are available to furnish such services. I certify that the services for which payment is claimed were medically necessary, and that no qualified resident was available to perform the services. I further understand that these services are subject to post-payment review by the Medicare carrier.      Jack Cisneros PA-C    05/02/2024  1:27 PM

## 2024-05-02 NOTE — H&P
Misti West is 58 y.o.  presenting for scheduled LAYA/SCP/BSO and possible A&P/Cysto/perineorrhaphy.    Temp:  [97.7 °F (36.5 °C)] 97.7 °F (36.5 °C)  Pulse:  [57] 57  Resp:  [18] 18  SpO2:  [96 %] 96 %  BP: (102)/(55) 102/55    General: NAD, alert, oriented, cooperative  HEENT: NCAT, EOM grossly intact  Lungs: Normal WOB  Heart: regular rate  Abdomen: soft, nondistended, nontender, no rebound or guarding    Consents in chart. Pre-operative heparin given at 0540 . All questions answered and concerns addressed. To OR for planned procedure.    Monica Bella MD  OB/GYN PGY-2         HPI: Patient is a 58 y.o. female  with a h/o cervical cancer s/p a cone biopsy in  who presents today with c/o vaginal prolapse. She reports that the vaginal bulge has been present since 2023. She states that she noticed it when she was in the shower. Prior to noticing the bulge she had a cough from a viral upper respiratory illness. She reports the vaginal bulge size varies depending on how long she is on her feet or the types of activities she does. [place] and when she does a lot of heavy lifting the bulge gets bigger. She will stop and elevate her legs when the bulge is uncomfortable. She tries not to strain and push as she finds that with these actions the bulge also gets bigger. She sometimes notices that she has difficulty starting her urine stream. When this occurs she will also lie down. Feels that she empties better at nighttime.   Reports that she had a pap smear which was normal recently. States that her old gyn who delivered her children that her mentioned she had uterine prolapse but this was about 20 years ago.      She reports urinary urgency and occasional urge incontinence. She at times has daytime urinary frequency, voiding every 1-2 hours. She wakes 2-3 times over night to void. She wakes due to the urge to void. She notices loss of urine with a hard cough. Does not notice loss  "of urine with sneezing or lifting. After she urinates she at times experiences post void dribbling.      Reports a h/o a urologist "stretching her bladder" about 2-4 years ago due to recurrent bladder infections.      She has a bowel movements about every 3-4 days. They are formed but she feels that she has a blockage. She is scheduled for a colonoscopy next week. She has a h/o colon polyps and hemorrhoids. She denies accidental bowel leakage.         Review of Systems   Constitutional:  Positive for activity change and unexpected weight change. Negative for appetite change, chills, fatigue and fever.   HENT:  Positive for nasal congestion, hearing loss and mouth dryness. Negative for dental problem and sore throat.    Eyes:  Negative for pain and eye dryness.   Respiratory:  Negative for cough, shortness of breath and wheezing.    Cardiovascular:  Positive for leg swelling. Negative for chest pain and palpitations.   Gastrointestinal:  Positive for abdominal distention and constipation. Negative for abdominal pain, blood in stool and rectal pain.   Endocrine: Positive for cold intolerance. Negative for heat intolerance.   Genitourinary:  Positive for vaginal dryness. Negative for dyspareunia and hot flashes.        Decreased libido   Musculoskeletal:  Positive for myalgias. Negative for arthralgias, back pain, gait problem, neck pain and neck stiffness.   Integumentary:  Negative for rash.   Neurological:  Negative for dizziness, tremors, seizures, speech difficulty, weakness, light-headedness, numbness and headaches.   Psychiatric/Behavioral:  Negative for confusion, dysphoric mood and sleep disturbance. The patient is not nervous/anxious.               Past Medical History:   Diagnosis Date    Anxiety      Arthritis      Cancer       cervical    Hepatomegaly       3 liver cyst    Hiatal hernia      High cholesterol      Liver lesion      PONV (postoperative nausea and vomiting)      Seasonal allergies             "     Past Surgical History:   Procedure Laterality Date    CERVIX SURGERY        COLONOSCOPY N/A 08/27/2020     Procedure: COLONOSCOPY;  Surgeon: Tim Aguayo MD;  Location: [facility] ENDO;  Service: Endoscopy;  Laterality: N/A;    CYSTOSCOPY WITH URETHRAL DILATION   10/01/2020     Procedure: CYSTOSCOPY, WITH URETHRAL DILATION;  Surgeon: Bhargav Keane MD;  Location: [facility] OR;  Service: Urology;;    ESOPHAGOGASTRODUODENOSCOPY N/A 08/13/2020     Procedure: EGD (ESOPHAGOGASTRODUODENOSCOPY);  Surgeon: Tim Aguayo MD;  Location: [facility] ENDO;  Service: Endoscopy;  Laterality: N/A;    KNEE ARTHROSCOPY         TKR    ROBOTIC ARTHROPLASTY, KNEE Left 03/29/2022     Procedure: ROBOTIC ARTHROPLASTY, KNEE, TOTAL;  Surgeon: Neo Zapata MD;  Location: [facility] OR;  Service: Orthopedics;  Laterality: Left;    SINUS SURGERY        TUBAL LIGATION               Current Medications      Current Outpatient Medications:     ALPRAZolam (XANAX) 0.25 MG tablet, Take 1 tablet (0.25 mg total) by mouth nightly as needed for Anxiety., Disp: 30 tablet, Rfl: 0    ascorbic acid, vitamin C, (VITAMIN C) 500 MG tablet, Take 500 mg by mouth once daily., Disp: , Rfl:     atorvastatin (LIPITOR) 10 MG tablet, Take 1 tablet (10 mg total) by mouth once daily., Disp: 90 tablet, Rfl: 3    b complex vitamins tablet, Take 1 tablet by mouth once daily., Disp: , Rfl:     biotin 1 mg tablet, Take 1,000 mcg by mouth 3 (three) times daily., Disp: , Rfl:     cholecalciferol, vitamin D3, (VITAMIN D3 ORAL), Take by mouth., Disp: , Rfl:     dextromethorphan-guaiFENesin  mg (MUCINEX DM)  mg per 12 hr tablet, Take 1 tablet by mouth every 12 (twelve) hours., Disp: , Rfl:     docusate sodium (COLACE) 100 MG capsule, Take 100 mg by mouth once daily., Disp: , Rfl:     doxycycline (VIBRA-TABS) 100 MG tablet, Take 1 tablet (100 mg total) by mouth 2 (two) times daily., Disp: 20 tablet, Rfl: 0    evening primrose oil 1,300 mg Cap, Take by mouth., Disp:  ", Rfl:     glucosamine-chondroitin 500-400 mg tablet, Take 1 tablet by mouth 3 (three) times daily., Disp: , Rfl:     ipratropium (ATROVENT) 21 mcg (0.03 %) nasal spray, 2 sprays by Each Nostril route 3 (three) times daily., Disp: 60 mL, Rfl: 0    levocetirizine (XYZAL) 5 MG tablet, Take 1 tablet (5 mg total) by mouth every evening., Disp: 30 tablet, Rfl: 11    montelukast (SINGULAIR) 5 MG chewable tablet, Take 5 mg by mouth every evening., Disp: , Rfl:     multivitamin capsule, Take 1 capsule by mouth once daily., Disp: , Rfl:     tumeric-ging-olive-oreg-capryl 100 mg-150 mg- 50 mg-150 mg Cap, Take by mouth., Disp: , Rfl:     ferrous sulfate (FEOSOL) Tab tablet, Take 1 tablet by mouth daily with breakfast., Disp: , Rfl:     fluticasone propionate (FLONASE) 50 mcg/actuation nasal spray, 1 spray (50 mcg total) by Each Nostril route 2 (two) times daily. (Patient not taking: Reported on 1/10/2024), Disp: 16 g, Rfl: 3              Review of patient's allergies indicates:   Allergen Reactions    Codeine Swelling       "Hives, vomiting"    Morphine Swelling       "Hives, vomiting"    Naproxen sodium Swelling       "Itching, high blood pressure"    Percocet [oxycodone-acetaminophen] Nausea And Vomiting       LOW HEART RATE    Tramadol hcl Swelling       "itching & nausea"    Penicillins      Trintex                 Family History   Problem Relation Age of Onset    Diabetes Mother      Cancer Mother           bladder    Arthritis Mother      Cancer Father           lung    COPD Father      Hypertension Father      Arthritis Father      Melanoma Father      Breast cancer Sister      Arthritis Sister      Breast cancer Sister      Arthritis Sister      Breast cancer Sister      Kidney disease Sister      Arthritis Sister      Colon polyps Neg Hx      Colon cancer Neg Hx      Crohn's disease Neg Hx      Ulcerative colitis Neg Hx      Stomach cancer Neg Hx      Esophageal cancer Neg Hx      Celiac disease Neg Hx         "   Social History            Socioeconomic History    Marital status:    Tobacco Use    Smoking status: Every Day       Current packs/day: 1.00       Types: Cigarettes    Smokeless tobacco: Never   Substance and Sexual Activity    Alcohol use: Yes       Comment: rare    Drug use: Never    Sexual activity: Not Currently       Partners: Male   Social History Narrative     [place]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       Social Determinants of Health           Financial Resource Strain: Medium Risk (3/9/2022)     Overall Financial Resource Strain (CARDIA)      Difficulty of Paying Living Expenses: Somewhat hard   Food Insecurity: No Food Insecurity (3/9/2022)     Hunger Vital Sign      Worried About Running Out of Food in the Last Year: Never true      Ran Out of Food in the Last Year: Never true   Transportation Needs: No Transportation Needs (3/9/2022)     PRAPARE - Transportation      Lack of Transportation (Medical): No      Lack of Transportation (Non-Medical): No   Physical Activity: Unknown (3/9/2022)     Exercise Vital Sign      Days of Exercise per Week: 0 days   Stress: No Stress Concern Present (3/9/2022)     [employer] - Occupational Stress Questionnaire      Feeling of Stress : Only a little   Social Connections: Unknown (3/9/2022)     Social Connection and Isolation Panel [NHANES]      Frequency of Communication with Friends and Family: More than three times a week      Frequency of Social Gatherings with Friends and Family: Once a week      Active Member of Clubs or Organizations: Yes      Attends Club or Organization Meetings: 1 to 4 times per year      Marital Status:    Housing Stability: Unknown (3/9/2022)     Housing Stability Vital Sign      Unable to Pay for Housing in the Last Year: No      Unstable Housing in the Last Year: No         OB History            3    Para   3    Term   3            AB        Living   2           SAB         IAB        Ectopic        Multiple        Live Births                 Obstetric Comments    x 3                   Gyn History     Mammogram: 23 BIRADS 1, negative  Pap smear: 21 negative  LMP: No LMP recorded. Patient is postmenopausal.   Postmenopausal bleeding: denies        INITIAL PHYSICAL EXAMINATION         Vitals:     01/10/24 0818   BP: 121/68   Pulse: 62      General: Healthy in appearance, Well nourished, Affect Normal, NAD.  Neck: Supple, No masses, Trachea midline, Thyroid normal size,  non-tender, no masses or nodules.  Nodes: No clavicular/cervical adenopathy.  Skin: Normal temperature, No atypical lesions or rash.  Heart: Normal sounds, no murmurs  Lungs: Normal respiratory effort.  Gastrointestinal: Non tender, Non distended, No masses, guarding or  rebound, No hepatosplenomegally, No hernia.  Ext: No clubbing, cyanosis, edema or varicosities.  DTR's: 2+ bilaterally  Strength 5/5 bilateral upper and lower extremities     Genitourinary-  Vulva: normal without lesions, masses, atrophy or pain  Urethra: meatus central and normal in appearance, no prolapse/caruncle, no masses or discharge. Empty supine stress test was negative.  Bladder: non-tender, no masses  Vagina: No discharge or lesions, severe atrophy, no masses appreciated.  [See POP-Q]  Cervix: no lesions, no discharge  Uterus:  non-tender, anteverted, approximately 4 weeks size  Adnexa: no masses, non tender.  Rectal: deferred   Neuro: S2-4- pin prick and light touch intact, Anal wink present  Levator strength: 2/5  Levator tenderness: left 0/10 and right 3/10     POP-Q Exam- pelvic organ prolapse quantitative     Aa +3  Anterior Wall Ba +3  Anterior wall C -2.5  Cervix or cuff   Gh 5.5  Genital hiatus pb 2  perineal body tvl 10.5  Total vaginal length   Ap -1.5  Posterior wall Bp -1.5  Posterior wall D -4  Posterior fornix      with Uterus     POP-Q Stage:3                Office Visit on 01/10/2024   Component Date Value Ref  Range Status    Color, UA 01/10/2024 Yellow    Final    pH, UA 01/10/2024 7    Final    WBC, UA 01/10/2024 Negative    Final    Nitrite, UA 01/10/2024 Negative    Final    Protein, POC 01/10/2024 Negative    Final    Glucose, UA 01/10/2024 Normal    Final    Ketones, UA 01/10/2024 Negative    Final    Urobilinogen, UA 01/10/2024 Normal    Final    Bilirubin, POC 01/10/2024 Negative    Final    Blood, UA 01/10/2024 Negative    Final    Clarity, UA 01/10/2024 Clear    Final    Spec Grav UA 01/10/2024 1.000    Final    POC Residual Urine Volume 01/10/2024 2  0 - 100 mL Final         ASSESSMENT & PLAN:     Cystocele, midline  -     POCT URINE DIPSTICK WITHOUT MICROSCOPE  -     Simple Urodynamics; Future; Expected date: 01/11/2024     Incomplete uterovaginal prolapse  -     Simple Urodynamics; Future; Expected date: 01/11/2024     Rectocele     Stress incontinence  -     Simple Urodynamics; Future; Expected date: 01/11/2024     Urge incontinence of urine  -     Simple Urodynamics; Future; Expected date: 01/11/2024     Smoker        Exam findings and treatment options were discussed in detail with the patient. Her symptoms and exam findings are consistent with mixed urinary incontinence and pelvic organ prolapse. We discussed various surgical and nonsurgical management options including pessary use, pelvic floor rehabilitation, and reconstructive surgery. In terms of surgery, vaginal, abdominal and laparoscopic approaches were considered. She feels that her condition is severe enough to consider surgical correction. The planned surgical procedure includes LSH, possible bilateral salpingectomy, sacrocervicopexy, possible anterior/ posterior repair, perineorrhaphy, cystoscopy, possible anti-incontinence procedure (Bulkamid versus Midurethral sling). Risks and benefits of the surgery were explained in detail and further information was provided. Patient was provided the opportunity to ask questions and further discussion  occurred as necessary. She was instructed to make a follow-up appointment for further counseling and urodynamic testing as indicated.     Patient is a current smoker- 1/2 to 1 ppd. Counseled strongly to quit smoking for 6 weeks minimum prior to surgery given increased risk of mesh erosion and poor wound healing. Reviewed that she will also need medical clearance from her PCP.      All questions were answered today. The patient was encouraged to contact the office as needed with any additional questions or concerns.      Total time spent on visit was 60 minutes.  This includes face to face time and non-face to face time preparing to see the patient (eg, review of tests), Obtaining and/or reviewing separately obtained history, Documenting clinical information in the electronic or other health record, Independently interpreting resultsand communicating results to the patient/family/caregiver, or Care coordination.     Angeles Last MD

## 2024-05-02 NOTE — TRANSFER OF CARE
"Anesthesia Transfer of Care Note    Patient: Misti West    Procedure(s) Performed: Procedure(s) (LRB):  HYSTERECTOMY,TOTAL,LAPAROSCOPIC,WITH SALPINGECTOMY (Bilateral)  SACROCOLPOPEXY, LAPAROSCOPIC (N/A)  COLPORRHAPHY, COMBINED ANTEROPOSTERIOR (N/A)  PERINEOPLASTY (N/A)  CYSTOSCOPY (N/A)  OOPHORECTOMY (Left)    Patient location: PACU    Anesthesia Type: general    Transport from OR: Transported from OR on 100% O2 by closed face mask with adequate spontaneous ventilation    Post pain: adequate analgesia    Post assessment: no apparent anesthetic complications    Post vital signs: stable    Level of consciousness: alert, awake and oriented    Nausea/Vomiting: no nausea/vomiting    Complications: none    Transfer of care protocol was followed      Last vitals: Visit Vitals  BP (!) 110/54 (BP Location: Right arm, Patient Position: Lying)   Pulse 65   Temp 36.3 °C (97.4 °F) (Skin)   Resp 16   Ht 5' 9" (1.753 m)   Wt 76.7 kg (169 lb 1.5 oz)   SpO2 98%   Breastfeeding No   BMI 24.97 kg/m²     "

## 2024-05-02 NOTE — ANESTHESIA PROCEDURE NOTES
Intubation    Date/Time: 5/2/2024 7:08 AM    Performed by: Belen Butt CRNA  Authorized by: Clifton Escudero MD    Intubation:     Induction:  Intravenous    Intubated:  Postinduction    Mask Ventilation:  Easy mask    Attempts:  1    Attempted By:  CRNA    Method of Intubation:  Video laryngoscopy    Blade:  Mc[place] 3    Laryngeal View Grade: Grade I - full view of cords      Difficult Airway Encountered?: No      Complications:  None    Airway Device:  Oral endotracheal tube    Airway Device Size:  7.5    Style/Cuff Inflation:  Cuffed    Tube secured:  21    Secured at:  The lips    Placement Verified By:  Capnometry    Complicating Factors:  None    Findings Post-Intubation:  BS equal bilateral and atraumatic/condition of teeth unchanged

## 2024-05-02 NOTE — OP NOTE
[facility]  Urogynecology  Operative Note    SUMMARY     Date of Procedure: 5/2/2024     Procedure: Procedure(s) (LRB):  HYSTERECTOMY,SUPRACERVICAL,LAPAROSCOPIC,WITH SALPINGECTOMY (Bilateral)  SACROCOLPOPEXY, LAPAROSCOPIC (N/A)  PERINEOPLASTY (N/A)  CYSTOSCOPY (N/A)  OOPHORECTOMY (Left)     LYSIS OF ADHESIONS (>30 min)    Surgeons and Role:     * Angeles Last MD - Primary    Assisting Surgeon: Dona Castellanos, PGY-4  Second Assist: IMMANUEL Garay    Pre-Operative Diagnosis: Incomplete uterovaginal prolapse [N81.2]  Cystocele, midline [N81.11]  Rectocele [N81.6]    Post-Operative Diagnosis: Post-Op Diagnosis Codes:     * Incomplete uterovaginal prolapse [N81.2]     * Cystocele, midline [N81.11]     * Rectocele [N81.6]   Left ovarian mass    Anesthesia: General    Operative Findings (including complications, if any): Normal appearing appendix and gallbladder, normal appearing right and left tubes, 10 cm left ovarian cyst that was multiloculated in appearance, left pelvic wall colon adhesions, normal appearing bladder without lesions or tumors. Normal appearing urethra. Bilateral ureteral efflux noted.     Description of Technical Procedures:     The patient was taken to the operating room, where general anesthesia was given and found to be adequate. The patient was then put in the dorsal lithotomy position and prepped and draped in normal sterile fashion. Exam under anesthesia revealed a small 5-week size anteverted uterus. A Calle catheter was placed to drain the bladder. Next, a uterine manipulator was placed to manipulate the uterus throughout the procedure. Attention was then placed in the patient's abdomen where 0.5% Marcaine was injected through the umbilicus. A scalpel was used to make a stab incision through the umbilicus. Using a Veress needle, laparoscopic entry was confirmed and pneumo insufflation was obtained with 4 liters of CO2. Afterwards a 5 mm laparoscopic trocar inserted under  direct visualization without damage to underlying structures.    Visualization of the abdominal cavity showed normal anatomy in the upper abdomen. Adhesions and other findings as noted above. The uterus was mobile. All pedicles could be visualized with manipulation. Both ureters were identified. Two 5mm right and left elen-umbilical ports were placed under direct visualization.    Using grasper through one port and endoshears through the other, the left sided abdominal adhesions were taken down to the side wall were taken down to better visualize the left IP ligament. Due to the large left ovarian cyst we decided to proceed with a left salpingo-opherectomy. A 12 mm suprapubic port was placed prior to which marcaine was injected. Next using ligasure through left lateral port, the left IP ligament was identified grasped, coagulated and excised taking care to stay far away from the ureter. The mesosalpinx and meso-ovarium was identified, coagulated and then cut. The ovary and tube were placed in a bag and removed from the suprapubic incision and sent to pathology for a frozen section evaluation. Pelvic washings were performed and sent for permanent. The suprapubic trocar was replaced and the abdomen and pelvic irrigated well while waiting for the pathology report. 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     The left round ligament was identified, cauterized and transected. A bladder flap was created by dissecting the anterior peritoneum and the bladder pushed down caudally. The posterior broad ligament was then dissected with combination of blunt dissection and electrocautery to allow the left ureter to fall away from the area of dissection. The left uterine arteries skeletonized and cauterized in three places. Hemostasis was confirmed with electrocautery.    The same procedure was carried out on the right and the right uterine vessels were transected.    The left  uterine artery was cauterized and transected.    The uterus was found to be cyanotic at this time. A Debbie loop electrocautery instrument was placed around the uterocervical junction. Care was taken to make sure the uterus was not touching any adjacent tissue or bowel. The uterine manipulator was removed and the Debbie loop activated thus  the uterus and intact tubes and ovaries from the cervix. Hemostasis was obtained using the ligasure to cauterize some bleeding on the right from the uterine vessels. The specimen was placed in the RUQ to be removed later.    Next, a T-lift was used to retract the epiploica of the rectosigmoid to the left side, placing the T-lift through the lower left quadrant. Next, an incision was made in the peritoneum overlying the sacral promontory, and the middle sacral vessels were identified as was the anterior longitudinal ligament of the sacrum. An incision using monopolar scissors was then carried out down along the right pelvic sidewall away from the ureter and just medial to the uterosacral ligament and away from the rectum. This incision was carried across into the rectovaginal space, and an avascular plane was found in the rectovaginal space as noted by the areolar tissue and fat which was maintained on the rectum. This was carried down for almost the full length of the vagina. Next, the bladder was taken down off the anterior cervix and anterior vagina using sharp dissection and electrocautery used sparingly. Next, the Upsylon-Y mesh was prepared and introduced into the abdomen. The sacral tail of the mesh was marked on the right side to aid in tensioning and was tied after being rolled up to keep it out of the way. One side of the Y portion of the mesh was trimmed in a semi-circular fashion to karina the anterior side. Once in the abdominal cavity the Y-portion of the mesh was placed over the cervix. The anterior portion was sutured to the cervix and vagina with CV-2 [place]-Epi  sutures using extracorporeal knot tying technique and a Pixability needle . When this was accomplished, the posterior mesh was attached to the cervix and vagina with CV-2 [place]-Epi sutures. A vaginal probe and tenaculum on the cervix was used to provide tensioning during suturing. The suture holding the tail was cut, the tail was unravelled and sutured to the anterior longitudinal ligament after proper tensioning. Tensioning was performed by pushing the vaginal probe towards the sacrum. Two CV-2 sutures were used. Hemostasis noted. The peritoneum was then closed over the mesh using 3-0 V-lock barbed suture. At the end of this procedure there were no exposed mesh noted.    A 10 mm endo-catch bag was placed intra-abdominally through the suprapubic port and the uterine/fallopian tube and ovary specimen which was stored in the RUQ was placed in it. The specimen was then brought down to the abdominal incision. The skin and fascial incision was extended and the specimen removed intact. The specimen was removed and the fascial incision closed with 0-vicryl. The abdomen was reinsufflated and extensive irrigation was performed. The T-lift was removed. Then any areas of bleeding were coagulated using bipolar cautery. All gas was extruded and the skin incisions closed with 4.0 monocryl.    Cystoscopy was performed using a 70 degree scope with the above findings noted.     Next, attention was turned to performing the perineorrhaphy. Two  Allis clamps were used to karina the hymenal edges at the posterior fourchette. Care was taken to make sure that the vaginal opening remained at least 3 finger breadths wide at the end of the perineorrhaphy. The perineal body and posterior vaginal epithelium was infiltrated with local anesthetic after a liz shaped area was marked on the posterior vaginal epithelium and perineal body. Using electrocautery a perineal skin incision was made. The perineal skin was dissected sharply from the  underlying perineal muscles and rectovaginal septum using self scissors. This dissection was carried to the posterior vaginal epithelium following the previously marked liz. Afterwards the liz shaped patch of perineal skin and posterior vaginal epithelium was excised. The posterior vaginal epithelium was approximated using 2-0 vicryl suture until the level of the hymen. 0-vicryl suture were used to plicate the perineal muscles using interrupted stitches. The 2-0 vicryl used to plicate the vaginal epithelium was used to close the perineal skin completing the perineorrhaphy.   Dr. Last was present and scrubbed for the entirety of the procedure.      Estimated Blood Loss (EBL): Minimal  IVF: 2000 mL LR; 1000 mL albumin  UOP: 150 mL           Implants:   Implant Name Type Inv. Item Serial No.  Lot No. LRB No. Used Action   MESH PELV UPSYLON BLUE Y SHAPE - [id number]  MESH PELV UPSYLON BLUE Y SHAPE  [employer] [id number] N/A 1 Implanted       Specimens:   Specimen (24h ago, onward)       Start     Ordered    05/02/24 1225  Specimen to Pathology, Surgery Gynecology and Obstetrics  Once        Comments: Pre-op Diagnosis: Incomplete uterovaginal prolapse [N81.2]Cystocele, midline [N81.11]Rectocele [N81.6]Procedure(s):HYSTERECTOMY, SUPRACERVICAL, LAPAROSCOPIC, WITH BILATERAL SALPINGO-OOPHORECTOMY, FOR UTERUS 250 GRAMS OR LESSSACROCOLPOPEXY, LAPAROSCOPICCOLPORRHAPHY, COMBINED ANTEROPOSTERIORPERINEOPLASTYCYSTOSCOPY Number of specimens: 1Name of specimens: 1. Perineal Skin;     References:    Click here for ordering Quick Tip   Question Answer Comment   Procedure Type: Gynecology and Obstetrics    Specimen Class: Routine/Screening    Release to patient Immediate        05/02/24 1225    05/02/24 1200  Specimen to Pathology, Surgery Gynecology and Obstetrics  Once        Comments: Pre-op Diagnosis: Incomplete uterovaginal prolapse [N81.2]Cystocele, midline [N81.11]Rectocele  [N81.6]Procedure(s):HYSTERECTOMY, SUPRACERVICAL, LAPAROSCOPIC, WITH BILATERAL SALPINGO-OOPHORECTOMY, FOR UTERUS 250 GRAMS OR LESSSACROCOLPOPEXY, LAPAROSCOPICCOLPORRHAPHY, COMBINED ANTEROPOSTERIORPERINEOPLASTYCYSTOSCOPY Number of specimens: 2Name of specimens: 1. Left Ovary, cyst and tubes (Frozen);2. Uterus and right Tube;     References:    Click here for ordering Quick Tip   Question Answer Comment   Procedure Type: Gynecology and Obstetrics    Specimen Class: Routine/Screening    Release to patient Immediate        05/02/24 1200    05/02/24 1144  Specimen to Pathology, Surgery Gynecology and Obstetrics  Once,   Status:  Canceled        Comments: Pre-op Diagnosis: Incomplete uterovaginal prolapse [N81.2]Cystocele, midline [N81.11]Rectocele [N81.6]Procedure(s):HYSTERECTOMY, SUPRACERVICAL, LAPAROSCOPIC, WITH BILATERAL SALPINGO-OOPHORECTOMY, FOR UTERUS 250 GRAMS OR LESSSACROCOLPOPEXY, LAPAROSCOPICCOLPORRHAPHY, COMBINED ANTEROPOSTERIORPERINEOPLASTYCYSTOSCOPY Number of specimens: 2Name of specimens: 1. Left Ovary, cyst and tubes (Frozen);2.     References:    Click here for ordering Quick Tip   Question Answer Comment   Procedure Type: Gynecology and Obstetrics    Specimen Class: Routine/Screening    Release to patient Immediate        05/02/24 1143    05/02/24 0750  Cytology, Fluid/Wash/Brush  Once        Comments: Pelvic Wash     Question Answer Comment   Source: Peritoneal/abdominal/pelvic wash    Clinical Information: Pelvic wash    Specific Site: Pelvic Wash    Other Requests: Pelvic Wash    Release to patient Immediate        05/02/24 0750                            Condition: Good    Disposition: PACU - hemodynamically stable.    Attestation: I was present and scrubbed for the entire procedure.

## 2024-05-02 NOTE — INTERVAL H&P NOTE
The patient has been examined and the H&P has been reviewed:    I concur with the findings and no changes have occurred since H&P was written.    Surgery risks, benefits and alternative options discussed and understood by patient/family.          Active Hospital Problems    Diagnosis  POA    *Vaginal prolapse [N81.10]  Yes      Resolved Hospital Problems   No resolved problems to display.

## 2024-05-02 NOTE — H&P
Misti West is 58 y.o.  presenting for scheduled LAYA/SCP/BSO and possible A&P/Cysto/perineorrhaphy.     Temp:  [97.7 °F (36.5 °C)] 97.7 °F (36.5 °C)  Pulse:  [57] 57  Resp:  [18] 18  SpO2:  [96 %] 96 %  BP: (102)/(55) 102/55     General: NAD, alert, oriented, cooperative  HEENT: NCAT, EOM grossly intact  Lungs: Normal WOB  Heart: regular rate  Abdomen: soft, nondistended, nontender, no rebound or guarding     Consents in chart. Pre-operative heparin given at 0540 . All questions answered and concerns addressed. To OR for planned procedure.     Monica Bella MD  OB/GYN PGY-2            HPI: Patient is a 58 y.o. female  with a h/o cervical cancer s/p a cone biopsy in  who presents today with c/o vaginal prolapse. She reports that the vaginal bulge has been present since 2023. She states that she noticed it when she was in the shower. Prior to noticing the bulge she had a cough from a viral upper respiratory illness. She reports the vaginal bulge size varies depending on how long she is on her feet or the types of activities she does. [place] and when she does a lot of heavy lifting the bulge gets bigger. She will stop and elevate her legs when the bulge is uncomfortable. She tries not to strain and push as she finds that with these actions the bulge also gets bigger. She sometimes notices that she has difficulty starting her urine stream. When this occurs she will also lie down. Feels that she empties better at nighttime.   Reports that she had a pap smear which was normal recently. States that her old gyn who delivered her children that her mentioned she had uterine prolapse but this was about 20 years ago.      She reports urinary urgency and occasional urge incontinence. She at times has daytime urinary frequency, voiding every 1-2 hours. She wakes 2-3 times over night to void. She wakes due to the urge to void. She notices loss of urine with a hard cough. Does not  "notice loss of urine with sneezing or lifting. After she urinates she at times experiences post void dribbling.      Reports a h/o a urologist "stretching her bladder" about 2-4 years ago due to recurrent bladder infections.      She has a bowel movements about every 3-4 days. They are formed but she feels that she has a blockage. She is scheduled for a colonoscopy next week. She has a h/o colon polyps and hemorrhoids. She denies accidental bowel leakage.         Review of Systems   Constitutional:  Positive for activity change and unexpected weight change. Negative for appetite change, chills, fatigue and fever.   HENT:  Positive for nasal congestion, hearing loss and mouth dryness. Negative for dental problem and sore throat.    Eyes:  Negative for pain and eye dryness.   Respiratory:  Negative for cough, shortness of breath and wheezing.    Cardiovascular:  Positive for leg swelling. Negative for chest pain and palpitations.   Gastrointestinal:  Positive for abdominal distention and constipation. Negative for abdominal pain, blood in stool and rectal pain.   Endocrine: Positive for cold intolerance. Negative for heat intolerance.   Genitourinary:  Positive for vaginal dryness. Negative for dyspareunia and hot flashes.        Decreased libido   Musculoskeletal:  Positive for myalgias. Negative for arthralgias, back pain, gait problem, neck pain and neck stiffness.   Integumentary:  Negative for rash.   Neurological:  Negative for dizziness, tremors, seizures, speech difficulty, weakness, light-headedness, numbness and headaches.   Psychiatric/Behavioral:  Negative for confusion, dysphoric mood and sleep disturbance. The patient is not nervous/anxious.                  Past Medical History:   Diagnosis Date    Anxiety      Arthritis      Cancer       cervical    Hepatomegaly       3 liver cyst    Hiatal hernia      High cholesterol      Liver lesion      PONV (postoperative nausea and vomiting)      Seasonal " allergies                     Past Surgical History:   Procedure Laterality Date    CERVIX SURGERY        COLONOSCOPY N/A 08/27/2020     Procedure: COLONOSCOPY;  Surgeon: Tim Aguayo MD;  Location: [facility] ENDO;  Service: Endoscopy;  Laterality: N/A;    CYSTOSCOPY WITH URETHRAL DILATION   10/01/2020     Procedure: CYSTOSCOPY, WITH URETHRAL DILATION;  Surgeon: Bhargav Keane MD;  Location: [facility] OR;  Service: Urology;;    ESOPHAGOGASTRODUODENOSCOPY N/A 08/13/2020     Procedure: EGD (ESOPHAGOGASTRODUODENOSCOPY);  Surgeon: Tim Aguayo MD;  Location: [facility] ENDO;  Service: Endoscopy;  Laterality: N/A;    KNEE ARTHROSCOPY         TKR    ROBOTIC ARTHROPLASTY, KNEE Left 03/29/2022     Procedure: ROBOTIC ARTHROPLASTY, KNEE, TOTAL;  Surgeon: Neo Zapata MD;  Location: [facility] OR;  Service: Orthopedics;  Laterality: Left;    SINUS SURGERY        TUBAL LIGATION                Current Medications      Current Outpatient Medications:     ALPRAZolam (XANAX) 0.25 MG tablet, Take 1 tablet (0.25 mg total) by mouth nightly as needed for Anxiety., Disp: 30 tablet, Rfl: 0    ascorbic acid, vitamin C, (VITAMIN C) 500 MG tablet, Take 500 mg by mouth once daily., Disp: , Rfl:     atorvastatin (LIPITOR) 10 MG tablet, Take 1 tablet (10 mg total) by mouth once daily., Disp: 90 tablet, Rfl: 3    b complex vitamins tablet, Take 1 tablet by mouth once daily., Disp: , Rfl:     biotin 1 mg tablet, Take 1,000 mcg by mouth 3 (three) times daily., Disp: , Rfl:     cholecalciferol, vitamin D3, (VITAMIN D3 ORAL), Take by mouth., Disp: , Rfl:     dextromethorphan-guaiFENesin  mg (MUCINEX DM)  mg per 12 hr tablet, Take 1 tablet by mouth every 12 (twelve) hours., Disp: , Rfl:     docusate sodium (COLACE) 100 MG capsule, Take 100 mg by mouth once daily., Disp: , Rfl:     doxycycline (VIBRA-TABS) 100 MG tablet, Take 1 tablet (100 mg total) by mouth 2 (two) times daily., Disp: 20 tablet, Rfl: 0    evening primrose oil 1,300 mg  "Cap, Take by mouth., Disp: , Rfl:     glucosamine-chondroitin 500-400 mg tablet, Take 1 tablet by mouth 3 (three) times daily., Disp: , Rfl:     ipratropium (ATROVENT) 21 mcg (0.03 %) nasal spray, 2 sprays by Each Nostril route 3 (three) times daily., Disp: 60 mL, Rfl: 0    levocetirizine (XYZAL) 5 MG tablet, Take 1 tablet (5 mg total) by mouth every evening., Disp: 30 tablet, Rfl: 11    montelukast (SINGULAIR) 5 MG chewable tablet, Take 5 mg by mouth every evening., Disp: , Rfl:     multivitamin capsule, Take 1 capsule by mouth once daily., Disp: , Rfl:     tumeric-ging-olive-oreg-capryl 100 mg-150 mg- 50 mg-150 mg Cap, Take by mouth., Disp: , Rfl:     ferrous sulfate (FEOSOL) Tab tablet, Take 1 tablet by mouth daily with breakfast., Disp: , Rfl:     fluticasone propionate (FLONASE) 50 mcg/actuation nasal spray, 1 spray (50 mcg total) by Each Nostril route 2 (two) times daily. (Patient not taking: Reported on 1/10/2024), Disp: 16 g, Rfl: 3                   Review of patient's allergies indicates:   Allergen Reactions    Codeine Swelling       "Hives, vomiting"    Morphine Swelling       "Hives, vomiting"    Naproxen sodium Swelling       "Itching, high blood pressure"    Percocet [oxycodone-acetaminophen] Nausea And Vomiting       LOW HEART RATE    Tramadol hcl Swelling       "itching & nausea"    Penicillins      Trintex                     Family History   Problem Relation Age of Onset    Diabetes Mother      Cancer Mother           bladder    Arthritis Mother      Cancer Father           lung    COPD Father      Hypertension Father      Arthritis Father      Melanoma Father      Breast cancer Sister      Arthritis Sister      Breast cancer Sister      Arthritis Sister      Breast cancer Sister      Kidney disease Sister      Arthritis Sister      Colon polyps Neg Hx      Colon cancer Neg Hx      Crohn's disease Neg Hx      Ulcerative colitis Neg Hx      Stomach cancer Neg Hx      Esophageal cancer Neg Hx      " Celiac disease Neg Hx           Social History                Socioeconomic History    Marital status:    Tobacco Use    Smoking status: Every Day       Current packs/day: 1.00       Types: Cigarettes    Smokeless tobacco: Never   Substance and Sexual Activity    Alcohol use: Yes       Comment: rare    Drug use: Never    Sexual activity: Not Currently       Partners: Male   Social History Narrative     [place]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       Social Determinants of Health              Financial Resource Strain: Medium Risk (3/9/2022)     Overall Financial Resource Strain (CARDIA)      Difficulty of Paying Living Expenses: Somewhat hard   Food Insecurity: No Food Insecurity (3/9/2022)     Hunger Vital Sign      Worried About Running Out of Food in the Last Year: Never true      Ran Out of Food in the Last Year: Never true   Transportation Needs: No Transportation Needs (3/9/2022)     PRAPARE - Transportation      Lack of Transportation (Medical): No      Lack of Transportation (Non-Medical): No   Physical Activity: Unknown (3/9/2022)     Exercise Vital Sign      Days of Exercise per Week: 0 days   Stress: No Stress Concern Present (3/9/2022)     [employer] - Occupational Stress Questionnaire      Feeling of Stress : Only a little   Social Connections: Unknown (3/9/2022)     Social Connection and Isolation Panel [NHANES]      Frequency of Communication with Friends and Family: More than three times a week      Frequency of Social Gatherings with Friends and Family: Once a week      Active Member of Clubs or Organizations: Yes      Attends Club or Organization Meetings: 1 to 4 times per year      Marital Status:    Housing Stability: Unknown (3/9/2022)     Housing Stability Vital Sign      Unable to Pay for Housing in the Last Year: No      Unstable Housing in the Last Year: No         OB History            3    Para   3    Term   3             AB        Living   2           SAB        IAB        Ectopic        Multiple        Live Births                 Obstetric Comments    x 3                   Gyn History     Mammogram: 23 BIRADS 1, negative  Pap smear: 21 negative  LMP: No LMP recorded. Patient is postmenopausal.   Postmenopausal bleeding: denies        INITIAL PHYSICAL EXAMINATION           Vitals:     01/10/24 0818   BP: 121/68   Pulse: 62      General: Healthy in appearance, Well nourished, Affect Normal, NAD.  Neck: Supple, No masses, Trachea midline, Thyroid normal size,  non-tender, no masses or nodules.  Nodes: No clavicular/cervical adenopathy.  Skin: Normal temperature, No atypical lesions or rash.  Heart: Normal sounds, no murmurs  Lungs: Normal respiratory effort.  Gastrointestinal: Non tender, Non distended, No masses, guarding or  rebound, No hepatosplenomegally, No hernia.  Ext: No clubbing, cyanosis, edema or varicosities.  DTR's: 2+ bilaterally  Strength 5/5 bilateral upper and lower extremities     Genitourinary-  Vulva: normal without lesions, masses, atrophy or pain  Urethra: meatus central and normal in appearance, no prolapse/caruncle, no masses or discharge. Empty supine stress test was negative.  Bladder: non-tender, no masses  Vagina: No discharge or lesions, severe atrophy, no masses appreciated.  [See POP-Q]  Cervix: no lesions, no discharge  Uterus:  non-tender, anteverted, approximately 4 weeks size  Adnexa: no masses, non tender.  Rectal: deferred   Neuro: S2-4- pin prick and light touch intact, Anal wink present  Levator strength: 2/5  Levator tenderness: left 0/10 and right 3/10     POP-Q Exam- pelvic organ prolapse quantitative     Aa +3  Anterior Wall Ba +3  Anterior wall C -2.5  Cervix or cuff   Gh 5.5  Genital hiatus pb 2  perineal body tvl 10.5  Total vaginal length   Ap -1.5  Posterior wall Bp -1.5  Posterior wall D -4  Posterior fornix      with Uterus     POP-Q Stage:3                      Office  Visit on 01/10/2024   Component Date Value Ref Range Status    Color, UA 01/10/2024 Yellow    Final    pH, UA 01/10/2024 7    Final    WBC, UA 01/10/2024 Negative    Final    Nitrite, UA 01/10/2024 Negative    Final    Protein, POC 01/10/2024 Negative    Final    Glucose, UA 01/10/2024 Normal    Final    Ketones, UA 01/10/2024 Negative    Final    Urobilinogen, UA 01/10/2024 Normal    Final    Bilirubin, POC 01/10/2024 Negative    Final    Blood, UA 01/10/2024 Negative    Final    Clarity, UA 01/10/2024 Clear    Final    Spec Grav UA 01/10/2024 1.000    Final    POC Residual Urine Volume 01/10/2024 2  0 - 100 mL Final         ASSESSMENT & PLAN:     Cystocele, midline  -     POCT URINE DIPSTICK WITHOUT MICROSCOPE  -     Simple Urodynamics; Future; Expected date: 01/11/2024     Incomplete uterovaginal prolapse  -     Simple Urodynamics; Future; Expected date: 01/11/2024     Rectocele     Stress incontinence  -     Simple Urodynamics; Future; Expected date: 01/11/2024     Urge incontinence of urine  -     Simple Urodynamics; Future; Expected date: 01/11/2024     Smoker        Exam findings and treatment options were discussed in detail with the patient. Her symptoms and exam findings are consistent with mixed urinary incontinence and pelvic organ prolapse. We discussed various surgical and nonsurgical management options including pessary use, pelvic floor rehabilitation, and reconstructive surgery. In terms of surgery, vaginal, abdominal and laparoscopic approaches were considered. She feels that her condition is severe enough to consider surgical correction. The planned surgical procedure includes LSH, possible bilateral salpingectomy, sacrocervicopexy, possible anterior/ posterior repair, perineorrhaphy, cystoscopy, possible anti-incontinence procedure (Bulkamid versus Midurethral sling). Risks and benefits of the surgery were explained in detail and further information was provided. Patient was provided the  opportunity to ask questions and further discussion occurred as necessary. She was instructed to make a follow-up appointment for further counseling and urodynamic testing as indicated.     Patient is a current smoker- 1/2 to 1 ppd. Counseled strongly to quit smoking for 6 weeks minimum prior to surgery given increased risk of mesh erosion and poor wound healing. Reviewed that she will also need medical clearance from her PCP.      All questions were answered today. The patient was encouraged to contact the office as needed with any additional questions or concerns.

## 2024-05-03 VITALS
OXYGEN SATURATION: 93 % | BODY MASS INDEX: 25.89 KG/M2 | DIASTOLIC BLOOD PRESSURE: 51 MMHG | RESPIRATION RATE: 18 BRPM | SYSTOLIC BLOOD PRESSURE: 102 MMHG | HEART RATE: 68 BPM | HEIGHT: 69 IN | TEMPERATURE: 98 F | WEIGHT: 174.81 LBS

## 2024-05-03 LAB
OHS QRS DURATION: 148 MS
OHS QTC CALCULATION: 487 MS

## 2024-05-03 PROCEDURE — 93010 ELECTROCARDIOGRAM REPORT: CPT | Mod: ,,, | Performed by: INTERNAL MEDICINE

## 2024-05-03 PROCEDURE — 93005 ELECTROCARDIOGRAM TRACING: CPT

## 2024-05-03 PROCEDURE — 63600175 PHARM REV CODE 636 W HCPCS: Performed by: STUDENT IN AN ORGANIZED HEALTH CARE EDUCATION/TRAINING PROGRAM

## 2024-05-03 PROCEDURE — 25000003 PHARM REV CODE 250: Performed by: STUDENT IN AN ORGANIZED HEALTH CARE EDUCATION/TRAINING PROGRAM

## 2024-05-03 RX ORDER — IBUPROFEN 600 MG/1
600 TABLET ORAL EVERY 6 HOURS
Qty: 60 TABLET | Refills: 0 | Status: SHIPPED | OUTPATIENT
Start: 2024-05-03 | End: 2024-06-01

## 2024-05-03 RX ORDER — DOCUSATE SODIUM 100 MG/1
100 CAPSULE, LIQUID FILLED ORAL 2 TIMES DAILY
Qty: 60 CAPSULE | Refills: 0 | Status: SHIPPED | OUTPATIENT
Start: 2024-05-03

## 2024-05-03 RX ORDER — HYDROCODONE BITARTRATE AND ACETAMINOPHEN 5; 325 MG/1; MG/1
1 TABLET ORAL EVERY 4 HOURS PRN
Qty: 20 TABLET | Refills: 0 | Status: SHIPPED | OUTPATIENT
Start: 2024-05-03 | End: 2024-06-01

## 2024-05-03 RX ADMIN — IBUPROFEN 600 MG: 600 TABLET, FILM COATED ORAL at 12:05

## 2024-05-03 RX ADMIN — IBUPROFEN 600 MG: 600 TABLET, FILM COATED ORAL at 05:05

## 2024-05-03 RX ADMIN — HEPARIN SODIUM 5000 UNITS: 5000 INJECTION INTRAVENOUS; SUBCUTANEOUS at 05:05

## 2024-05-03 NOTE — NURSING
Voiding trail complete.  250 cc's of Sterile water in bladder.  Patient ambulated to bathroom and voided 325 cc's of clear yellow urine.  Dr. Castellanos notified.

## 2024-05-03 NOTE — NURSING
Discharge orders noted.  IV removed.  AVS printed and reviewed with patient.  Home medication delivered to bedside.  Patients spouse at bedside and will transport home.

## 2024-05-03 NOTE — PROGRESS NOTES
"[facility] ([address])  Urogynecology  Progress Note    Patient Name: Misti West  MRN: [id number]  Admission Date: 5/2/2024  Primary Care Provider: Lizet Landers MD  Principal Problem: Status post sacrocolpopexy    Subjective:     Interval History: POD#1 s/p LSH/LSO/RS/SCP/Cysto/Perineorrhaphy.   Patient is doing well this morning. She reports minimal abdominal pain, has not required narcotics overnight. She denies vaginal bleeding. She has not ambulated yet. She is voiding via welch. She has not passed flatus, but feels rumblings. She tolerated dinner last night without nausea/vomiting. She does endorse some shoulder pain on the L side that she noticed earlier this AM. She had an EKG performed that showed LBBB. She denies any history of abnormal EKGs in the past, does not have a cardiologist, but does endorse occasional "flutters" that have been there for years.     Scheduled Meds:  Current Facility-Administered Medications   Medication Dose Route Frequency    atorvastatin  10 mg Oral Daily    heparin (porcine)  5,000 Units Subcutaneous Q8H    ibuprofen  600 mg Oral Q6H    ipratropium  2 spray Each Nostril TID    magnesium hydroxide 400 mg/5 ml  30 mL Oral BID    mupirocin   Nasal BID    senna  8.6 mg Oral BID     Continuous Infusions:  Current Facility-Administered Medications   Medication Dose Route Frequency Last Rate Last Admin    lactated ringers   Intravenous Continuous 40 mL/hr at 05/02/24 1601 New Bag at 05/02/24 1601     PRN Meds:  Current Facility-Administered Medications:     HYDROcodone-acetaminophen, 1 tablet, Oral, Q4H PRN    HYDROcodone-acetaminophen, 1 tablet, Oral, Q4H PRN    HYDROcodone-acetaminophen, 1 tablet, Oral, Q6H PRN    naloxone, 0.02 mg, Intravenous, PRN    ondansetron, 4 mg, Intravenous, Q6H PRN    prochlorperazine, 5 mg, Intravenous, Q6H PRN    zolpidem, 5 mg, Oral, Nightly PRN    Review of patient's allergies indicates:   Allergen Reactions    Codeine Swelling     " ""Hives, vomiting"  Can take hydrocodone & tylenol    Morphine Swelling     "Hives, vomiting"    Naproxen sodium Swelling     "Itching, high blood pressure"    Percocet [oxycodone-acetaminophen] Nausea And Vomiting     LOW HEART RATE  Can take hydrocodone & tylenol    Tramadol hcl Swelling     "itching & nausea"    Ciprofloxacin Other (See Comments)     Low BP    Penicillins     Trintex        Objective:     Vital Signs (Most Recent):  Temp: 98.1 °F (36.7 °C) (05/03/24 0506)  Pulse: 68 (05/03/24 0506)  Resp: 18 (05/03/24 0506)  BP: (!) 102/51 (05/03/24 0506)  SpO2: (!) 93 % (05/03/24 0506) Vital Signs (24h Range):  Temp:  [97.4 °F (36.3 °C)-98.6 °F (37 °C)] 98.1 °F (36.7 °C)  Pulse:  [55-74] 68  Resp:  [16-20] 18  SpO2:  [93 %-100 %] 93 %  BP: (102-125)/(51-69) 102/51     Weight: 79.3 kg (174 lb 13.2 oz)  Body mass index is 25.82 kg/m².  No LMP recorded. Patient is postmenopausal.    I&O (Last 24H):    Intake/Output Summary (Last 24 hours) at 5/3/2024 0626  Last data filed at 5/3/2024 0200  Gross per 24 hour   Intake 2150 ml   Output 4255 ml   Net -2105 ml       Physical Exam:   Constitutional: She is oriented to person, place, and time. She appears well-developed.    Cardiovascular: Normal rate, regular rhythm, normal heart sounds and intact distal pulses.   Pulmonary/Chest: Effort normal and breath sounds normal. No respiratory distress. She has no wheezes.      Abdominal: Incisions clean, dry, and intact, with steri-strips and bandaids in place. Soft. Mildly-distended. There is minimal tenderness  Genitourinary: welch draining clear yellow urine   Neurological: She is alert and oriented to person, place, and time.    Skin: Nails show no clubbing.    Psychiatric: She has a normal mood and affect.     Laboratory:  No new results     Assessment/Plan:     Active Diagnoses:    Diagnosis Date Noted POA    PRINCIPAL PROBLEM:  S/p LSH/LSO/RS/SCP/Cystoscopy/Perineorrhaphy [Z98.890] 05/02/2024 Not Applicable    Vaginal " prolapse [N81.10] 05/02/2024 Yes      Problems Resolved During this Admission:       Routine Post-Op Care:  POD#1, doing well, no acute events overnight; continue routine advances  - Regular diet, d/c IVF today  - Remove Calle catheter this morning, perform AVT   - SCDs/heparin for DVT prophylaxis; Encourage ambulation  - Continue IS  - Continue scheduled colace  - Continue current pain meds: ibuprofen    Anxiety   - no home meds       HLD   - continue home statin       Tobacco use/chronic cough   - using nicotine gum, counseled extensively on recommendation to quit smoking while healing now with synthetic mesh in place   - continue home inhalers/nasal spray   - encouraged IS      Ovarian mass   - incidental L ovarian mass noted during surgery, remove and sent to pathology   - Frozen: benign mucinous cystadenoma, await final path     LBBB  - new LBBB noted on EKG overnight in the setting of shoulder pain   - No prior similar EKG findings found in chart review   - Patient does not have cardiologist at home   - will place outpatient referral for [facility] upon discharge     Anticipate discharge later today.    Alannah Castellanos MD  Urogynecology  [facility] ([address])

## 2024-05-03 NOTE — PLAN OF CARE
05/03/24 1000   Final Note   Assessment Type Final Discharge Note   Anticipated Discharge Disposition Home   Hospital Resources/Appts/Education Provided Provided patient/caregiver with written discharge plan information;Appointments scheduled and added to AVS   Post-Acute Status   Discharge Delays None known at this time       Pt states she lives independently at home.     Family to provide transportation home.    No DC needs from CM perspective.

## 2024-05-03 NOTE — PLAN OF CARE
MSW met with the patient at the bedside. The patient's  Gene is also at the bedside.     Patient is alert and oriented with no communication barriers.     Patient denies having DME at home.       Patients PCP is correct on the face sheet. Patient choice pharmacy is bedside delivery.     Patients' family will transport the patient home at discharge.     CM team will continue to follow.      05/03/24 0914   Discharge Assessment   Assessment Type Discharge Planning Assessment   Confirmed/corrected address, phone number and insurance Yes   Confirmed Demographics Correct on Facesheet   Source of Information patient;family;health record   People in Home significant other   Do you expect to return to your current living situation? Yes   Do you have help at home or someone to help you manage your care at home? Yes   Prior to hospitilization cognitive status: Alert/Oriented   Current cognitive status: Alert/Oriented   Walking or Climbing Stairs Difficulty no   Dressing/Bathing Difficulty no   Equipment Currently Used at Home none   Readmission within 30 days? No   Patient currently being followed by outpatient case management? No   Do you currently have service(s) that help you manage your care at home? No   Is the pt/caregiver preference to resume services with current agency No   Do you take prescription medications? Yes   Do you have prescription coverage? Yes   Do you have any problems affording any of your prescribed medications? No   Is the patient taking medications as prescribed? yes   How do you get to doctors appointments? car, drives self   Are you on dialysis? No   Do you take coumadin? No   Discharge Plan A Home;Home with family   Discharge Plan B Home Health;Home with family   DME Needed Upon Discharge  none   Discharge Plan discussed with: Patient;Spouse/sig other   Transition of Care Barriers None

## 2024-05-03 NOTE — PLAN OF CARE
Pt AAOx4.  Calle in place and draining.  Output documented.  Pt c/o chest and back pain when SCD inflating.  Physician notified.  EKG completed.  NSR with a left bundle branch block.  OK to turn SCD's off.  LR infusing at 40ml/hr.  Pt offered pain medication, refused as she prefers tylenol/ibuprofen.        Problem: Adult Inpatient Plan of Care  Goal: Plan of Care Review  Outcome: Progressing  Goal: Patient-Specific Goal (Individualized)  Outcome: Progressing  Goal: Absence of Hospital-Acquired Illness or Injury  Outcome: Progressing  Goal: Optimal Comfort and Wellbeing  Outcome: Progressing  Goal: Readiness for Transition of Care  Outcome: Progressing     Problem: Wound  Goal: Optimal Coping  Outcome: Progressing  Goal: Optimal Functional Ability  Outcome: Progressing  Goal: Absence of Infection Signs and Symptoms  Outcome: Progressing  Goal: Improved Oral Intake  Outcome: Progressing  Goal: Optimal Pain Control and Function  Outcome: Progressing  Goal: Skin Health and Integrity  Outcome: Progressing  Goal: Optimal Wound Healing  Outcome: Progressing     Problem: Fall Injury Risk  Goal: Absence of Fall and Fall-Related Injury  Outcome: Progressing

## 2024-05-03 NOTE — NURSING
Pt c/o left sided chest pain when SCD's inflate on BLE.  Says that the pain goes away when they are off.  Paged resident on call.  EKG ordered.  OK to leave SCD's off for now.

## 2024-05-03 NOTE — DISCHARGE SUMMARY
Discharge Summary  Gynecology      Admit Date: 2024    Discharge Date and Time: 5/3/2024  Attending Physician: No att. providers found    Principal Diagnoses:   Status post sacrocolpopexy    Active Hospital Problems    Diagnosis  POA    *S/p LSH/LSO/RS/SCP/Cystoscopy/Perineorrhaphy [Z98.890]  Not Applicable    Vaginal prolapse [N81.10]  Yes      Resolved Hospital Problems   No resolved problems to display.       Procedures:   Procedure(s) (LRB):  HYSTERECTOMY,TOTAL,LAPAROSCOPIC,WITH SALPINGECTOMY (Bilateral)  SACROCOLPOPEXY, LAPAROSCOPIC (N/A)  COLPORRHAPHY, COMBINED ANTEROPOSTERIOR (N/A)  PERINEOPLASTY (N/A)  CYSTOSCOPY (N/A)  OOPHORECTOMY (Left)    Discharged Condition: good    Hospital Course:   Misti West is a 58 y.o. y.o.  female who presented on 2024 for the above-listed procedures for the treatment of POP. PMH is significant for HLD, tobacco use. Patient tolerated the procedure well and was admitted for post-operative care. Post-operative course was uncomplicated. She did have shoulder pain on POD#1 and an EKG was performed that showed new LBBB. She was overall doing well and asymptomatic and was referred to cardiology outpatient.   On day of discharge (POD#1), patient was in stable condition, having met all post-operative milestones. She was urinating spontaneously, ambulating, and tolerating a regular diet without nausea/vomiting. Pain was well-controlled on oral medication. She was discharged with medications and follow up as listed below.     Consults: None    Significant Diagnostic Studies:  Recent Labs   Lab 24  0614   WBC 4.30   HGB 12.0   HCT 36.7*   MCV 92           Treatments:   1. Surgery as above    Disposition: Home or Self Care    Patient Instructions:   Discharge Medication List as of 5/3/2024  9:58 AM        START taking these medications    Details   !! docusate sodium (COLACE) 100 MG capsule Take 1 capsule (100 mg total) by mouth 2 (two) times daily.,  Starting Fri 5/3/2024, Normal      HYDROcodone-acetaminophen (NORCO) 5-325 mg per tablet Take 1 tablet by mouth every 4 (four) hours as needed for Pain., Starting Fri 5/3/2024, Normal      ibuprofen (ADVIL,MOTRIN) 600 MG tablet Take 1 tablet (600 mg total) by mouth every 6 (six) hours., Starting Fri 5/3/2024, Normal       !! - Potential duplicate medications found. Please discuss with provider.        CONTINUE these medications which have NOT CHANGED    Details   ascorbic acid, vitamin C, (VITAMIN C) 500 MG tablet Take 500 mg by mouth once daily., Historical Med      atorvastatin (LIPITOR) 10 MG tablet Take 1 tablet (10 mg total) by mouth once daily., Starting Tue 10/24/2023, Until Wed 10/23/2024, Normal      b complex vitamins tablet Take 1 tablet by mouth once daily., Historical Med      biotin 1 mg tablet Take 1,000 mcg by mouth 3 (three) times daily., Historical Med      cholecalciferol, vitamin D3, (VITAMIN D3 ORAL) Take by mouth., Historical Med      !! docusate sodium (COLACE) 100 MG capsule Take 100 mg by mouth once daily., Historical Med      evening primrose oil 1,300 mg Cap Take by mouth., Historical Med      glucosamine-chondroitin 500-400 mg tablet Take 1 tablet by mouth 3 (three) times daily., Historical Med      ipratropium (ATROVENT) 21 mcg (0.03 %) nasal spray 2 sprays by Each Nostril route 3 (three) times daily., Starting Thu 4/18/2024, Normal      levocetirizine (XYZAL) 5 MG tablet Take 1 tablet (5 mg total) by mouth every evening., Starting Tue 12/5/2023, Until Wed 12/4/2024, Normal      montelukast (SINGULAIR) 5 MG chewable tablet Take 5 mg by mouth every evening., Historical Med      multivitamin capsule Take 1 capsule by mouth once daily., Historical Med      tumeric-ging-olive-oreg-capryl 100 mg-150 mg- 50 mg-150 mg Cap Take by mouth., Historical Med      UNABLE TO FIND medication name: MILK THISTLE SUPPLEMENT, Historical Med       !! - Potential duplicate medications found. Please discuss  with provider.          Discharge Procedure Orders   Ambulatory referral/consult to Cardiology   Standing Status: Future   Referral Priority: Routine Referral Type: Consultation   Referral Reason: Specialty Services Required   Requested Specialty: Cardiology   Number of Visits Requested: 1     Diet general     Lifting restrictions   Order Comments: No lifting greater than 15 pounds for six weeks.     Other restrictions (specify):   Order Comments: PELVIC REST:  No douching, tampons, or intercourse for 6 weeks.    If prescribed, vaginal estrogen cream may be used during the postoperative period.     DRIVING:  No driving while on narcotics. Driving may be resumed initially with a competent passenger one to two weeks after surgery if no longer taking narcotics.     EXERCISE:  For six weeks your exercise should be limited to walking. You may walk as far as you wish, as long as you increase your level of exertion gradually and avoid slippery surfaces. You may climb stairs as needed to get around, but should not use stair climbing for exercise.     Remove dressing in 24 hours   Order Comments: If you have a bandage on wound, you may remove it the day after dismissal.  If you had steri-strips remove them once they begin to peel off (usually 2 weeks). Keep incision clean and dry.  Inspect the incision daily for signs and symptoms of infection.     Wound care routine (specify)   Order Comments: WOUND CARE:  If you have a band-aid or bandage on your wound, you may remove it the day after dismissal.  You may wash the wound with mild soap and water.   You may shower at any time but should avoid immersing any abdominal incisions in water for at least two weeks after surgery or until the wound is completely healed. If given, please shower with Hibiclens soap until bottle is completely finished. Keep your wound clean and dry.  You should observe your incision for signs of infection which include redness, warmth, drainage or fever.      Call MD for:  temperature >100.4     Call MD for:  persistent nausea and vomiting     Call MD for:  severe uncontrolled pain     Call MD for:  difficulty breathing, headache or visual disturbances     Call MD for:  redness, tenderness, or signs of infection (pain, swelling, redness, odor or green/yellow discharge around incision site)     Call MD for:  hives     Call MD for:   Order Comments: inability to void,urine is ketchup colored or you have large clots, vaginal bleeding is heavier than a period.    VAGINAL DISCHARGE: You may develop a vaginal discharge and intermittent vaginal spotting after surgery and up to 6 weeks postoperatively.  The discharge may have an odor and may change in color but it is normal.  This is due to dissolving stiches.  Contact your surgical team if you develop vaginal or vulvar irritation along with a discharge.  Also contact your surgical team if you have vaginal discharge that smells like urine or stool.    CONSTIPATION REMEDIES: Patients are often constipated after surgery or with use of oral narcotic medicine. You should continue to take the stool softener, Senokot-S during the next six weeks, and consume adequate amounts of water.  If you have not had a bowel movement for 3 days after dismissal, or are uncomfortable and unable to pass stool, please try one or all of the following measures:  1.  Milk of Magnesia - 30 cc by mouth every 12 hours   2.  Dulcolax suppository - One suppository per rectum every 4-6 hours   3.  Metamucil, Fibercon or other bulk former - use as directed  4.  Fleets Enema        PAIN MEDICATIONS:     Take your pain medications as instructed. It is best to take pain medications before your pain becomes severe. This will allow you to take less medication yet have better pain relief. For the first 2 or 3 days it may be helpful to take your pain medications on a regular schedule (e.g. every 4 to 6 hours). This will help you to keep your pain under better  control. You should then begin to take fewer medications each day until you no longer need them. Do not take pain medication on an empty stomach. This may lead to nausea and vomiting.     Activity as tolerated   Order Comments: Return to normal activity slowly as you feel able.  For 6 weeks your exercise should be limited to walking.  You may walk as far as you wish, as long as you increase your level of exertion gradually and avoid slippery surfaces.    If you had a hysterectomy at the surgery do not insert anything in your vagina for 9 weeks.     Shower on day dressing removed (No bath)   Order Comments: You may shower at any time but should avoid immersing any abdominal incisions in water for at least 2 weeks after surgery or until the wound is completely healed.  If given, please shower with Hibaclens soap until bottle is completely finish        Follow-up Information       Angeles Last MD. Schedule an appointment as soon as possible for a visit in 2 week(s).    Specialties: Obstetrics and Gynecology, UroGynecology   Why: Postop Appointment  Contact information:  [address] [id number] [phone]                             Alannah Castellanos MD   PGY-4, OB-GYN

## 2024-05-06 RX ORDER — LIDOCAINE HYDROCHLORIDE 10 MG/ML
1 INJECTION INFILTRATION; PERINEURAL
Status: DISCONTINUED | OUTPATIENT
Start: 2024-04-22 | End: 2024-05-06 | Stop reason: HOSPADM

## 2024-05-06 RX ORDER — DEXAMETHASONE SODIUM PHOSPHATE 4 MG/ML
4 INJECTION, SOLUTION INTRA-ARTICULAR; INTRALESIONAL; INTRAMUSCULAR; INTRAVENOUS; SOFT TISSUE
Status: DISCONTINUED | OUTPATIENT
Start: 2024-04-22 | End: 2024-05-06 | Stop reason: HOSPADM

## 2024-05-06 NOTE — PROGRESS NOTES
Subjective:     Patient ID: Misti West is a 58 y.o. female.    Chief Complaint: Foot Pain    Misti is a 58 y.o. female who presents to the podiatry clinic  with complaint of  left foot pain. Onset of the symptoms was several years ago. Precipitating event: none known. Current symptoms include: ability to bear weight, but with some pain, pain at the anterior and lateral aspect of the ankle, stiffness, and worsening symptoms after a period of activity. Aggravating factors: walking. Symptoms have gradually worsened. Patient has had prior foot problems.. Treatment to date: OTC analgesics which are somewhat effective. Patients rates pain 8/10 on pain scale.    Review of Systems   Constitutional: Negative for chills and fever.   Cardiovascular:  Negative for chest pain and leg swelling.   Respiratory:  Negative for cough and shortness of breath.    Gastrointestinal:  Negative for diarrhea, nausea and vomiting.   Neurological:  Positive for paresthesias.        Objective:     Physical Exam  Vitals reviewed.   Constitutional:       General: She is not in acute distress.     Appearance: Normal appearance. She is not ill-appearing.   HENT:      Head: Normocephalic.      Nose: Nose normal.   Pulmonary:      Effort: Pulmonary effort is normal. No respiratory distress.   Skin:     Capillary Refill: Capillary refill takes 2 to 3 seconds.   Neurological:      Mental Status: She is alert and oriented to person, place, and time.   Psychiatric:         Mood and Affect: Mood normal.         Behavior: Behavior normal.         Thought Content: Thought content normal.         Judgment: Judgment normal.       Neurologic: Protective and light touch sensation intact bilateral lower extremity, positive paresthesias from left lateral ankle   Musculoskeletal:  5/5 muscle strength noted bilateral foot, ankle joint range of motion is reduced to the left with some crepitus and mild pain, no masses noted bilateral foot, pain and tenderness  with palpation of the left sinus tarsi region, pain and tenderness with extreme inversion of the left foot of the lateral ankle   Dermatologic:  No open lesions noted bilateral foot, no rashes noted bilateral foot, hyperkeratotic skin lesion noted at the plantar aspect bilateral hallux IPJ joint  Vascular: DP and PT pulses palpable 2/4 bilateral foot, capillary fill time less than 3 seconds to distal aspect of the digits bilateral foot, no swelling noted to the left ankle    Assessment:      Encounter Diagnoses   Name Primary?    Arthritis     Sinus tarsi syndrome, left Yes     Plan:     Misti was seen today for foot pain.    Diagnoses and all orders for this visit:    Sinus tarsi syndrome, left  -     Intermediate Joint Aspiration/Injection: L ankle    Arthritis  -     Ambulatory referral/consult to Podiatry  -     Intermediate Joint Aspiration/Injection: L ankle      I counseled the patient on her conditions, their implications and medical management.        1. Patient was examined and evaluated.    2. Discussed with patient etiology of arthritis of the left ankle and its association with possible sinus tarsi syndrome.  Discussed with patient potential benefits of local corticosteroid injection or oral Medrol Dosepak for improvement of inflammation.  Patient was advised to adjunct with OTC analgesics/NSAIDs as well.  Patient was advised to ice and elevate the left lower extremity at end of days.    Intermediate Joint Aspiration/Injection: L ankle    Date/Time: 4/22/2024 2:30 PM    Performed by: Jv Brian DPM  Authorized by: Jv Brian DPM    Consent Done?:  Yes (Verbal)  Indications:  Pain and arthritis    Location:  Ankle  Site:  L ankle  Needle size:  25 G  Approach:  Anterolateral  Medications:  1 mL LIDOcaine HCL 10 mg/ml (1%) 10 mg/mL (1 %); 4 mg dexAMETHasone 4 mg/mL  Patient tolerance:  Patient tolerated the procedure well with no immediate complications        3. Patient was advised to  continue with comfortable shoe gear both inside and outside the home.  Patient will decrease the amount of use of flip-flops slides.    4. Patient will follow-up in 3 weeks or p.r.n. for complaints

## 2024-05-07 LAB
FINAL PATHOLOGIC DIAGNOSIS: NORMAL
FROZEN SECTION DIAGNOSIS: NORMAL
FROZEN SECTION FOOTNOTE: NORMAL
GROSS: NORMAL
Lab: NORMAL

## 2024-05-08 LAB
FINAL PATHOLOGIC DIAGNOSIS: NORMAL
Lab: NORMAL

## 2024-05-10 ENCOUNTER — HOSPITAL ENCOUNTER (EMERGENCY)
Facility: HOSPITAL | Age: 59
Discharge: HOME | End: 2024-05-10
Attending: STUDENT IN AN ORGANIZED HEALTH CARE EDUCATION/TRAINING PROGRAM

## 2024-05-10 ENCOUNTER — TELEPHONE (OUTPATIENT)
Dept: UROGYNECOLOGY | Facility: CLINIC | Age: 59
End: 2024-05-10

## 2024-05-10 VITALS
HEIGHT: 69 IN | WEIGHT: 170 LBS | OXYGEN SATURATION: 96 % | RESPIRATION RATE: 20 BRPM | BODY MASS INDEX: 25.18 KG/M2 | DIASTOLIC BLOOD PRESSURE: 56 MMHG | TEMPERATURE: 99 F | HEART RATE: 62 BPM | SYSTOLIC BLOOD PRESSURE: 122 MMHG

## 2024-05-10 DIAGNOSIS — N30.00 ACUTE CYSTITIS WITHOUT HEMATURIA: Primary | ICD-10-CM

## 2024-05-10 DIAGNOSIS — R07.9 CHEST PAIN: ICD-10-CM

## 2024-05-10 LAB
ALBUMIN SERPL BCP-MCNC: 4.1 G/DL (ref 3.5–5.2)
ALP SERPL-CCNC: 69 U/L (ref 55–135)
ALT SERPL W/O P-5'-P-CCNC: 24 U/L (ref 10–44)
ANION GAP SERPL CALC-SCNC: 10 MMOL/L (ref 8–16)
AST SERPL-CCNC: 21 U/L (ref 10–40)
BACTERIA #/AREA URNS HPF: ABNORMAL /HPF
BASOPHILS # BLD AUTO: 0.06 K/UL (ref 0–0.2)
BASOPHILS NFR BLD: 0.6 % (ref 0–1.9)
BILIRUB SERPL-MCNC: 0.4 MG/DL (ref 0.1–1)
BILIRUB UR QL STRIP: NEGATIVE
BUN SERPL-MCNC: 14 MG/DL (ref 6–20)
CALCIUM SERPL-MCNC: 9.6 MG/DL (ref 8.7–10.5)
CHLORIDE SERPL-SCNC: 107 MMOL/L (ref 95–110)
CLARITY UR: CLEAR
CO2 SERPL-SCNC: 24 MMOL/L (ref 23–29)
COLOR UR: YELLOW
CREAT SERPL-MCNC: 1 MG/DL (ref 0.5–1.4)
DIFFERENTIAL METHOD BLD: ABNORMAL
EOSINOPHIL # BLD AUTO: 0.2 K/UL (ref 0–0.5)
EOSINOPHIL NFR BLD: 2.4 % (ref 0–8)
ERYTHROCYTE [DISTWIDTH] IN BLOOD BY AUTOMATED COUNT: 14.3 % (ref 11.5–14.5)
EST. GFR  (NO RACE VARIABLE): >60 ML/MIN/1.73 M^2
GLUCOSE SERPL-MCNC: 95 MG/DL (ref 70–110)
GLUCOSE UR QL STRIP: NEGATIVE
HCT VFR BLD AUTO: 40.7 % (ref 37–48.5)
HCV AB SERPL QL IA: NORMAL
HGB BLD-MCNC: 13.7 G/DL (ref 12–16)
HGB UR QL STRIP: ABNORMAL
HIV 1+2 AB+HIV1 P24 AG SERPL QL IA: NORMAL
IMM GRANULOCYTES # BLD AUTO: 0.05 K/UL (ref 0–0.04)
IMM GRANULOCYTES NFR BLD AUTO: 0.5 % (ref 0–0.5)
KETONES UR QL STRIP: NEGATIVE
LACTATE SERPL-SCNC: 0.9 MMOL/L (ref 0.5–2.2)
LEUKOCYTE ESTERASE UR QL STRIP: ABNORMAL
LYMPHOCYTES # BLD AUTO: 1.9 K/UL (ref 1–4.8)
LYMPHOCYTES NFR BLD: 19.1 % (ref 18–48)
MCH RBC QN AUTO: 31 PG (ref 27–31)
MCHC RBC AUTO-ENTMCNC: 33.7 G/DL (ref 32–36)
MCV RBC AUTO: 92 FL (ref 82–98)
MICROSCOPIC COMMENT: ABNORMAL
MONOCYTES # BLD AUTO: 0.6 K/UL (ref 0.3–1)
MONOCYTES NFR BLD: 5.9 % (ref 4–15)
NEUTROPHILS # BLD AUTO: 7.2 K/UL (ref 1.8–7.7)
NEUTROPHILS NFR BLD: 71.5 % (ref 38–73)
NITRITE UR QL STRIP: NEGATIVE
NRBC BLD-RTO: 0 /100 WBC
PH UR STRIP: 7 [PH] (ref 5–8)
PLATELET # BLD AUTO: 263 K/UL (ref 150–450)
PMV BLD AUTO: 9.5 FL (ref 9.2–12.9)
POTASSIUM SERPL-SCNC: 4 MMOL/L (ref 3.5–5.1)
PROT SERPL-MCNC: 7.3 G/DL (ref 6–8.4)
PROT UR QL STRIP: NEGATIVE
RBC # BLD AUTO: 4.42 M/UL (ref 4–5.4)
RBC #/AREA URNS HPF: 2 /HPF (ref 0–4)
SODIUM SERPL-SCNC: 141 MMOL/L (ref 136–145)
SP GR UR STRIP: 1.01 (ref 1–1.03)
SQUAMOUS #/AREA URNS HPF: 4 /HPF
TROPONIN I SERPL DL<=0.01 NG/ML-MCNC: <0.006 NG/ML (ref 0–0.03)
URN SPEC COLLECT METH UR: ABNORMAL
UROBILINOGEN UR STRIP-ACNC: NEGATIVE EU/DL
WBC # BLD AUTO: 10.06 K/UL (ref 3.9–12.7)
WBC #/AREA URNS HPF: 20 /HPF (ref 0–5)

## 2024-05-10 PROCEDURE — 87389 HIV-1 AG W/HIV-1&-2 AB AG IA: CPT | Performed by: STUDENT IN AN ORGANIZED HEALTH CARE EDUCATION/TRAINING PROGRAM

## 2024-05-10 PROCEDURE — 80053 COMPREHEN METABOLIC PANEL: CPT | Performed by: STUDENT IN AN ORGANIZED HEALTH CARE EDUCATION/TRAINING PROGRAM

## 2024-05-10 PROCEDURE — 84484 ASSAY OF TROPONIN QUANT: CPT | Performed by: STUDENT IN AN ORGANIZED HEALTH CARE EDUCATION/TRAINING PROGRAM

## 2024-05-10 PROCEDURE — 93010 ELECTROCARDIOGRAM REPORT: CPT | Mod: ,,, | Performed by: INTERNAL MEDICINE

## 2024-05-10 PROCEDURE — 74177 CT ABD & PELVIS W/CONTRAST: CPT | Mod: 26,,, | Performed by: RADIOLOGY

## 2024-05-10 PROCEDURE — 99285 EMERGENCY DEPT VISIT HI MDM: CPT | Mod: 25

## 2024-05-10 PROCEDURE — 81000 URINALYSIS NONAUTO W/SCOPE: CPT | Performed by: STUDENT IN AN ORGANIZED HEALTH CARE EDUCATION/TRAINING PROGRAM

## 2024-05-10 PROCEDURE — 93005 ELECTROCARDIOGRAM TRACING: CPT

## 2024-05-10 PROCEDURE — 86803 HEPATITIS C AB TEST: CPT | Performed by: STUDENT IN AN ORGANIZED HEALTH CARE EDUCATION/TRAINING PROGRAM

## 2024-05-10 PROCEDURE — 94761 N-INVAS EAR/PLS OXIMETRY MLT: CPT

## 2024-05-10 PROCEDURE — 74177 CT ABD & PELVIS W/CONTRAST: CPT | Mod: TC

## 2024-05-10 PROCEDURE — 85025 COMPLETE CBC W/AUTO DIFF WBC: CPT | Performed by: STUDENT IN AN ORGANIZED HEALTH CARE EDUCATION/TRAINING PROGRAM

## 2024-05-10 PROCEDURE — 25500020 PHARM REV CODE 255: Performed by: STUDENT IN AN ORGANIZED HEALTH CARE EDUCATION/TRAINING PROGRAM

## 2024-05-10 PROCEDURE — 83605 ASSAY OF LACTIC ACID: CPT | Performed by: STUDENT IN AN ORGANIZED HEALTH CARE EDUCATION/TRAINING PROGRAM

## 2024-05-10 PROCEDURE — 87086 URINE CULTURE/COLONY COUNT: CPT | Performed by: STUDENT IN AN ORGANIZED HEALTH CARE EDUCATION/TRAINING PROGRAM

## 2024-05-10 RX ORDER — NITROFURANTOIN 25; 75 MG/1; MG/1
100 CAPSULE ORAL 2 TIMES DAILY
Qty: 14 CAPSULE | Refills: 0 | Status: SHIPPED | OUTPATIENT
Start: 2024-05-10 | End: 2024-05-17

## 2024-05-10 RX ADMIN — IOHEXOL 75 ML: 350 INJECTION, SOLUTION INTRAVENOUS at 12:05

## 2024-05-10 NOTE — TELEPHONE ENCOUNTER
----- Message from Renee Crain sent at 5/10/2024 10:06 AM CDT -----  Name of Who is Calling:SILVA POSEY [[id number]]                What is the request in detail:Pt calling because she states that her BP was high,she passed out and is in pain, I was able to get her to [facility], for assistance.Please call back to further assist.                 Can the clinic reply by [facility]: No                What Number to Call Back if not in [facility]: [phone]

## 2024-05-10 NOTE — ED PROVIDER NOTES
"Encounter Date: 5/10/2024       History     Chief Complaint   Patient presents with    pain in coccyx     Anxiety     Patient had a partial hysterectomy on 5/2/24. She was feeling her coccyx which felt like a knot on it when she became diaphoretic. She relays that she then walked into her living room and checked her blood pressure. At home pressure 88/52.  Normotensive upon arrival.      58-year-old female with history of anxiety, cervical cancer, bladder prolapse, hiatal hernia, hepatomegaly, liver lesions, status post laparoscopic partial hysterectomy done 8 days ago 5/2/24.   She presents to ED chief complaint of hypotension 88/52. She tells me she felt a knot on her lower back, she became anxious and diaphoretic and essentially checked her blood pressure and she noticed was low.  Upon arrival to the ED however she has multiple complaints, including sharp chest pain at rest, abdominal pain.    She describes abdominal pain as crampy spasms which seems more intense than postop incisional pain. She ate breakfast today, she is passing flatus, and had a normal bowel movement yesterday.  As far as chest pain is concerned it located mid sternum radiates to her left chest, it was nonexertional it is sharp, she denies associated palpitations, nausea, vomiting.      The history is provided by the patient. No  was used.     Review of patient's allergies indicates:   Allergen Reactions    Codeine Swelling     "Hives, vomiting"  Can take hydrocodone & tylenol    Morphine Swelling     "Hives, vomiting"    Naproxen sodium Swelling     "Itching, high blood pressure"    Percocet [oxycodone-acetaminophen] Nausea And Vomiting     LOW HEART RATE  Can take hydrocodone & tylenol    Tramadol hcl Swelling     "itching & nausea"    Ciprofloxacin Other (See Comments)     Low BP    Penicillins     Trintex      Past Medical History:   Diagnosis Date    Anxiety     Arthritis     Cancer     cervical    Female bladder " prolapse     Hepatomegaly     3 liver cyst    Hiatal hernia     High cholesterol     Liver lesion     PONV (postoperative nausea and vomiting)     Seasonal allergies      Past Surgical History:   Procedure Laterality Date    CERVIX SURGERY      COLONOSCOPY N/A 08/27/2020    Procedure: COLONOSCOPY;  Surgeon: Tim Aguayo MD;  Location: [facility];  Service: Endoscopy;  Laterality: N/A;    COLONOSCOPY N/A 1/16/2024    Procedure: COLONOSCOPY;  Surgeon: Tim Aguayo MD;  Location: [facility];  Service: Endoscopy;  Laterality: N/A;    COLPORRHAPHY, COMBINED ANTEROPOSTERIOR N/A 5/2/2024    Procedure: COLPORRHAPHY, COMBINED ANTEROPOSTERIOR;  Surgeon: Angeles Last MD;  Location: [facility] [place] [facility];  Service: OB/GYN;  Laterality: N/A;    CYSTOSCOPY N/A 5/2/2024    Procedure: CYSTOSCOPY;  Surgeon: Angeles Last MD;  Location: [facility];  Service: OB/GYN;  Laterality: N/A;    CYSTOSCOPY WITH URETHRAL DILATION  10/01/2020    Procedure: CYSTOSCOPY, WITH URETHRAL DILATION;  Surgeon: Bhargav Keane MD;  Location: [facility];  Service: Urology;;    ESOPHAGOGASTRODUODENOSCOPY N/A 08/13/2020    Procedure: EGD (ESOPHAGOGASTRODUODENOSCOPY);  Surgeon: Tim Aguayo MD;  Location: [facility];  Service: Endoscopy;  Laterality: N/A;    HYSTERECTOMY, TOTAL, LAPAROSCOPIC, WITH SALPINGECTOMY Bilateral 5/2/2024    Procedure: HYSTERECTOMY,TOTAL,LAPAROSCOPIC,WITH SALPINGECTOMY;  Surgeon: Angeles Last MD;  Location: [facility];  Service: OB/GYN;  Laterality: Bilateral;  5 hours staying overnight    KNEE ARTHROSCOPY      TKR    LAPAROSCOPIC SACROCOLPOPEXY N/A 5/2/2024    Procedure: SACROCOLPOPEXY, LAPAROSCOPIC;  Surgeon: Angeles Last MD;  Location: [facility];  Service: OB/GYN;  Laterality: N/A;    OOPHORECTOMY Left 5/2/2024    Procedure: OOPHORECTOMY;  Surgeon: Angeles Last MD;  Location: [facility];  Service: OB/GYN;  Laterality: Left;  LAPAROSCOPIC    PERINEOPLASTY N/A 5/2/2024    Procedure: PERINEOPLASTY;  Surgeon: Angeles Last MD;   Location: [facility] OR;  Service: OB/GYN;  Laterality: N/A;    ROBOTIC ARTHROPLASTY, KNEE Left 03/29/2022    Procedure: ROBOTIC ARTHROPLASTY, KNEE, TOTAL;  Surgeon: Neo Zapata MD;  Location: [facility] OR;  Service: Orthopedics;  Laterality: Left;    SINUS SURGERY      TUBAL LIGATION       Family History   Problem Relation Name Age of Onset    Diabetes Mother      Cancer Mother          bladder    Arthritis Mother      Cancer Father          lung    COPD Father      Hypertension Father      Arthritis Father      Melanoma Father      Breast cancer Sister      Arthritis Sister      Breast cancer Sister      Arthritis Sister      Breast cancer Sister      Kidney disease Sister      Arthritis Sister      Colon polyps Neg Hx      Colon cancer Neg Hx      Crohn's disease Neg Hx      Ulcerative colitis Neg Hx      Stomach cancer Neg Hx      Esophageal cancer Neg Hx      Celiac disease Neg Hx      Seizures Neg Hx       Social History     Tobacco Use    Smoking status: Every Day     Current packs/day: 1.00     Types: Cigarettes    Smokeless tobacco: Never   Substance Use Topics    Alcohol use: Yes     Comment: rare    Drug use: Never     Review of Systems   Constitutional: Negative.    HENT: Negative.     Eyes: Negative.    Respiratory: Negative.     Cardiovascular: Negative.    Gastrointestinal:  Positive for abdominal pain.   Endocrine: Negative.    Genitourinary: Negative.    Musculoskeletal: Negative.    Skin: Negative.    Allergic/Immunologic: Negative.    Neurological: Negative.    Hematological: Negative.    Psychiatric/Behavioral: Negative.          Moderately anxious   All other systems reviewed and are negative.      Physical Exam     Initial Vitals [05/10/24 1111]   BP Pulse Resp Temp SpO2   115/60 67 20 98.7 °F (37.1 °C) 98 %      MAP       --         Physical Exam    Nursing note and vitals reviewed.  Constitutional: She appears well-developed.   Moderately anxious 58-year-old female   HENT:   Head:  Normocephalic.   Eyes: EOM are normal. Pupils are equal, round, and reactive to light.   Neck:   Normal range of motion.  Pulmonary/Chest: Breath sounds normal. No respiratory distress. She has no wheezes.   Abdominal: Abdomen is soft. Bowel sounds are normal. She exhibits no distension. There is abdominal tenderness.   Laparoscopic incisional wounds with well approximated wound edges with Steri-Strips, no drainage, clean clear intact without surrounding erythema There is no rebound and no guarding.   Musculoskeletal:         General: Normal range of motion.      Cervical back: Normal range of motion.     Neurological: She is alert and oriented to person, place, and time. She has normal strength. GCS score is 15. GCS eye subscore is 4. GCS verbal subscore is 5. GCS motor subscore is 6.   Skin: Skin is warm. Capillary refill takes less than 2 seconds.   Psychiatric: She has a normal mood and affect.         ED Course   Procedures  Labs Reviewed   CBC W/ AUTO DIFFERENTIAL - Abnormal; Notable for the following components:       Result Value    Immature Grans (Abs) 0.05 (*)     All other components within normal limits    Narrative:     Release to patient->Immediate   URINALYSIS, REFLEX TO URINE CULTURE - Abnormal; Notable for the following components:    Occult Blood UA Trace (*)     Leukocytes, UA Trace (*)     All other components within normal limits    Narrative:     Preferred Collection Type->Urine, Clean Catch  Specimen Source->Urine   URINALYSIS MICROSCOPIC - Abnormal; Notable for the following components:    WBC, UA 20 (*)     Bacteria Few (*)     All other components within normal limits    Narrative:     Preferred Collection Type->Urine, Clean Catch  Specimen Source->Urine   CULTURE, URINE   COMPREHENSIVE METABOLIC PANEL    Narrative:     Release to patient->Immediate   TROPONIN I   LACTIC ACID, PLASMA   HIV 1 / 2 ANTIBODY   HEPATITIS C ANTIBODY          Imaging Results              CT Abdomen Pelvis With IV  Contrast NO Oral Contrast (Final result)  Result time 05/10/24 12:47:25      Final result by David Reynoso Jr., MD (05/10/24 12:47:25)                   Impression:      Mild diverticulosis coli without CT evidence of diverticulitis.  Three small cyst of the liver parenchyma, stable.  Otherwise negative CT of the abdomen and pelvis.      Electronically signed by: David Reynoso MD  Date:    05/10/2024  Time:    12:47               Narrative:    EXAMINATION:  CT ABDOMEN PELVIS WITH IV CONTRAST    CLINICAL HISTORY:  Abdominal pain, post-op;Status post laparoscopic partial hysterectomy;    TECHNIQUE:  Low dose axial images, sagittal and coronal reformations were obtained from the lung bases to the pubic symphysis following the IV administration of 75 mL of Omnipaque 350    COMPARISON:  CT abdomen pelvis of August 31, 2022    FINDINGS:  The liver is of normal size contour and CT density.  In the left lobe is a 1.5 cm and a a 5 mm cyst and in the anterior right lobe are a 1.5 cm cyst.  These are unchanged from the prior CT.  The gallbladder is empty.  The pancreas is of normal contour and CT density without edema or mass.  The spleen is of normal size and CT density.    The adrenal glands are not enlarged.  The kidneys are of normal size contour and contrast enhancement without mass or hydronephrosis seen.  The abdominal aorta and inferior vena cava are of normal caliber.    The stomach is of normal configuration.  Small bowel dilatation or air-fluid levels are not seen.  The colon appears of normal configuration without distention or mass.  Few diverticuli are noted in the sigmoid colon without CT evidence of diverticulitis.  A normal appendix is noted.  No free fluid or free air is seen.    The bladder is of normal contour without mass or asymmetry.  The uterus is not enlarged.  No free fluid is seen.                                       Medications   iohexoL (OMNIPAQUE 350) injection 75 mL (75 mLs Intravenous  Given 5/10/24 1218)     Medical Decision Making  58-year-old female with history of anxiety, cervical cancer, bladder prolapse, hiatal hernia, hepatomegaly, liver lesions, status post laparoscopic partial hysterectomy done 8 days ago 5/2/24.   She presents to ED chief complaint of hypotension 88/52. She tells me she felt a knot on her lower back, she became anxious and diaphoretic and essentially checked her blood pressure and she noticed was low.  Upon arrival to the ED however she has multiple complaints, including sharp chest pain at rest, abdominal pain.    She describes abdominal pain as crampy spasms which seems more intense than postop incisional pain. She ate breakfast today, she is passing flatus, and had a normal bowel movement yesterday.  As far as chest pain is concerned it located mid sternum radiates to her left chest, it was nonexertional it is sharp, she denies associated palpitations, nausea, vomiting.  ---------------------  Moderately anxious female with multiple complaints  EKG shows sinus Chris, nonspecific T-wave abdominal V2 V3, otherwise no STEMI.  Lab work including CBC, lactic, CMP, troponin are all without significant gross abnormalities.  UA shows positive leukocyte esterase, pyuria, trace blood consistent with UTI.  CT abdomen pelvis shows no acute findings\  Rx Macrobid.  Patient was instructed to follow up with PCP and was given strict return precautions to the ED. The patient voiced understanding and agreed with the plan        Amount and/or Complexity of Data Reviewed  External Data Reviewed: labs and radiology.  Labs: ordered.  Radiology: ordered.    Risk  Prescription drug management.                                      Clinical Impression:  Final diagnoses:  [R07.9] Chest pain  [N30.00] Acute cystitis without hematuria (Primary)          ED Disposition Condition    Discharge Stable          ED Prescriptions       Medication Sig Dispense Start Date End Date Auth. Provider     nitrofurantoin, macrocrystal-monohydrate, (MACROBID) 100 MG capsule Take 1 capsule (100 mg total) by mouth 2 (two) times daily. for 7 days 14 capsule 5/10/2024 5/17/2024 Gael Thapa MD          Follow-up Information       Follow up With Specialties Details Why Contact Info    Lizet Landers MD Family Medicine  As needed [address] [facility] [address] [phone]               Gael Thapa MD  05/10/24 9936

## 2024-05-10 NOTE — TELEPHONE ENCOUNTER
----- Message from Monica Figueroa sent at 5/10/2024  2:39 PM CDT -----  Type: Patient Call Back    Who called:pt     What is the request in detail:pt is requesting to speak to the doctor in regards to getting a prescription sent to the pharmacy nitrofurantoin, macrocrystal-monohydrate, (MACROBID) 100 MG capsule. She went to the hospital and they gave her a paper prescription for this medication. She is requesting dr quiroz to send it to the pharmacy because she can't drive. The hospital wouldn't send it to the pharmacy for her. Only paper prescription. Please call pt to discuss.       Can the clinic reply by [facility]?    Would the patient rather a call back or a response via [facility]? call    Best call back number:[phone] (home)       Additional Information:

## 2024-05-10 NOTE — TELEPHONE ENCOUNTER
Pt stated she was updating rx status, she did receive prescription and wanted to cancel message. Call ended.

## 2024-05-10 NOTE — TELEPHONE ENCOUNTER
Spoke with ,  Pt  had surgery 5/2//2024. She called complaining of severe back pain and a small knot on lower back about 4 inches up from her tail bone. Pt is also complaining of burning sensation in that same area. She stated that her blood pressure (88/51)and pulse (60) was very low as well and she felt like she going to pass out. Per Anna Marie Johnson (NP) pt was advised to go into the ER and drink plenty fluids. Pt verbalized that she understood.     [facility]    ----- Message from Renee Crain sent at 5/10/2024 10:06 AM CDT -----  Name of Who is Calling:SILVA POSEY [[id number]]                What is the request in detail:Pt calling because she states that her BP was high,she passed out and is in pain, I was able to get her to [facility], for assistance.Please call back to further assist.                 Can the clinic reply by [facility]: No                What Number to Call Back if not in [facility]: [phone]

## 2024-05-10 NOTE — ED TRIAGE NOTES
Patient arrives to ER complaining that her blood pressure is low . She had a partial hysterectomy x 1 week ago. She has a sore area on her coccyx that she was attempting to feel just PTA. She states that when she went to touch the area she got light headed. She then checked her B/P . It was a low reading  so she called her NP. The NP suggested she be checked out at her nearest ER. Surgery was at  [facility].

## 2024-05-11 LAB
BACTERIA UR CULT: NORMAL
BACTERIA UR CULT: NORMAL

## 2024-05-13 ENCOUNTER — OFFICE VISIT (OUTPATIENT)
Dept: PODIATRY | Facility: CLINIC | Age: 59
End: 2024-05-13

## 2024-05-13 VITALS — HEIGHT: 69 IN | BODY MASS INDEX: 25.03 KG/M2 | WEIGHT: 169 LBS

## 2024-05-13 DIAGNOSIS — M19.90 ARTHRITIS: ICD-10-CM

## 2024-05-13 DIAGNOSIS — M20.11 VALGUS DEFORMITY OF BOTH GREAT TOES: Primary | ICD-10-CM

## 2024-05-13 DIAGNOSIS — M20.12 VALGUS DEFORMITY OF BOTH GREAT TOES: Primary | ICD-10-CM

## 2024-05-13 PROCEDURE — 99213 OFFICE O/P EST LOW 20 MIN: CPT | Mod: S$GLB,,, | Performed by: PODIATRIST

## 2024-05-15 LAB
OHS QRS DURATION: 78 MS
OHS QTC CALCULATION: 447 MS

## 2024-05-16 ENCOUNTER — OFFICE VISIT (OUTPATIENT)
Dept: UROGYNECOLOGY | Facility: CLINIC | Age: 59
End: 2024-05-16
Payer: MEDICAID

## 2024-05-16 VITALS
SYSTOLIC BLOOD PRESSURE: 119 MMHG | BODY MASS INDEX: 25.04 KG/M2 | WEIGHT: 169.06 LBS | HEIGHT: 69 IN | HEART RATE: 61 BPM | DIASTOLIC BLOOD PRESSURE: 78 MMHG

## 2024-05-16 DIAGNOSIS — Z98.890 POST-OPERATIVE STATE: Primary | ICD-10-CM

## 2024-05-16 DIAGNOSIS — K59.00 CONSTIPATION, UNSPECIFIED CONSTIPATION TYPE: ICD-10-CM

## 2024-05-16 LAB
BILIRUB SERPL-MCNC: NORMAL MG/DL
BLOOD URINE, POC: NORMAL
CLARITY, POC UA: CLEAR
COLOR, POC UA: YELLOW
GLUCOSE UR QL STRIP: NORMAL
KETONES UR QL STRIP: NORMAL
LEUKOCYTE ESTERASE URINE, POC: NORMAL
NITRITE, POC UA: NORMAL
PH, POC UA: 5
PROTEIN, POC: NORMAL
SPECIFIC GRAVITY, POC UA: 1.01
UROBILINOGEN, POC UA: NORMAL

## 2024-05-16 PROCEDURE — 99999 PR PBB SHADOW E&M-EST. PATIENT-LVL IV: CPT | Mod: PBBFAC,,, | Performed by: NURSE PRACTITIONER

## 2024-05-16 PROCEDURE — 81002 URINALYSIS NONAUTO W/O SCOPE: CPT | Mod: PBBFAC | Performed by: NURSE PRACTITIONER

## 2024-05-16 PROCEDURE — 99999PBSHW POCT URINE DIPSTICK WITHOUT MICROSCOPE: Mod: PBBFAC,,,

## 2024-05-16 NOTE — PATIENT INSTRUCTIONS
Post op healing well. Continue to follow post op instructions.  Take miralax daily in addition to stool softeners twice daily-- decrease miralax if your stool becomes too loose.  Patient will follow up with us in 4 weeks.

## 2024-05-16 NOTE — PROGRESS NOTES
[facility] - UROGYNECOLOGY  [address] [address]  June 21, 2024     Misti West  [id number]  1965    UROGYN POST-OP  6/21/2024     Misti West is a 58 y.o. here for a post operative visit.    Date of Procedure: 5/2/2024      Procedure: Procedure(s) (LRB):  HYSTERECTOMY,SUPRACERVICAL,LAPAROSCOPIC,WITH SALPINGECTOMY (Bilateral)  SACROCOLPOPEXY, LAPAROSCOPIC (N/A)  PERINEOPLASTY (N/A)  CYSTOSCOPY (N/A)  OOPHORECTOMY (Left)     LYSIS OF ADHESIONS (>30 min)    HISTORY SINCE LAST VISIT:  Denies pain, bleeding, or discharge.   Bladder issues: rare uui if ignores urge. Voiding every 2 hours during the day and 1/ night  Bowel issues: intermittent constipation--taking stool softener twice daily-- using miralax intermittently    Past Medical History:   Diagnosis Date    Anxiety     Arthritis     Cancer     cervical    Female bladder prolapse     Hepatomegaly     3 liver cyst    Hiatal hernia     High cholesterol     Liver lesion     PONV (postoperative nausea and vomiting)     Seasonal allergies        Past Surgical History:   Procedure Laterality Date    CERVIX SURGERY      COLONOSCOPY N/A 08/27/2020    Procedure: COLONOSCOPY;  Surgeon: Tim Aguayo MD;  Location: [facility];  Service: Endoscopy;  Laterality: N/A;    COLONOSCOPY N/A 1/16/2024    Procedure: COLONOSCOPY;  Surgeon: Tim Aguayo MD;  Location: [facility];  Service: Endoscopy;  Laterality: N/A;    COLPORRHAPHY, COMBINED ANTEROPOSTERIOR N/A 5/2/2024    Procedure: COLPORRHAPHY, COMBINED ANTEROPOSTERIOR;  Surgeon: Angeles Last MD;  Location: [facility] OR;  Service: OB/GYN;  Laterality: N/A;    CYSTOSCOPY N/A 5/2/2024    Procedure: CYSTOSCOPY;  Surgeon: Angeles Last MD;  Location: [facility] OR;  Service: OB/GYN;  Laterality: N/A;    CYSTOSCOPY WITH URETHRAL DILATION  10/01/2020    Procedure: CYSTOSCOPY, WITH URETHRAL DILATION;  Surgeon: Bhargav Keane MD;  Location: [facility] OR;  Service: Urology;;     ESOPHAGOGASTRODUODENOSCOPY N/A 08/13/2020    Procedure: EGD (ESOPHAGOGASTRODUODENOSCOPY);  Surgeon: Tim Aguayo MD;  Location: [facility];  Service: Endoscopy;  Laterality: N/A;    HYSTERECTOMY, TOTAL, LAPAROSCOPIC, WITH SALPINGECTOMY Bilateral 5/2/2024    Procedure: HYSTERECTOMY,TOTAL,LAPAROSCOPIC,WITH SALPINGECTOMY;  Surgeon: Angeles Last MD;  Location: [facility] OR;  Service: OB/GYN;  Laterality: Bilateral;  5 hours staying overnight    KNEE ARTHROSCOPY      TKR    LAPAROSCOPIC SACROCOLPOPEXY N/A 5/2/2024    Procedure: SACROCOLPOPEXY, LAPAROSCOPIC;  Surgeon: Angeles Last MD;  Location: [facility] OR;  Service: OB/GYN;  Laterality: N/A;    OOPHORECTOMY Left 5/2/2024    Procedure: OOPHORECTOMY;  Surgeon: Angeles Last MD;  Location: [facility] OR;  Service: OB/GYN;  Laterality: Left;  LAPAROSCOPIC    PERINEOPLASTY N/A 5/2/2024    Procedure: PERINEOPLASTY;  Surgeon: Angeles Last MD;  Location: [facility] OR;  Service: OB/GYN;  Laterality: N/A;    ROBOTIC ARTHROPLASTY, KNEE Left 03/29/2022    Procedure: ROBOTIC ARTHROPLASTY, KNEE, TOTAL;  Surgeon: Neo Zapata MD;  Location: [facility];  Service: Orthopedics;  Laterality: Left;    SINUS SURGERY      TUBAL LIGATION         Family History   Problem Relation Name Age of Onset    Diabetes Mother      Cancer Mother          bladder    Arthritis Mother      Cancer Father          lung    COPD Father      Hypertension Father      Arthritis Father      Melanoma Father      Breast cancer Sister      Arthritis Sister      Breast cancer Sister      Arthritis Sister      Breast cancer Sister      Kidney disease Sister      Arthritis Sister      Colon polyps Neg Hx      Colon cancer Neg Hx      Crohn's disease Neg Hx      Ulcerative colitis Neg Hx      Stomach cancer Neg Hx      Esophageal cancer Neg Hx      Celiac disease Neg Hx      Seizures Neg Hx         Social History     Socioeconomic History    Marital status:    Tobacco Use    Smoking status: Every Day     Current  packs/day: 1.00     Types: Cigarettes    Smokeless tobacco: Never   Substance and Sexual Activity    Alcohol use: Yes     Comment: rare    Drug use: Never    Sexual activity: Not Currently     Partners: Male   Social History Narrative    [place]. 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      Social Determinants of Health     Financial Resource Strain: Medium Risk (3/9/2022)    Overall Financial Resource Strain (CARDIA)     Difficulty of Paying Living Expenses: Somewhat hard   Food Insecurity: No Food Insecurity (3/9/2022)    Hunger Vital Sign     Worried About Running Out of Food in the Last Year: Never true     Ran Out of Food in the Last Year: Never true   Transportation Needs: No Transportation Needs (3/9/2022)    PRAPARE - Transportation     Lack of Transportation (Medical): No     Lack of Transportation (Non-Medical): No   Physical Activity: Unknown (3/9/2022)    Exercise Vital Sign     Days of Exercise per Week: 0 days   Stress: No Stress Concern Present (3/9/2022)    [employer] [place] [employer] - Occupational Stress Questionnaire     Feeling of Stress : Only a little   Housing Stability: Unknown (3/9/2022)    Housing Stability Vital Sign     Unable to Pay for Housing in the Last Year: No     Unstable Housing in the Last Year: No       Current Outpatient Medications   Medication Sig Dispense Refill    ascorbic acid, vitamin C, (VITAMIN C) 500 MG tablet Take 500 mg by mouth once daily.      atorvastatin (LIPITOR) 10 MG tablet Take 1 tablet (10 mg total) by mouth once daily. 90 tablet 3    b complex vitamins tablet Take 1 tablet by mouth once daily.      biotin 1 mg tablet Take 1,000 mcg by mouth 3 (three) times daily.      cholecalciferol, vitamin D3, (VITAMIN D3 ORAL) Take by mouth.      docusate sodium (COLACE) 100 MG capsule Take 1 capsule (100 mg total) by mouth 2 (two) times daily. 60 capsule 0    evening primrose oil 1,300 mg Cap Take by mouth.      glucosamine-chondroitin 500-400 mg  "tablet Take 1 tablet by mouth 3 (three) times daily.      ipratropium (ATROVENT) 21 mcg (0.03 %) nasal spray 2 sprays by Each Nostril route 3 (three) times daily. 60 mL 0    levocetirizine (XYZAL) 5 MG tablet Take 1 tablet (5 mg total) by mouth every evening. 30 tablet 11    montelukast (SINGULAIR) 5 MG chewable tablet Take 5 mg by mouth every evening.      multivitamin capsule Take 1 capsule by mouth once daily.      tumeric-ging-olive-oreg-capryl 100 mg-150 mg- 50 mg-150 mg Cap Take by mouth.      albuterol (VENTOLIN HFA) 90 mcg/actuation inhaler Inhale 2 puffs into the lungs every 6 (six) hours as needed for Wheezing. Rescue      azelastine (ASTELIN) 137 mcg (0.1 %) nasal spray 1 spray (137 mcg total) by Nasal route 2 (two) times daily. 18.2 mL 0    azithromycin (Z-ELMA) 250 MG tablet Take 2 tablets by mouth on day 1; Take 1 tablet by mouth on days 2-5 6 tablet 0    benzonatate (TESSALON) 200 MG capsule Take 1 capsule (200 mg total) by mouth 3 (three) times daily as needed for Cough. 30 capsule 0    dextromethorphan-guaiFENesin  mg/5 ml (ROBITUSSIN-DM)  mg/5 mL liquid Take 5 mLs by mouth every 6 (six) hours as needed (cough).      Lactobacillus rhamnosus GG (CULTURELLE) 10 billion cell capsule Take 1 capsule by mouth once daily.       No current facility-administered medications for this visit.       Review of patient's allergies indicates:   Allergen Reactions    Codeine Swelling     "Hives, vomiting"  Can take hydrocodone & tylenol    Morphine Swelling     "Hives, vomiting"    Naproxen sodium Swelling     "Itching, high blood pressure"    Percocet [oxycodone-acetaminophen] Nausea And Vomiting     LOW HEART RATE  Can take hydrocodone & tylenol    Tramadol hcl Swelling     "itching & nausea"    Ciprofloxacin Other (See Comments)     Low BP    Penicillins     Trintex         ROS  Per HPI    Well Woman   Pap:11/2021 normal HPV negative  Mammo:09/2023 normal  Colonoscopy:01/2024 internal bleeding, " "diverticulosis, polyps  Dexa:n/a    Physical Exam  /78 (BP Location: Right arm, Patient Position: Sitting, BP Method: Medium (Automatic))   Pulse 61   Ht 5' 9" (1.753 m)   Wt 76.7 kg (169 lb 1.5 oz)   BMI 24.97 kg/m²   General: alert and oriented, no acute distress  Respiratory: normal respiratory effort  Abd: soft, non-tender, non-distended      Pelvic:  Ext. Genitalia: normal external genitalia. Normal bartholin's and skeens glands  Vagina: + atrophy. Normal vaginal mucosa without lesions. No discharge noted.  Incisions healing well-- sutures intact. No mesh visible/ palpable  Non-tender bladder base without palpable mass.  Cervix: no lesions  Uterus:  absent   Urethra: no masses or tenderness  Urethral meatus: no lesions, caruncle or prolapse.        Impression  1. Post-operative state  POCT URINE DIPSTICK WITHOUT MICROSCOPE      2. Constipation, unspecified constipation type          We reviewed the above issues and discussed options for short-term versus long-term management of her problems.     Plan:   Post op healing well. Continue to follow post op instructions.  Take miralax daily in addition to stool softeners twice daily-- decrease miralax if your stool becomes too loose.  Patient will follow up with us in 4 weeks.    20 minutes were spent in face to face time with this patient  90 % of this time was spent in counseling and/or coordination of care    ARACELIS Carter-BC [facility]  Division of Female Pelvic Medicine and Reconstructive Surgery  Department of Obstetrics & Gynecology       "

## 2024-05-22 ENCOUNTER — HOSPITAL ENCOUNTER (OUTPATIENT)
Dept: RADIOLOGY | Facility: HOSPITAL | Age: 59
Discharge: HOME | End: 2024-05-22
Attending: ORTHOPAEDIC SURGERY

## 2024-05-22 ENCOUNTER — OFFICE VISIT (OUTPATIENT)
Dept: ORTHOPEDICS | Facility: CLINIC | Age: 59
End: 2024-05-22

## 2024-05-22 VITALS — BODY MASS INDEX: 25.04 KG/M2 | WEIGHT: 169.06 LBS | HEIGHT: 69 IN

## 2024-05-22 DIAGNOSIS — M25.569 KNEE PAIN, UNSPECIFIED CHRONICITY, UNSPECIFIED LATERALITY: Primary | ICD-10-CM

## 2024-05-22 DIAGNOSIS — M25.569 KNEE PAIN, UNSPECIFIED CHRONICITY, UNSPECIFIED LATERALITY: ICD-10-CM

## 2024-05-22 DIAGNOSIS — Z96.652 STATUS POST LEFT KNEE REPLACEMENT: ICD-10-CM

## 2024-05-22 PROCEDURE — 99999 PR PBB SHADOW E&M-EST. PATIENT-LVL III: CPT | Mod: PBBFAC,,, | Performed by: ORTHOPAEDIC SURGERY

## 2024-05-22 PROCEDURE — 73564 X-RAY EXAM KNEE 4 OR MORE: CPT | Mod: 26,50,, | Performed by: RADIOLOGY

## 2024-05-22 PROCEDURE — 99214 OFFICE O/P EST MOD 30 MIN: CPT | Mod: S$PBB,,, | Performed by: ORTHOPAEDIC SURGERY

## 2024-05-22 PROCEDURE — 99213 OFFICE O/P EST LOW 20 MIN: CPT | Mod: PBBFAC,25,PO | Performed by: ORTHOPAEDIC SURGERY

## 2024-05-22 PROCEDURE — 73564 X-RAY EXAM KNEE 4 OR MORE: CPT | Mod: TC,50,PO

## 2024-05-22 NOTE — PROGRESS NOTES
Past Medical History:   Diagnosis Date    Anxiety     Arthritis     Cancer     cervical    Female bladder prolapse     Hepatomegaly     3 liver cyst    Hiatal hernia     High cholesterol     Liver lesion     PONV (postoperative nausea and vomiting)     Seasonal allergies        Past Surgical History:   Procedure Laterality Date    CERVIX SURGERY      COLONOSCOPY N/A 08/27/2020    Procedure: COLONOSCOPY;  Surgeon: Tim Aguayo MD;  Location: [facility];  Service: Endoscopy;  Laterality: N/A;    COLONOSCOPY N/A 1/16/2024    Procedure: COLONOSCOPY;  Surgeon: Tim Aguayo MD;  Location: [facility];  Service: Endoscopy;  Laterality: N/A;    COLPORRHAPHY, COMBINED ANTEROPOSTERIOR N/A 5/2/2024    Procedure: COLPORRHAPHY, COMBINED ANTEROPOSTERIOR;  Surgeon: Angeles Last MD;  Location: [facility];  Service: OB/GYN;  Laterality: N/A;    CYSTOSCOPY N/A 5/2/2024    Procedure: CYSTOSCOPY;  Surgeon: Angeles Last MD;  Location: [facility];  Service: OB/GYN;  Laterality: N/A;    CYSTOSCOPY WITH URETHRAL DILATION  10/01/2020    Procedure: CYSTOSCOPY, WITH URETHRAL DILATION;  Surgeon: Bhargav Keane MD;  Location: [facility];  Service: Urology;;    ESOPHAGOGASTRODUODENOSCOPY N/A 08/13/2020    Procedure: EGD (ESOPHAGOGASTRODUODENOSCOPY);  Surgeon: Tim Aguayo MD;  Location: [facility];  Service: Endoscopy;  Laterality: N/A;    HYSTERECTOMY, TOTAL, LAPAROSCOPIC, WITH SALPINGECTOMY Bilateral 5/2/2024    Procedure: HYSTERECTOMY,TOTAL,LAPAROSCOPIC,WITH SALPINGECTOMY;  Surgeon: Angeles Last MD;  Location: [facility];  Service: OB/GYN;  Laterality: Bilateral;  5 hours staying overnight    KNEE ARTHROSCOPY      TKR    LAPAROSCOPIC SACROCOLPOPEXY N/A 5/2/2024    Procedure: SACROCOLPOPEXY, LAPAROSCOPIC;  Surgeon: Angeles Last MD;  Location: [facility];  Service: OB/GYN;  Laterality: N/A;    OOPHORECTOMY Left 5/2/2024    Procedure: OOPHORECTOMY;  Surgeon: Angeles Last MD;  Location: [facility];  Service: OB/GYN;  Laterality:  Left;  LAPAROSCOPIC    PERINEOPLASTY N/A 5/2/2024    Procedure: PERINEOPLASTY;  Surgeon: Angeles Last MD;  Location: [facility] OR;  Service: OB/GYN;  Laterality: N/A;    ROBOTIC ARTHROPLASTY, KNEE Left 03/29/2022    Procedure: ROBOTIC ARTHROPLASTY, KNEE, TOTAL;  Surgeon: Neo Zapata MD;  Location: [facility] OR;  Service: Orthopedics;  Laterality: Left;    SINUS SURGERY      TUBAL LIGATION         Current Outpatient Medications   Medication Sig    ascorbic acid, vitamin C, (VITAMIN C) 500 MG tablet Take 500 mg by mouth once daily.    atorvastatin (LIPITOR) 10 MG tablet Take 1 tablet (10 mg total) by mouth once daily.    b complex vitamins tablet Take 1 tablet by mouth once daily.    biotin 1 mg tablet Take 1,000 mcg by mouth 3 (three) times daily.    cholecalciferol, vitamin D3, (VITAMIN D3 ORAL) Take by mouth.    docusate sodium (COLACE) 100 MG capsule Take 100 mg by mouth once daily.    docusate sodium (COLACE) 100 MG capsule Take 1 capsule (100 mg total) by mouth 2 (two) times daily.    evening primrose oil 1,300 mg Cap Take by mouth.    glucosamine-chondroitin 500-400 mg tablet Take 1 tablet by mouth 3 (three) times daily.    HYDROcodone-acetaminophen (NORCO) 5-325 mg per tablet Take 1 tablet by mouth every 4 (four) hours as needed for Pain.    ibuprofen (ADVIL,MOTRIN) 600 MG tablet Take 1 tablet (600 mg total) by mouth every 6 (six) hours.    ipratropium (ATROVENT) 21 mcg (0.03 %) nasal spray 2 sprays by Each Nostril route 3 (three) times daily.    levocetirizine (XYZAL) 5 MG tablet Take 1 tablet (5 mg total) by mouth every evening.    montelukast (SINGULAIR) 5 MG chewable tablet Take 5 mg by mouth every evening.    multivitamin capsule Take 1 capsule by mouth once daily.    tumeric-ging-olive-oreg-capryl 100 mg-150 mg- 50 mg-150 mg Cap Take by mouth.    UNABLE TO FIND medication name: MILK THISTLE SUPPLEMENT (Patient not taking: Reported on 5/13/2024)     No current facility-administered medications for  "this visit.       Review of patient's allergies indicates:   Allergen Reactions    Codeine Swelling     "Hives, vomiting"  Can take hydrocodone & tylenol    Morphine Swelling     "Hives, vomiting"    Naproxen sodium Swelling     "Itching, high blood pressure"    Percocet [oxycodone-acetaminophen] Nausea And Vomiting     LOW HEART RATE  Can take hydrocodone & tylenol    Tramadol hcl Swelling     "itching & nausea"    Ciprofloxacin Other (See Comments)     Low BP    Penicillins     Trintex        Family History   Problem Relation Name Age of Onset    Diabetes Mother      Cancer Mother          bladder    Arthritis Mother      Cancer Father          lung    COPD Father      Hypertension Father      Arthritis Father      Melanoma Father      Breast cancer Sister      Arthritis Sister      Breast cancer Sister      Arthritis Sister      Breast cancer Sister      Kidney disease Sister      Arthritis Sister      Colon polyps Neg Hx      Colon cancer Neg Hx      Crohn's disease Neg Hx      Ulcerative colitis Neg Hx      Stomach cancer Neg Hx      Esophageal cancer Neg Hx      Celiac disease Neg Hx      Seizures Neg Hx         Social History     Socioeconomic History    Marital status:    Tobacco Use    Smoking status: Every Day     Current packs/day: 1.00     Types: Cigarettes    Smokeless tobacco: Never   Substance and Sexual Activity    Alcohol use: Yes     Comment: rare    Drug use: Never    Sexual activity: Not Currently     Partners: Male   Social History Narrative    [place] farm. 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      Social Determinants of Health     Financial Resource Strain: Medium Risk (3/9/2022)    Overall Financial Resource Strain (CARDIA)     Difficulty of Paying Living Expenses: Somewhat hard   Food Insecurity: No Food Insecurity (3/9/2022)    Hunger Vital Sign     Worried About Running Out of Food in the Last Year: Never true     Ran Out of Food in the Last Year: Never true   Transportation " Needs: No Transportation Needs (3/9/2022)    PRAPARE - Transportation     Lack of Transportation (Medical): No     Lack of Transportation (Non-Medical): No   Physical Activity: Unknown (3/9/2022)    Exercise Vital Sign     Days of Exercise per Week: 0 days   Stress: No Stress Concern Present (3/9/2022)    [employer] [place] [employer] - Occupational Stress Questionnaire     Feeling of Stress : Only a little   Housing Stability: Unknown (3/9/2022)    Housing Stability Vital Sign     Unable to Pay for Housing in the Last Year: No     Unstable Housing in the Last Year: No       Chief Complaint:   No chief complaint on file.      Date of surgery:  March 29, 2022    History of present illness:  58-year-old female underwent left Buzz total knee arthroplasty for valgus knee arthritis.  Was just a little stiff immediately after surgery and has never really gotten as much flexion as she wants.  Doing well.  Just has occasional achy pain but no real dysfunction.    Review of Systems:    Musculoskeletal:  See HPI        Physical Examination:    Vital Signs:    There were no vitals filed for this visit.      There is no height or weight on file to calculate BMI.    This a well-developed, well nourished patient in no acute distress.  They are alert and oriented and cooperative to examination.      Examination left knee shows multiple healed surgical incisions.  No erythema drainage.  Mild joint swelling.  Range of motion is 0-120 degrees.      X-rays:  Four views of both knees are ordered and reviewed which show well-aligned left total knee arthroplasty without complications.  Minimal valgus arthritis in the right knee     Assessment::  Status post left Buzz Abraham CR total knee arthroplasty    Plan:  She is doing well.  Follow-up in 2 years with x-rays of both knees.    This note was created using [employer] voice recognition software that occasionally misinterpreted phrases or words.

## 2024-05-29 ENCOUNTER — OFFICE VISIT (OUTPATIENT)
Dept: PODIATRY | Facility: CLINIC | Age: 59
End: 2024-05-29

## 2024-05-29 VITALS — BODY MASS INDEX: 25.03 KG/M2 | HEIGHT: 69 IN | WEIGHT: 169 LBS

## 2024-05-29 DIAGNOSIS — M67.472 GANGLION CYST OF LEFT FOOT: Primary | ICD-10-CM

## 2024-05-29 PROCEDURE — 20600 DRAIN/INJ JOINT/BURSA W/O US: CPT | Mod: LT,S$GLB,, | Performed by: PODIATRIST

## 2024-05-29 PROCEDURE — 99213 OFFICE O/P EST LOW 20 MIN: CPT | Mod: 25,S$GLB,, | Performed by: PODIATRIST

## 2024-05-29 RX ADMIN — LIDOCAINE HYDROCHLORIDE 1 ML: 10 INJECTION INFILTRATION; PERINEURAL at 08:05

## 2024-05-29 RX ADMIN — DEXAMETHASONE SODIUM PHOSPHATE 4 MG: 4 INJECTION, SOLUTION INTRA-ARTICULAR; INTRALESIONAL; INTRAMUSCULAR; INTRAVENOUS; SOFT TISSUE at 08:05

## 2024-06-02 NOTE — PROGRESS NOTES
Subjective:     Patient ID: Misti West is a 58 y.o. female.    Chief Complaint: Foot Pain    Misti is a 58 y.o. female who presents to the podiatry clinic  with complaint of  left foot pain. Onset of the symptoms was several years ago. Precipitating event: increased activity and growth of bunion and arthritic changes . Current symptoms include: stiffness and worsening symptoms after a period of activity. Aggravating factors:  prolonged use . Symptoms have gradually worsened. Patient has had prior foot problems.  Treatment to date: none. Patients rates pain 2/10 on pain scale.    Review of Systems   Constitutional: Negative for chills and fever.   Cardiovascular:  Negative for chest pain and leg swelling.   Respiratory:  Negative for cough and shortness of breath.    Gastrointestinal:  Negative for diarrhea, nausea and vomiting.        Objective:     Physical Exam  Vitals reviewed.   Constitutional:       General: She is not in acute distress.     Appearance: Normal appearance. She is not ill-appearing.   HENT:      Head: Normocephalic.      Nose: Nose normal.   Pulmonary:      Effort: Pulmonary effort is normal. No respiratory distress.   Skin:     Capillary Refill: Capillary refill takes 2 to 3 seconds.   Neurological:      Mental Status: She is alert and oriented to person, place, and time.   Psychiatric:         Mood and Affect: Mood normal.         Behavior: Behavior normal.         Thought Content: Thought content normal.         Judgment: Judgment normal.     Neurologic: Protective and light touch sensation intact bilateral lower extremity, positive paresthesias from left lateral ankle   Musculoskeletal:  5/5 muscle strength noted bilateral foot, ankle joint range of motion is reduced to the left with some crepitus and mild pain, no masses noted bilateral foot, medial deviation of left 1st metatarsal with prominent dorsal medial eminence left 1st MPJ/bunion left 1st MPJ, reduced range of motion at the  left 1st MPJ with mild pain at end points range of motion particularly dorsiflexion   Dermatologic:  No open lesions noted bilateral foot, no rashes noted bilateral foot, hyperkeratotic skin lesion noted at the plantar aspect bilateral hallux IPJ joint  Vascular: DP and PT pulses palpable 2/4 bilateral foot, capillary fill time less than 3 seconds to distal aspect of the digits bilateral foot, no swelling noted to the left ankle      Assessment:      Encounter Diagnoses   Name Primary?    Arthritis     Valgus deformity of both great toes Yes     Plan:     Misti was seen today for foot pain.    Diagnoses and all orders for this visit:    Valgus deformity of both great toes    Arthritis      I counseled the patient on her conditions, their implications and medical management.        1. Patient was examined and evaluated.    2. Discussed with patient etiology of bunion deformity/hallux abductovalgus.  Discussed conservative treatment options with the patient.  Patient was advised to adjust shoe gear for better comfort and fit including increased toe box height and width and selection shoe gear with soft stretchable uppers.  Discussed with patient potential benefits of a local corticosteroid injection or oral Medrol Dosepak for improvement of pain and inflammation.  Patient was advised that she can adjunct with NSAIDs for pain relief as well.  Briefly discussed with patient surgical intervention including Scottie bunionectomy.    3. Discussed arthritic changes with the patient.  Patient will continue with current multi vitamin and nutrition support regimen for the area.  Patient was advised of potential benefits of OTC analgesics/NSAIDs for pain relief.  Discussed with patient potential benefits of local corticosteroid injection for improvement of pain related to bunions and arthritis.  4. Patient will follow-up 2-3 weeks or p.r.n. for complaints

## 2024-06-13 ENCOUNTER — OFFICE VISIT (OUTPATIENT)
Dept: UROGYNECOLOGY | Facility: CLINIC | Age: 59
End: 2024-06-13

## 2024-06-13 VITALS
WEIGHT: 168 LBS | DIASTOLIC BLOOD PRESSURE: 74 MMHG | BODY MASS INDEX: 24.88 KG/M2 | HEIGHT: 69 IN | HEART RATE: 61 BPM | SYSTOLIC BLOOD PRESSURE: 118 MMHG

## 2024-06-13 DIAGNOSIS — K59.04 CHRONIC IDIOPATHIC CONSTIPATION: ICD-10-CM

## 2024-06-13 DIAGNOSIS — Z48.89 POSTOPERATIVE VISIT: Primary | ICD-10-CM

## 2024-06-13 LAB
BILIRUB SERPL-MCNC: NORMAL MG/DL
BLOOD URINE, POC: NORMAL
CLARITY, POC UA: CLEAR
COLOR, POC UA: YELLOW
GLUCOSE UR QL STRIP: NORMAL
KETONES UR QL STRIP: NORMAL
LEUKOCYTE ESTERASE URINE, POC: NORMAL
NITRITE, POC UA: NORMAL
PH, POC UA: 5
PROTEIN, POC: NORMAL
SPECIFIC GRAVITY, POC UA: 1.02
UROBILINOGEN, POC UA: NORMAL

## 2024-06-13 PROCEDURE — 99999 PR PBB SHADOW E&M-EST. PATIENT-LVL III: CPT | Mod: PBBFAC,,, | Performed by: OBSTETRICS & GYNECOLOGY

## 2024-06-13 PROCEDURE — 99999PBSHW POCT URINE DIPSTICK WITHOUT MICROSCOPE: Mod: PBBFAC,,,

## 2024-06-13 PROCEDURE — 81002 URINALYSIS NONAUTO W/O SCOPE: CPT | Mod: PBBFAC | Performed by: OBSTETRICS & GYNECOLOGY

## 2024-06-13 NOTE — PROGRESS NOTES
"CC: Post-operative visit  S/p LSH, LSO, right salpingectomy, sacrocervicopexy, perineorrhaphy, GERARD, and cystoscopy on 24    HPI:  Patient is a 58 y.o. female  presents today for a postoperative visit. Reports that her pain has slowly improved. She feels that she has intermittent "belly button pain". She also feels that the left side of the abdomen is more raised than the other side and there is some pain associated with that side. Notices both when she is more active. She also feels that she has slowly adapted to the change in how she urinates. Feels that she urinates more normally now.     She denies vaginal bulge or pain. She continues to have constipation. She denies straining. She is taking a stool softeners two times per day. Miralax did not help. Taking MOM. Feels that she eats a lot of fiber in her diet.     Denies urinary urgency, frequency, and incontinence      REVIEW OF SYSTEMS:  Per HPI. All other reviewed systems are negative.        PHYSICAL EXAMINATION  /74 (BP Location: Left arm, Patient Position: Sitting, BP Method: Medium (Automatic))   Pulse 61   Ht 5' 9" (1.753 m)   Wt 76.2 kg (167 lb 15.9 oz)   BMI 24.81 kg/m²     General: Healthy in appearance, Well nourished, Affect Normal, NAD.  Gastrointestinal: Non tender, Non distended, No masses, guarding or rebound, Incision: well healed, no hernia noted in the standing and straining position  Ext: No clubbing, cyanosis, edema or varicosities.    Genitourinary- (Verbal consent obtained; Chaperone present)  Vulva: normal without lesions, masses, atrophy or pain  Urethra: meatus central and normal in appearance, no prolapse/caruncle, no masses or discharge. Empty supine stress test was negative.   Bladder: non-tender, no masses  Vagina: No discharge or lesions, moderate atrophy, no masses appreciated, Mesh erosion: none.  [See POP-Q]  Cervix: no lesions, no discharge  Uterus:  absent  Adnexa: no masses, non tender.      POP-Q Exam- " pelvic organ prolapse quantitative    Aa -3  Anterior Wall Ba -3  Anterior wall C -7  Cervix or cuff   Gh 3.5    Genital hiatus pb 3.5    perineal body tvl 10.5  Total vaginal length   Ap -3  Posterior wall Bp -3  Posterior wall D -10.5  Posterior formix     without Uterus      Office Visit on 06/13/2024   Component Date Value Ref Range Status    Glucose, UA 06/13/2024 Neg   Final    Bilirubin, POC 06/13/2024 Neg   Final    Ketones, UA 06/13/2024 Neg   Final    Spec Grav UA 06/13/2024 1.020   Final    Blood, UA 06/13/2024 Neg   Final    pH, UA 06/13/2024 5   Final    Protein, POC 06/13/2024 Neg   Final    Urobilinogen, UA 06/13/2024 Neg   Final    Nitrite, UA 06/13/2024 Neg   Final    WBC, UA 06/13/2024 Neg   Final    Color, UA 06/13/2024 Yellow   Final    Clarity, UA 06/13/2024 Clear   Final        ASSESSMENT & PLAN:    Postoperative visit  -     POCT URINE DIPSTICK WITHOUT MICROSCOPE       59 yo s/p LSH, LSO, right salpingectomy, sacrocervicopexy, perineorrhaphy, GERARD, and cystoscopy on 5/2/24 presented for a 6 week postoperative visit. We discussed that many of her symptoms are very typical in the postoperative period. Reassured her that I did not see evidence of a ventral hernia but to monitor symptoms and if needed we can obtain a CT scan if needed for further evaluation. Discussed that she may resume normal activities but slowly as she is deconditioned and we do not want to cause a musculoskeletal injury. She will return again in about 4.5 months for re-evaluation.     For her constipation various recommendations were made including resuming Miralax, adding fiber and trying smooth move tea. If symptoms are not better she was asked to call the office in a couple of weeks.       All questions were answered today. The patient was encouraged to contact the office as needed with any additional questions or concerns.     Total time spent on visit was 25 minutes.  This includes face to face time and non-face to face  time preparing to see the patient (eg, review of tests), Obtaining and/or reviewing separately obtained history, Documenting clinical information in the electronic or other health record, Independently interpreting resultsand communicating results to the patient/family/caregiver, or Care coordination.      Angeles Last MD

## 2024-06-19 NOTE — PROGRESS NOTES
Subjective:     Patient ID: Misti West is a 58 y.o. female.    Chief Complaint: Foot Pain    Misti is a 58 y.o. female who presents to the podiatry clinic  with complaint of  left foot pain. Onset of the symptoms was several weeks ago. Precipitating event: increased activity and growth of soft tissue lesion . Current symptoms include: ability to bear weight, but with some pain, swelling, and worsening symptoms after a period of activity. Aggravating factors:  direct contact to lesion . Symptoms have been intermittent. Patient has had prior foot problems. Treatment to date: rest. Patients rates pain 5/10 on pain scale.    Review of Systems   Constitutional: Negative for chills and fever.   Cardiovascular:  Negative for chest pain and leg swelling.   Respiratory:  Negative for cough and shortness of breath.    Gastrointestinal:  Negative for diarrhea, nausea and vomiting.        Objective:     Physical Exam  Vitals reviewed.   Constitutional:       General: She is not in acute distress.     Appearance: Normal appearance. She is not ill-appearing.   HENT:      Head: Normocephalic.      Nose: Nose normal.   Pulmonary:      Effort: Pulmonary effort is normal. No respiratory distress.   Skin:     Capillary Refill: Capillary refill takes 2 to 3 seconds.   Neurological:      Mental Status: She is alert and oriented to person, place, and time.   Psychiatric:         Mood and Affect: Mood normal.         Behavior: Behavior normal.         Thought Content: Thought content normal.         Judgment: Judgment normal.     Neurologic: Protective and light touch sensation intact bilateral lower extremity, positive paresthesias from left lateral ankle   Musculoskeletal:  5/5 muscle strength noted bilateral foot, ankle joint range of motion is reduced to the left with some crepitus and mild pain, no masses noted bilateral foot, medial deviation of left 1st metatarsal with prominent dorsal medial eminence left 1st MPJ/bunion  left 1st MPJ, soft tissue mass mass noted to the lateral left dorsal midfoot which is freely movable  Dermatologic:  No open lesions noted bilateral foot, no rashes noted bilateral foot, hyperkeratotic skin lesion noted at the plantar aspect bilateral hallux IPJ joint  Vascular: DP and PT pulses palpable 2/4 bilateral foot, capillary fill time less than 3 seconds to distal aspect of the digits bilateral foot, no swelling noted to the left ankle      Assessment:      Encounter Diagnosis   Name Primary?    Ganglion cyst of left foot Yes     Plan:     Misti was seen today for foot pain.    Diagnoses and all orders for this visit:    Ganglion cyst of left foot  -     Small Joint Aspiration/Injection: L intertarsal      I counseled the patient on her conditions, their implications and medical management.        1. Patient was examined and evaluated.    2. Discussed patient presence of possible ganglionic cyst to the left foot causing compression on adjacent structures leading to pain and tenderness.  Discussed with patient benefits of local corticosteroid injection and aspiration of the lesion contents for reduction of pressure.  Discussed with patient surgical intervention for removal of ganglionic cyst.  Patient was made aware of potential recurrence over time.  Patient was advised to adjust shoe gear for better comfort fit for prevention of direct contact to the lesion   Small Joint Aspiration/Injection: L intertarsal    Date/Time: 5/29/2024 8:45 AM    Performed by: Jv Brian DPM  Authorized by: Jv Brian DPM    Consent Done?:  Yes (Verbal)  Indications:  Pain and joint swelling  Location:  Foot  Site:  L intertarsal  Ultrasonic guidance for needle placement?: No    Needle size:  25 G  Approach:  Dorsal  Medications:  1 mL LIDOcaine HCL 10 mg/ml (1%) 10 mg/mL (1 %); 4 mg dexAMETHasone 4 mg/mL  Patient tolerance:  Patient tolerated the procedure well with no immediate complications      3. Patient  will follow-up 1 month or p.r.n. for complaints

## 2024-06-20 ENCOUNTER — PATIENT MESSAGE (OUTPATIENT)
Dept: FAMILY MEDICINE | Facility: CLINIC | Age: 59
End: 2024-06-20

## 2024-06-20 ENCOUNTER — OFFICE VISIT (OUTPATIENT)
Dept: CARDIOLOGY | Facility: CLINIC | Age: 59
End: 2024-06-20

## 2024-06-20 VITALS
HEART RATE: 55 BPM | BODY MASS INDEX: 25.11 KG/M2 | SYSTOLIC BLOOD PRESSURE: 121 MMHG | HEIGHT: 69 IN | DIASTOLIC BLOOD PRESSURE: 88 MMHG | WEIGHT: 169.56 LBS

## 2024-06-20 DIAGNOSIS — R00.1 BRADYCARDIA: Primary | ICD-10-CM

## 2024-06-20 DIAGNOSIS — R07.2 PRECORDIAL PAIN: ICD-10-CM

## 2024-06-20 DIAGNOSIS — E78.49 OTHER HYPERLIPIDEMIA: ICD-10-CM

## 2024-06-20 DIAGNOSIS — F17.210 TOBACCO DEPENDENCE DUE TO CIGARETTES: ICD-10-CM

## 2024-06-20 DIAGNOSIS — Z98.890 STATUS POST SACROCOLPOPEXY: ICD-10-CM

## 2024-06-20 PROBLEM — R07.9 CHEST PAIN: Status: ACTIVE | Noted: 2024-06-20

## 2024-06-20 PROCEDURE — 99214 OFFICE O/P EST MOD 30 MIN: CPT | Mod: PBBFAC,PO | Performed by: INTERNAL MEDICINE

## 2024-06-20 PROCEDURE — 99999 PR PBB SHADOW E&M-EST. PATIENT-LVL IV: CPT | Mod: PBBFAC,,, | Performed by: INTERNAL MEDICINE

## 2024-06-20 NOTE — PROGRESS NOTES
"Subjective:    Patient ID:  Misti West is a 58 y.o. female patient here for evaluation Establish Care      History of Present Illness:  Status post gyn/ surgery May 2, 2024 with post anesthesia issues of what sounds like panic attack, atypical chest pain.  One-week postop patient did have a syncopal episode, non recurrent.  No prodrome symptomatology.  No prior history of syncope/presyncope.  Baseline EKG with sinus bradycardia, anterior ST T wave changes, rule out ischemia, rule out remote anterior wall MI. No recent past noninvasive cardiac assessment.  Risk factors include dyslipidemia, ongoing tobacco use family history.    Patient currently asymptomatic.  No past documented structural ischemic or arrhythmic heart disease.  Patient denies diabetes mellitus.  No history of CVA Ca.  No DVT PE.  Suspect underlying COPD.             Review of patient's allergies indicates:   Allergen Reactions    Codeine Swelling     "Hives, vomiting"  Can take hydrocodone & tylenol    Morphine Swelling     "Hives, vomiting"    Naproxen sodium Swelling     "Itching, high blood pressure"    Percocet [oxycodone-acetaminophen] Nausea And Vomiting     LOW HEART RATE  Can take hydrocodone & tylenol    Tramadol hcl Swelling     "itching & nausea"    Ciprofloxacin Other (See Comments)     Low BP    Penicillins     Trintex        Past Medical History:   Diagnosis Date    Anxiety     Arthritis     Cancer     cervical    Female bladder prolapse     Hepatomegaly     3 liver cyst    Hiatal hernia     High cholesterol     Liver lesion     PONV (postoperative nausea and vomiting)     Seasonal allergies      Past Surgical History:   Procedure Laterality Date    CERVIX SURGERY      COLONOSCOPY N/A 08/27/2020    Procedure: COLONOSCOPY;  Surgeon: Tim Aguayo MD;  Location: [facility];  Service: Endoscopy;  Laterality: N/A;    COLONOSCOPY N/A 1/16/2024    Procedure: COLONOSCOPY;  Surgeon: Tim Aguayo MD;  Location: [facility];  " Service: Endoscopy;  Laterality: N/A;    COLPORRHAPHY, COMBINED ANTEROPOSTERIOR N/A 5/2/2024    Procedure: COLPORRHAPHY, COMBINED ANTEROPOSTERIOR;  Surgeon: Angeles Last MD;  Location: [facility] OR;  Service: OB/GYN;  Laterality: N/A;    CYSTOSCOPY N/A 5/2/2024    Procedure: CYSTOSCOPY;  Surgeon: Angeles Last MD;  Location: [facility] OR;  Service: OB/GYN;  Laterality: N/A;    CYSTOSCOPY WITH URETHRAL DILATION  10/01/2020    Procedure: CYSTOSCOPY, WITH URETHRAL DILATION;  Surgeon: Bhargav Keane MD;  Location: [facility] OR;  Service: Urology;;    ESOPHAGOGASTRODUODENOSCOPY N/A 08/13/2020    Procedure: EGD (ESOPHAGOGASTRODUODENOSCOPY);  Surgeon: Tim Aguayo MD;  Location: [facility];  Service: Endoscopy;  Laterality: N/A;    HYSTERECTOMY, TOTAL, LAPAROSCOPIC, WITH SALPINGECTOMY Bilateral 5/2/2024    Procedure: HYSTERECTOMY,TOTAL,LAPAROSCOPIC,WITH SALPINGECTOMY;  Surgeon: Angeles Last MD;  Location: [facility];  Service: OB/GYN;  Laterality: Bilateral;  5 hours staying overnight    KNEE ARTHROSCOPY      TKR    LAPAROSCOPIC SACROCOLPOPEXY N/A 5/2/2024    Procedure: SACROCOLPOPEXY, LAPAROSCOPIC;  Surgeon: Angeles Last MD;  Location: [facility];  Service: OB/GYN;  Laterality: N/A;    OOPHORECTOMY Left 5/2/2024    Procedure: OOPHORECTOMY;  Surgeon: Angeles Last MD;  Location: [facility];  Service: OB/GYN;  Laterality: Left;  LAPAROSCOPIC    PERINEOPLASTY N/A 5/2/2024    Procedure: PERINEOPLASTY;  Surgeon: Angeles Last MD;  Location: [facility];  Service: OB/GYN;  Laterality: N/A;    ROBOTIC ARTHROPLASTY, KNEE Left 03/29/2022    Procedure: ROBOTIC ARTHROPLASTY, KNEE, TOTAL;  Surgeon: Neo Zapata MD;  [facility];  Service: Orthopedics;  Laterality: Left;    SINUS SURGERY      TUBAL LIGATION       Social History     Tobacco Use    Smoking status: Every Day     Current packs/day: 1.00     Types: Cigarettes    Smokeless tobacco: Never   Substance Use Topics    Alcohol use: Yes     Comment: rare    Drug use:  Never        Review of Systems:    As noted in HPI in addition         REVIEW OF SYSTEMS  Review of Systems   Constitutional: Negative for decreased appetite, diaphoresis, night sweats, weight gain and weight loss.   HENT:  Negative for nosebleeds and odynophagia.    Eyes:  Negative for double vision and photophobia.   Cardiovascular:  Negative for chest pain, claudication, cyanosis, dyspnea on exertion, irregular heartbeat, leg swelling, near-syncope, orthopnea, palpitations, paroxysmal nocturnal dyspnea and syncope.   Respiratory:  Negative for cough, hemoptysis, shortness of breath and wheezing.    Hematologic/Lymphatic: Negative for adenopathy.   Skin:  Negative for flushing, skin cancer and suspicious lesions.   Musculoskeletal:  Negative for gout, myalgias and neck pain.   Gastrointestinal:  Negative for abdominal pain, heartburn, hematemesis and hematochezia.   Genitourinary:  Negative for bladder incontinence, hesitancy and nocturia.   Neurological:  Negative for focal weakness, headaches, light-headedness and paresthesias.   Psychiatric/Behavioral:  Negative for memory loss and substance abuse.        Objective:        Vitals:    06/20/24 0929   BP: 121/88   Pulse: (!) 55       Lab Results   Component Value Date    WBC 10.06 05/10/2024    HGB 13.7 05/10/2024    HCT 40.7 05/10/2024     05/10/2024    CHOL 167 07/20/2023    TRIG 58 07/20/2023    HDL 70 07/20/2023    ALT 24 05/10/2024    AST 21 05/10/2024     05/10/2024    K 4.0 05/10/2024     05/10/2024    CREATININE 1.0 05/10/2024    BUN 14 05/10/2024    CO2 24 05/10/2024    TSH 1.168 07/20/2023    INR 1.0 04/18/2022    HGBA1C 5.2 07/20/2023      CARDIOGRAM RESULTS  No results found for this or any previous visit.    No results found for this or any previous visit.          CURRENT/PREVIOUS VISIT EKG  Results for orders placed or performed during the hospital encounter of 05/10/24   EKG 12-lead    Collection Time: 05/10/24 11:58 AM   Result  Value Ref Range    QRS Duration 78 ms    [facility] QTC Calculation 447 ms    Narrative    Test Reason : R07.9,    Vent. Rate : 058 BPM     Atrial Rate : 058 BPM     P-R Int : 136 ms          QRS Dur : 078 ms      QT Int : 456 ms       P-R-T Axes : 057 030 031 degrees     QTc Int : 447 ms    Sinus bradycardia  Septal infarct ,age undetermined  T wave abnormality, consider anterior ischemia  Abnormal ECG    Confirmed by Ervin Ruiz MD ([id number]) on 5/15/2024 8:33:25 AM    Referred By: IRISH   SELF           Confirmed By:Ervin Ruiz MD     No valid procedures specified.   No results found for this or any previous visit.    No valid procedures specified.        PHYSICAL EXAM  GENERAL: well built, well nourished, well-developed in no apparent distress alert and oriented.   HEENT: Normocephalic. Pupils normal and conjunctivae normal.  Mucous membranes normal, no cyanosis or icterus, trachea central,no pallor or icterus is noted..   NECK: No JVD. No bruit..   THYROID: Thyroid not enlarged. No nodules present..   CARDIAC:  Normal S1-S2.  No murmur rub click or gallop.  PMI nondisplaced.  BRADYCARDIA.  LUNGS:  MILDLY DECREASED BREATH SOUNDS.  FEW SCATTERED RHONCHI.  ABDOMEN: Soft no masses or organomegaly.  No abdomen pulsations or bruits.  Normal bowel sounds. No pulsations and no masses felt, No guarding or rebound.   URINARY: No welch catheter   EXTREMITIES: No cyanosis, clubbing or edema noted at this time., no calf tenderness bilaterally.   PERIPHERAL VASCULAR SYSTEM: Good palpable distal pulses.  2+ femoral, popliteal and pedal pulses.  No bruits    CENTRAL NERVOUS SYSTEM: No focal motor or sensory deficits noted.   SKIN: Skin without lesions, moist, well perfused.   MUSCLE STRENGTH & TONE: No noteable weakness, atrophy or abnormal movement.     I HAVE REVIEWED :    The vital signs, nurses notes, and all the pertinent radiology and labs.         Current Outpatient Medications   Medication Instructions    ascorbic acid (vitamin C)  (VITAMIN C) 500 mg, Oral, Daily    atorvastatin (LIPITOR) 10 mg, Oral, Daily    b complex vitamins tablet 1 tablet, Oral, Daily    biotin 1,000 mcg, Oral, 3 times daily    cholecalciferol, vitamin D3, (VITAMIN D3 ORAL) Oral    docusate sodium (COLACE) 100 mg, Oral, Daily    docusate sodium (COLACE) 100 mg, Oral, 2 times daily    evening primrose oil 1,300 mg Cap Oral    glucosamine-chondroitin 500-400 mg tablet 1 tablet, Oral, 3 times daily    ipratropium (ATROVENT) 21 mcg (0.03 %) nasal spray 2 sprays, Each Nostril, 3 times daily    levocetirizine (XYZAL) 5 mg, Oral, Nightly    montelukast (SINGULAIR) 5 mg, Oral, Nightly    multivitamin capsule 1 capsule, Oral, Daily    tumeric-ging-olive-oreg-capryl 100 mg-150 mg- 50 mg-150 mg Cap Oral          Assessment:   Status post gyn/ surgery complicated by one-week history of syncope.  Underlying baseline EKG sinus bradycardia.  Anterior ST T wave changes.    Atypical chest pain.    Family history, ongoing tobacco use, dyslipidemia.    Plan:   GXT Cardiolite.  Echo.  Holter monitor.          No follow-ups on file.

## 2024-06-21 ENCOUNTER — HOSPITAL ENCOUNTER (OUTPATIENT)
Dept: RADIOLOGY | Facility: HOSPITAL | Age: 59
Discharge: HOME | End: 2024-06-21
Attending: STUDENT IN AN ORGANIZED HEALTH CARE EDUCATION/TRAINING PROGRAM

## 2024-06-21 ENCOUNTER — OFFICE VISIT (OUTPATIENT)
Dept: FAMILY MEDICINE | Facility: CLINIC | Age: 59
End: 2024-06-21

## 2024-06-21 VITALS
HEIGHT: 69 IN | BODY MASS INDEX: 25.42 KG/M2 | OXYGEN SATURATION: 95 % | HEART RATE: 70 BPM | WEIGHT: 171.63 LBS | SYSTOLIC BLOOD PRESSURE: 130 MMHG | DIASTOLIC BLOOD PRESSURE: 78 MMHG

## 2024-06-21 DIAGNOSIS — M79.672 LEFT FOOT PAIN: ICD-10-CM

## 2024-06-21 DIAGNOSIS — Z12.31 ENCOUNTER FOR SCREENING MAMMOGRAM FOR MALIGNANT NEOPLASM OF BREAST: ICD-10-CM

## 2024-06-21 DIAGNOSIS — R79.9 ABNORMAL BLOOD CHEMISTRY: ICD-10-CM

## 2024-06-21 DIAGNOSIS — B96.89 BACTERIAL SINUSITIS: ICD-10-CM

## 2024-06-21 DIAGNOSIS — J32.9 BACTERIAL SINUSITIS: ICD-10-CM

## 2024-06-21 DIAGNOSIS — F17.218 NICOTINE DEPENDENCE, CIGARETTES, WITH OTHER NICOTINE-INDUCED DISORDERS: Primary | ICD-10-CM

## 2024-06-21 PROCEDURE — 99999 PR PBB SHADOW E&M-EST. PATIENT-LVL V: CPT | Mod: PBBFAC,,, | Performed by: STUDENT IN AN ORGANIZED HEALTH CARE EDUCATION/TRAINING PROGRAM

## 2024-06-21 PROCEDURE — 73630 X-RAY EXAM OF FOOT: CPT | Mod: 26,LT,, | Performed by: RADIOLOGY

## 2024-06-21 PROCEDURE — 73630 X-RAY EXAM OF FOOT: CPT | Mod: TC,LT

## 2024-06-21 PROCEDURE — 99215 OFFICE O/P EST HI 40 MIN: CPT | Mod: PBBFAC,25 | Performed by: STUDENT IN AN ORGANIZED HEALTH CARE EDUCATION/TRAINING PROGRAM

## 2024-06-21 RX ORDER — AZELASTINE 1 MG/ML
1 SPRAY, METERED NASAL 2 TIMES DAILY
Qty: 18.2 ML | Refills: 0 | Status: SHIPPED | OUTPATIENT
Start: 2024-06-21 | End: 2025-06-21

## 2024-06-21 RX ORDER — GUAIFENESIN AND DEXTROMETHORPHAN HYDROBROMIDE 10; 100 MG/5ML; MG/5ML
5 SYRUP ORAL EVERY 6 HOURS PRN
COMMUNITY
Start: 2024-06-21 | End: 2024-07-01

## 2024-06-21 RX ORDER — AZITHROMYCIN 250 MG/1
TABLET, FILM COATED ORAL
Qty: 6 TABLET | Refills: 0 | Status: SHIPPED | OUTPATIENT
Start: 2024-06-21 | End: 2024-06-26

## 2024-06-21 RX ORDER — ALBUTEROL SULFATE 90 UG/1
2 AEROSOL, METERED RESPIRATORY (INHALATION) EVERY 6 HOURS PRN
Start: 2024-06-21 | End: 2025-06-21

## 2024-06-21 RX ORDER — BENZONATATE 200 MG/1
200 CAPSULE ORAL 3 TIMES DAILY PRN
Qty: 30 CAPSULE | Refills: 0 | Status: SHIPPED | OUTPATIENT
Start: 2024-06-21 | End: 2024-07-01

## 2024-06-21 NOTE — PROGRESS NOTES
[facility] Note    Subjective:    The HPI and pertinent ROS is included in the Diagnostic Impression Remarks section at the end of the note. Please see below for further details. Chief complaint is at end of note.     Misti is a pleasant intelligent patient who is here for evaluation.     Modified Medications    No medications on file       Data reviewed 274}  Previous medical records reviewed and summarized in plan section at end of note.      If you are due for any health screening(s) below please notify me so we can arrange them to be ordered and scheduled. Most healthy patients at your age complete them, but you are free to accept or refuse. If you can't do it, I'll definitely understand. If you can, I'd certainly appreciate it!     Tests to Keep You Healthy    Mammogram: Met on 9/7/2023  Colon Cancer Screening: Met on 1/16/2024  Tobacco Cessation: NO      The following portions of the patient's history were reviewed and updated as appropriate: allergies, current medications, past family history, past medical history, past social history, past surgical history and problem list.    She  has a past medical history of Anxiety, Arthritis, Cancer, Female bladder prolapse, Hepatomegaly, Hiatal hernia, High cholesterol, Liver lesion, PONV (postoperative nausea and vomiting), and Seasonal allergies.  She  has a past surgical history that includes Tubal ligation; Sinus surgery; Knee arthroscopy; Esophagogastroduodenoscopy (N/A, 08/13/2020); Cervix surgery; Colonoscopy (N/A, 08/27/2020); Cystoscopy with urethral dilation (10/01/2020); robotic arthroplasty, knee (Left, 03/29/2022); Colonoscopy (N/A, 1/16/2024); hysterectomy, total, laparoscopic, with salpingectomy (Bilateral, 5/2/2024); Laparoscopic sacrocolpopexy (N/A, 5/2/2024); colporrhaphy, combined anteroposterior (N/A, 5/2/2024); Perineoplasty (N/A, 5/2/2024); Cystoscopy (N/A, 5/2/2024); and Oophorectomy (Left, 5/2/2024).    She  reports that she  "has been smoking cigarettes. She has never used smokeless tobacco. She reports current alcohol use. She reports that she does not use drugs.  She family history includes Arthritis in her father, mother, sister, sister, and sister; Breast cancer in her sister, sister, and sister; COPD in her father; Cancer in her father and mother; Diabetes in her mother; Hypertension in her father; Kidney disease in her sister; Melanoma in her father.    Review of patient's allergies indicates:   Allergen Reactions    Codeine Swelling     "Hives, vomiting"  Can take hydrocodone & tylenol    Morphine Swelling     "Hives, vomiting"    Naproxen sodium Swelling     "Itching, high blood pressure"    Percocet [oxycodone-acetaminophen] Nausea And Vomiting     LOW HEART RATE  Can take hydrocodone & tylenol    Tramadol hcl Swelling     "itching & nausea"    Ciprofloxacin Other (See Comments)     Low BP    Penicillins     Trintex        Tobacco Use: High Risk (6/21/2024)    Patient History     Smoking Tobacco Use: Every Day     Smokeless Tobacco Use: Never     Passive Exposure: Not on file     Physical Examination  General appearance: alert, cooperative, no distress  Neck: no thyromegaly, no neck stiffness  Lungs: clear to auscultation, no wheezes, rales or rhonchi, symmetric air entry  Heart: normal rate, regular rhythm, normal S1, S2, no murmurs, rubs, clicks or gallops  Abdomen: soft, nontender, nondistended, no rigidity, rebound, or guarding.   Back: no point tenderness over spine  Extremities: there is a hard bony deformity on the dorsal aspect the left foot in the tarsal region  Neurological:alert, oriented, normal speech, no new focal findings or movement disorder noted from baseline    BP Readings from Last 3 Encounters:   06/21/24 130/78   06/20/24 121/88   06/13/24 118/74     Wt Readings from Last 3 Encounters:   06/21/24 77.8 kg (171 lb 9.6 oz)   06/20/24 76.9 kg (169 lb 8.5 oz)   06/13/24 76.2 kg (167 lb 15.9 oz)     /78 (BP " "Location: Left arm, Patient Position: Sitting, BP Method: Medium (Manual))   Pulse 70   Ht 5' 9" (1.753 m)   Wt 77.8 kg (171 lb 9.6 oz)   SpO2 95%   BMI 25.34 kg/m²    274}  Laboratory: I have reviewed old labs below:    274}    Lab Results   Component Value Date    WBC 10.06 05/10/2024    HGB 13.7 05/10/2024    HCT 40.7 05/10/2024    MCV 92 05/10/2024     05/10/2024     05/10/2024    K 4.0 05/10/2024     05/10/2024    CALCIUM 9.6 05/10/2024    PHOS 3.4 04/19/2022    CO2 24 05/10/2024    GLU 95 05/10/2024    BUN 14 05/10/2024    CREATININE 1.0 05/10/2024    EGFRNORACEVR >60.0 05/10/2024    ANIONGAP 10 05/10/2024    PROT 7.3 05/10/2024    ALBUMIN 4.1 05/10/2024    BILITOT 0.4 05/10/2024    ALKPHOS 69 05/10/2024    ALT 24 05/10/2024    AST 21 05/10/2024    INR 1.0 04/18/2022    CHOL 167 07/20/2023    TRIG 58 07/20/2023    HDL 70 07/20/2023    LDLCALC 85.4 07/20/2023    TSH 1.168 07/20/2023    HGBA1C 5.2 07/20/2023      Lab reviewed by me: Particular labs of significance that I will monitor, workup, or treat to improve are mentioned below in diagnostic impression remarks.    Imaging/EKG: I have reviewed the pertinent results and my findings are noted in remarks.  274}    CC:   Chief Complaint   Patient presents with    Cough    Foot Pain        274}    Assessment/Plan  Misti West is a 58 y.o. female who presents to clinic with:  1. Nicotine dependence, cigarettes, with other nicotine-induced disorders    2. Bacterial sinusitis    3. Abnormal blood chemistry    4. Encounter for screening mammogram for malignant neoplasm of breast    5. Left foot pain       274}  Diagnostic Impression Remarks + HPI     Documentation entered by me for this encounter may have been done in part using speech-recognition technology. Although I have made an effort to ensure accuracy, "sound like" errors may exist and should be interpreted in context.      Patient presents today for symptoms of sinusitis including " post nasal drip, congestion, ear fullness, and has seasonal allergies. Patient has an old window AC that needs to be cleaned. Will treat.   Patient also c/o left foot pain that was treated by podiatrist w/ injection which provided temporary relieve. Will do xray and send to podiatrist    Additional workup planned: see labs ordered below.    See below for labs and meds ordered with associated diagnosis      1. Nicotine dependence, cigarettes, with other nicotine-induced disorders  - Ambulatory referral/consult to Smoking Cessation Program; Future    2. Bacterial sinusitis  - albuterol (VENTOLIN HFA) 90 mcg/actuation inhaler; Inhale 2 puffs into the lungs every 6 (six) hours as needed for Wheezing. Rescue  - azithromycin (Z-ELMA) 250 MG tablet; Take 2 tablets by mouth on day 1; Take 1 tablet by mouth on days 2-5  Dispense: 6 tablet; Refill: 0  - dextromethorphan-guaiFENesin  mg/5 ml (ROBITUSSIN-DM)  mg/5 mL liquid; Take 5 mLs by mouth every 6 (six) hours as needed (cough).  - benzonatate (TESSALON) 200 MG capsule; Take 1 capsule (200 mg total) by mouth 3 (three) times daily as needed for Cough.  Dispense: 30 capsule; Refill: 0  - azelastine (ASTELIN) 137 mcg (0.1 %) nasal spray; 1 spray (137 mcg total) by Nasal route 2 (two) times daily.  Dispense: 18.2 mL; Refill: 0    3. Abnormal blood chemistry  - Hemoglobin A1C; Future    4. Encounter for screening mammogram for malignant neoplasm of breast  - Mammo Digital Screening Bilat w/ Lam; Future    5. Left foot pain  - X-Ray Foot Complete Left; Future  - Ambulatory referral/consult to Podiatry; Future      Lizet Landers MD   274}    If you are due for any health screening(s) below please notify me so we can arrange them to be ordered and scheduled. Most healthy patients at your age complete them, but you are free to accept or refuse.     If you can't do it, I'll definitely understand. If you can, I'd certainly appreciate it!   Tests to Keep You  Healthy    Mammogram: Met on 9/7/2023  Colon Cancer Screening: Met on 1/16/2024  Tobacco Cessation: NO

## 2024-06-24 ENCOUNTER — OFFICE VISIT (OUTPATIENT)
Dept: PODIATRY | Facility: CLINIC | Age: 59
End: 2024-06-24

## 2024-06-24 VITALS
BODY MASS INDEX: 25.4 KG/M2 | DIASTOLIC BLOOD PRESSURE: 59 MMHG | HEART RATE: 70 BPM | HEIGHT: 69 IN | WEIGHT: 171.5 LBS | SYSTOLIC BLOOD PRESSURE: 125 MMHG

## 2024-06-24 DIAGNOSIS — M19.072 ARTHRITIS OF LEFT MIDFOOT: ICD-10-CM

## 2024-06-24 DIAGNOSIS — M67.472 GANGLION CYST OF LEFT FOOT: ICD-10-CM

## 2024-06-24 DIAGNOSIS — G57.82 NEUROMA OF THIRD INTERSPACE OF LEFT FOOT: Primary | ICD-10-CM

## 2024-06-24 DIAGNOSIS — M25.572 SINUS TARSI SYNDROME, LEFT: ICD-10-CM

## 2024-06-24 DIAGNOSIS — M79.672 LEFT FOOT PAIN: ICD-10-CM

## 2024-06-24 PROCEDURE — 99214 OFFICE O/P EST MOD 30 MIN: CPT | Mod: PBBFAC,25 | Performed by: PODIATRIST

## 2024-06-24 PROCEDURE — 99999 PR PBB SHADOW E&M-EST. PATIENT-LVL IV: CPT | Mod: PBBFAC,,, | Performed by: PODIATRIST

## 2024-06-24 PROCEDURE — 96372 THER/PROPH/DIAG INJ SC/IM: CPT | Mod: PBBFAC,LT | Performed by: PODIATRIST

## 2024-06-24 PROCEDURE — 64455 NJX AA&/STRD PLTR COM DG NRV: CPT | Mod: S$PBB,LT,, | Performed by: PODIATRIST

## 2024-06-24 PROCEDURE — 99214 OFFICE O/P EST MOD 30 MIN: CPT | Mod: 25,S$PBB,, | Performed by: PODIATRIST

## 2024-06-24 PROCEDURE — 99999PBSHW PR PBB SHADOW TECHNICAL ONLY FILED TO HB: Mod: PBBFAC,LT,,

## 2024-06-24 PROCEDURE — 64455 NJX AA&/STRD PLTR COM DG NRV: CPT | Mod: PBBFAC,LT | Performed by: PODIATRIST

## 2024-06-24 RX ORDER — METHYLPREDNISOLONE ACETATE 80 MG/ML
80 INJECTION, SUSPENSION INTRA-ARTICULAR; INTRALESIONAL; INTRAMUSCULAR; SOFT TISSUE ONCE
Status: COMPLETED | OUTPATIENT
Start: 2024-06-24 | End: 2024-06-24

## 2024-06-24 RX ORDER — DEXAMETHASONE SODIUM PHOSPHATE 4 MG/ML
4 INJECTION, SOLUTION INTRA-ARTICULAR; INTRALESIONAL; INTRAMUSCULAR; INTRAVENOUS; SOFT TISSUE ONCE
Status: COMPLETED | OUTPATIENT
Start: 2024-06-24 | End: 2024-06-24

## 2024-06-24 RX ADMIN — METHYLPREDNISOLONE ACETATE 80 MG: 80 INJECTION, SUSPENSION INTRA-ARTICULAR; INTRALESIONAL; INTRAMUSCULAR; SOFT TISSUE at 11:06

## 2024-06-24 RX ADMIN — DEXAMETHASONE SODIUM PHOSPHATE 4 MG: 4 INJECTION INTRA-ARTICULAR; INTRALESIONAL; INTRAMUSCULAR; INTRAVENOUS; SOFT TISSUE at 11:06

## 2024-06-25 NOTE — PROGRESS NOTES
Subjective:       Patient ID: Misti West is a 58 y.o. female.    Chief Complaint: Foot Pain (Left )  Patient presents with complaint of pain left foot, has been going on for a while, not quite sure how long.  Was seeing a different physician who administered an ankle joint injection but patient relates the location injected did not seem consistent with ankle joint.  Recently saw her PCP and he ordered an x-ray, she was told she has arthritis and a spur.  Has been wearing good tennis shoes, trying conservative treatments, no improvement but admits she is on her feet constantly.    Past Medical History:   Diagnosis Date    Anxiety     Arthritis     Cancer     cervical    Female bladder prolapse     Hepatomegaly     3 liver cyst    Hiatal hernia     High cholesterol     Liver lesion     PONV (postoperative nausea and vomiting)     Seasonal allergies      Past Surgical History:   Procedure Laterality Date    CERVIX SURGERY      COLONOSCOPY N/A 08/27/2020    Procedure: COLONOSCOPY;  Surgeon: Tim Aguayo MD;  Location: [facility];  Service: Endoscopy;  Laterality: N/A;    COLONOSCOPY N/A 1/16/2024    Procedure: COLONOSCOPY;  Surgeon: Tim Aguayo MD;  Location: [facility];  Service: Endoscopy;  Laterality: N/A;    COLPORRHAPHY, COMBINED ANTEROPOSTERIOR N/A 5/2/2024    Procedure: COLPORRHAPHY, COMBINED ANTEROPOSTERIOR;  Surgeon: Angeles Last MD;  Location: [facility] OR;  Service: OB/GYN;  Laterality: N/A;    CYSTOSCOPY N/A 5/2/2024    Procedure: CYSTOSCOPY;  Surgeon: Angeles Last MD;  Location: [facility] OR;  Service: OB/GYN;  Laterality: N/A;    CYSTOSCOPY WITH URETHRAL DILATION  10/01/2020    Procedure: CYSTOSCOPY, WITH URETHRAL DILATION;  Surgeon: Bhargav Keane MD;  Location: [facility] OR;  Service: Urology;;    ESOPHAGOGASTRODUODENOSCOPY N/A 08/13/2020    Procedure: EGD (ESOPHAGOGASTRODUODENOSCOPY);  Surgeon: Tim Aguayo MD;  Location: [facility];  Service: Endoscopy;  Laterality: N/A;    HYSTERECTOMY,  TOTAL, LAPAROSCOPIC, WITH SALPINGECTOMY Bilateral 5/2/2024    Procedure: HYSTERECTOMY,TOTAL,LAPAROSCOPIC,WITH SALPINGECTOMY;  Surgeon: Angeles Last MD;  Location: [facility] OR;  Service: OB/GYN;  Laterality: Bilateral;  5 hours staying overnight    KNEE ARTHROSCOPY      TKR    LAPAROSCOPIC SACROCOLPOPEXY N/A 5/2/2024    Procedure: SACROCOLPOPEXY, LAPAROSCOPIC;  Surgeon: Angeles Last MD;  Location: [facility] OR;  Service: OB/GYN;  Laterality: N/A;    OOPHORECTOMY Left 5/2/2024    Procedure: OOPHORECTOMY;  Surgeon: Angeles Last MD;  Location: [facility] OR;  Service: OB/GYN;  Laterality: Left;  LAPAROSCOPIC    PERINEOPLASTY N/A 5/2/2024    Procedure: PERINEOPLASTY;  Surgeon: Angeles Last MD;  Location: [facility];  Service: OB/GYN;  Laterality: N/A;    ROBOTIC ARTHROPLASTY, KNEE Left 03/29/2022    Procedure: ROBOTIC ARTHROPLASTY, KNEE, TOTAL;  Surgeon: Neo Zapata MD;  Location: [facility] OR;  Service: Orthopedics;  Laterality: Left;    SINUS SURGERY      TUBAL LIGATION       Family History   Problem Relation Name Age of Onset    Diabetes Mother      Cancer Mother          bladder    Arthritis Mother      Cancer Father          lung    COPD Father      Hypertension Father      Arthritis Father      Melanoma Father      Breast cancer Sister      Arthritis Sister      Breast cancer Sister      Arthritis Sister      Breast cancer Sister      Kidney disease Sister      Arthritis Sister      Colon polyps Neg Hx      Colon cancer Neg Hx      Crohn's disease Neg Hx      Ulcerative colitis Neg Hx      Stomach cancer Neg Hx      Esophageal cancer Neg Hx      Celiac disease Neg Hx      Seizures Neg Hx       Social History     Socioeconomic History    Marital status:    Tobacco Use    Smoking status: Every Day     Current packs/day: 1.00     Types: Cigarettes    Smokeless tobacco: Never   Substance and Sexual Activity    Alcohol use: Yes     Comment: rare    Drug use: Never    Sexual activity: Not Currently     Partners:  Male   Social History Narrative    [place] 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      Social Determinants of Health     Financial Resource Strain: Medium Risk (3/9/2022)    Overall Financial Resource Strain (CARDIA)     Difficulty of Paying Living Expenses: Somewhat hard   Food Insecurity: No Food Insecurity (3/9/2022)    Hunger Vital Sign     Worried About Running Out of Food in the Last Year: Never true     Ran Out of Food in the Last Year: Never true   Transportation Needs: No Transportation Needs (3/9/2022)    PRAPARE - Transportation     Lack of Transportation (Medical): No     Lack of Transportation (Non-Medical): No   Physical Activity: Unknown (3/9/2022)    Exercise Vital Sign     Days of Exercise per Week: 0 days   Stress: No Stress Concern Present (3/9/2022)    [employer] - Occupational Stress Questionnaire     Feeling of Stress : Only a little   Housing Stability: Unknown (3/9/2022)    Housing Stability Vital Sign     Unable to Pay for Housing in the Last Year: No     Unstable Housing in the Last Year: No       Current Outpatient Medications   Medication Sig Dispense Refill    albuterol (VENTOLIN HFA) 90 mcg/actuation inhaler Inhale 2 puffs into the lungs every 6 (six) hours as needed for Wheezing. Rescue      ascorbic acid, vitamin C, (VITAMIN C) 500 MG tablet Take 500 mg by mouth once daily.      atorvastatin (LIPITOR) 10 MG tablet Take 1 tablet (10 mg total) by mouth once daily. 90 tablet 3    azelastine (ASTELIN) 137 mcg (0.1 %) nasal spray 1 spray (137 mcg total) by Nasal route 2 (two) times daily. 18.2 mL 0    azithromycin (Z-ELMA) 250 MG tablet Take 2 tablets by mouth on day 1; Take 1 tablet by mouth on days 2-5 6 tablet 0    b complex vitamins tablet Take 1 tablet by mouth once daily.      benzonatate (TESSALON) 200 MG capsule Take 1 capsule (200 mg total) by mouth 3 (three) times daily as needed for Cough. 30 capsule 0    biotin 1 mg tablet Take 1,000 mcg by  "mouth 3 (three) times daily.      cholecalciferol, vitamin D3, (VITAMIN D3 ORAL) Take by mouth.      dextromethorphan-guaiFENesin  mg/5 ml (ROBITUSSIN-DM)  mg/5 mL liquid Take 5 mLs by mouth every 6 (six) hours as needed (cough).      docusate sodium (COLACE) 100 MG capsule Take 1 capsule (100 mg total) by mouth 2 (two) times daily. 60 capsule 0    evening primrose oil 1,300 mg Cap Take by mouth.      glucosamine-chondroitin 500-400 mg tablet Take 1 tablet by mouth 3 (three) times daily.      ipratropium (ATROVENT) 21 mcg (0.03 %) nasal spray 2 sprays by Each Nostril route 3 (three) times daily. 60 mL 0    Lactobacillus rhamnosus GG (CULTURELLE) 10 billion cell capsule Take 1 capsule by mouth once daily.      levocetirizine (XYZAL) 5 MG tablet Take 1 tablet (5 mg total) by mouth every evening. 30 tablet 11    montelukast (SINGULAIR) 5 MG chewable tablet Take 5 mg by mouth every evening.      multivitamin capsule Take 1 capsule by mouth once daily.      tumeric-ging-olive-oreg-capryl 100 mg-150 mg- 50 mg-150 mg Cap Take by mouth.       No current facility-administered medications for this visit.     Review of patient's allergies indicates:   Allergen Reactions    Codeine Swelling     "Hives, vomiting"  Can take hydrocodone & tylenol    Morphine Swelling     "Hives, vomiting"    Naproxen sodium Swelling     "Itching, high blood pressure"    Percocet [oxycodone-acetaminophen] Nausea And Vomiting     LOW HEART RATE  Can take hydrocodone & tylenol    Tramadol hcl Swelling     "itching & nausea"    Ciprofloxacin Other (See Comments)     Low BP    Penicillins     Trintex        Review of Systems   Musculoskeletal:  Negative for gait problem.   All other systems reviewed and are negative.      Objective:      Vitals:    06/24/24 0958   BP: (!) 125/59   BP Location: Left arm   Patient Position: Sitting   BP Method: Medium (Automatic)   Pulse: 70   Weight: 77.8 kg (171 lb 8.3 oz)   Height: 5' 9" (1.753 m) "     Physical Exam  Vitals and nursing note reviewed.   Constitutional:       General: She is not in acute distress.     Appearance: Normal appearance.   Cardiovascular:      Pulses:           Dorsalis pedis pulses are 2+ on the right side and 2+ on the left side.        Posterior tibial pulses are 2+ on the right side and 2+ on the left side.   Pulmonary:      Effort: Pulmonary effort is normal.   Musculoskeletal:      Right foot: Bunion present.      Left foot: Bunion present.      Comments: Mild discomfort over the sinus tarsi left   Feet:      Right foot:      Skin integrity: Skin integrity normal.      Left foot:      Skin integrity: Skin integrity normal.   Skin:     Capillary Refill: Capillary refill takes less than 2 seconds.      Comments: Small ganglion dorsal lateral left foot over extensor tendon to the 4th digit   Neurological:      General: No focal deficit present.      Mental Status: She is alert.      Comments: Greatest pain upon compression of the 3rd common plantar digital nerve left foot consistent with neuroma, the 4th webspace is mildly tender with some localized lateral dorsal cutaneous neuritis involving the 3rd 4th and 5th digits   Psychiatric:         Behavior: Behavior normal.         Thought Content: Thought content normal.     EXAMINATION:  XR FOOT COMPLETE 3 VIEW LEFT     CLINICAL HISTORY:  .  Pain in left foot     TECHNIQUE:  AP, lateral and oblique views of the left foot were performed.     COMPARISON:  None     FINDINGS:  There is joint space narrowing at the 1st metatarsophalangeal joint.  There is also some joint space narrowing at the D IP joints.  I do not see evidence of acute fracture subluxation about the left foot.  Mild midfoot degenerative osteophyte formation is noted.  There is a degenerative type plantar calcaneal spur.        Electronically signed by:Damon Abreu MD  Date:                                            06/21/2024     Assessment:       1. 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 Arthritis of left midfoot    3. Ganglion cyst of left foot    4. Sinus tarsi syndrome, left    5. Left foot pain        Plan:         INJECTION 3RD COMMON PLANTAR DIGITAL NERVE LEFT FOOT UTILIZING 1 CC 1% LIDOCAINE PLAIN, 1 CC 0.5% BUPIVACAINE PLAIN, 80 MG DEPO-MEDROL/ METHYLPREDNISONE, 4 MG DEXAMETHASONE      Reviewed x-rays ordered by PCP and advised patient she does have some midfoot arthritis and we discussed a very small ossicle type area on the very outside of the left foot, this is the spur radiologist most likely was referring to, pointed out to patient she has not having pain in this area and this is most likely been present for a long time  Discussed mild inflammation within the subtalar joint, anatomy and location of this joint under the ankle joint  Discussed small ganglion on along the tendon to the 4th digit and advised patient these 2 conditions are mild, most likely affected due to pain in the front of her foot and compensating  Pointed out to patient areas greatest pain is upon compression of the 3rd webspace, patient had a lot of discomfort in this area.  Also pointed out to patient mild in the 4th webspace.  We discussed neuroma the 3rd interspace and reviewed inflammation of the nerve in this location at length.  Advised this will definitely cause radiating pain to the top of the foot especially due length of time it has been present  At this time because it neuroma is so inflamed it is difficult to assess how much discomfort she may be having from the arthritis, ganglion and mild inflammation in the subtalar joint.  Reassured patient no evidence of ankle joint inflammation, arthritis or pain at this time  Topical treatments for pain left foot, ice/cool therapy in frequency this should be performed as long as well tolerated and beneficial  All these topical treatments should also incorporate the area of arthritis and ganglion on the top of the foot  Had a lengthy  discussion on use of 2 over-the-counter products to treat inflammation and the nerve.  Instructed on application of Voltaren gel, allow to fully dry and absorbed into the skin would then apply an over-the-counter 4% lidocaine patch. Then apply whole or half lidocaine patch over same area during the day and then this same topical treatment at night.  Apply Voltaren gel and a 2nd half of the patch overnight so the nerve is treated 24/7.  Explained with patch on delivering medication consistently and performing this treatment over the next 2-3 days the nerve will begin to settled down  Recommended steroid injection for neuroma 3rd webspace, explained medications used, effectiveness in decreasing inflammation, can take 3-4 days, for best results she needs to stay off her foot, ice and elevate and use above topical medications described for best results.  These topical medications should be used until any remaining discomfort has completely resolved  If majority of the pain has resolved but not completely pain-free recommend 2nd cortisone injection if effective  Continue wide well supportive and sturdy tennis shoes, soft material, light weight with thick sole for shock absorption and explained they should be worn at all times indoors and out, absolutely no flat shoes or walking barefoot  Reviewed arch support to help stabilize the arthritis/subtalar joint area, recommend firm full length arch supports typically used for heel and arch pain, they should replace the existing insoles in her shoes, we discussed how to gradually adjust to wearing them comfortably  Patient was in understanding and agreement with treatment plan.  I counseled the patient on their conditions, implications and medical management.  Instructed patient to contact the office with any changes, questions, concerns, worsening of symptoms.   Total face to face time 30 minutes, exam, assessment, treatment, discussion, additional time for review of chart  prior to and following appointment and visit documentation, consultation and coordination of care.  Additional time for procedure/injection neuroma left foot  Follow up 2 weeks    This note was created using [employer] voice recognition software that occasionally misinterpreted phrases or words.

## 2024-06-26 RX ORDER — DEXAMETHASONE SODIUM PHOSPHATE 4 MG/ML
4 INJECTION, SOLUTION INTRA-ARTICULAR; INTRALESIONAL; INTRAMUSCULAR; INTRAVENOUS; SOFT TISSUE
Status: DISCONTINUED | OUTPATIENT
Start: 2024-05-29 | End: 2024-06-26 | Stop reason: HOSPADM

## 2024-06-26 RX ORDER — LIDOCAINE HYDROCHLORIDE 10 MG/ML
1 INJECTION INFILTRATION; PERINEURAL
Status: DISCONTINUED | OUTPATIENT
Start: 2024-05-29 | End: 2024-06-26 | Stop reason: HOSPADM

## 2024-07-09 ENCOUNTER — PATIENT MESSAGE (OUTPATIENT)
Dept: CARDIOLOGY | Facility: HOSPITAL | Age: 59
End: 2024-07-09

## 2024-07-11 ENCOUNTER — HOSPITAL ENCOUNTER (OUTPATIENT)
Dept: CARDIOLOGY | Facility: HOSPITAL | Age: 59
Discharge: HOME | End: 2024-07-11
Attending: INTERNAL MEDICINE

## 2024-07-11 ENCOUNTER — HOSPITAL ENCOUNTER (OUTPATIENT)
Dept: RADIOLOGY | Facility: HOSPITAL | Age: 59
Discharge: HOME | End: 2024-07-11
Attending: INTERNAL MEDICINE

## 2024-07-11 VITALS — BODY MASS INDEX: 25.33 KG/M2 | HEIGHT: 69 IN | HEART RATE: 52 BPM | WEIGHT: 171 LBS

## 2024-07-11 VITALS — WEIGHT: 171 LBS | HEIGHT: 69 IN | BODY MASS INDEX: 25.33 KG/M2

## 2024-07-11 DIAGNOSIS — E78.49 OTHER HYPERLIPIDEMIA: ICD-10-CM

## 2024-07-11 DIAGNOSIS — F17.210 TOBACCO DEPENDENCE DUE TO CIGARETTES: ICD-10-CM

## 2024-07-11 DIAGNOSIS — R00.1 BRADYCARDIA: ICD-10-CM

## 2024-07-11 DIAGNOSIS — R07.2 PRECORDIAL PAIN: ICD-10-CM

## 2024-07-11 LAB
CV PHARM DOSE: 0.4 MG
CV STRESS BASE HR: 51 BPM
DIASTOLIC BLOOD PRESSURE: 78 MMHG
NUC STRESS EJECTION FRACTION: 50 %
OHS CV CPX 1 MINUTE RECOVERY HEART RATE: 78 BPM
OHS CV CPX 85 PERCENT MAX PREDICTED HEART RATE MALE: 136
OHS CV CPX MAX PREDICTED HEART RATE: 160
OHS CV CPX PATIENT IS FEMALE: 1
OHS CV CPX PATIENT IS MALE: 0
OHS CV CPX PEAK DIASTOLIC BLOOD PRESSURE: 58 MMHG
OHS CV CPX PEAK HEAR RATE: 87 BPM
OHS CV CPX PEAK RATE PRESSURE PRODUCT: NORMAL
OHS CV CPX PEAK SYSTOLIC BLOOD PRESSURE: 138 MMHG
OHS CV CPX PERCENT MAX PREDICTED HEART RATE ACHIEVED: 57
OHS CV CPX RATE PRESSURE PRODUCT PRESENTING: 6783
OHS CV PHARM TIME: 1028 MIN
SYSTOLIC BLOOD PRESSURE: 133 MMHG

## 2024-07-11 PROCEDURE — 78452 HT MUSCLE IMAGE SPECT MULT: CPT | Mod: 26,,, | Performed by: INTERNAL MEDICINE

## 2024-07-11 PROCEDURE — A9502 TC99M TETROFOSMIN: HCPCS | Mod: PO | Performed by: INTERNAL MEDICINE

## 2024-07-11 PROCEDURE — 93017 CV STRESS TEST TRACING ONLY: CPT | Mod: PBBFAC,PO

## 2024-07-11 PROCEDURE — 93017 CV STRESS TEST TRACING ONLY: CPT | Mod: PO

## 2024-07-11 PROCEDURE — 93018 CV STRESS TEST I&R ONLY: CPT | Mod: S$PBB,,, | Performed by: INTERNAL MEDICINE

## 2024-07-11 PROCEDURE — 78452 HT MUSCLE IMAGE SPECT MULT: CPT | Mod: PO

## 2024-07-11 PROCEDURE — 93225 XTRNL ECG REC<48 HRS REC: CPT | Mod: PO

## 2024-07-11 PROCEDURE — 93226 XTRNL ECG REC<48 HR SCAN A/R: CPT | Mod: PO

## 2024-07-11 PROCEDURE — 93306 TTE W/DOPPLER COMPLETE: CPT | Mod: 26,,, | Performed by: INTERNAL MEDICINE

## 2024-07-11 PROCEDURE — 93306 TTE W/DOPPLER COMPLETE: CPT | Mod: PO

## 2024-07-11 PROCEDURE — 93016 CV STRESS TEST SUPVJ ONLY: CPT | Mod: S$PBB,,, | Performed by: INTERNAL MEDICINE

## 2024-07-11 PROCEDURE — 63600175 PHARM REV CODE 636 W HCPCS: Mod: PO | Performed by: INTERNAL MEDICINE

## 2024-07-11 RX ORDER — REGADENOSON 0.08 MG/ML
0.4 INJECTION, SOLUTION INTRAVENOUS
Status: COMPLETED | OUTPATIENT
Start: 2024-07-11 | End: 2024-07-11

## 2024-07-11 RX ADMIN — TETROFOSMIN 11 MILLICURIE: 1.38 INJECTION, POWDER, LYOPHILIZED, FOR SOLUTION INTRAVENOUS at 10:07

## 2024-07-11 RX ADMIN — TETROFOSMIN 33 MILLICURIE: 1.38 INJECTION, POWDER, LYOPHILIZED, FOR SOLUTION INTRAVENOUS at 10:07

## 2024-07-11 RX ADMIN — REGADENOSON 0.4 MG: 0.08 INJECTION, SOLUTION INTRAVENOUS at 10:07

## 2024-07-12 LAB
ASCENDING AORTA: 2.52 CM
AV INDEX (PROSTH): 0.63
AV MEAN GRADIENT: 6 MMHG
AV PEAK GRADIENT: 12 MMHG
AV VALVE AREA BY VELOCITY RATIO: 1.94 CM²
AV VALVE AREA: 2.16 CM²
AV VELOCITY RATIO: 0.57
BSA FOR ECHO PROCEDURE: 1.94 M2
CV ECHO LV RWT: 0.29 CM
DOP CALC AO PEAK VEL: 1.73 M/S
DOP CALC AO VTI: 34.5 CM
DOP CALC LVOT AREA: 3.4 CM2
DOP CALC LVOT DIAMETER: 2.09 CM
DOP CALC LVOT PEAK VEL: 0.98 M/S
DOP CALC LVOT STROKE VOLUME: 74.41 CM3
DOP CALCLVOT PEAK VEL VTI: 21.7 CM
E WAVE DECELERATION TIME: 218.69 MSEC
E/A RATIO: 1.35
E/E' RATIO: 10.63 M/S
ECHO LV POSTERIOR WALL: 0.86 CM (ref 0.6–1.1)
FRACTIONAL SHORTENING: 32 % (ref 28–44)
INTERVENTRICULAR SEPTUM: 0.84 CM (ref 0.6–1.1)
IVRT: 133.21 MSEC
LEFT ATRIUM AREA SYSTOLIC (APICAL 2 CHAMBER): 24.74 CM2
LEFT ATRIUM AREA SYSTOLIC (APICAL 4 CHAMBER): 17.56 CM2
LEFT ATRIUM SIZE: 2.97 CM
LEFT ATRIUM VOLUME INDEX MOD: 38.3 ML/M2
LEFT ATRIUM VOLUME MOD: 73.92 CM3
LEFT INTERNAL DIMENSION IN SYSTOLE: 4.01 CM (ref 2.1–4)
LEFT VENTRICLE DIASTOLIC VOLUME INDEX: 90.15 ML/M2
LEFT VENTRICLE DIASTOLIC VOLUME: 173.99 ML
LEFT VENTRICLE END SYSTOLIC VOLUME APICAL 2 CHAMBER: 80.72 ML
LEFT VENTRICLE END SYSTOLIC VOLUME APICAL 4 CHAMBER: 53.87 ML
LEFT VENTRICLE MASS INDEX: 101 G/M2
LEFT VENTRICLE SYSTOLIC VOLUME INDEX: 36.5 ML/M2
LEFT VENTRICLE SYSTOLIC VOLUME: 70.43 ML
LEFT VENTRICULAR INTERNAL DIMENSION IN DIASTOLE: 5.91 CM (ref 3.5–6)
LEFT VENTRICULAR MASS: 195.53 G
LV LATERAL E/E' RATIO: 9.44 M/S
LV SEPTAL E/E' RATIO: 12.14 M/S
LVED V (TEICH): 173.99 ML
LVES V (TEICH): 70.43 ML
LVOT MG: 2.04 MMHG
LVOT MV: 0.67 CM/S
MV PEAK A VEL: 0.63 M/S
MV PEAK E VEL: 0.85 M/S
MV STENOSIS PRESSURE HALF TIME: 63.42 MS
MV VALVE AREA P 1/2 METHOD: 3.47 CM2
OHS CV RV/LV RATIO: 0.65 CM
PISA MRMAX VEL: 4.54 M/S
PISA TR MAX VEL: 2.54 M/S
PULM VEIN S/D RATIO: 0.83
PV PEAK D VEL: 0.59 M/S
PV PEAK S VEL: 0.49 M/S
RA PRESSURE ESTIMATED: 3 MMHG
RA VOL SYS: 47.59 ML
RA WIDTH: 4 CM
RIGHT ATRIAL AREA: 15.4 CM2
RIGHT ATRIUM VOLUME AREA LENGTH APICAL 4 CHAMBER: 44.95 ML
RIGHT VENTRICLE DIASTOLIC LENGTH: 4.6 CM
RIGHT VENTRICLE DIASTOLIC MID DIMENSION: 3.4 CM
RIGHT VENTRICULAR END-DIASTOLIC DIMENSION: 3.83 CM
RIGHT VENTRICULAR LENGTH IN DIASTOLE (APICAL 4-CHAMBER VIEW): 4.59 CM
RV MID DIAMA: 3.43 CM
RV TB RVSP: 6 MMHG
RV TISSUE DOPPLER FREE WALL SYSTOLIC VELOCITY 1 (APICAL 4 CHAMBER VIEW): 18.32 CM/S
SINUS: 3.28 CM
STJ: 2.39 CM
TDI LATERAL: 0.09 M/S
TDI SEPTAL: 0.07 M/S
TDI: 0.08 M/S
TR MAX PG: 26 MMHG
TRICUSPID ANNULAR PLANE SYSTOLIC EXCURSION: 3.14 CM
TV REST PULMONARY ARTERY PRESSURE: 29 MMHG
Z-SCORE OF LEFT VENTRICULAR DIMENSION IN END DIASTOLE: 0.78
Z-SCORE OF LEFT VENTRICULAR DIMENSION IN END SYSTOLE: 1.39

## 2024-07-17 ENCOUNTER — CLINICAL SUPPORT (OUTPATIENT)
Dept: SMOKING CESSATION | Facility: CLINIC | Age: 59
End: 2024-07-17

## 2024-07-17 DIAGNOSIS — F17.210 MODERATE SMOKER (20 OR LESS PER DAY): Primary | ICD-10-CM

## 2024-07-17 RX ORDER — VARENICLINE TARTRATE 0.5 (11)-1
KIT ORAL
Qty: 53 EACH | Refills: 0 | Status: SHIPPED | OUTPATIENT
Start: 2024-07-17

## 2024-07-17 NOTE — Clinical Note
Patient seen for the tobacco cessation program. This is her first attempt to stop smoking. She is a cigarette smoker of 1 PPD X 47 years. She is motivated to quit the use of tobacco and has agreed to attend our 6 week tobacco cessation program.

## 2024-07-18 ENCOUNTER — OFFICE VISIT (OUTPATIENT)
Dept: FAMILY MEDICINE | Facility: CLINIC | Age: 59
End: 2024-07-18

## 2024-07-18 ENCOUNTER — OFFICE VISIT (OUTPATIENT)
Dept: CARDIOLOGY | Facility: CLINIC | Age: 59
End: 2024-07-18

## 2024-07-18 ENCOUNTER — HOSPITAL ENCOUNTER (OUTPATIENT)
Dept: RADIOLOGY | Facility: HOSPITAL | Age: 59
Discharge: HOME | End: 2024-07-18
Attending: STUDENT IN AN ORGANIZED HEALTH CARE EDUCATION/TRAINING PROGRAM

## 2024-07-18 VITALS
SYSTOLIC BLOOD PRESSURE: 134 MMHG | WEIGHT: 171 LBS | BODY MASS INDEX: 25.33 KG/M2 | OXYGEN SATURATION: 98 % | HEIGHT: 69 IN | HEART RATE: 62 BPM | DIASTOLIC BLOOD PRESSURE: 56 MMHG

## 2024-07-18 VITALS
HEART RATE: 50 BPM | SYSTOLIC BLOOD PRESSURE: 133 MMHG | DIASTOLIC BLOOD PRESSURE: 80 MMHG | WEIGHT: 168.44 LBS | BODY MASS INDEX: 24.95 KG/M2 | HEIGHT: 69 IN

## 2024-07-18 DIAGNOSIS — J32.9 BACTERIAL SINUSITIS: Primary | ICD-10-CM

## 2024-07-18 DIAGNOSIS — J30.2 SEASONAL ALLERGIES: ICD-10-CM

## 2024-07-18 DIAGNOSIS — N39.0 RECURRENT UTI: ICD-10-CM

## 2024-07-18 DIAGNOSIS — F17.218 NICOTINE DEPENDENCE, CIGARETTES, WITH OTHER NICOTINE-INDUCED DISORDERS: ICD-10-CM

## 2024-07-18 DIAGNOSIS — R07.9 CHEST PAIN, UNSPECIFIED TYPE: ICD-10-CM

## 2024-07-18 DIAGNOSIS — F17.210 TOBACCO DEPENDENCE DUE TO CIGARETTES: ICD-10-CM

## 2024-07-18 DIAGNOSIS — B96.89 BACTERIAL SINUSITIS: Primary | ICD-10-CM

## 2024-07-18 DIAGNOSIS — I70.0 ATHEROSCLEROSIS OF ABDOMINAL AORTA: ICD-10-CM

## 2024-07-18 DIAGNOSIS — E78.49 OTHER HYPERLIPIDEMIA: ICD-10-CM

## 2024-07-18 DIAGNOSIS — R94.39 ABNORMAL NUCLEAR STRESS TEST: Primary | ICD-10-CM

## 2024-07-18 DIAGNOSIS — R91.1 PULMONARY NODULE: ICD-10-CM

## 2024-07-18 DIAGNOSIS — R00.1 BRADYCARDIA: Primary | ICD-10-CM

## 2024-07-18 PROCEDURE — 99999 PR PBB SHADOW E&M-EST. PATIENT-LVL IV: CPT | Mod: PBBFAC,,, | Performed by: STUDENT IN AN ORGANIZED HEALTH CARE EDUCATION/TRAINING PROGRAM

## 2024-07-18 PROCEDURE — 99214 OFFICE O/P EST MOD 30 MIN: CPT | Mod: PBBFAC,25,27 | Performed by: STUDENT IN AN ORGANIZED HEALTH CARE EDUCATION/TRAINING PROGRAM

## 2024-07-18 PROCEDURE — 96372 THER/PROPH/DIAG INJ SC/IM: CPT | Mod: PBBFAC

## 2024-07-18 PROCEDURE — 99406 BEHAV CHNG SMOKING 3-10 MIN: CPT | Mod: S$PBB,,, | Performed by: STUDENT IN AN ORGANIZED HEALTH CARE EDUCATION/TRAINING PROGRAM

## 2024-07-18 PROCEDURE — 99214 OFFICE O/P EST MOD 30 MIN: CPT | Mod: S$PBB,,, | Performed by: INTERNAL MEDICINE

## 2024-07-18 PROCEDURE — 99213 OFFICE O/P EST LOW 20 MIN: CPT | Mod: PBBFAC,PO | Performed by: INTERNAL MEDICINE

## 2024-07-18 PROCEDURE — 99999PBSHW PR PBB SHADOW TECHNICAL ONLY FILED TO HB: Mod: PBBFAC,,,

## 2024-07-18 PROCEDURE — 99999 PR PBB SHADOW E&M-EST. PATIENT-LVL III: CPT | Mod: PBBFAC,,, | Performed by: INTERNAL MEDICINE

## 2024-07-18 PROCEDURE — 71271 CT THORAX LUNG CANCER SCR C-: CPT | Mod: TC

## 2024-07-18 PROCEDURE — 71271 CT THORAX LUNG CANCER SCR C-: CPT | Mod: 26,,, | Performed by: RADIOLOGY

## 2024-07-18 PROCEDURE — 99214 OFFICE O/P EST MOD 30 MIN: CPT | Mod: S$PBB,25,, | Performed by: STUDENT IN AN ORGANIZED HEALTH CARE EDUCATION/TRAINING PROGRAM

## 2024-07-18 RX ORDER — ALBUTEROL SULFATE 90 UG/1
2 AEROSOL, METERED RESPIRATORY (INHALATION) EVERY 6 HOURS PRN
Qty: 60 G | Refills: 0 | Status: SHIPPED | OUTPATIENT
Start: 2024-07-18 | End: 2025-07-18

## 2024-07-18 RX ORDER — SODIUM CHLORIDE 9 MG/ML
INJECTION, SOLUTION INTRAVENOUS ONCE
OUTPATIENT
Start: 2024-07-18 | End: 2024-07-18

## 2024-07-18 RX ORDER — TRIAMCINOLONE ACETONIDE 40 MG/ML
40 INJECTION, SUSPENSION INTRA-ARTICULAR; INTRAMUSCULAR
Status: COMPLETED | OUTPATIENT
Start: 2024-07-18 | End: 2024-07-18

## 2024-07-18 RX ORDER — SODIUM CHLORIDE 0.9 % (FLUSH) 0.9 %
10 SYRINGE (ML) INJECTION
Status: SHIPPED | OUTPATIENT
Start: 2024-07-18

## 2024-07-18 RX ORDER — MONTELUKAST SODIUM 10 MG/1
10 TABLET ORAL NIGHTLY
Qty: 30 TABLET | Refills: 0 | Status: SHIPPED | OUTPATIENT
Start: 2024-07-18 | End: 2024-08-17

## 2024-07-18 RX ORDER — AZITHROMYCIN 250 MG/1
TABLET, FILM COATED ORAL
Qty: 6 TABLET | Refills: 0 | Status: SHIPPED | OUTPATIENT
Start: 2024-07-18 | End: 2024-07-23

## 2024-07-18 RX ORDER — GUAIFENESIN AND DEXTROMETHORPHAN HYDROBROMIDE 10; 100 MG/5ML; MG/5ML
5 SYRUP ORAL EVERY 6 HOURS PRN
COMMUNITY
Start: 2024-07-18 | End: 2024-07-28

## 2024-07-18 RX ADMIN — TRIAMCINOLONE ACETONIDE 40 MG: 40 INJECTION, SUSPENSION INTRA-ARTICULAR; INTRAMUSCULAR at 04:07

## 2024-07-18 NOTE — PROGRESS NOTES
SUBJECTIVE:    CHIEF COMPLAINT:   Chief Complaint   Patient presents with    Cough     Chest cough, hard time getting mucus up, chest pains  Coughs so hard abd scarring hurts           274}    HISTORY OF PRESENT ILLNESS:  Misti West is a 58 y.o. female who presents to the clinic today for coughing    Patient has hacking cough. For the past week. Patient was found to have pulmonary nodules b/l by lung screening and is worried about possibility of cancer. Patient continues to have allergies symptoms uncontrolled. Only controlled on atrovent nasal spray. Atherosclerosis likely d/t smoking      PAST MEDICAL HISTORY:     274}  Past Medical History:   Diagnosis Date    Anxiety     Arthritis     Cancer     cervical    Female bladder prolapse     Hepatomegaly     3 liver cyst    Hiatal hernia     High cholesterol     Liver lesion     PONV (postoperative nausea and vomiting)     Seasonal allergies        PAST SURGICAL HISTORY:  Past Surgical History:   Procedure Laterality Date    CERVIX SURGERY      COLONOSCOPY N/A 08/27/2020    Procedure: COLONOSCOPY;  Surgeon: Tim Aguayo MD;  Location: [facility];  Service: Endoscopy;  Laterality: N/A;    COLONOSCOPY N/A 1/16/2024    Procedure: COLONOSCOPY;  Surgeon: Tim Aguayo MD;  Location: [facility];  Service: Endoscopy;  Laterality: N/A;    COLPORRHAPHY, COMBINED ANTEROPOSTERIOR N/A 5/2/2024    Procedure: COLPORRHAPHY, COMBINED ANTEROPOSTERIOR;  Surgeon: Angeles Last MD;  Location: [facility];  Service: OB/GYN;  Laterality: N/A;    CYSTOSCOPY N/A 5/2/2024    Procedure: CYSTOSCOPY;  Surgeon: Angeles Last MD;  Location: [facility] OR;  Service: OB/GYN;  Laterality: N/A;    CYSTOSCOPY WITH URETHRAL DILATION  10/01/2020    Procedure: CYSTOSCOPY, WITH URETHRAL DILATION;  Surgeon: Bhargav Keane MD;  Location: [facility] OR;  Service: Urology;;    ESOPHAGOGASTRODUODENOSCOPY N/A 08/13/2020    Procedure: EGD (ESOPHAGOGASTRODUODENOSCOPY);  Surgeon: Tim Aguayo MD;   Location: [facility] ENDO;  Service: Endoscopy;  Laterality: N/A;    HYSTERECTOMY, TOTAL, LAPAROSCOPIC, WITH SALPINGECTOMY Bilateral 5/2/2024    Procedure: HYSTERECTOMY,TOTAL,LAPAROSCOPIC,WITH SALPINGECTOMY;  Surgeon: Angeles Last MD;  Location: [facility] OR;  Service: OB/GYN;  Laterality: Bilateral;  5 hours staying overnight    KNEE ARTHROSCOPY      TKR    LAPAROSCOPIC SACROCOLPOPEXY N/A 5/2/2024    Procedure: SACROCOLPOPEXY, LAPAROSCOPIC;  Surgeon: Angeles Last MD;  Location: [facility] OR;  Service: OB/GYN;  Laterality: N/A;    OOPHORECTOMY Left 5/2/2024    Procedure: OOPHORECTOMY;  Surgeon: Angeles Last MD;  Location: [facility] OR;  Service: OB/GYN;  Laterality: Left;  LAPAROSCOPIC    PERINEOPLASTY N/A 5/2/2024    Procedure: PERINEOPLASTY;  Surgeon: Angeles Last MD;  Location: [facility] OR;  Service: OB/GYN;  Laterality: N/A;    ROBOTIC ARTHROPLASTY, KNEE Left 03/29/2022    Procedure: ROBOTIC ARTHROPLASTY, KNEE, TOTAL;  Surgeon: Neo Zapata MD;  Location: [facility] OR;  Service: Orthopedics;  Laterality: Left;    SINUS SURGERY      TUBAL LIGATION         SOCIAL HISTORY:  Social History     Socioeconomic History    Marital status:    Tobacco Use    Smoking status: Every Day     Current packs/day: 1.00     Average packs/day: 1 pack/day for 47.5 years (47.5 ttl pk-yrs)     Types: Cigarettes     Start date: 1977    Smokeless tobacco: Never   Substance and Sexual Activity    Alcohol use: Yes     Comment: rare    Drug use: Never    Sexual activity: Not Currently     Partners: Male   Social History Narrative    [place]. 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      Social Determinants of Health     Financial Resource Strain: Medium Risk (3/9/2022)    Overall Financial Resource Strain (CARDIA)     Difficulty of Paying Living Expenses: Somewhat hard   Food Insecurity: No Food Insecurity (3/9/2022)    Hunger Vital Sign     Worried About Running Out of Food in the Last Year: Never true     Ran Out of Food in the Last  "Year: Never true   Transportation Needs: No Transportation Needs (3/9/2022)    PRAPARE - Transportation     Lack of Transportation (Medical): No     Lack of Transportation (Non-Medical): No   Physical Activity: Unknown (3/9/2022)    Exercise Vital Sign     Days of Exercise per Week: 0 days   Stress: No Stress Concern Present (3/9/2022)    [facility] - Occupational Stress Questionnaire     Feeling of Stress : Only a little   Housing Stability: Unknown (3/9/2022)    Housing Stability Vital Sign     Unable to Pay for Housing in the Last Year: No     Unstable Housing in the Last Year: No       FAMILY HISTORY:       Family History   Problem Relation Name Age of Onset    Diabetes Mother      Cancer Mother          bladder    Arthritis Mother      Cancer Father          lung    COPD Father      Hypertension Father      Arthritis Father      Melanoma Father      Breast cancer Sister      Arthritis Sister      Breast cancer Sister      Arthritis Sister      Breast cancer Sister      Kidney disease Sister      Arthritis Sister      Colon polyps Neg Hx      Colon cancer Neg Hx      Crohn's disease Neg Hx      Ulcerative colitis Neg Hx      Stomach cancer Neg Hx      Esophageal cancer Neg Hx      Celiac disease Neg Hx      Seizures Neg Hx         ALLERGIES AND MEDICATIONS: updated and reviewed.      274}  Review of patient's allergies indicates:   Allergen Reactions    Codeine Swelling     "Hives, vomiting"  Can take hydrocodone & tylenol    Morphine Swelling     "Hives, vomiting"    Naproxen sodium Swelling     "Itching, high blood pressure"    Percocet [oxycodone-acetaminophen] Nausea And Vomiting     LOW HEART RATE  Can take hydrocodone & tylenol    Tramadol hcl Swelling     "itching & nausea"    Ciprofloxacin Other (See Comments)     Low BP    Penicillins     Trintex      Medication List with Changes/Refills   New Medications    AZITHROMYCIN (Z-ELMA) 250 MG TABLET    Take 2 tablets by mouth on " day 1; Take 1 tablet by mouth on days 2-5    DEXTROMETHORPHAN-GUAIFENESIN  MG/5 ML (ROBITUSSIN-DM)  MG/5 ML LIQUID    Take 5 mLs by mouth every 6 (six) hours as needed (cough).    MONTELUKAST (SINGULAIR) 10 MG TABLET    Take 1 tablet (10 mg total) by mouth every evening.   Current Medications    ASCORBIC ACID, VITAMIN C, (VITAMIN C) 500 MG TABLET    Take 500 mg by mouth once daily.    ATORVASTATIN (LIPITOR) 10 MG TABLET    Take 1 tablet (10 mg total) by mouth once daily.    B COMPLEX VITAMINS TABLET    Take 1 tablet by mouth once daily.    BIOTIN 1 MG TABLET    Take 1,000 mcg by mouth 3 (three) times daily.    CHOLECALCIFEROL, VITAMIN D3, (VITAMIN D3 ORAL)    Take by mouth.    DOCUSATE SODIUM (COLACE) 100 MG CAPSULE    Take 1 capsule (100 mg total) by mouth 2 (two) times daily.    EVENING PRIMROSE OIL 1,300 MG CAP    Take by mouth.    GLUCOSAMINE-CHONDROITIN 500-400 MG TABLET    Take 1 tablet by mouth 3 (three) times daily.    IPRATROPIUM (ATROVENT) 21 MCG (0.03 %) NASAL SPRAY    2 sprays by Each Nostril route 3 (three) times daily.    LACTOBACILLUS RHAMNOSUS GG (CULTURELLE) 10 BILLION CELL CAPSULE    Take 1 capsule by mouth once daily.    LEVOCETIRIZINE (XYZAL) 5 MG TABLET    Take 1 tablet (5 mg total) by mouth every evening.    MULTIVITAMIN CAPSULE    Take 1 capsule by mouth once daily.    TUMERIC-GING-OLIVE-OREG-CAPRYL 100 MG-150 MG- 50 MG-150 MG CAP    Take by mouth.    VARENICLINE (CHANTIX STARTING MONTH BOX) 0.5 MG (11)- 1 MG (42) TABLET    Take one 0.5mg tab by mouth once daily X3 days,then increase to one 0.5mg tab twice daily X4 days,then increase to one 1mg tab twice daily   Changed and/or Refilled Medications    Modified Medication Previous Medication    ALBUTEROL (VENTOLIN HFA) 90 MCG/ACTUATION INHALER albuterol (VENTOLIN HFA) 90 mcg/actuation inhaler       Inhale 2 puffs into the lungs every 6 (six) hours as needed for Wheezing. Rescue    Inhale 2 puffs into the lungs every 6 (six) hours as  "needed for Wheezing. Rescue   Discontinued Medications    AZELASTINE (ASTELIN) 137 MCG (0.1 %) NASAL SPRAY    1 spray (137 mcg total) by Nasal route 2 (two) times daily.    MONTELUKAST (SINGULAIR) 5 MG CHEWABLE TABLET    Take 5 mg by mouth every evening.       SCREENING HISTORY:    274}  Health Maintenance         Date Due Completion Date    Pneumococcal Vaccines (Age 0-64) (1 of 2 - PCV) Never done ---    TETANUS VACCINE Never done ---    Shingles Vaccine (1 of 2) Never done ---    COVID-19 Vaccine (3 - 2023-24 season) 09/01/2023 4/5/2021    RSV Vaccine (Age 60+ and Pregnant patients) (1 - 1-dose 60+ series) Never done ---    Hemoglobin A1c 07/20/2024 7/20/2023    Mammogram 09/07/2024 9/7/2023    Influenza Vaccine (1) 09/01/2024 ---    LDCT Lung Screen 07/18/2025 7/18/2024    Lipid Panel 07/20/2028 7/20/2023    Colorectal Cancer Screening 01/16/2029 1/16/2024            REVIEW OF SYSTEMS:   Review of Systems   Constitutional:  Negative for chills, fever and weight loss.   Respiratory:  Positive for cough. Negative for shortness of breath and wheezing.    Cardiovascular:  Negative for chest pain, palpitations and leg swelling.   Gastrointestinal:  Negative for abdominal pain, blood in stool, constipation, diarrhea, heartburn, nausea and vomiting.   Musculoskeletal:  Negative for back pain, joint pain, myalgias and neck pain.   Neurological:  Negative for dizziness, tingling, tremors, sensory change and headaches.   Psychiatric/Behavioral:  Negative for depression. The patient is not nervous/anxious.        PHYSICAL EXAM:      274}  BP (!) 134/56 (BP Location: Left arm, Patient Position: Sitting, BP Method: Medium (Manual))   Pulse 62   Ht 5' 9" (1.753 m)   Wt 77.6 kg (171 lb)   SpO2 98%   BMI 25.25 kg/m²   Wt Readings from Last 3 Encounters:   07/18/24 77.6 kg (171 lb)   07/18/24 76.4 kg (168 lb 6.9 oz)   07/11/24 77.6 kg (171 lb)     BP Readings from Last 3 Encounters:   07/18/24 (!) 134/56   07/18/24 133/80 " "  06/24/24 (!) 125/59     Estimated body mass index is 25.25 kg/m² as calculated from the following:    Height as of this encounter: 5' 9" (1.753 m).    Weight as of this encounter: 77.6 kg (171 lb).     Physical Exam  Constitutional:       Appearance: Normal appearance.   HENT:      Head: Normocephalic and atraumatic.      Mouth/Throat:      Mouth: Mucous membranes are moist.      Pharynx: Oropharynx is clear.   Eyes:      Extraocular Movements: Extraocular movements intact.      Pupils: Pupils are equal, round, and reactive to light.   Cardiovascular:      Rate and Rhythm: Normal rate and regular rhythm.   Pulmonary:      Effort: Pulmonary effort is normal.      Breath sounds: Normal breath sounds.   Abdominal:      General: Abdomen is flat. Bowel sounds are normal.   Musculoskeletal:         General: Normal range of motion.      Cervical back: Normal range of motion and neck supple.   Skin:     General: Skin is warm and dry.   Neurological:      General: No focal deficit present.      Mental Status: She is alert. Mental status is at baseline.   Psychiatric:         Mood and Affect: Mood normal.         Thought Content: Thought content normal.         LABS:   274}  I have reviewed old labs below:  Lab Results   Component Value Date    WBC 10.06 05/10/2024    HGB 13.7 05/10/2024    HCT 40.7 05/10/2024    MCV 92 05/10/2024     05/10/2024     05/10/2024    K 4.0 05/10/2024     05/10/2024    CALCIUM 9.6 05/10/2024    PHOS 3.4 04/19/2022    CO2 24 05/10/2024    GLU 95 05/10/2024    BUN 14 05/10/2024    CREATININE 1.0 05/10/2024    ANIONGAP 10 05/10/2024    ESTGFRAFRICA >60.0 07/18/2022    EGFRNONAA >60.0 07/18/2022    PROT 7.3 05/10/2024    ALBUMIN 4.1 05/10/2024    BILITOT 0.4 05/10/2024    ALKPHOS 69 05/10/2024    ALT 24 05/10/2024    AST 21 05/10/2024    INR 1.0 04/18/2022    CHOL 167 07/20/2023    TRIG 58 07/20/2023    HDL 70 07/20/2023    LDLCALC 85.4 07/20/2023    TSH 1.168 07/20/2023    HGBA1C " 5.2 07/20/2023       ASSESSMENT AND PLAN:  274}  1. Bacterial sinusitis  Will treat cough productive. Patient would like to see pulmonologist for nodules.   - azithromycin (Z-ELMA) 250 MG tablet; Take 2 tablets by mouth on day 1; Take 1 tablet by mouth on days 2-5  Dispense: 6 tablet; Refill: 0  - dextromethorphan-guaiFENesin  mg/5 ml (ROBITUSSIN-DM)  mg/5 mL liquid; Take 5 mLs by mouth every 6 (six) hours as needed (cough).  - albuterol (VENTOLIN HFA) 90 mcg/actuation inhaler; Inhale 2 puffs into the lungs every 6 (six) hours as needed for Wheezing. Rescue  Dispense: 60 g; Refill: 0  - triamcinolone acetonide injection 40 mg    2. Nicotine dependence, cigarettes, with other nicotine-induced disorders  - CT Chest Lung Screening Low Dose; Future    3. Seasonal allergies  Will increase singulair from 5 to 10 mg  - montelukast (SINGULAIR) 10 mg tablet; Take 1 tablet (10 mg total) by mouth every evening.  Dispense: 30 tablet; Refill: 0    4. Pulmonary nodule  - Ambulatory referral/consult to Pulmonology; Future    5. Atherosclerosis of abdominal aorta  Advise tobacco cessation         Orders Placed This Encounter   Procedures    CT Chest Lung Screening Low Dose    Ambulatory referral/consult to Pulmonology       No follow-ups on file. or sooner as needed.        Tobacco cessation 8 minutes

## 2024-07-18 NOTE — PATIENT INSTRUCTIONS
Angiogram    Arrive for procedure at: [place] on 7/30/24 arrive at 8 am    You will receive a phone call from [facility] Pre-Op Department with further instructions and exact arrival time prior to your scheduled procedure.    Notify the nurse if you are ALLERGIC TO IODINE.    FASTING: You MAY NOT have anything to eat or drink AFTER MIDNIGHT the day before your procedure. If your procedure is scheduled in the afternoon, you may have a LIGHT BREAKFAST 6-8 hours prior to your procedure.  For example: Two slices of toast; black coffee or black tea.    MEDICATIONS: You may take your regular morning medications with water. If there are any medications that you should not take, you will be instructed to hold them for that morning.    CARDIOLOGY PRE-PROCEDURE MEDICATION ORDERS:  ** Please hold any medications that are checked below:    HOLD   # OF DAYS TO HOLD      CONTINUE the Following Medications   Plavix      Effient     Aspirin    WHAT TO EXPECT:    How long will the procedure take?  The procedure will take an average of 1 - 2 hours to perform.  After the procedure, you will need to lay flat for around 4 - 6 hours to minimize bleeding from the puncture site. If the wrist is accessed you will need to keep your arm still as instructed by the nurse.    When can I go home?  You may be able to be discharged home that same afternoon if there were no complications.  If you have one of the following: balloon; stent; pacemaker or defibrillator procedures, you may spend one night for observation.  Your doctor will determine your discharge based upon your progress.  The results of your procedure will be discussed with you before you are discharged.  Any further testing or procedures will be scheduled for you either before you leave or you will be instructed to call for a future appointment.      TRANSPORTATION:  PLEASE ARRANGE TO HAVE SOMEONE DRIVE YOU HOME FOLLOWING YOUR PROCEDURE, YOU WILL NOT BE ALLOWED TO  DRIVE.

## 2024-07-18 NOTE — PATIENT INSTRUCTIONS
Griffin Chappell,     If you are due for any health screening(s) below please notify me so we can arrange them to be ordered and scheduled. Most healthy patients at your age complete them, but you are free to accept or refuse.     If you can't do it, I'll definitely understand. If you can, I'd certainly appreciate it!    Tests to Keep You Healthy    Mammogram: Met on 9/7/2023  Colon Cancer Screening: Met on 1/16/2024  Tobacco Cessation: NO      Were here to help you quit smoking     Our records indicated that you are still smoking. One of the best things you can do for your health is to stop smoking and we are here to help.     Talk with your provider about our Smoking Cessation Program and how we can support you on your journey.

## 2024-07-18 NOTE — PROGRESS NOTES
"  Subjective:    Patient ID:  Misti West is a 58 y.o. female patient here for evaluation Results      History of Present Illness:  Follow up visit test results.  Nuclear study equivocal for ischemia echo normal.  Holter monitor with sinus bradycardia.  No high-grade SA AV randolph block.  Continued problems of fatigue, atypical chest pain.  Ongoing risk factors include use, dyslipidemia family history.             Review of patient's allergies indicates:   Allergen Reactions    Codeine Swelling     "Hives, vomiting"  Can take hydrocodone & tylenol    Morphine Swelling     "Hives, vomiting"    Naproxen sodium Swelling     "Itching, high blood pressure"    Percocet [oxycodone-acetaminophen] Nausea And Vomiting     LOW HEART RATE  Can take hydrocodone & tylenol    Tramadol hcl Swelling     "itching & nausea"    Ciprofloxacin Other (See Comments)     Low BP    Penicillins     Trintex        Past Medical History:   Diagnosis Date    Anxiety     Arthritis     Cancer     cervical    Female bladder prolapse     Hepatomegaly     3 liver cyst    Hiatal hernia     High cholesterol     Liver lesion     PONV (postoperative nausea and vomiting)     Seasonal allergies      Past Surgical History:   Procedure Laterality Date    CERVIX SURGERY      COLONOSCOPY N/A 08/27/2020    Procedure: COLONOSCOPY;  Surgeon: Tim Aguayo MD;  Location: [facility];  Service: Endoscopy;  Laterality: N/A;    COLONOSCOPY N/A 1/16/2024    Procedure: COLONOSCOPY;  Surgeon: Tim Aguayo MD;  Location: [facility];  Service: Endoscopy;  Laterality: N/A;    COLPORRHAPHY, COMBINED ANTEROPOSTERIOR N/A 5/2/2024    Procedure: COLPORRHAPHY, COMBINED ANTEROPOSTERIOR;  Surgeon: Angeles Last MD;  Location: [facility] OR;  Service: OB/GYN;  Laterality: N/A;    CYSTOSCOPY N/A 5/2/2024    Procedure: CYSTOSCOPY;  Surgeon: Angeles Last MD;  Location: [facility] OR;  Service: OB/GYN;  Laterality: N/A;    CYSTOSCOPY WITH URETHRAL DILATION  10/01/2020    Procedure: " CYSTOSCOPY, WITH URETHRAL DILATION;  Surgeon: Bhargav Keane MD;  Location: [facility] OR;  Service: Urology;;    ESOPHAGOGASTRODUODENOSCOPY N/A 08/13/2020    Procedure: EGD (ESOPHAGOGASTRODUODENOSCOPY);  Surgeon: Tim Aguayo MD;  Location: [facility];  Service: Endoscopy;  Laterality: N/A;    HYSTERECTOMY, TOTAL, LAPAROSCOPIC, WITH SALPINGECTOMY Bilateral 5/2/2024    Procedure: HYSTERECTOMY,TOTAL,LAPAROSCOPIC,WITH SALPINGECTOMY;  Surgeon: Angeles Last MD;  Location: [facility] [place] OR;  Service: OB/GYN;  Laterality: Bilateral;  5 hours staying overnight    KNEE ARTHROSCOPY      TKR    LAPAROSCOPIC SACROCOLPOPEXY N/A 5/2/2024    Procedure: SACROCOLPOPEXY, LAPAROSCOPIC;  Surgeon: Angeles Last MD;  Location: [facility];  Service: OB/GYN;  Laterality: N/A;    OOPHORECTOMY Left 5/2/2024    Procedure: OOPHORECTOMY;  Surgeon: Angeles Last MD;  Location: [facility] [place] OR;  Service: OB/GYN;  Laterality: Left;  LAPAROSCOPIC    PERINEOPLASTY N/A 5/2/2024    Procedure: PERINEOPLASTY;  Surgeon: Angeles Last MD;  Location: [facility] [place] [facility];  Service: OB/GYN;  Laterality: N/A;    ROBOTIC ARTHROPLASTY, KNEE Left 03/29/2022    Procedure: ROBOTIC ARTHROPLASTY, KNEE, TOTAL;  Surgeon: Neo Zapata MD;  Location: [facility] OR;  Service: Orthopedics;  Laterality: Left;    SINUS SURGERY      TUBAL LIGATION       Social History     Tobacco Use    Smoking status: Every Day     Current packs/day: 1.00     Average packs/day: 1 pack/day for 47.5 years (47.5 ttl pk-yrs)     Types: Cigarettes     Start date: 1977    Smokeless tobacco: Never   Substance Use Topics    Alcohol use: Yes     Comment: rare    Drug use: Never        Review of Systems:    As noted in HPI in addition      REVIEW OF SYSTEMS  Review of Systems   Constitutional: Negative for decreased appetite, diaphoresis, night sweats, weight gain and weight loss.   HENT:  Negative for nosebleeds and odynophagia.    Eyes:  Negative for double vision and photophobia.   Cardiovascular:  Positive for  chest pain. Negative for claudication, cyanosis, dyspnea on exertion, irregular heartbeat, leg swelling, near-syncope, orthopnea, palpitations, paroxysmal nocturnal dyspnea and syncope.   Respiratory:  Positive for cough and shortness of breath. Negative for hemoptysis and wheezing.    Hematologic/Lymphatic: Negative for adenopathy.   Skin:  Negative for flushing, skin cancer and suspicious lesions.   Musculoskeletal:  Negative for gout, myalgias and neck pain.   Gastrointestinal:  Negative for abdominal pain, heartburn, hematemesis and hematochezia.   Genitourinary:  Negative for bladder incontinence, hesitancy and nocturia.   Neurological:  Negative for focal weakness, headaches, light-headedness and paresthesias.   Psychiatric/Behavioral:  Negative for memory loss and substance abuse.               Objective:        Vitals:    07/18/24 1030   BP: 133/80   Pulse: (!) 50       Lab Results   Component Value Date    WBC 10.06 05/10/2024    HGB 13.7 05/10/2024    HCT 40.7 05/10/2024     05/10/2024    CHOL 167 07/20/2023    TRIG 58 07/20/2023    HDL 70 07/20/2023    ALT 24 05/10/2024    AST 21 05/10/2024     05/10/2024    K 4.0 05/10/2024     05/10/2024    CREATININE 1.0 05/10/2024    BUN 14 05/10/2024    CO2 24 05/10/2024    TSH 1.168 07/20/2023    INR 1.0 04/18/2022    HGBA1C 5.2 07/20/2023        ECHOCARDIOGRAM RESULTS  Results for orders placed during the hospital encounter of 07/11/24    Echo    Interpretation Summary    Left Ventricle: The left ventricle is mildly dilated. Normal wall thickness. There is low normal systolic function with a visually estimated ejection fraction of 50 - 55%. There is normal diastolic function.    Right Ventricle: Normal right ventricular cavity size. Wall thickness is normal. Systolic function is normal.    Left Atrium: Left atrium is mildly dilated.    Pulmonary Artery: No pulmonary hypertension. The estimated pulmonary artery systolic pressure is 29 mmHg.     IVC/SVC: Normal venous pressure at 3 mmHg.    No results found for this or any previous visit.          CURRENT/PREVIOUS VISIT EKG  Results for orders placed or performed during the hospital encounter of 05/10/24   EKG 12-lead    Collection Time: 05/10/24 11:58 AM   Result Value Ref Range    QRS Duration 78 ms    [facility] QTC Calculation 447 ms    Narrative    Test Reason : R07.9,    Vent. Rate : 058 BPM     Atrial Rate : 058 BPM     P-R Int : 136 ms          QRS Dur : 078 ms      QT Int : 456 ms       P-R-T Axes : 057 030 031 degrees     QTc Int : 447 ms    Sinus bradycardia  Septal infarct ,age undetermined  T wave abnormality, consider anterior ischemia  Abnormal ECG    Confirmed by Ervin Ruiz MD ([id number]) on 5/15/2024 8:33:25 AM    Referred By: [email or web address]   SELF           Confirmed By:Ervin Ruiz MD     No valid procedures specified.   Results for orders placed during the hospital encounter of 07/11/24    Nuclear Stress - Cardiology Interpreted    Interpretation Summary    Equivocal myocardial perfusion scan.    The gated perfusion images showed an ejection fraction of 50% post stress.    LV cavity size is normal at rest and normal at stress.    The ECG portion of the study is uninterpretable due to left bundle branch block.    The patient reported no chest pain during the stress test.    Rate dependent left bundle-branch block present.  Septal abnormality, mostly fixed rule out second-degree to bundle-branch block, no reversible segments to suggest ischemia.  Clinical correlation warranted.    No valid procedures specified.    PHYSICAL EXAM  GENERAL: well built, well nourished, well-developed in no apparent distress alert and oriented.   HEENT: Normocephalic. Pupils normal and conjunctivae normal.  Mucous membranes normal, no cyanosis or icterus, trachea central,no pallor or icterus is noted..   NECK: No JVD. No bruit..   THYROID: Thyroid not enlarged. No nodules present..   CARDIAC:  Normal S1-S2.  No murmur rub click or  gallop.  PMI nondisplaced.  LUNGS: Clear to auscultation. No wheezing or rhonchi..   ABDOMEN: Soft no masses or organomegaly.  No abdomen pulsations or bruits.  Normal bowel sounds. No pulsations and no masses felt, No guarding or rebound.   URINARY: No welch catheter   EXTREMITIES: No cyanosis, clubbing or edema noted at this time., no calf tenderness bilaterally.   PERIPHERAL VASCULAR SYSTEM: Good palpable distal pulses.  2+ femoral, popliteal and pedal pulses.  No bruits    CENTRAL NERVOUS SYSTEM: No focal motor or sensory deficits noted.   SKIN: Skin without lesions, moist, well perfused.   MUSCLE STRENGTH & TONE: No noteable weakness, atrophy or abnormal movement    I HAVE REVIEWED :    The vital signs, nurses notes, and all the pertinent radiology and labs.         Current Outpatient Medications   Medication Instructions    albuterol (VENTOLIN HFA) 90 mcg/actuation inhaler 2 puffs, Inhalation, Every 6 hours PRN, Rescue    ascorbic acid (vitamin C) (VITAMIN C) 500 mg, Oral, Daily    atorvastatin (LIPITOR) 10 mg, Oral, Daily    azelastine (ASTELIN) 137 mcg, Nasal, 2 times daily    b complex vitamins tablet 1 tablet, Oral, Daily    biotin 1,000 mcg, Oral, 3 times daily    cholecalciferol, vitamin D3, (VITAMIN D3 ORAL) Oral    docusate sodium (COLACE) 100 mg, Oral, 2 times daily    evening primrose oil 1,300 mg Cap Oral    glucosamine-chondroitin 500-400 mg tablet 1 tablet, Oral, 3 times daily    ipratropium (ATROVENT) 21 mcg (0.03 %) nasal spray 2 sprays, Each Nostril, 3 times daily    Lactobacillus rhamnosus GG (CULTURELLE) 10 billion cell capsule 1 capsule, Oral, Daily    levocetirizine (XYZAL) 5 mg, Oral, Nightly    montelukast (SINGULAIR) 5 mg, Oral, Nightly    multivitamin capsule 1 capsule, Oral, Daily    tumeric-ging-olive-oreg-capryl 100 mg-150 mg- 50 mg-150 mg Cap Oral    varenicline (CHANTIX STARTING MONTH BOX) 0.5 mg (11)- 1 mg (42) tablet Take one 0.5mg tab by mouth once daily X3 days,then increase to  one 0.5mg tab twice daily X4 days,then increase to one 1mg tab twice daily          Assessment:   Continue problems with atypical chest pain in the face of ongoing tobacco use, positive family history and equivocal nuclear study for ischemia.    Chronic asymptomatic sinus bradycardia, syncope x1 post surgery.        Plan:     Left heart catheterization        No follow-ups on file.

## 2024-07-23 ENCOUNTER — TELEPHONE (OUTPATIENT)
Dept: FAMILY MEDICINE | Facility: CLINIC | Age: 59
End: 2024-07-23
Payer: COMMERCIAL

## 2024-07-23 NOTE — TELEPHONE ENCOUNTER
----- Message from Vivienne Gino sent at 7/23/2024  8:29 AM CDT -----  Contact: Self  Type: Needs Medical Advice  Who Called:  Patient  Symptoms (please be specific):  She called back to tell the nurse she is doing better and wont need her f/up apt tomorrow afternoon.  Best Call Back Number:  [phone]  Additional Information:  Please call the patient back at the phone number listed above to advise. Thank you!

## 2024-07-23 NOTE — TELEPHONE ENCOUNTER
Spoke with pt. Pt reports feeling better and wishes to cancel appt. Encouraged pt to contact if she has further questions or concerns.

## 2024-07-25 ENCOUNTER — PATIENT MESSAGE (OUTPATIENT)
Dept: CARDIOLOGY | Facility: CLINIC | Age: 59
End: 2024-07-25
Payer: COMMERCIAL

## 2024-07-25 ENCOUNTER — TELEPHONE (OUTPATIENT)
Dept: CARDIOLOGY | Facility: CLINIC | Age: 59
End: 2024-07-25
Payer: COMMERCIAL

## 2024-07-25 NOTE — TELEPHONE ENCOUNTER
Spoke with pt regarding [facility] and her financial concerns. Pt directed to Chevy Rider as she spoke with her previously in regards to this case. Went back over the instructions for the [facility] and that she doesn't need to hold any medications. Pt reminded of nothing to eat or drink after midnight. Pt VU

## 2024-08-01 ENCOUNTER — CLINICAL SUPPORT (OUTPATIENT)
Dept: SMOKING CESSATION | Facility: CLINIC | Age: 59
End: 2024-08-01

## 2024-08-01 DIAGNOSIS — F17.210 MODERATE SMOKER (20 OR LESS PER DAY): Primary | ICD-10-CM

## 2024-08-05 ENCOUNTER — PATIENT MESSAGE (OUTPATIENT)
Dept: CARDIOLOGY | Facility: CLINIC | Age: 59
End: 2024-08-05
Payer: COMMERCIAL

## 2024-08-08 ENCOUNTER — CLINICAL SUPPORT (OUTPATIENT)
Dept: SMOKING CESSATION | Facility: CLINIC | Age: 59
End: 2024-08-08

## 2024-08-08 DIAGNOSIS — F17.210 MODERATE SMOKER (20 OR LESS PER DAY): Primary | ICD-10-CM

## 2024-08-15 ENCOUNTER — OFFICE VISIT (OUTPATIENT)
Dept: CARDIOLOGY | Facility: CLINIC | Age: 59
End: 2024-08-15

## 2024-08-15 VITALS
HEIGHT: 69 IN | SYSTOLIC BLOOD PRESSURE: 129 MMHG | DIASTOLIC BLOOD PRESSURE: 82 MMHG | WEIGHT: 172.38 LBS | HEART RATE: 53 BPM | BODY MASS INDEX: 25.53 KG/M2

## 2024-08-15 DIAGNOSIS — F17.210 TOBACCO DEPENDENCE DUE TO CIGARETTES: ICD-10-CM

## 2024-08-15 DIAGNOSIS — R00.1 BRADYCARDIA: ICD-10-CM

## 2024-08-15 DIAGNOSIS — R06.83 SNORING: ICD-10-CM

## 2024-08-15 DIAGNOSIS — E78.49 OTHER HYPERLIPIDEMIA: Primary | ICD-10-CM

## 2024-08-15 PROCEDURE — 99999 PR PBB SHADOW E&M-EST. PATIENT-LVL IV: CPT | Mod: PBBFAC,,,

## 2024-08-15 PROCEDURE — 99214 OFFICE O/P EST MOD 30 MIN: CPT | Mod: PBBFAC,PO

## 2024-08-15 NOTE — PROGRESS NOTES
[facility]  [facility]  Date: 8/15/24    Patient: Misti West, 1965, [id number]  Primary Care Provider: Lizet Landers MD     Chief Complaint: Follow up     Subjective:       Misti West is a 58 y.o. female who presents for follow up. They follow with Dr. Donaldson.    CBC, CMP, lipid panel stable. At last visit, patient presented with atypical chest pain with abnormal nuclear stress. LHC performed which demonstrated tortuous, otherwise normal, coronary arteries.     Since then, patient doing well. Access site well healed. Reports 2 episodes of mild chest discomfort which improve with administration of antacid and smoking cessation. No further episodes since then. Notes gasping for breath in the middle of the night, witnessed apneic events, loud snoring, and excessive daytime fatigue. Denies dyspnea, palpitations, dizziness, syncope, orthopnea, PND, edema.    Focused Past History includes:  Hyperlipidemia   TTE 7/2024:  LVEF 50-55%, mild LAE, PASP 29  Nuclear stress 7/2024:  Equivocal myocardial perfusion scan, ECG portion of the study is uninterpretable due to left bundle branch block  LHC 7/2024: Right-dominant system. The coronary arteries are somewhat tortuous, no significant atheromatous plaque noted to involve the epicardial coronary arteries. LVEDP 16   Bradycardia  48 hr Holter 7/2024:  Predominantly sinus rhythm, rare noncomplex atrial and ventricular ectopy, rate dependent LBBB  Tobacco abuse    Current Outpatient Medications   Medication Sig    albuterol (VENTOLIN HFA) 90 mcg/actuation inhaler Inhale 2 puffs into the lungs every 6 (six) hours as needed for Wheezing. Rescue    ascorbic acid, vitamin C, (VITAMIN C) 500 MG tablet Take 500 mg by mouth once daily.    atorvastatin (LIPITOR) 10 MG tablet Take 1 tablet (10 mg total) by mouth once daily.    b complex vitamins tablet Take 1 tablet by mouth once daily.    cholecalciferol, vitamin D3, (VITAMIN D3 ORAL) Take by mouth.     docusate sodium (COLACE) 100 MG capsule Take 1 capsule (100 mg total) by mouth 2 (two) times daily.    evening primrose oil 1,300 mg Cap Take by mouth.    glucosamine-chondroitin 500-400 mg tablet Take 1 tablet by mouth 3 (three) times daily.    Lactobacillus rhamnosus GG (CULTURELLE) 10 billion cell capsule Take 1 capsule by mouth once daily.    levocetirizine (XYZAL) 5 MG tablet Take 1 tablet (5 mg total) by mouth every evening.    montelukast (SINGULAIR) 10 mg tablet Take 1 tablet (10 mg total) by mouth every evening.    multivitamin capsule Take 1 capsule by mouth once daily.    tumeric-ging-olive-oreg-capryl 100 mg-150 mg- 50 mg-150 mg Cap Take by mouth.    varenicline (CHANTIX STARTING MONTH BOX) 0.5 mg (11)- 1 mg (42) tablet Take one 0.5mg tab by mouth once daily X3 days,then increase to one 0.5mg tab twice daily X4 days,then increase to one 1mg tab twice daily (Patient taking differently: Take one 0.5mg tab by mouth once daily X3 days,then increase to one 0.5mg tab twice daily X4 days,then increase to one 1mg tab twice daily)     Current Facility-Administered Medications   Medication    sodium chloride 0.9% flush 10 mL            Objective       Review of Systems  Constitutional: negative for fevers, night sweats, and weight loss  Eyes: negative for visual disturbance, diplopia  Respiratory: negative for cough, hemoptysis, sputum, and wheezing  Cardiovascular: see HPI  Gastrointestinal: negative for abdominal pain, bright red blood per rectum, change in bowel habits, dysphagia, melena, and reflux symptoms  Genitourinary:negative for dysuria, frequency, and hematuria  Hematologic/lymphatic: negative for bleeding, easy bruising, and lymphadenopathy  Musculoskeletal:negative for arthralgias, back pain, and myalgias  Neurological: negative for gait problems, paresthesia, speech problems, vertigo, and weakness  Behavioral/Psych: negative for excessive alcohol consumption, illegal drug usage, and sleep  disturbance    -------------------------------------    Anxiety    Arthritis    Cancer    cervical    Female bladder prolapse    General anesthetics causing adverse effect in therapeutic use    hard to wake up    Hepatomegaly    3 liver cyst    Hiatal hernia    High cholesterol    Liver lesion    PONV (postoperative nausea and vomiting)    Seasonal allergies     ----------------------------    Angiogram, coronary, with left heart catheterization    Procedure: Left Heart Cath;  Surgeon: Damon Donaldson MD;  Location: [facility] CATH;  Service: Cardiology;;    Cervix surgery    Colonoscopy    Procedure: COLONOSCOPY;  Surgeon: Tim Aguayo MD;  Location: [facility];  Service: Endoscopy;  Laterality: N/A;    Colonoscopy    Procedure: COLONOSCOPY;  Surgeon: Tim Aguayo MD;  Location: [facility];  Service: Endoscopy;  Laterality: N/A;    Colporrhaphy, combined anteroposterior    Procedure: COLPORRHAPHY, COMBINED ANTEROPOSTERIOR;  Surgeon: Angeles Last MD;  Location: [facility];  Service: OB/GYN;  Laterality: N/A;    Cystoscopy    Procedure: CYSTOSCOPY;  Surgeon: Angeles Last MD;  Location: [facility];  Service: OB/GYN;  Laterality: N/A;    Cystoscopy with urethral dilation    Procedure: CYSTOSCOPY, WITH URETHRAL DILATION;  Surgeon: Bhargav Keane MD;  Location: [facility];  Service: Urology;;    Esophagogastroduodenoscopy    Procedure: EGD (ESOPHAGOGASTRODUODENOSCOPY);  Surgeon: Tim Aguayo MD;  Location: [facility];  Service: Endoscopy;  Laterality: N/A;    Hysterectomy, total, laparoscopic, with salpingectomy    Procedure: HYSTERECTOMY,TOTAL,LAPAROSCOPIC,WITH SALPINGECTOMY;  Surgeon: Angeles Last MD;  Location: [facility];  Service: OB/GYN;  Laterality: Bilateral;  5 hours staying overnight    Knee arthroscopy    TKR    Laparoscopic sacrocolpopexy    Procedure: SACROCOLPOPEXY, LAPAROSCOPIC;  Surgeon: Angeles Last MD;  Location: [facility];  Service: OB/GYN;  Laterality: N/A;    Oophorectomy    Procedure:  OOPHORECTOMY;  Surgeon: Angeles Last MD;  Location: [facility] OR;  Service: OB/GYN;  Laterality: Left;  LAPAROSCOPIC    Perineoplasty    Procedure: PERINEOPLASTY;  Surgeon: Angeles Last MD;  Location: [facility] OR;  Service: OB/GYN;  Laterality: N/A;    Robotic arthroplasty, knee    Procedure: ROBOTIC ARTHROPLASTY, KNEE, TOTAL;  Surgeon: Neo Zapata MD;  Location: [facility] OR;  Service: Orthopedics;  Laterality: Left;    Sinus surgery    Tubal ligation        Family History   Problem Relation Name Age of Onset    Diabetes Mother      Cancer Mother          bladder    Arthritis Mother      Cancer Father          lung    COPD Father      Hypertension Father      Arthritis Father      Melanoma Father      Breast cancer Sister      Arthritis Sister      Breast cancer Sister      Arthritis Sister      Breast cancer Sister      Kidney disease Sister      Arthritis Sister      Colon polyps Neg Hx      Colon cancer Neg Hx      Crohn's disease Neg Hx      Ulcerative colitis Neg Hx      Stomach cancer Neg Hx      Esophageal cancer Neg Hx      Celiac disease Neg Hx      Seizures Neg Hx       Social History     Tobacco Use    Smoking status: Every Day     Current packs/day: 1.00     Average packs/day: 1 pack/day for 47.6 years (47.6 ttl pk-yrs)     Types: Cigarettes     Start date: 1977    Smokeless tobacco: Never    Tobacco comments:     3-4 daily   Substance Use Topics    Alcohol use: Yes     Comment: rare    Drug use: Never       Physical Exam  There were no vitals taken for this visit.  There is no height or weight on file to calculate BSA.  There is no height or weight on file to calculate BMI.    General appearance: alert, appears stated age, cooperative, and no distress  Head: Normocephalic, without obvious abnormality, atraumatic  Neck: no carotid bruit, no JVD, and supple, symmetrical, trachea midline  Lungs: clear to auscultation bilaterally  Heart: regular rate and rhythm; S1, S2 normal, no murmur, click, rub or  gallop  Abdomen: soft, non-tender, no distended  Extremities: extremities atraumatic, no pitting edema  Skin: warm, no cyanosis, no pathologic ecchymosis in exposed portions  Neurologic: Grossly normal. A&O x3      Lab Review   Lab Results   Component Value Date    WBC 6.14 07/30/2024    HGB 13.2 07/30/2024    HCT 39.5 07/30/2024    MCV 93 07/30/2024     07/30/2024         BMP  Lab Results   Component Value Date     07/30/2024    K 4.4 07/30/2024     07/30/2024    CO2 25 07/30/2024    BUN 23 (H) 07/30/2024    CREATININE 0.73 07/30/2024    CALCIUM 9.1 07/30/2024    ANIONGAP 7 07/30/2024    ESTGFRAFRICA >60.0 07/18/2022    EGFRNONAA >60.0 07/18/2022       Lab Results   Component Value Date    LABPROT 10.0 04/18/2022    ALBUMIN 4.4 07/30/2024       Lab Results   Component Value Date    ALT 25 07/30/2024    AST 29 07/30/2024    ALKPHOS 67 07/30/2024    BILITOT 0.5 07/30/2024       Lab Results   Component Value Date    TSH 1.168 07/20/2023       Lab Results   Component Value Date    CHOL 167 07/20/2023    CHOL 173 07/18/2022    CHOL 198 02/18/2021     Lab Results   Component Value Date    HDL 70 07/20/2023    HDL 53 07/18/2022    HDL 73 02/18/2021     Lab Results   Component Value Date    LDLCALC 85.4 07/20/2023    LDLCALC 104.8 07/18/2022    LDLCALC 116.2 02/18/2021     Lab Results   Component Value Date    TRIG 58 07/20/2023    TRIG 76 07/18/2022    TRIG 44 02/18/2021     Lab Results   Component Value Date    CHOLHDL 41.9 07/20/2023    CHOLHDL 30.6 07/18/2022    CHOLHDL 36.9 02/18/2021        The 10-year ASCVD risk score (Sindhu DILLARD, et al., 2019) is: 5.3%    Values used to calculate the score:      Age: 58 years      Sex: Female      Is Non- : No      Diabetic: No      Tobacco smoker: Yes      Systolic Blood Pressure: 130 mmHg      Is BP treated: No      HDL Cholesterol: 70 mg/dL      Total Cholesterol: 167 mg/dL          Assessment & Plan:     This is a 58 y.o. female with  HLD, bradycardia, tobacco abuse. Underwent LHC on 7/30/24 for evaluation of atypical chest pain with abnormal nuclear stress. LHC demonstrated tortuous, otherwise normal, coronary arteries. Patient doing well since.     1. Snoring, apneic events  - Referral to sleep medicine     2. Other hyperlipidemia  - Stable   - Continue lipitor 10 mg qd    3. Bradycardia  - 48 hr Holter 7/2024:  Predominantly sinus rhythm   - Asymptomatic    4. Tobacco abuse  - Counseled on smoking cessation  - Weaning off cigarettes currently       Emphasized the importance of modifying lifestyle related risk factors including smoking cessation, limiting alcohol intake, exercise, diet most resembling a Mediterranean diet.    Please follow up in 3 months or sooner if needed.      Gwendolyn Joseph PA-C  [facility] [place]  Office: [phone]

## 2024-08-22 ENCOUNTER — CLINICAL SUPPORT (OUTPATIENT)
Dept: SMOKING CESSATION | Facility: CLINIC | Age: 59
End: 2024-08-22

## 2024-08-22 DIAGNOSIS — F17.210 MODERATE SMOKER (20 OR LESS PER DAY): Primary | ICD-10-CM

## 2024-08-22 RX ORDER — VARENICLINE TARTRATE 1 MG/1
1 TABLET, FILM COATED ORAL 2 TIMES DAILY
Qty: 56 TABLET | Refills: 0 | Status: SHIPPED | OUTPATIENT
Start: 2024-08-22

## 2024-08-22 NOTE — PROGRESS NOTES
Site: [facility]  Date:  8/22/2024  Clinical Status of Patient: Outpatient   Length of Service and Code: 90 minutes - 40087   Number in Attendance: 10  CO Monitor Score: 7 pmm  Group Activities/Focus of Group:  orientation, client introductions, completion of TCRS (Tobacco Cessation Rating Scale) learned addiction model, cues/triggers, personal reasons for quitting, medications, goals, quit date    Target symptoms:  withdrawal and medication side effects             The following were rated moderate (3) to severe (4) on TCRS:       Moderate 3: none     Severe 4:   none  Patient's Response to Intervention: #5 Patient reports decreasing cigarette smoking from 1 pack per day for 47 years to a 1/2 pack per day. She remains determined to stop smoking soon. Completion of TCRS (Tobacco Cessation Rating Scale) reviewed strategies, habitual behavior, high risks situations, understanding urges and cravings, stress and relaxation with open discussion and additional interventions, Introduced lapses, relapses, understanding them and analyzing the situation of a lapse, conflict issues that may be linked to a lapse. The patient remains on the prescribed tobacco cessation medication regimen of 1 mg Chantix BID without any negative side effects at this time. The patient denies any abnormal behavioral or mental changes at this time. The patient will continue with group therapy sessions and medication monitoring by CTTS. Prescribed medication management will be by physician.      Progress Toward Goals and Other Mental Status Changes: The patient denies any abnormal behavioral or mental changes at this time.     Plan: The patient will continue with group therapy sessions and medication regimen prescribed with management by physician or Cessation Clinic Provider. Patient will inform Smoking Clinic Cessation Counselor of symptoms as rated high on TCRS.    Return to Clinic: 1 week

## 2024-08-22 NOTE — Clinical Note
Patient reports decreasing cigarette smoking from 1 pack per day for 47 years to a 1/2 pack per day. She remains determined to stop smoking soon. Completion of TCRS (Tobacco Cessation Rating Scale) reviewed strategies, habitual behavior, high risks situations, understanding urges and cravings, stress and relaxation with open discussion and additional interventions, Introduced lapses, relapses, understanding them and analyzing the situation of a lapse, conflict issues that may be linked to a lapse. The patient remains on the prescribed tobacco cessation medication regimen of 1 mg Chantix BID without any negative side effects at this time. The patient denies any abnormal behavioral or mental changes at this time. The patient will continue with group therapy sessions and medication monitoring by CTTS. Prescribed medication management will be by physician.

## 2024-09-02 ENCOUNTER — PATIENT MESSAGE (OUTPATIENT)
Dept: FAMILY MEDICINE | Facility: CLINIC | Age: 59
End: 2024-09-02
Payer: COMMERCIAL

## 2024-09-03 ENCOUNTER — CLINICAL SUPPORT (OUTPATIENT)
Dept: SMOKING CESSATION | Facility: CLINIC | Age: 59
End: 2024-09-03

## 2024-09-03 DIAGNOSIS — F17.210 MODERATE SMOKER (20 OR LESS PER DAY): Primary | ICD-10-CM

## 2024-09-05 ENCOUNTER — OFFICE VISIT (OUTPATIENT)
Dept: PULMONOLOGY | Facility: CLINIC | Age: 59
End: 2024-09-05

## 2024-09-05 VITALS
OXYGEN SATURATION: 97 % | DIASTOLIC BLOOD PRESSURE: 72 MMHG | SYSTOLIC BLOOD PRESSURE: 112 MMHG | BODY MASS INDEX: 25.61 KG/M2 | HEIGHT: 69 IN | WEIGHT: 172.94 LBS | HEART RATE: 82 BPM

## 2024-09-05 DIAGNOSIS — R91.1 PULMONARY NODULE: ICD-10-CM

## 2024-09-05 DIAGNOSIS — F17.210 TOBACCO DEPENDENCE DUE TO CIGARETTES: Primary | ICD-10-CM

## 2024-09-05 DIAGNOSIS — J44.89 ASTHMA-COPD OVERLAP SYNDROME: ICD-10-CM

## 2024-09-05 DIAGNOSIS — J32.9 BACTERIAL SINUSITIS: ICD-10-CM

## 2024-09-05 DIAGNOSIS — B96.89 BACTERIAL SINUSITIS: ICD-10-CM

## 2024-09-05 PROCEDURE — 99214 OFFICE O/P EST MOD 30 MIN: CPT | Mod: PBBFAC,PO | Performed by: INTERNAL MEDICINE

## 2024-09-05 PROCEDURE — 99999 PR PBB SHADOW E&M-EST. PATIENT-LVL IV: CPT | Mod: PBBFAC,,, | Performed by: INTERNAL MEDICINE

## 2024-09-05 RX ORDER — MONTELUKAST SODIUM 10 MG/1
1 TABLET ORAL NIGHTLY
COMMUNITY

## 2024-09-05 RX ORDER — ALBUTEROL SULFATE 90 UG/1
2 INHALANT RESPIRATORY (INHALATION) EVERY 4 HOURS PRN
Qty: 18 G | Refills: 11 | Status: SHIPPED | OUTPATIENT
Start: 2024-09-05 | End: 2025-09-05

## 2024-09-05 RX ORDER — FLUTICASONE FUROATE, UMECLIDINIUM BROMIDE AND VILANTEROL TRIFENATATE 200; 62.5; 25 UG/1; UG/1; UG/1
1 POWDER RESPIRATORY (INHALATION) DAILY
Qty: 60 EACH | Refills: 11 | Status: SHIPPED | OUTPATIENT
Start: 2024-09-05

## 2024-09-05 NOTE — PATIENT INSTRUCTIONS
Lung nodules do not look significant.  No follow up needed for nodule-- may be reasonable to do screen ct lung for lung cancers especially with father's history....    Yearly ct reasonable to screen for lung cancer.    Best to stop smokes     With cough and wheezes that clear with stop smoking suggest asthma with copd.  Copd not expected to clear.     Use albuterol as needed.  Sent in al[facility]ol to [facility].    May use trelegy once daily if lungs give problems.  Paper script given.

## 2024-09-17 ENCOUNTER — CLINICAL SUPPORT (OUTPATIENT)
Dept: SMOKING CESSATION | Facility: CLINIC | Age: 59
End: 2024-09-17

## 2024-09-17 DIAGNOSIS — F17.210 MODERATE SMOKER (20 OR LESS PER DAY): Primary | ICD-10-CM

## 2024-09-27 DIAGNOSIS — R06.83 SNORING: Primary | ICD-10-CM

## 2024-10-30 ENCOUNTER — PATIENT MESSAGE (OUTPATIENT)
Dept: UROGYNECOLOGY | Facility: CLINIC | Age: 59
End: 2024-10-30
Payer: COMMERCIAL

## 2024-10-30 ENCOUNTER — PATIENT MESSAGE (OUTPATIENT)
Dept: CARDIOLOGY | Facility: CLINIC | Age: 59
End: 2024-10-30
Payer: COMMERCIAL

## 2024-10-30 ENCOUNTER — TELEPHONE (OUTPATIENT)
Dept: CARDIOLOGY | Facility: CLINIC | Age: 59
End: 2024-10-30
Payer: COMMERCIAL

## 2024-10-31 ENCOUNTER — CLINICAL SUPPORT (OUTPATIENT)
Dept: SMOKING CESSATION | Facility: CLINIC | Age: 59
End: 2024-10-31

## 2024-10-31 DIAGNOSIS — F17.200 NICOTINE DEPENDENCE: Primary | ICD-10-CM

## 2024-10-31 PROCEDURE — 99999 PR PBB SHADOW E&M-EST. PATIENT-LVL I: CPT | Mod: PBBFAC,,,

## 2024-11-06 ENCOUNTER — TELEPHONE (OUTPATIENT)
Dept: UROGYNECOLOGY | Facility: CLINIC | Age: 59
End: 2024-11-06
Payer: COMMERCIAL

## 2024-11-06 ENCOUNTER — PATIENT MESSAGE (OUTPATIENT)
Dept: FAMILY MEDICINE | Facility: CLINIC | Age: 59
End: 2024-11-06
Payer: COMMERCIAL

## 2024-11-06 DIAGNOSIS — J30.2 SEASONAL ALLERGIES: Primary | ICD-10-CM

## 2024-11-06 RX ORDER — FLUTICASONE PROPIONATE 50 MCG
1 SPRAY, SUSPENSION (ML) NASAL DAILY
Qty: 18.2 ML | Refills: 0 | Status: SHIPPED | OUTPATIENT
Start: 2024-11-06

## 2024-11-06 RX ORDER — AZELASTINE 1 MG/ML
1 SPRAY, METERED NASAL 2 TIMES DAILY
Qty: 18.2 ML | Refills: 0 | Status: SHIPPED | OUTPATIENT
Start: 2024-11-06 | End: 2025-11-06

## 2024-11-18 DIAGNOSIS — E78.5 HYPERLIPIDEMIA, UNSPECIFIED HYPERLIPIDEMIA TYPE: ICD-10-CM

## 2024-11-18 RX ORDER — ATORVASTATIN CALCIUM 10 MG/1
10 TABLET, FILM COATED ORAL DAILY
Qty: 90 TABLET | Refills: 3 | Status: SHIPPED | OUTPATIENT
Start: 2024-11-18 | End: 2025-11-18

## 2024-11-18 NOTE — TELEPHONE ENCOUNTER
Refill Routing Note   Medication(s) are not appropriate for processing by [facility] for the following reason(s):        Required labs outdated  No active prescription written by provider  Responsible provider unclear    [facility] action(s):  Defer             Appointments  past 12m or future 3m with PCP    Date Provider   Last Visit   7/18/2024 Lizet Landers MD   Next Visit   1/2/2025 Lizet Landers MD   ED visits in past 90 days: 0        Note composed:2:35 PM 11/18/2024

## 2024-11-18 NOTE — TELEPHONE ENCOUNTER
----- Message from Merissa sent at 11/18/2024 12:54 PM CST -----  Contact: PT  Type:  RX Refill Request    Who Called: PT   Refill or New Rx: REFILL   RX Name and Strength:atorvastatin (LIPITOR) 10 MG tablet  How is the patient currently taking it? (ex. 1XDay):Route: Take 1 tablet (10 mg total) by mouth once daily. - Oral  Is this a 30 day or 90 day RX:90  Preferred Pharmacy with phone number:   [facility] - [place] [id number] [address]  Phone: [phone] Fax: [phone]  Local or Mail Order:LOCAL  Ordering Provider:JACY   Would the patient rather a call back or a response via [facility]? CALL  Best Call Back Number:[phone]  Additional Information: THANK YOU

## 2024-11-18 NOTE — TELEPHONE ENCOUNTER
No care due was identified.  [facility] Embedded Care Due Messages. Reference number: [id number].   11/18/2024 12:59:35 PM CST

## 2025-01-02 ENCOUNTER — OFFICE VISIT (OUTPATIENT)
Dept: FAMILY MEDICINE | Facility: CLINIC | Age: 60
End: 2025-01-02

## 2025-01-02 VITALS
DIASTOLIC BLOOD PRESSURE: 66 MMHG | RESPIRATION RATE: 15 BRPM | BODY MASS INDEX: 25.98 KG/M2 | OXYGEN SATURATION: 99 % | HEIGHT: 69 IN | SYSTOLIC BLOOD PRESSURE: 118 MMHG | WEIGHT: 175.38 LBS | HEART RATE: 72 BPM

## 2025-01-02 DIAGNOSIS — R79.9 ABNORMAL BLOOD CHEMISTRY: ICD-10-CM

## 2025-01-02 DIAGNOSIS — E55.9 VITAMIN D DEFICIENCY DISEASE: ICD-10-CM

## 2025-01-02 DIAGNOSIS — F41.9 ANXIETY: Primary | ICD-10-CM

## 2025-01-02 DIAGNOSIS — J30.1 SEASONAL ALLERGIC RHINITIS DUE TO POLLEN: ICD-10-CM

## 2025-01-02 DIAGNOSIS — E78.5 HYPERLIPIDEMIA, UNSPECIFIED HYPERLIPIDEMIA TYPE: ICD-10-CM

## 2025-01-02 PROCEDURE — 99213 OFFICE O/P EST LOW 20 MIN: CPT | Mod: PBBFAC | Performed by: STUDENT IN AN ORGANIZED HEALTH CARE EDUCATION/TRAINING PROGRAM

## 2025-01-02 PROCEDURE — 99999 PR PBB SHADOW E&M-EST. PATIENT-LVL III: CPT | Mod: PBBFAC,,, | Performed by: STUDENT IN AN ORGANIZED HEALTH CARE EDUCATION/TRAINING PROGRAM

## 2025-01-02 PROCEDURE — 99214 OFFICE O/P EST MOD 30 MIN: CPT | Mod: S$PBB,,, | Performed by: STUDENT IN AN ORGANIZED HEALTH CARE EDUCATION/TRAINING PROGRAM

## 2025-01-02 RX ORDER — LEVOCETIRIZINE DIHYDROCHLORIDE 5 MG/1
5 TABLET, FILM COATED ORAL NIGHTLY
Qty: 30 TABLET | Refills: 11 | Status: SHIPPED | OUTPATIENT
Start: 2025-01-02 | End: 2026-01-02

## 2025-01-02 RX ORDER — MONTELUKAST SODIUM 10 MG/1
10 TABLET ORAL
COMMUNITY
Start: 2024-11-13 | End: 2025-01-02 | Stop reason: SDUPTHER

## 2025-01-02 RX ORDER — ATORVASTATIN CALCIUM 10 MG/1
10 TABLET, FILM COATED ORAL DAILY
Qty: 90 TABLET | Refills: 3 | Status: SHIPPED | OUTPATIENT
Start: 2025-01-02 | End: 2026-01-02

## 2025-01-02 RX ORDER — MONTELUKAST SODIUM 10 MG/1
10 TABLET ORAL DAILY
Qty: 90 TABLET | Refills: 3 | Status: SHIPPED | OUTPATIENT
Start: 2025-01-02

## 2025-01-02 RX ORDER — ESCITALOPRAM OXALATE 5 MG/1
5 TABLET ORAL DAILY
Qty: 90 TABLET | Refills: 3 | Status: SHIPPED | OUTPATIENT
Start: 2025-01-02 | End: 2026-01-02

## 2025-01-02 NOTE — PROGRESS NOTES
SUBJECTIVE:    CHIEF COMPLAINT:   Chief Complaint   Patient presents with    Annual Exam           274}    HISTORY OF PRESENT ILLNESS:  Misti West is a 59 y.o. female who presents to the clinic today   History of Present Illness    CHIEF COMPLAINT:  Ms. West presents today for follow-up regarding chest tightness and allergies.    RESPIRATORY AND CHEST SYMPTOMS:  She experiences chest tightness associated with albuterol use (as needed). She believes stress and anxiety are contributing factors to her chest discomfort.    ALLERGIES AND ENT:  She has multiple allergies with high sensitivity to mold and pine, resulting in cough, runny nose, and fluid buildup around ears. She experiences significant ear symptoms including bad tinnitus, wax buildup with chunking, itching, and inflammation.    SLEEP:  She reports improved sleep with combination of xyzal and Singulair. When Singulair is missed, she experiences frequent awakening every 1.5-2 hours throughout the night.    PSYCHIATRIC HISTORY:  She reports ongoing mood changes and attributes some to past ovarian surgery. She experienced suicidal ideation while taking Chantix, which has resolved since discontinuation. She has previously used Xanax for stress management, though infrequently.    CURRENT MEDICATIONS:  She takes xyzal, atorvastatin medication, and Singulair. Singulair helps with sleep maintenance and prevents ear symptoms. She uses albuterol as needed.      ROS:  General: -fever, -chills, -fatigue, -weight gain, -weight loss  Eyes: -vision changes, -redness, -discharge  ENT: -ear pain, -nasal congestion, -sore throat, +runny nose, +tinnitus  Cardiovascular: -chest pain, -palpitations, -lower extremity edema, +chest tightness  Respiratory: +cough, -shortness of breath  Gastrointestinal: -abdominal pain, -nausea, -vomiting, -diarrhea, -constipation, -blood in stool  Genitourinary: -dysuria, -hematuria, -frequency  Musculoskeletal: -joint pain, -muscle  pain  Skin: -rash, -lesion  Neurological: -headache, -dizziness, -numbness, -tingling  Psychiatric: +anxiety, -depression, +sleep difficulty, -suicidal ideation          PAST MEDICAL HISTORY:     274}  Past Medical History:   Diagnosis Date    Anxiety     Arthritis     Cancer     cervical    Female bladder prolapse     General anesthetics causing adverse effect in therapeutic use     hard to wake up    Hepatomegaly     3 liver cyst    Hiatal hernia     High cholesterol     Liver lesion     PONV (postoperative nausea and vomiting)     Seasonal allergies        PAST SURGICAL HISTORY:  Past Surgical History:   Procedure Laterality Date    ANGIOGRAM, CORONARY, WITH LEFT HEART CATHETERIZATION  7/30/2024    Procedure: Left Heart Cath;  Surgeon: Damon Donaldson MD;  Location: [facility] CATH;  Service: Cardiology;;    CERVIX SURGERY      COLONOSCOPY N/A 08/27/2020    Procedure: COLONOSCOPY;  Surgeon: Tim Aguayo MD;  Location: [facility];  Service: Endoscopy;  Laterality: N/A;    COLONOSCOPY N/A 1/16/2024    Procedure: COLONOSCOPY;  Surgeon: Tim Aguayo MD;  Location: [facility];  Service: Endoscopy;  Laterality: N/A;    COLPORRHAPHY, COMBINED ANTEROPOSTERIOR N/A 5/2/2024    Procedure: COLPORRHAPHY, COMBINED ANTEROPOSTERIOR;  Surgeon: Angeles Last MD;  Location: [facility] OR;  Service: OB/GYN;  Laterality: N/A;    CYSTOSCOPY N/A 5/2/2024    Procedure: CYSTOSCOPY;  Surgeon: Angeles Last MD;  Location: [facility] OR;  Service: OB/GYN;  Laterality: N/A;    CYSTOSCOPY WITH URETHRAL DILATION  10/01/2020    Procedure: CYSTOSCOPY, WITH URETHRAL DILATION;  Surgeon: Bhargav Keane MD;  Location: [facility] OR;  Service: Urology;;    ESOPHAGOGASTRODUODENOSCOPY N/A 08/13/2020    Procedure: EGD (ESOPHAGOGASTRODUODENOSCOPY);  Surgeon: Tim Aguayo MD;  Location: [facility];  Service: Endoscopy;  Laterality: N/A;    HYSTERECTOMY, TOTAL, LAPAROSCOPIC, WITH SALPINGECTOMY Bilateral 5/2/2024    Procedure:  HYSTERECTOMY,TOTAL,LAPAROSCOPIC,WITH SALPINGECTOMY;  Surgeon: Angeles Last MD;  Location: [facility] OR;  Service: OB/GYN;  Laterality: Bilateral;  5 hours staying overnight    KNEE ARTHROSCOPY      TKR    LAPAROSCOPIC SACROCOLPOPEXY N/A 5/2/2024    Procedure: SACROCOLPOPEXY, LAPAROSCOPIC;  Surgeon: Angeles Last MD;  Location: [facility] OR;  Service: OB/GYN;  Laterality: N/A;    OOPHORECTOMY Left 5/2/2024    Procedure: OOPHORECTOMY;  Surgeon: Angeles Last MD;  Location: [facility] OR;  Service: OB/GYN;  Laterality: Left;  LAPAROSCOPIC    PERINEOPLASTY N/A 5/2/2024    Procedure: PERINEOPLASTY;  Surgeon: Angeles Last MD;  Location: [facility] OR;  Service: OB/GYN;  Laterality: N/A;    ROBOTIC ARTHROPLASTY, KNEE Left 03/29/2022    Procedure: ROBOTIC ARTHROPLASTY, KNEE, TOTAL;  Surgeon: Neo Zapata MD;  Location: [facility];  Service: Orthopedics;  Laterality: Left;    SINUS SURGERY      TUBAL LIGATION         SOCIAL HISTORY:  Social History     Socioeconomic History    Marital status:    Tobacco Use    Smoking status: Every Day     Current packs/day: 1.00     Average packs/day: 1 pack/day for 48.0 years (48.0 ttl pk-yrs)     Types: Cigarettes     Start date: 1977    Smokeless tobacco: Never    Tobacco comments:     3-4 daily   Substance and Sexual Activity    Alcohol use: Yes     Comment: rare    Drug use: Never    Sexual activity: Not Currently     Partners: Male   Social History Narrative    [place] 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      Social Drivers of Health     Financial Resource Strain: Medium Risk (3/9/2022)    Overall Financial Resource Strain (CARDIA)     Difficulty of Paying Living Expenses: Somewhat hard   Food Insecurity: No Food Insecurity (3/9/2022)    Hunger Vital Sign     Worried About Running Out of Food in the Last Year: Never true     Ran Out of Food in the Last Year: Never true   Transportation Needs: No Transportation Needs (3/9/2022)    PRAPARE - Transportation     Lack of  "Transportation (Medical): No     Lack of Transportation (Non-Medical): No   Physical Activity: Unknown (3/9/2022)    Exercise Vital Sign     Days of Exercise per Week: 0 days   Stress: No Stress Concern Present (3/9/2022)    [employer] - Occupational Stress Questionnaire     Feeling of Stress : Only a little   Housing Stability: Unknown (3/9/2022)    Housing Stability Vital Sign     Unable to Pay for Housing in the Last Year: No     Unstable Housing in the Last Year: No       FAMILY HISTORY:       Family History   Problem Relation Name Age of Onset    Diabetes Mother      Cancer Mother          bladder    Arthritis Mother      Cancer Father          lung    COPD Father      Hypertension Father      Arthritis Father      Melanoma Father      Breast cancer Sister      Arthritis Sister      Breast cancer Sister      Arthritis Sister      Breast cancer Sister      Kidney disease Sister      Arthritis Sister      Colon polyps Neg Hx      Colon cancer Neg Hx      Crohn's disease Neg Hx      Ulcerative colitis Neg Hx      Stomach cancer Neg Hx      Esophageal cancer Neg Hx      Celiac disease Neg Hx      Seizures Neg Hx         ALLERGIES AND MEDICATIONS: updated and reviewed.      274}  Review of patient's allergies indicates:   Allergen Reactions    Codeine Swelling     "Hives, vomiting"  Can take hydrocodone & tylenol    Morphine Swelling     "Hives, vomiting"    Naproxen sodium Swelling     "Itching, high blood pressure"    Percocet [oxycodone-acetaminophen] Nausea And Vomiting     LOW HEART RATE  Can take hydrocodone & tylenol    Tramadol hcl Swelling     "itching & nausea"    Ciprofloxacin Other (See Comments)     Low BP    Demerol (pf) [meperidine (pf)] Nausea And Vomiting    Penicillins     Trintex      Medication List with Changes/Refills   New Medications    ESCITALOPRAM OXALATE (LEXAPRO) 5 MG TAB    Take 1 tablet (5 mg total) by mouth once daily.   Current Medications    ASCORBIC " ACID, VITAMIN C, (VITAMIN C) 500 MG TABLET    Take 500 mg by mouth once daily.    B COMPLEX VITAMINS TABLET    Take 1 tablet by mouth once daily.    CHOLECALCIFEROL, VITAMIN D3, (VITAMIN D3 ORAL)    Take by mouth.    DOCUSATE SODIUM (COLACE) 100 MG CAPSULE    Take 1 capsule (100 mg total) by mouth 2 (two) times daily.    GLUCOSAMINE-CHONDROITIN 500-400 MG TABLET    Take 1 tablet by mouth 3 (three) times daily.    LACTOBACILLUS RHAMNOSUS GG (CULTURELLE) 10 BILLION CELL CAPSULE    Take 1 capsule by mouth once daily.    MULTIVITAMIN CAPSULE    Take 1 capsule by mouth once daily.    TUMERIC-GING-OLIVE-OREG-CAPRYL 100 MG-150 MG- 50 MG-150 MG CAP    Take by mouth.   Changed and/or Refilled Medications    Modified Medication Previous Medication    ATORVASTATIN (LIPITOR) 10 MG TABLET atorvastatin (LIPITOR) 10 MG tablet       Take 1 tablet (10 mg total) by mouth once daily.    Take 1 tablet (10 mg total) by mouth once daily.    LEVOCETIRIZINE (XYZAL) 5 MG TABLET levocetirizine (XYZAL) 5 MG tablet       Take 1 tablet (5 mg total) by mouth every evening.    Take 1 tablet (5 mg total) by mouth every evening.    MONTELUKAST (SINGULAIR) 10 MG TABLET montelukast (SINGULAIR) 10 mg tablet       Take 1 tablet (10 mg total) by mouth once daily.    Take 10 mg by mouth.   Discontinued Medications    ALBUTEROL (VENTOLIN HFA) 90 MCG/ACTUATION INHALER    Inhale 2 puffs into the lungs every 4 (four) hours as needed for Wheezing or Shortness of Breath. Rescue    AZELASTINE (ASTELIN) 137 MCG (0.1 %) NASAL SPRAY    1 spray (137 mcg total) by Nasal route 2 (two) times daily.    EVENING PRIMROSE OIL 1,300 MG CAP    Take by mouth.    FLUTICASONE PROPIONATE (FLONASE) 50 MCG/ACTUATION NASAL SPRAY    1 spray (50 mcg total) by Each Nostril route once daily.    FLUTICASONE-UMECLIDIN-VILANTER (TRELEGY ELLIPTA) 200-62.5-25 MCG INHALER    Inhale 1 puff into the lungs once daily.    VARENICLINE (CHANTIX) 1 MG TAB    Take 1 tablet (1 mg total) by mouth 2  "(two) times daily.       SCREENING HISTORY:    274}  Health Maintenance         Date Due Completion Date    TETANUS VACCINE Never done ---    Pneumococcal Vaccines (Age 50+) (1 of 2 - PCV) Never done ---    Shingles Vaccine (1 of 2) Never done ---    RSV Vaccine (Age 60+ and Pregnant patients) (1 - Risk 60-74 years 1-dose series) Never done ---    Hemoglobin A1c 07/20/2024 7/20/2023    Influenza Vaccine (1) Never done ---    COVID-19 Vaccine (3 - 2024-25 season) 09/01/2024 4/5/2021    Mammogram 09/07/2024 9/7/2023    LDCT Lung Screen 07/18/2025 7/18/2024    Lipid Panel 07/20/2028 7/20/2023    Colorectal Cancer Screening 01/16/2029 1/16/2024            REVIEW OF SYSTEMS:   ROS    PHYSICAL EXAM:      274}  /66 (BP Location: Left arm, Patient Position: Sitting)   Pulse 72   Resp 15   Ht 5' 9" (1.753 m)   Wt 79.6 kg (175 lb 6.4 oz)   SpO2 99%   BMI 25.90 kg/m²   Wt Readings from Last 3 Encounters:   01/02/25 79.6 kg (175 lb 6.4 oz)   09/05/24 78.4 kg (172 lb 15.2 oz)   08/15/24 78.2 kg (172 lb 6.4 oz)     BP Readings from Last 3 Encounters:   01/02/25 118/66   09/05/24 112/72   08/15/24 129/82     Estimated body mass index is 25.9 kg/m² as calculated from the following:    Height as of this encounter: 5' 9" (1.753 m).    Weight as of this encounter: 79.6 kg (175 lb 6.4 oz).     Physical Exam  Constitutional:       Appearance: Normal appearance.   HENT:      Head: Normocephalic and atraumatic.      Right Ear: Tympanic membrane normal.      Left Ear: Tympanic membrane normal.      Ears:      Comments: inflammed     Nose: Nose normal.      Mouth/Throat:      Mouth: Mucous membranes are dry.      Pharynx: Oropharynx is clear.   Eyes:      Extraocular Movements: Extraocular movements intact.      Conjunctiva/sclera: Conjunctivae normal.   Cardiovascular:      Rate and Rhythm: Normal rate and regular rhythm.   Pulmonary:      Effort: Pulmonary effort is normal.      Breath sounds: Normal breath sounds. "   Abdominal:      General: Bowel sounds are normal.      Palpations: Abdomen is soft.   Musculoskeletal:         General: Normal range of motion.      Cervical back: Normal range of motion.   Skin:     General: Skin is warm.   Neurological:      General: No focal deficit present.      Mental Status: She is alert. Mental status is at baseline.   Psychiatric:         Thought Content: Thought content normal.         LABS:   274}  I have reviewed old labs below:  Lab Results   Component Value Date    WBC 6.14 07/30/2024    HGB 13.2 07/30/2024    HCT 39.5 07/30/2024    MCV 93 07/30/2024     07/30/2024     07/30/2024    K 4.4 07/30/2024     07/30/2024    CALCIUM 9.1 07/30/2024    PHOS 3.4 04/19/2022    CO2 25 07/30/2024    GLU 98 07/30/2024    BUN 23 (H) 07/30/2024    CREATININE 0.73 07/30/2024    ANIONGAP 7 07/30/2024    ESTGFRAFRICA >60.0 07/18/2022    EGFRNONAA >60.0 07/18/2022    PROT 6.9 07/30/2024    ALBUMIN 4.4 07/30/2024    BILITOT 0.5 07/30/2024    ALKPHOS 67 07/30/2024    ALT 25 07/30/2024    AST 29 07/30/2024    INR 1.0 04/18/2022    CHOL 167 07/20/2023    TRIG 58 07/20/2023    HDL 70 07/20/2023    LDLCALC 85.4 07/20/2023    TSH 1.168 07/20/2023    HGBA1C 5.2 07/20/2023       ASSESSMENT AND PLAN:  274}  Assessment & Plan    IMPRESSION:  Evaluated chest tightness, likely related to albuterol use rather than cardiac issues  Assessed mood changes, potentially associated with Chantix use rather than Singulair  Evaluated allergy symptoms and current medication regimen  Considered alternative treatments for allergy management, including nasal sprays to potentially replace Singulair  Assessed anxiety and mood symptoms, determining need for daily antidepressant medication  Evaluated ear discomfort, noting inflammation likely due to Q-tip use and dryness    ALLERGIC RHINITIS:  - Prescribed 2 nasal sprays as alternatives to Singulair; instructed the patient to trial and determine which works best.  -  Continued Zyrtec for allergy management.  - Noted the patient's symptoms of allergic rhinitis including cough, rhinorrhea, and fluid buildup around ears.  - Acknowledged the patient's high sensitivity to mold and pine allergens.  - Discussed the effectiveness of current medications and considered alternative treatments.  - Noted the patient's reports of sleep disturbances when not taking allergy medications.  - Continued management of sleep with nighttime allergy medications (Zyrtec and Singulair).  - Discontinued Singulair to evaluate its impact on mood.  - Noted the patient's reports of chest tightness and dyspnea during allergy flare-ups.  - Continued use of albuterol inhaler as needed for respiratory symptoms.    ANXIETY AND MOOD DISORDERS:  - Educated the patient about potential side effects of Singulair, including mood changes and anxiety.  - Prescribed Lexapro at the lowest dose, taking into account the patient's sensitivity to medications.  - Discussed the mechanisms of action for different classes of antidepressants in managing symptoms.  - Initiated Lexapro at lowest dose for anxiety and mood management.  - Scheduled follow up in 1 month to assess efficacy of Lexapro.  - Noted the patient's reports of experiencing anxiety, stress, and mood changes.  - Acknowledged the patient's previous prescription for low-dose Xanax for acute stress.    MEDICATION SENSITIVITIES:  - Noted the patient's history of allergies to multiple medications, particularly those used in mental health treatment.  - Considered medication sensitivities when prescribing new medications.  - Noted the patient's history of severe adverse effects from Chantix, including suicidal ideation.  - Acknowledged the adverse effects of Chantix and considered this when prescribing new medications.  - Confirmed that the patient discontinued Chantix due to severe adverse effects.    HYPERLIPIDEMIA:  - Refilled cholesterol medication.    THYROID  DISORDER:  - Continued current thyroid medication regimen.    EAR ISSUES:  - Noted the patient's reports of severe tinnitus.  - Educated the patient on proper ear care, including avoiding cotton swabs and managing dryness.  - Instructed the patient to apply a small amount of petroleum jelly on a cotton swab to moisturize ear canals and relieve itching.  - Observed inflammation   in the ear canal.  - Suggested the ear issues may be related to allergies and dryness.    LABS:  - Ordered comprehensive labs including lipid panel.  - Ordered comprehensive labs including liver and kidney panels.  - Complete labs before next appointment.    MAMMOGRAM:  - Ordered mammogram.    FOLLOW UP:  - Instructed the patient to contact the office if experiencing any concerning side effects from new medications.        1. Hyperlipidemia, unspecified hyperlipidemia type  - atorvastatin (LIPITOR) 10 MG tablet; Take 1 tablet (10 mg total) by mouth once daily.  Dispense: 90 tablet; Refill: 3  - Lipid Panel; Future    2. Seasonal allergic rhinitis due to pollen  - montelukast (SINGULAIR) 10 mg tablet; Take 1 tablet (10 mg total) by mouth once daily.  Dispense: 90 tablet; Refill: 3  - levocetirizine (XYZAL) 5 MG tablet; Take 1 tablet (5 mg total) by mouth every evening.  Dispense: 30 tablet; Refill: 11    3. Anxiety  - EScitalopram oxalate (LEXAPRO) 5 MG Tab; Take 1 tablet (5 mg total) by mouth once daily.  Dispense: 90 tablet; Refill: 3  - CBC Auto Differential; Future  - Comprehensive Metabolic Panel; Future  - TSH; Future    4. Vitamin D deficiency disease  - Vitamin D 25-Hydroxy; Future    5. Abnormal blood chemistry  - Hemoglobin A1C; Future         Orders Placed This Encounter   Procedures    CBC Auto Differential    Comprehensive Metabolic Panel    Hemoglobin A1C    Lipid Panel    TSH    Vitamin D 25-Hydroxy       Follow up in about 6 months (around 7/2/2025). or sooner as needed.

## 2025-01-08 ENCOUNTER — OFFICE VISIT (OUTPATIENT)
Dept: UROGYNECOLOGY | Facility: CLINIC | Age: 60
End: 2025-01-08

## 2025-01-08 VITALS
DIASTOLIC BLOOD PRESSURE: 72 MMHG | HEIGHT: 69 IN | WEIGHT: 177 LBS | BODY MASS INDEX: 26.22 KG/M2 | HEART RATE: 65 BPM | SYSTOLIC BLOOD PRESSURE: 130 MMHG

## 2025-01-08 DIAGNOSIS — R19.8 ABNORMAL DEFECATION: ICD-10-CM

## 2025-01-08 DIAGNOSIS — M79.18 MYOFASCIAL PAIN: Primary | ICD-10-CM

## 2025-01-08 PROCEDURE — 99214 OFFICE O/P EST MOD 30 MIN: CPT | Mod: PBBFAC | Performed by: OBSTETRICS & GYNECOLOGY

## 2025-01-08 PROCEDURE — 99999 PR PBB SHADOW E&M-EST. PATIENT-LVL IV: CPT | Mod: PBBFAC,,, | Performed by: OBSTETRICS & GYNECOLOGY

## 2025-01-08 NOTE — PROGRESS NOTES
"CC: Post-operative visit  S/p LSH, LSO, right salpingectomy, sacrocervicopexy, perineorrhaphy, GERARD, and cystoscopy on 24    HPI:  Patient is a 59 y.o. female  presents today for a follow up visit.   Patient reports that she has difficulty with working daily as she states that she has a lot of lower back pain but also in the vaginal area at times.   She denies a vaginal bulge.     She reports daily or every other day BMs. She states that she takes Miralax daily. She states stool is formed not hard but very large. She feels that stool gets caught after moving down a certain amount. She does not sit and strain. Uses a stool under her feet and takes a probiotic daily.     Denies urinary urgency, frequency, and incontinence  Reports hot flashes.       REVIEW OF SYSTEMS:  Per HPI. All other reviewed systems are negative.        PHYSICAL EXAMINATION  /72 (BP Location: Left arm, Patient Position: Sitting)   Pulse 65   Ht 5' 9" (1.753 m)   Wt 80.3 kg (177 lb 0.5 oz)   BMI 26.14 kg/m²     General: Healthy in appearance, Well nourished, Affect Normal, NAD.  Gastrointestinal: Non tender, Non distended, No masses, guarding or rebound, Incision: well healed, no hernia noted in the standing and straining position  Ext: No clubbing, cyanosis, edema or varicosities.    Genitourinary- (Verbal consent obtained; Chaperone present)  Vulva: normal without lesions, masses, atrophy or pain  Urethra: meatus central and normal in appearance, no prolapse/caruncle, no masses or discharge. Empty supine stress test was negative.   Bladder: non-tender, no masses  Vagina: No discharge or lesions, moderate atrophy, no masses appreciated, Mesh erosion: none.  [See POP-Q]  Cervix: no lesions, no discharge  Uterus:  absent  Adnexa: no masses, non tender.  Levator strength 1/5  Levator tenderness left 4/10 and right 1/10      POP-Q Exam- pelvic organ prolapse quantitative    Aa -3  Anterior Wall Ba -3  Anterior wall C -7  Cervix or " cuff   Gh 3.5    Genital hiatus pb 3.5    perineal body tvl 10.5  Total vaginal length   Ap -3  Posterior wall Bp -3  Posterior wall D -10.5  Posterior formix     without Uterus      No visits with results within 1 Day(s) from this visit.   Latest known visit with results is:   Admission on 07/30/2024, Discharged on 07/30/2024   Component Date Value Ref Range Status    Cath EF Quantitative 07/30/2024 60  % Final    QRS Duration 07/30/2024 94  ms Final    [facility] QTC Calculation 07/30/2024 413  ms Final    WBC 07/30/2024 6.14  3.90 - 12.70 K/uL Final    RBC 07/30/2024 4.27  4.00 - 5.40 M/uL Final    Hemoglobin 07/30/2024 13.2  12.0 - 16.0 g/dL Final    Hematocrit 07/30/2024 39.5  37.0 - 48.5 % Final    MCV 07/30/2024 93  82 - 98 fL Final    MCH 07/30/2024 30.9  27.0 - 31.0 pg Final    MCHC 07/30/2024 33.4  32.0 - 36.0 g/dL Final    RDW 07/30/2024 14.4  11.5 - 14.5 % Final    Platelets 07/30/2024 280  150 - 450 K/uL Final    MPV 07/30/2024 9.5  9.2 - 12.9 fL Final    Sodium 07/30/2024 138  136 - 145 mmol/L Final    Potassium 07/30/2024 4.4  3.5 - 5.1 mmol/L Final    Chloride 07/30/2024 106  95 - 110 mmol/L Final    CO2 07/30/2024 25  22 - 31 mmol/L Final    Glucose 07/30/2024 98  70 - 110 mg/dL Final    BUN 07/30/2024 23 (H)  7 - 18 mg/dL Final    Creatinine 07/30/2024 0.73  0.50 - 1.40 mg/dL Final    Calcium 07/30/2024 9.1  8.4 - 10.2 mg/dL Final    Total Protein 07/30/2024 6.9  6.0 - 8.4 g/dL Final    Albumin 07/30/2024 4.4  3.5 - 5.2 g/dL Final    Total Bilirubin 07/30/2024 0.5  0.2 - 1.3 mg/dL Final    Alkaline Phosphatase 07/30/2024 67  38 - 145 U/L Final    AST 07/30/2024 29  14 - 36 U/L Final    ALT 07/30/2024 25  0 - 35 U/L Final    Anion Gap 07/30/2024 7  5 - 12 mmol/L Final    eGFR 07/30/2024 >60  >60 mL/min/1.73 m^2 Final        ASSESSMENT & PLAN:    Myofascial pain  -     Ambulatory Referral/Consult to Physical Therapy/Occupational Therapy; Future; Expected date: 01/15/2025    Abnormal defecation  -      Ambulatory Referral/Consult to Physical Therapy/Occupational Therapy; Future; Expected date: 01/15/2025         60 yo s/p LSH, LSO, right salpingectomy, sacrocervicopexy, perineorrhaphy, GERARD, and cystoscopy on 5/2/24 presented for a postoperative visit. Reviewed that she did have left colonic pelvic adhesions which were excised but likely returned and this may be contributing to some of the bowel changes. Recommended that she increase the Miralax by 1/2 capful to see if it will help. Also reviewed findings of myofascial pain of the pelvic floor and how this may be contributing. Recommended that pelvic floor PT to address the vaginal pain and bowel dysfunction. Referral entered.   She was reassured that her support remains excellent. She will return again in about 6 months for re-evaluation.     All questions were answered today. The patient was encouraged to contact the office as needed with any additional questions or concerns.     Total time spent on visit was 20 minutes.  This includes face to face time and non-face to face time preparing to see the patient (eg, review of tests), Obtaining and/or reviewing separately obtained history, Documenting clinical information in the electronic or other health record, Independently interpreting resultsand communicating results to the patient/family/caregiver, or Care coordination.      Angeles Last MD

## 2025-01-10 ENCOUNTER — PATIENT MESSAGE (OUTPATIENT)
Dept: REHABILITATION | Facility: HOSPITAL | Age: 60
End: 2025-01-10
Payer: COMMERCIAL

## 2025-01-16 ENCOUNTER — CLINICAL SUPPORT (OUTPATIENT)
Dept: SMOKING CESSATION | Facility: CLINIC | Age: 60
End: 2025-01-16

## 2025-01-16 DIAGNOSIS — F17.200 NICOTINE DEPENDENCE: Primary | ICD-10-CM

## 2025-01-16 PROCEDURE — 99999 PR PBB SHADOW E&M-EST. PATIENT-LVL I: CPT | Mod: PBBFAC,,,

## 2025-01-16 NOTE — PROGRESS NOTES
Spoke with patient today in regard to smoking cessation progress for 6 month telephone follow up, she states not tobacco free.  Patient states that she knows she needs to quit, but is not interested in returning to the program at this time.  Patient states she is currently having mechanical troubles with her truck.  Patient states she had reactions to varenicline and NRT patches.  We discussed fluticasone propionate on skin prior to patch placement to help with skin irritation.  I instructed patient to remove the NRT patch if any itching is noticed.  Informed patient of benefit period, future follow up, and contact information if any further help or support is needed. Will complete smart form for 6 month follow up on Quit attempt #1.

## 2025-03-26 ENCOUNTER — OFFICE VISIT (OUTPATIENT)
Dept: FAMILY MEDICINE | Facility: CLINIC | Age: 60
End: 2025-03-26

## 2025-03-26 VITALS
HEIGHT: 69 IN | HEART RATE: 73 BPM | SYSTOLIC BLOOD PRESSURE: 110 MMHG | OXYGEN SATURATION: 98 % | BODY MASS INDEX: 26.22 KG/M2 | DIASTOLIC BLOOD PRESSURE: 60 MMHG | RESPIRATION RATE: 16 BRPM | WEIGHT: 177 LBS

## 2025-03-26 DIAGNOSIS — Z12.31 ENCOUNTER FOR SCREENING MAMMOGRAM FOR MALIGNANT NEOPLASM OF BREAST: Primary | ICD-10-CM

## 2025-03-26 DIAGNOSIS — R10.13 EPIGASTRIC PAIN: ICD-10-CM

## 2025-03-26 DIAGNOSIS — R19.7 DIARRHEA, UNSPECIFIED TYPE: ICD-10-CM

## 2025-03-26 PROCEDURE — 99214 OFFICE O/P EST MOD 30 MIN: CPT | Mod: S$PBB,,, | Performed by: STUDENT IN AN ORGANIZED HEALTH CARE EDUCATION/TRAINING PROGRAM

## 2025-03-26 PROCEDURE — 99999 PR PBB SHADOW E&M-EST. PATIENT-LVL IV: CPT | Mod: PBBFAC,,, | Performed by: STUDENT IN AN ORGANIZED HEALTH CARE EDUCATION/TRAINING PROGRAM

## 2025-03-26 PROCEDURE — 99214 OFFICE O/P EST MOD 30 MIN: CPT | Mod: PBBFAC | Performed by: STUDENT IN AN ORGANIZED HEALTH CARE EDUCATION/TRAINING PROGRAM

## 2025-03-26 RX ORDER — OMEPRAZOLE 40 MG/1
40 CAPSULE, DELAYED RELEASE ORAL 2 TIMES DAILY
Qty: 180 CAPSULE | Refills: 3 | Status: SHIPPED | OUTPATIENT
Start: 2025-03-26 | End: 2026-03-26

## 2025-03-26 RX ORDER — LOPERAMIDE HYDROCHLORIDE 2 MG/1
2 CAPSULE ORAL 4 TIMES DAILY PRN
Qty: 28 CAPSULE | Refills: 0 | Status: SHIPPED | OUTPATIENT
Start: 2025-03-26 | End: 2025-04-05

## 2025-03-27 NOTE — PROGRESS NOTES
SUBJECTIVE:    CHIEF COMPLAINT:   Chief Complaint   Patient presents with    Diarrhea     Pt reports diarrhea each time she eats for the past 2 weeks.  Pt states she has started a new vitamin ashwagandha 1400 mg daily.           274}    HISTORY OF PRESENT ILLNESS:  Misti West is a 59 y.o. female who presents to the clinic today   History of Present Illness    CHIEF COMPLAINT:  Ms. West presents today for diarrhea for three weeks.    GASTROINTESTINAL:  She reports diarrhea occurring within an hour of eating for the past three weeks. A recent episode occurred at 5:30 AM after eating dinner at 6 PM the previous evening (half cup of grits, one scrambled egg, toast, and thin piece of ham). The diarrhea is described as loose but clumpy with a yellowish-brownish appearance. She has attempted self-treatment with rice water, which provided temporary relief, and has also tried omeprazole. She reports similar episodes occurring every 8-10 years. She experiences upper abdominal pain and chest pain with burping that is relieved by sitting upright in a recliner. She has a history of chronic stomach inflammation and hiatal hernia discovered in 2020.    MEDICAL HISTORY:  She has a history of partial hysterectomy and bladder prolapse.    MEDICATIONS:  She takes Omeprazole with good symptom relief and Singulair for allergy management, noting increased symptoms and fluid retention when attempting to discontinue during high pollen seasons. She recently started a stress and anxiety supplement at the end of January.      ROS:  General: -fever, -chills, -fatigue, -weight gain, -weight loss  Eyes: -vision changes, -redness, -discharge  ENT: -ear pain, -nasal congestion, -sore throat  Cardiovascular: +chest pain, -palpitations, -lower extremity edema, +chest pain at rest  Respiratory: -cough, -shortness of breath  Gastrointestinal: -abdominal pain, -nausea, -vomiting, +diarrhea, -constipation, -blood in stool, +stool color changes,  +indigestion  Genitourinary: -dysuria, -hematuria, -frequency  Musculoskeletal: -joint pain, -muscle pain  Skin: -rash, -lesion  Neurological: -headache, -dizziness, -numbness, -tingling  Psychiatric: +anxiety, -depression, -sleep difficulty  Allergic: +seasonal allergies          PAST MEDICAL HISTORY:     274}  Past Medical History:   Diagnosis Date    Anxiety     Arthritis     Cancer     cervical    Female bladder prolapse     General anesthetics causing adverse effect in therapeutic use     hard to wake up    Hepatomegaly     3 liver cyst    Hiatal hernia     High cholesterol     Liver lesion     PONV (postoperative nausea and vomiting)     Seasonal allergies        PAST SURGICAL HISTORY:  Past Surgical History:   Procedure Laterality Date    ANGIOGRAM, CORONARY, WITH LEFT HEART CATHETERIZATION  7/30/2024    Procedure: Left Heart Cath;  Surgeon: Damon Donaldson MD;  Location: [facility] CATH;  Service: Cardiology;;    CERVIX SURGERY      COLONOSCOPY N/A 08/27/2020    Procedure: COLONOSCOPY;  Surgeon: Tim Aguayo MD;  Location: [facility];  Service: Endoscopy;  Laterality: N/A;    COLONOSCOPY N/A 1/16/2024    Procedure: COLONOSCOPY;  Surgeon: Tim Aguayo MD;  Location: [facility];  Service: Endoscopy;  Laterality: N/A;    COLPORRHAPHY, COMBINED ANTEROPOSTERIOR N/A 5/2/2024    Procedure: COLPORRHAPHY, COMBINED ANTEROPOSTERIOR;  Surgeon: Angeles Last MD;  Location: [facility] OR;  Service: OB/GYN;  Laterality: N/A;    CYSTOSCOPY N/A 5/2/2024    Procedure: CYSTOSCOPY;  Surgeon: Angeles Last MD;  Location: [facility] OR;  Service: OB/GYN;  Laterality: N/A;    CYSTOSCOPY WITH URETHRAL DILATION  10/01/2020    Procedure: CYSTOSCOPY, WITH URETHRAL DILATION;  Surgeon: Bhargav Keane MD;  Location: [facility] OR;  Service: Urology;;    ESOPHAGOGASTRODUODENOSCOPY N/A 08/13/2020    Procedure: EGD (ESOPHAGOGASTRODUODENOSCOPY);  Surgeon: Tim Aguayo MD;  Location: [facility];  Service: Endoscopy;  Laterality: N/A;     "HYSTERECTOMY, TOTAL, LAPAROSCOPIC, WITH SALPINGECTOMY Bilateral 5/2/2024    Procedure: HYSTERECTOMY,TOTAL,LAPAROSCOPIC,WITH SALPINGECTOMY;  Surgeon: Angeles Last MD;  Location: [facility] OR;  Service: OB/GYN;  Laterality: Bilateral;  5 hours staying overnight    KNEE ARTHROSCOPY      TKR    LAPAROSCOPIC SACROCOLPOPEXY N/A 5/2/2024    Procedure: SACROCOLPOPEXY, LAPAROSCOPIC;  Surgeon: Angeles Last MD;  Location: [facility] OR;  Service: OB/GYN;  Laterality: N/A;    OOPHORECTOMY Left 5/2/2024    Procedure: OOPHORECTOMY;  Surgeon: Angeles Last MD;  Location: [facility] OR;  Service: OB/GYN;  Laterality: Left;  LAPAROSCOPIC    PERINEOPLASTY N/A 5/2/2024    Procedure: PERINEOPLASTY;  Surgeon: Angeles Last MD;  Location: [facility];  Service: OB/GYN;  Laterality: N/A;    ROBOTIC ARTHROPLASTY, KNEE Left 03/29/2022    Procedure: ROBOTIC ARTHROPLASTY, KNEE, TOTAL;  Surgeon: Neo Zapata MD;  Location: [facility] OR;  Service: Orthopedics;  Laterality: Left;    SINUS SURGERY      TUBAL LIGATION         SOCIAL HISTORY:  Social History[1]    FAMILY HISTORY:       Family History   Problem Relation Name Age of Onset    Diabetes Mother      Cancer Mother          bladder    Arthritis Mother      Cancer Father          lung    COPD Father      Hypertension Father      Arthritis Father      Melanoma Father      Breast cancer Sister      Arthritis Sister      Breast cancer Sister      Arthritis Sister      Breast cancer Sister      Kidney disease Sister      Arthritis Sister      Colon polyps Neg Hx      Colon cancer Neg Hx      Crohn's disease Neg Hx      Ulcerative colitis Neg Hx      Stomach cancer Neg Hx      Esophageal cancer Neg Hx      Celiac disease Neg Hx      Seizures Neg Hx         ALLERGIES AND MEDICATIONS: updated and reviewed.      274}  Review of patient's allergies indicates:   Allergen Reactions    Codeine Swelling     "Hives, vomiting"  Can take hydrocodone & tylenol    Morphine Swelling     "Hives, vomiting"    " "Naproxen sodium Swelling     "Itching, high blood pressure"    Percocet [oxycodone-acetaminophen] Nausea And Vomiting     LOW HEART RATE  Can take hydrocodone & tylenol    Tramadol hcl Swelling     "itching & nausea"    Ciprofloxacin Other (See Comments)     Low BP    Demerol (pf) [meperidine (pf)] Nausea And Vomiting    Penicillins     Trintex      Medication List with Changes/Refills   New Medications    LOPERAMIDE (IMODIUM) 2 MG CAPSULE    Take 1 capsule (2 mg total) by mouth 4 (four) times daily as needed for Diarrhea.    OMEPRAZOLE (PRILOSEC) 40 MG CAPSULE    Take 1 capsule (40 mg total) by mouth 2 (two) times a day.   Current Medications    ASCORBIC ACID, VITAMIN C, (VITAMIN C) 500 MG TABLET    Take 500 mg by mouth once daily.    ATORVASTATIN (LIPITOR) 10 MG TABLET    Take 1 tablet (10 mg total) by mouth once daily.    B COMPLEX VITAMINS TABLET    Take 1 tablet by mouth once daily.    CHOLECALCIFEROL, VITAMIN D3, (VITAMIN D3 ORAL)    Take by mouth.    DOCUSATE SODIUM (COLACE) 100 MG CAPSULE    Take 1 capsule (100 mg total) by mouth 2 (two) times daily.    ESCITALOPRAM OXALATE (LEXAPRO) 5 MG TAB    Take 1 tablet (5 mg total) by mouth once daily.    GLUCOSAMINE-CHONDROITIN 500-400 MG TABLET    Take 1 tablet by mouth 3 (three) times daily.    LACTOBACILLUS RHAMNOSUS GG (CULTURELLE) 10 BILLION CELL CAPSULE    Take 1 capsule by mouth once daily.    LEVOCETIRIZINE (XYZAL) 5 MG TABLET    Take 1 tablet (5 mg total) by mouth every evening.    MONTELUKAST (SINGULAIR) 10 MG TABLET    Take 1 tablet (10 mg total) by mouth once daily.    MULTIVITAMIN CAPSULE    Take 1 capsule by mouth once daily.    TUMERIC-GING-OLIVE-OREG-CAPRYL 100 MG-150 MG- 50 MG-150 MG CAP    Take by mouth.       SCREENING HISTORY:    274}  Health Maintenance         Date Due Completion Date    TETANUS VACCINE Never done ---    Pneumococcal Vaccines (Age 50+) (1 of 2 - PCV) Never done ---    Shingles Vaccine (1 of 2) Never done ---    RSV Vaccine (Age " "60+ and Pregnant patients) (1 - Risk 60-74 years 1-dose series) Never done ---    Hemoglobin A1c 07/20/2024 7/20/2023    Influenza Vaccine (1) Never done ---    COVID-19 Vaccine (3 - 2024-25 season) 09/01/2024 4/5/2021    Mammogram 09/07/2024 9/7/2023    LDCT Lung Screen 07/18/2025 7/18/2024    Lipid Panel 07/20/2028 7/20/2023    Colorectal Cancer Screening 01/16/2029 1/16/2024            REVIEW OF SYSTEMS:   ROS    PHYSICAL EXAM:      274}  /60 (BP Location: Left arm, Patient Position: Sitting)   Pulse 73   Resp 16   Ht 5' 9.02" (1.753 m)   Wt 80.3 kg (177 lb)   SpO2 98%   BMI 26.13 kg/m²   Wt Readings from Last 3 Encounters:   03/26/25 80.3 kg (177 lb)   01/08/25 80.3 kg (177 lb 0.5 oz)   01/02/25 79.6 kg (175 lb 6.4 oz)     BP Readings from Last 3 Encounters:   03/26/25 110/60   01/08/25 130/72   01/02/25 118/66     Estimated body mass index is 26.13 kg/m² as calculated from the following:    Height as of this encounter: 5' 9.02" (1.753 m).    Weight as of this encounter: 80.3 kg (177 lb).     Physical Exam  HENT:      Head: Normocephalic and atraumatic.      Nose: Nose normal.      Mouth/Throat:      Mouth: Mucous membranes are dry.      Pharynx: Oropharynx is clear.   Eyes:      Extraocular Movements: Extraocular movements intact.      Conjunctiva/sclera: Conjunctivae normal.   Cardiovascular:      Rate and Rhythm: Normal rate and regular rhythm.   Pulmonary:      Effort: Pulmonary effort is normal.      Breath sounds: Normal breath sounds.   Abdominal:      General: Bowel sounds are normal.      Palpations: Abdomen is soft.   Musculoskeletal:         General: Normal range of motion.      Cervical back: Normal range of motion.   Skin:     General: Skin is warm.   Neurological:      General: No focal deficit present.      Mental Status: She is alert. Mental status is at baseline.   Psychiatric:         Mood and Affect: Mood normal.         Thought Content: Thought content normal.         LABS:   " 274}  I have reviewed old labs below:  Lab Results   Component Value Date    WBC 6.14 07/30/2024    HGB 13.2 07/30/2024    HCT 39.5 07/30/2024    MCV 93 07/30/2024     07/30/2024     07/30/2024    K 4.4 07/30/2024     07/30/2024    CALCIUM 9.1 07/30/2024    PHOS 3.4 04/19/2022    CO2 25 07/30/2024    GLU 98 07/30/2024    BUN 23 (H) 07/30/2024    CREATININE 0.73 07/30/2024    ANIONGAP 7 07/30/2024    ESTGFRAFRICA >60.0 07/18/2022    EGFRNONAA >60.0 07/18/2022    PROT 6.9 07/30/2024    ALBUMIN 4.4 07/30/2024    BILITOT 0.5 07/30/2024    ALKPHOS 67 07/30/2024    ALT 25 07/30/2024    AST 29 07/30/2024    INR 1.0 04/18/2022    CHOL 167 07/20/2023    TRIG 58 07/20/2023    HDL 70 07/20/2023    LDLCALC 85.4 07/20/2023    TSH 1.168 07/20/2023    HGBA1C 5.2 07/20/2023       ASSESSMENT AND PLAN:  274}  Assessment & Plan    IMPRESSION:  Considered prolonged diarrhea and recent dietary changes.  Assessed possibility of stomach infection or viral gastroenteritis.  Evaluated potential side effects of recently started ashwagandha supplements.  Reviewed history of chronic stomach inflammation and hiatal hernia.  Determined need for anti-diarrheal medication and stomach acid reducer.  Likely combination of IBS with relation to anxiety and gastritis    IBS/ gastritis :  - Ms. West reports having diarrhea for 3 weeks, with loose, clumpy stools occurring within an hour of eating.  - The diarrhea is described as yellowish-brownish in color.  - Ms. West reports abdominal pain starting in the upper abdomen.  - Auscultated the patient's abdomen during the exam.  - Considered the possibility of a stomach infection and planned to run labs.  - Suggested it could be a gastroenteritis or viral infection that should resolve on its own with medication.  - Prescribed anti-diarrheal medication to prevent fluid and nutrient loss.  - Ordered labs to check for gastric infection.  - Ms. West to try liquid diet first, then progress to  light foods like grits and oatmeal.  - Ms. West to avoid heavy foods such as starch, potatoes, pasta, pizza, meats, and hamburgers.  - Recommend a liquid diet and avoiding heavy foods like starch, potatoes, pasta, pizza, meats, and hamburgers.  - Suggested trying grits and oatmeal.  - advise going back to GI     HIATAL HERNIA:  - Ms. West reports a history of hiatal hernia diagnosed in 2020.  - Confirmed the location of the hiatal hernia in the upper chest area.  - Explained hiatal hernia symptoms and its relation to acid reflux.  - Explained that the hiatal hernia can cause discomfort in the chest due to constant acid reflux.  - Ms. West reports waking up with eructation and chest pain.  - Explained that the hiatal hernia can cause constant acid reflux, leading to chest discomfort.    CHRONIC GASTRITIS:  - Ms. West has a history of chronic gastritis diagnosed in 2020.  - Continued Omeprazole for gastric acid management.    GASTRO-ESOPHAGEAL REFLUX DISEASE:  - Explained hiatal hernia symptoms and its relation to acid reflux.  - Continued Omeprazole for gastric acid management.    HERBAL SUPPLEMENTS AND MEDICATION MANAGEMENT:  - Discussed potential side effects of herbal supplements, including diarrhea.  - Recommend discontinuing ashwagandha supplement for a few days to assess if it is contributing to diarrhea.  - Advised on limitations of medical training regarding herbal remedies.  - Continued Singulair for allergy management.    FOLLOW-UP:  - Follow up with gastroenterologist if symptoms persist, contact office for contact information.        1. Encounter for screening mammogram for malignant neoplasm of breast  - Mammo Digital Screening Bilat w/ Lam (XPD); Future    2. Diarrhea, unspecified type  - loperamide (IMODIUM) 2 mg capsule; Take 1 capsule (2 mg total) by mouth 4 (four) times daily as needed for Diarrhea.  Dispense: 28 capsule; Refill: 0    3. Epigastric pain  - omeprazole (PRILOSEC) 40 MG capsule; Take  1 capsule (40 mg total) by mouth 2 (two) times a day.  Dispense: 180 capsule; Refill: 3  - H. pylori Antibody, IgG; Future         Orders Placed This Encounter   Procedures    Mammo Digital Screening Bilat w/ Alm (XPD)    H. pylori Antibody, IgG       No follow-ups on file. or sooner as needed.                 [1]   Social History  Socioeconomic History    Marital status:    Tobacco Use    Smoking status: Every Day     Current packs/day: 1.00     Average packs/day: 1 pack/day for 48.2 years (48.2 ttl pk-yrs)     Types: Cigarettes     Start date: 1977    Smokeless tobacco: Never    Tobacco comments:     3-4 daily   Substance and Sexual Activity    Alcohol use: Yes     Comment: rare    Drug use: Never    Sexual activity: Not Currently     Partners: Male   Social History Narrative    [place]. 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      Social Drivers of Health     Financial Resource Strain: Low Risk  (3/26/2025)    Overall Financial Resource Strain (CARDIA)     Difficulty of Paying Living Expenses: Not hard at all   Food Insecurity: No Food Insecurity (3/26/2025)    Hunger Vital Sign     Worried About Running Out of Food in the Last Year: Never true     Ran Out of Food in the Last Year: Never true   Transportation Needs: No Transportation Needs (3/26/2025)    PRAPARE - Transportation     Lack of Transportation (Medical): No     Lack of Transportation (Non-Medical): No   Physical Activity: Sufficiently Active (3/26/2025)    Exercise Vital Sign     Days of Exercise per Week: 7 days     Minutes of Exercise per Session: 30 min   Stress: No Stress Concern Present (3/26/2025)    [place] [employer] - Occupational Stress Questionnaire     Feeling of Stress : Not at all   Housing Stability: Low Risk  (3/26/2025)    Housing Stability Vital Sign     Unable to Pay for Housing in the Last Year: No     Homeless in the Last Year: No

## 2025-04-08 ENCOUNTER — LAB VISIT (OUTPATIENT)
Dept: LAB | Facility: HOSPITAL | Age: 60
End: 2025-04-08
Attending: STUDENT IN AN ORGANIZED HEALTH CARE EDUCATION/TRAINING PROGRAM

## 2025-04-08 ENCOUNTER — RESULTS FOLLOW-UP (OUTPATIENT)
Dept: FAMILY MEDICINE | Facility: CLINIC | Age: 60
End: 2025-04-08

## 2025-04-08 ENCOUNTER — OFFICE VISIT (OUTPATIENT)
Dept: FAMILY MEDICINE | Facility: CLINIC | Age: 60
End: 2025-04-08

## 2025-04-08 VITALS
DIASTOLIC BLOOD PRESSURE: 70 MMHG | HEART RATE: 70 BPM | OXYGEN SATURATION: 100 % | WEIGHT: 171 LBS | BODY MASS INDEX: 25.33 KG/M2 | HEIGHT: 69 IN | RESPIRATION RATE: 16 BRPM | SYSTOLIC BLOOD PRESSURE: 122 MMHG

## 2025-04-08 DIAGNOSIS — Z11.59 ENCOUNTER FOR SCREENING FOR VIRAL DISEASE: ICD-10-CM

## 2025-04-08 DIAGNOSIS — A04.8 H. PYLORI INFECTION: Primary | ICD-10-CM

## 2025-04-08 DIAGNOSIS — E78.5 HYPERLIPIDEMIA, UNSPECIFIED HYPERLIPIDEMIA TYPE: ICD-10-CM

## 2025-04-08 DIAGNOSIS — R79.9 ABNORMAL BLOOD CHEMISTRY: ICD-10-CM

## 2025-04-08 DIAGNOSIS — E55.9 VITAMIN D DEFICIENCY DISEASE: ICD-10-CM

## 2025-04-08 DIAGNOSIS — F41.9 ANXIETY: ICD-10-CM

## 2025-04-08 DIAGNOSIS — R10.13 EPIGASTRIC PAIN: ICD-10-CM

## 2025-04-08 DIAGNOSIS — T78.40XA ALLERGIC REACTION, INITIAL ENCOUNTER: Primary | ICD-10-CM

## 2025-04-08 LAB
25(OH)D3+25(OH)D2 SERPL-MCNC: 51 NG/ML (ref 30–96)
ABSOLUTE EOSINOPHIL (OHS): 0.75 K/UL
ABSOLUTE MONOCYTE (OHS): 0.36 K/UL (ref 0.3–1)
ABSOLUTE NEUTROPHIL COUNT (OHS): 3.16 K/UL (ref 1.8–7.7)
ALBUMIN SERPL BCP-MCNC: 3.5 G/DL (ref 3.5–5.2)
ALP SERPL-CCNC: 57 UNIT/L (ref 40–150)
ALT SERPL W/O P-5'-P-CCNC: 21 UNIT/L (ref 10–44)
ANION GAP (OHS): 9 MMOL/L (ref 8–16)
AST SERPL-CCNC: 23 UNIT/L (ref 11–45)
BASOPHILS # BLD AUTO: 0.07 K/UL
BASOPHILS NFR BLD AUTO: 1.1 %
BILIRUB SERPL-MCNC: 0.3 MG/DL (ref 0.1–1)
BUN SERPL-MCNC: 11 MG/DL (ref 8–23)
CALCIUM SERPL-MCNC: 9 MG/DL (ref 8.7–10.5)
CHLORIDE SERPL-SCNC: 109 MMOL/L (ref 95–110)
CHOLEST SERPL-MCNC: 160 MG/DL (ref 120–199)
CHOLEST/HDLC SERPL: 3.3 {RATIO} (ref 2–5)
CO2 SERPL-SCNC: 25 MMOL/L (ref 23–29)
CREAT SERPL-MCNC: 0.8 MG/DL (ref 0.5–1.4)
EAG (OHS): 108 MG/DL (ref 68–131)
ERYTHROCYTE [DISTWIDTH] IN BLOOD BY AUTOMATED COUNT: 13.4 % (ref 11.5–14.5)
GFR SERPLBLD CREATININE-BSD FMLA CKD-EPI: >60 ML/MIN/1.73/M2
GLUCOSE SERPL-MCNC: 92 MG/DL (ref 70–110)
HBA1C MFR BLD: 5.4 % (ref 4–5.6)
HCT VFR BLD AUTO: 40.2 % (ref 37–48.5)
HDLC SERPL-MCNC: 48 MG/DL (ref 40–75)
HDLC SERPL: 30 % (ref 20–50)
HGB BLD-MCNC: 13.3 GM/DL (ref 12–16)
IMM GRANULOCYTES # BLD AUTO: 0.02 K/UL (ref 0–0.04)
IMM GRANULOCYTES NFR BLD AUTO: 0.3 % (ref 0–0.5)
LDLC SERPL CALC-MCNC: 96.8 MG/DL (ref 63–159)
LYMPHOCYTES # BLD AUTO: 1.75 K/UL (ref 1–4.8)
MCH RBC QN AUTO: 30 PG (ref 27–31)
MCHC RBC AUTO-ENTMCNC: 33.1 G/DL (ref 32–36)
MCV RBC AUTO: 91 FL (ref 82–98)
NONHDLC SERPL-MCNC: 112 MG/DL
NUCLEATED RBC (/100WBC) (OHS): 0 /100 WBC
PLATELET # BLD AUTO: 329 K/UL (ref 150–450)
PMV BLD AUTO: 9.3 FL (ref 9.2–12.9)
POTASSIUM SERPL-SCNC: 4 MMOL/L (ref 3.5–5.1)
PROT SERPL-MCNC: 6.4 GM/DL (ref 6–8.4)
RBC # BLD AUTO: 4.43 M/UL (ref 4–5.4)
RELATIVE EOSINOPHIL (OHS): 12.3 %
RELATIVE LYMPHOCYTE (OHS): 28.6 % (ref 18–48)
RELATIVE MONOCYTE (OHS): 5.9 % (ref 4–15)
RELATIVE NEUTROPHIL (OHS): 51.8 % (ref 38–73)
SODIUM SERPL-SCNC: 143 MMOL/L (ref 136–145)
TRIGL SERPL-MCNC: 76 MG/DL (ref 30–150)
TSH SERPL-ACNC: 2.44 UIU/ML (ref 0.4–4)
WBC # BLD AUTO: 6.11 K/UL (ref 3.9–12.7)

## 2025-04-08 PROCEDURE — 86762 RUBELLA ANTIBODY: CPT

## 2025-04-08 PROCEDURE — 82306 VITAMIN D 25 HYDROXY: CPT

## 2025-04-08 PROCEDURE — 99214 OFFICE O/P EST MOD 30 MIN: CPT | Mod: PBBFAC | Performed by: STUDENT IN AN ORGANIZED HEALTH CARE EDUCATION/TRAINING PROGRAM

## 2025-04-08 PROCEDURE — 86765 RUBEOLA ANTIBODY: CPT

## 2025-04-08 PROCEDURE — 84443 ASSAY THYROID STIM HORMONE: CPT

## 2025-04-08 PROCEDURE — 86677 HELICOBACTER PYLORI ANTIBODY: CPT

## 2025-04-08 PROCEDURE — 86735 MUMPS ANTIBODY: CPT

## 2025-04-08 PROCEDURE — 99214 OFFICE O/P EST MOD 30 MIN: CPT | Mod: S$PBB,,, | Performed by: STUDENT IN AN ORGANIZED HEALTH CARE EDUCATION/TRAINING PROGRAM

## 2025-04-08 PROCEDURE — 85025 COMPLETE CBC W/AUTO DIFF WBC: CPT

## 2025-04-08 PROCEDURE — 99999 PR PBB SHADOW E&M-EST. PATIENT-LVL IV: CPT | Mod: PBBFAC,,, | Performed by: STUDENT IN AN ORGANIZED HEALTH CARE EDUCATION/TRAINING PROGRAM

## 2025-04-08 PROCEDURE — 83036 HEMOGLOBIN GLYCOSYLATED A1C: CPT

## 2025-04-08 PROCEDURE — 36415 COLL VENOUS BLD VENIPUNCTURE: CPT

## 2025-04-08 PROCEDURE — 84450 TRANSFERASE (AST) (SGOT): CPT

## 2025-04-08 PROCEDURE — 82465 ASSAY BLD/SERUM CHOLESTEROL: CPT

## 2025-04-08 RX ORDER — DIPHENHYDRAMINE HCL 25 MG
25 TABLET ORAL EVERY 4 HOURS PRN
Qty: 30 TABLET | Refills: 0 | Status: SHIPPED | OUTPATIENT
Start: 2025-04-08

## 2025-04-08 RX ORDER — METHYLPREDNISOLONE 4 MG/1
TABLET ORAL
Qty: 21 EACH | Refills: 0 | Status: SHIPPED | OUTPATIENT
Start: 2025-04-08 | End: 2025-04-29

## 2025-04-08 RX ORDER — ONDANSETRON 4 MG/1
1 TABLET, ORALLY DISINTEGRATING ORAL EVERY 8 HOURS PRN
COMMUNITY
Start: 2025-03-30 | End: 2026-03-30

## 2025-04-08 RX ORDER — BETAMETHASONE DIPROPIONATE 0.5 MG/G
CREAM TOPICAL 2 TIMES DAILY
Qty: 45 G | Refills: 0 | Status: SHIPPED | OUTPATIENT
Start: 2025-04-08

## 2025-04-08 NOTE — PROGRESS NOTES
SUBJECTIVE:    CHIEF COMPLAINT:   Chief Complaint   Patient presents with    Rash     All over body, started Sunday, burning and itching           274}    HISTORY OF PRESENT ILLNESS:  Misti West is a 59 y.o. female who presents to the clinic today   History of Present Illness    CHIEF COMPLAINT:  Ms. West presents today for evaluation of a widespread itchy rash    HISTORY OF PRESENT ILLNESS:  She developed an extensive, pruritic rash on Sunday. 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 She denies associated pain.    RECENT HOSPITALIZATION:  She visited [facility] ER on the 29th for persistent vomiting after a heavy meal. She was found to be dehydrated and received IV fluids. She was diagnosed with early UTI and prescribed Microbid, but did not complete the full course stating the medication was too strong.    MEDICAL HISTORY:  She has a history of allergies requiring use of organic soaps due to skin irritation. She had measles at age 5 with no similar rash episodes since then. She has history of knee replacement.    FAMILY HISTORY:  Three maternal half-sisters with history of breast cancer.    ROS:  General: -fever, -chills, -fatigue, -weight gain, -weight loss  Eyes: -vision changes, -redness, -discharge  ENT: -ear pain, -nasal congestion, -sore throat  Cardiovascular: -chest pain, -palpitations, -lower extremity edema  Respiratory: -cough, -shortness of breath  Gastrointestinal: -abdominal pain, -nausea, +vomiting, -diarrhea, -constipation, -blood in stool  Genitourinary: -dysuria, -hematuria, -frequency  Musculoskeletal: -joint pain, -muscle pain  Skin: +rash, -lesion, +itching  Neurological: -headache, -dizziness, -numbness, -tingling, +memory loss  Psychiatric: -anxiety, -depression, -sleep difficulty  Allergic: +frequent sneezing          PAST  MEDICAL HISTORY:     274}  Past Medical History:   Diagnosis Date    Anxiety     Arthritis     Cancer     cervical    Female bladder prolapse     General anesthetics causing adverse effect in therapeutic use     hard to wake up    Hepatomegaly     3 liver cyst    Hiatal hernia     High cholesterol     Liver lesion     PONV (postoperative nausea and vomiting)     Seasonal allergies        PAST SURGICAL HISTORY:  Past Surgical History:   Procedure Laterality Date    ANGIOGRAM, CORONARY, WITH LEFT HEART CATHETERIZATION  7/30/2024    Procedure: Left Heart Cath;  Surgeon: Damon Donaldson MD;  Location: [facility] CATH;  Service: Cardiology;;    CERVIX SURGERY      COLONOSCOPY N/A 08/27/2020    Procedure: COLONOSCOPY;  Surgeon: Tim Aguayo MD;  Location: [facility];  Service: Endoscopy;  Laterality: N/A;    COLONOSCOPY N/A 1/16/2024    Procedure: COLONOSCOPY;  Surgeon: Tim Aguayo MD;  Location: [facility];  Service: Endoscopy;  Laterality: N/A;    COLPORRHAPHY, COMBINED ANTEROPOSTERIOR N/A 5/2/2024    Procedure: COLPORRHAPHY, COMBINED ANTEROPOSTERIOR;  Surgeon: Angeles Last MD;  Location: [facility] OR;  Service: OB/GYN;  Laterality: N/A;    CYSTOSCOPY N/A 5/2/2024    Procedure: CYSTOSCOPY;  Surgeon: Angeles Last MD;  Location: [facility] OR;  Service: OB/GYN;  Laterality: N/A;    CYSTOSCOPY WITH URETHRAL DILATION  10/01/2020    Procedure: CYSTOSCOPY, WITH URETHRAL DILATION;  Surgeon: Bhargav Keane MD;  Location: [facility] OR;  Service: Urology;;    ESOPHAGOGASTRODUODENOSCOPY N/A 08/13/2020    Procedure: EGD (ESOPHAGOGASTRODUODENOSCOPY);  Surgeon: Tim Aguayo MD;  Location: [facility];  Service: Endoscopy;  Laterality: N/A;    HYSTERECTOMY, TOTAL, LAPAROSCOPIC, WITH SALPINGECTOMY Bilateral 5/2/2024    Procedure: HYSTERECTOMY,TOTAL,LAPAROSCOPIC,WITH SALPINGECTOMY;  Surgeon: Angeles Last MD;  Location: [facility] OR;  Service: OB/GYN;  Laterality: Bilateral;  5 hours staying overnight    KNEE ARTHROSCOPY      TKR     "LAPAROSCOPIC SACROCOLPOPEXY N/A 5/2/2024    Procedure: SACROCOLPOPEXY, LAPAROSCOPIC;  Surgeon: Angeles Last MD;  Location: [facility] OR;  Service: OB/GYN;  Laterality: N/A;    OOPHORECTOMY Left 5/2/2024    Procedure: OOPHORECTOMY;  Surgeon: Angeles Last MD;  Location: [facility] OR;  Service: OB/GYN;  Laterality: Left;  LAPAROSCOPIC    PERINEOPLASTY N/A 5/2/2024    Procedure: PERINEOPLASTY;  Surgeon: Angeles Last MD;  Location: [facility] OR;  Service: OB/GYN;  Laterality: N/A;    ROBOTIC ARTHROPLASTY, KNEE Left 03/29/2022    Procedure: ROBOTIC ARTHROPLASTY, KNEE, TOTAL;  Surgeon: Neo Zapata MD;  Location: [facility] OR;  Service: Orthopedics;  Laterality: Left;    SINUS SURGERY      TUBAL LIGATION         SOCIAL HISTORY:  Social History[1]    FAMILY HISTORY:       Family History   Problem Relation Name Age of Onset    Diabetes Mother      Cancer Mother          bladder    Arthritis Mother      Cancer Father          lung    COPD Father      Hypertension Father      Arthritis Father      Melanoma Father      Breast cancer Sister      Arthritis Sister      Breast cancer Sister      Arthritis Sister      Breast cancer Sister      Kidney disease Sister      Arthritis Sister      Colon polyps Neg Hx      Colon cancer Neg Hx      Crohn's disease Neg Hx      Ulcerative colitis Neg Hx      Stomach cancer Neg Hx      Esophageal cancer Neg Hx      Celiac disease Neg Hx      Seizures Neg Hx         ALLERGIES AND MEDICATIONS: updated and reviewed.      274}  Review of patient's allergies indicates:   Allergen Reactions    Codeine Swelling     "Hives, vomiting"  Can take hydrocodone & tylenol    Morphine Swelling     "Hives, vomiting"    Naproxen sodium Swelling     "Itching, high blood pressure"    Percocet [oxycodone-acetaminophen] Nausea And Vomiting     LOW HEART RATE  Can take hydrocodone & tylenol    Tramadol hcl Swelling     "itching & nausea"    Ciprofloxacin Other (See Comments)     Low BP    Demerol (pf) [meperidine " (pf)] Nausea And Vomiting    Penicillins     Trintex      Medication List with Changes/Refills   New Medications    BETAMETHASONE DIPROPIONATE 0.05 % CREAM    Apply topically 2 (two) times daily.    DIPHENHYDRAMINE (BENADRYL ALLERGY) 25 MG TABLET    Take 1 tablet (25 mg total) by mouth every 4 (four) hours as needed for Allergies.    METHYLPREDNISOLONE (MEDROL DOSEPACK) 4 MG TABLET    use as directed   Current Medications    ASCORBIC ACID, VITAMIN C, (VITAMIN C) 500 MG TABLET    Take 500 mg by mouth once daily.    ATORVASTATIN (LIPITOR) 10 MG TABLET    Take 1 tablet (10 mg total) by mouth once daily.    B COMPLEX VITAMINS TABLET    Take 1 tablet by mouth once daily.    CHOLECALCIFEROL, VITAMIN D3, (VITAMIN D3 ORAL)    Take by mouth.    DOCUSATE SODIUM (COLACE) 100 MG CAPSULE    Take 1 capsule (100 mg total) by mouth 2 (two) times daily.    GLUCOSAMINE-CHONDROITIN 500-400 MG TABLET    Take 1 tablet by mouth 3 (three) times daily.    LACTOBACILLUS RHAMNOSUS GG (CULTURELLE) 10 BILLION CELL CAPSULE    Take 1 capsule by mouth once daily.    LEVOCETIRIZINE (XYZAL) 5 MG TABLET    Take 1 tablet (5 mg total) by mouth every evening.    MONTELUKAST (SINGULAIR) 10 MG TABLET    Take 1 tablet (10 mg total) by mouth once daily.    MULTIVITAMIN CAPSULE    Take 1 capsule by mouth once daily.    OMEPRAZOLE (PRILOSEC) 40 MG CAPSULE    Take 1 capsule (40 mg total) by mouth 2 (two) times a day.    ONDANSETRON (ZOFRAN-ODT) 4 MG TBDL    Take 1 tablet by mouth every 8 (eight) hours as needed.    TUMERIC-GING-OLIVE-OREG-CAPRYL 100 MG-150 MG- 50 MG-150 MG CAP    Take by mouth.       SCREENING HISTORY:    274}  Health Maintenance         Date Due Completion Date    TETANUS VACCINE Never done ---    Pneumococcal Vaccines (Age 50+) (1 of 2 - PCV) Never done ---    Shingles Vaccine (1 of 2) Never done ---    RSV Vaccine (Age 60+ and Pregnant patients) (1 - Risk 60-74 years 1-dose series) Never done ---    Influenza Vaccine (1) Never done ---     "COVID-19 Vaccine (3 - 2024-25 season) 09/01/2024 4/5/2021    Mammogram 09/07/2024 9/7/2023    LDCT Lung Screen 07/18/2025 7/18/2024    Hemoglobin A1c 04/08/2026 4/8/2025    Colorectal Cancer Screening 01/16/2029 1/16/2024    Lipid Panel 04/08/2030 4/8/2025            REVIEW OF SYSTEMS:   ROS    PHYSICAL EXAM:      274}  /70 (BP Location: Left arm, Patient Position: Sitting)   Pulse 70   Resp 16   Ht 5' 9" (1.753 m)   Wt 77.6 kg (171 lb)   SpO2 100%   BMI 25.25 kg/m²   Wt Readings from Last 3 Encounters:   04/08/25 77.6 kg (171 lb)   03/26/25 80.3 kg (177 lb)   01/08/25 80.3 kg (177 lb 0.5 oz)     BP Readings from Last 3 Encounters:   04/08/25 122/70   03/26/25 110/60   01/08/25 130/72     Estimated body mass index is 25.25 kg/m² as calculated from the following:    Height as of this encounter: 5' 9" (1.753 m).    Weight as of this encounter: 77.6 kg (171 lb).     Physical Exam  HENT:      Head: Normocephalic and atraumatic.      Nose: Nose normal.      Mouth/Throat:      Mouth: Mucous membranes are dry.      Pharynx: Oropharynx is clear.   Eyes:      Extraocular Movements: Extraocular movements intact.      Conjunctiva/sclera: Conjunctivae normal.   Cardiovascular:      Rate and Rhythm: Normal rate and regular rhythm.   Pulmonary:      Effort: Pulmonary effort is normal.      Breath sounds: Normal breath sounds.   Abdominal:      General: Bowel sounds are normal.      Palpations: Abdomen is soft.   Musculoskeletal:      Cervical back: Normal range of motion.   Skin:     Comments: Scattered puritic hives throughout the body   Neurological:      General: No focal deficit present.      Mental Status: She is alert. Mental status is at baseline.   Psychiatric:         Mood and Affect: Mood normal.         Thought Content: Thought content normal.         LABS:   274}  I have reviewed old labs below:  Lab Results   Component Value Date    WBC 6.11 04/08/2025    HGB 13.3 04/08/2025    HCT 40.2 04/08/2025    MCV " 91 2025     2025     2025    K 4.0 2025     2025    CALCIUM 9.0 2025    PHOS 3.4 2022    CO2 25 2025    GLU 98 2024    BUN 11 2025    CREATININE 0.8 2025    ANIONGAP 9 2025    ESTGFRAFRICA >60.0 2022    EGFRNONAA >60.0 2022    PROT 6.9 2024    ALBUMIN 3.5 2025    BILITOT 0.3 2025    ALKPHOS 57 2025    ALT 21 2025    AST 23 2025    INR 1.0 2022    CHOL 160 2025    TRIG 76 2025    HDL 48 2025    LDLCALC 85.4 2023    TSH 2.438 2025    HGBA1C 5.4 2025       ASSESSMENT AND PLAN:  274}  Assessment & Plan    IMPRESSION:  Assessed extensive itchy rash on body, likely an allergic reaction to environmental exposure.  Evaluated concern about  vaccinations, particularly MMR.  Determined need for lab work to check MMR immunity status and other health markers.  Considered financial constraints in treatment planning.    ALLERGIC DERMATITIS:  - Examined the patient's rash and confirmed it to be extensive, resembling an allergic reaction.  - Assessed the rash as an allergic reaction and discussed potential causes with the patient.  - Prescribed a steroid pack and cortisone/steroid cream for topical application once the rash has reduced in spread.  - Prescribed Benadryl for symptom relief.  - Instructed the patient to contact the office if the rash does not improve.  - Examined the patient's rash and confirmed it to be consistent with an allergic reaction.  - Discussed potential allergens with the patient, including dust exposure in a new house and possible exposure in a barn.  - possible 2/2 antibiotics    ARTIFICIAL KNEE JOINT:  - Noted the patient's history of right knee replacement.    NONCOMPLIANCE WITH MEDICAL TREATMENT:  - Discussed the importance of completing antibiotic courses as prescribed.  - Noted the patient's report of not completing  the full course of antibiotics prescribed for a UTI.    HISTORY OF INFECTIOUS DISEASES:  - Noted the patient's history of measles.  - Ordered labs to check MMR (Measles, Mumps, Rubella) immunity status.  - Discussed checking the patient's immunity against measles through labs.    FAMILY HISTORY OF BREAST CANCER:  - Noted the patient's family history of three sisters with breast cancer.  - Recommend scheduling a mammogram for the patient.  - Informed the patient about the availability of free mammogram programs.  - Planned to find a program to provide free mammograms for the patient.  - Advised follow-up for mammogram pending insurance or financial assistance approval.    HISTORY OF URINARY SYSTEM DISEASES:  - Noted the patient's report of a recent UTI diagnosed at the Emergency Room.  - Recorded that the patient was prescribed Nitrofurantoin (Macrobid) for the UTI at the Emergency Room.    FOLLOW-UP:  - Ordered comprehensive labs including blood sugar, liver, and renal function.        1. Allergic reaction, initial encounter  - methylPREDNISolone (MEDROL DOSEPACK) 4 mg tablet; use as directed  Dispense: 21 each; Refill: 0  - diphenhydrAMINE (BENADRYL ALLERGY) 25 mg tablet; Take 1 tablet (25 mg total) by mouth every 4 (four) hours as needed for Allergies.  Dispense: 30 tablet; Refill: 0  - betamethasone dipropionate 0.05 % cream; Apply topically 2 (two) times daily.  Dispense: 45 g; Refill: 0    2. Encounter for screening for viral disease  - Mumps, IgG Screen; Future  - Rubeola antibody IgG; Future  - Rubella antibody, IgG; Future         Orders Placed This Encounter   Procedures    Mumps, IgG Screen    Rubeola antibody IgG    Rubella antibody, IgG       No follow-ups on file. or sooner as needed.                 [1]   Social History  Socioeconomic History    Marital status:    Tobacco Use    Smoking status: Every Day     Current packs/day: 1.00     Average packs/day: 1 pack/day for 48.3 years (48.3 ttl  pk-yrs)     Types: Cigarettes     Start date: 1977    Smokeless tobacco: Never    Tobacco comments:     3-4 daily   Substance and Sexual Activity    Alcohol use: Yes     Comment: rare    Drug use: Never    Sexual activity: Not Currently     Partners: Male   Social History Narrative    [place]. 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      Social Drivers of Health     Financial Resource Strain: Low Risk  (3/26/2025)    Overall Financial Resource Strain (CARDIA)     Difficulty of Paying Living Expenses: Not hard at all   Food Insecurity: No Food Insecurity (3/26/2025)    Hunger Vital Sign     Worried About Running Out of Food in the Last Year: Never true     Ran Out of Food in the Last Year: Never true   Transportation Needs: No Transportation Needs (3/26/2025)    PRAPARE - Transportation     Lack of Transportation (Medical): No     Lack of Transportation (Non-Medical): No   Physical Activity: Sufficiently Active (3/26/2025)    Exercise Vital Sign     Days of Exercise per Week: 7 days     Minutes of Exercise per Session: 30 min   Stress: No Stress Concern Present (3/26/2025)    [employer] - Occupational Stress Questionnaire     Feeling of Stress : Not at all   Housing Stability: Low Risk  (3/26/2025)    Housing Stability Vital Sign     Unable to Pay for Housing in the Last Year: No     Homeless in the Last Year: No

## 2025-04-09 LAB
H PYLORI IGG SERPL QL IA: POSITIVE
MUMPS IGG (OHS): 47.6 AU/ML
MUMPS IGG INTERPRETATION (OHS): POSITIVE
RUBEOLA (MEASLES) IGG (OHS): >300 AU/ML
RUBEOLA IGG INTERP. (OHS): POSITIVE
RUBV IGG SER-ACNC: 204 IU/ML
RUBV IGG SER-IMP: REACTIVE

## 2025-04-09 RX ORDER — PANTOPRAZOLE SODIUM 40 MG/1
40 TABLET, DELAYED RELEASE ORAL DAILY
Qty: 28 TABLET | Refills: 3 | Status: SHIPPED | OUTPATIENT
Start: 2025-04-09 | End: 2026-04-09

## 2025-04-09 RX ORDER — METRONIDAZOLE 500 MG/1
500 TABLET ORAL EVERY 8 HOURS
Qty: 42 TABLET | Refills: 0 | Status: SHIPPED | OUTPATIENT
Start: 2025-04-09

## 2025-04-09 RX ORDER — TETRACYCLINE HYDROCHLORIDE 500 MG/1
500 CAPSULE ORAL 4 TIMES DAILY
Qty: 56 CAPSULE | Refills: 0 | Status: SHIPPED | OUTPATIENT
Start: 2025-04-09

## 2025-07-29 ENCOUNTER — CLINICAL SUPPORT (OUTPATIENT)
Dept: SMOKING CESSATION | Facility: CLINIC | Age: 60
End: 2025-07-29

## 2025-07-29 DIAGNOSIS — F17.200 NICOTINE DEPENDENCE, UNCOMPLICATED: Primary | ICD-10-CM

## 2025-07-29 PROCEDURE — 99999 PR PBB SHADOW E&M-EST. PATIENT-LVL I: CPT | Mod: PBBFAC,,, | Performed by: GENERAL PRACTICE

## 2025-07-29 PROCEDURE — 99407 BEHAV CHNG SMOKING > 10 MIN: CPT | Mod: ,,, | Performed by: FAMILY MEDICINE

## 2025-07-29 NOTE — PROGRESS NOTES
Spoke with patient today in regard to smoking cessation progress for 12 month follow up. She states she is not tobacco free. Patient was not interested in scheduling an appointment. Informed patient of benefit period, future follow ups and contact information if any further help is needed. Will resolve smart form for 12 month follow up for Quit # 1.

## (undated) DEVICE — GOWN ECLIPSE REINF LVL4 TWL LG

## (undated) DEVICE — KIT CHECKPOINT MAKO

## (undated) DEVICE — CLIPPER BLADE MOD 4406 (CAREF)

## (undated) DEVICE — KIT TRIATHLON CR FEM PREP SZ4

## (undated) DEVICE — GLOVE SENSICARE PI SURG 7

## (undated) DEVICE — SYR B-D DISP CONTROL 10CC100/C

## (undated) DEVICE — SUT VICRYL PLUS 0 CT1 36IN

## (undated) DEVICE — SCRUB 10% POVIDONE IODINE 4OZ

## (undated) DEVICE — SOL POVIDONE PREP IODINE 4 OZ

## (undated) DEVICE — BRIEF MESH LARGE

## (undated) DEVICE — GLOVE SURG ULTRA TOUCH 8

## (undated) DEVICE — BANDAGE ESMARK ELASTIC ST 6X9

## (undated) DEVICE — KIT WING PAD POSITIONING

## (undated) DEVICE — GOWN SURGICAL XX LARGE X LONG

## (undated) DEVICE — SET CYSTO IRRIGATION UNIV SPIK

## (undated) DEVICE — PIN BONE 3.2X110MM
Type: IMPLANTABLE DEVICE | Site: KNEE | Status: NON-FUNCTIONAL
Removed: 2022-03-29

## (undated) DEVICE — SEALER LIGASURE LAP 37CM 5MM

## (undated) DEVICE — PORT ACCESS 5MM W/120MM

## (undated) DEVICE — SOL NACL IRR 1000ML BTL

## (undated) DEVICE — SET TRI-LUMEN FILTERED TUBE

## (undated) DEVICE — GOWN TOGA SYS PEELWY ZIP 2 XL

## (undated) DEVICE — TOWEL OR DISP STRL BLUE 4/PK

## (undated) DEVICE — PIN BONE 4 X 140MM STERILE
Type: IMPLANTABLE DEVICE | Site: KNEE | Status: NON-FUNCTIONAL
Removed: 2022-03-29

## (undated) DEVICE — INTERPULSE SET

## (undated) DEVICE — DRAPE STERI LONG

## (undated) DEVICE — UNDERGLOVES BIOGEL PI SIZE 8

## (undated) DEVICE — GLOVE SENSICARE PI GRN 7

## (undated) DEVICE — UTERINE MANIPULATOR HUMI 6003

## (undated) DEVICE — TRAY DRY SKIN SCRUB PREP

## (undated) DEVICE — BLADE TONGUE DEPRESSOR STRL

## (undated) DEVICE — SEE MEDLINE ITEM 157131

## (undated) DEVICE — JELLY SURGILUBE 5GR

## (undated) DEVICE — GLOVE SURGEONS ULTRA TOUCH 6.5

## (undated) DEVICE — PACK LAPAROSCOPY BAPTIST

## (undated) DEVICE — APPLICATOR CHLORAPREP ORN 26ML

## (undated) DEVICE — CORD SILICONE RETRACTOR

## (undated) DEVICE — KIT VIZADISC KNEE TRACKING

## (undated) DEVICE — SCISSOR 5MMX35CM DIRECT DRIVE

## (undated) DEVICE — SYR IRRIGATION BULB STER 60ML

## (undated) DEVICE — DRESSING GZ SURGICOUNT 4X8

## (undated) DEVICE — PACK BASIC

## (undated) DEVICE — TROCAR KII FIOS 11MM X 100MM

## (undated) DEVICE — MANIFOLD 4 PORT

## (undated) DEVICE — PACK CUSTOM UNIV BASIN SLI

## (undated) DEVICE — SPONGE BULKEE II ABSRB 6X6.75

## (undated) DEVICE — SEE MEDLINE ITEM 107746

## (undated) DEVICE — LOOP LINA 200MM BIPOLAR

## (undated) DEVICE — SOL WATER STRL IRR 1000ML

## (undated) DEVICE — SUT 2/0 18IN COATED VICRYL

## (undated) DEVICE — SET CYSTO IRR DRP CHMBR 84IN

## (undated) DEVICE — GAUZE SPONGE BULKEE 6X6.75IN

## (undated) DEVICE — BNDG COFLEX FOAM LF2 ST 6X5YD

## (undated) DEVICE — PADDING CAST SPECIALIST 6X4YD

## (undated) DEVICE — DRESSING GAUZE OIL EMUL 3X8

## (undated) DEVICE — BLADE MAKO STANDARD

## (undated) DEVICE — SOL IRR WATER STRL 3000 ML

## (undated) DEVICE — TUBE SUCTION YANKAUER HI CAP

## (undated) DEVICE — TOGA FLYTE PEEL AWAY XLARGE

## (undated) DEVICE — GLOVE SENSICARE PI GRN 6.5

## (undated) DEVICE — DRAPE STERI INSTRUMENT 1018

## (undated) DEVICE — NDL INSUFFLATION VERRES 120MM

## (undated) DEVICE — SYR 10CC LUER LOCK

## (undated) DEVICE — SOL POVIDONE SCRUB IODINE 4 OZ

## (undated) DEVICE — NDL HYPO REG 25G X 1 1/2

## (undated) DEVICE — SCRUB HIBICLENS 4% CHG 4OZ

## (undated) DEVICE — ELECTRODE REM PLYHSV RETURN 9

## (undated) DEVICE — GLOVE SURG ULTRA TOUCH 7.5

## (undated) DEVICE — KIT DRAPE RIO ONE PIECE W/POCK

## (undated) DEVICE — BLADE SURG CARBON STEEL #10

## (undated) DEVICE — BAG LINGEMAN DRAIN UROLOGY

## (undated) DEVICE — SPONGE LAP 18X18 PREWASHED

## (undated) DEVICE — BLADE SURG CARBON STEEL SZ11

## (undated) DEVICE — SUT VICRYL+ 27 UR-6 VIOL

## (undated) DEVICE — STRAP OR TABLE 5IN X 72IN

## (undated) DEVICE — WRAP PROTECTIVE LEG POS STRL

## (undated) DEVICE — DRAPE LAVH LAPAROSCOPY W/FLUID

## (undated) DEVICE — KIT TRIATHLON TIBIAL SIZER

## (undated) DEVICE — PADDING WYTEX UNDRCST 6INX4YD

## (undated) DEVICE — DRESSING AQUACEL AG 3.5X10IN

## (undated) DEVICE — TOURNIQUET SB QC DP 34X4IN

## (undated) DEVICE — SUT VICRYL 2-0 CT-2 VCP269H

## (undated) DEVICE — ADAPTER STOPCOCK FEMALE LL

## (undated) DEVICE — DRESSING AQUACEL AG FOAM 4X4

## (undated) DEVICE — BOWL STERILE LARGE 32OZ

## (undated) DEVICE — SOL 9P NACL IRR PIC IL

## (undated) DEVICE — PAD ABD 8X10 STERILE

## (undated) DEVICE — SET DECANTER MEDICHOICE

## (undated) DEVICE — GOWN NONREINF SET-IN SLV XL

## (undated) DEVICE — SOL IRR NACL .9% 3000ML

## (undated) DEVICE — Device

## (undated) DEVICE — PACK LOWER EXTREMITY

## (undated) DEVICE — SUT STRATAFIX PDS 1 CTX 18IN

## (undated) DEVICE — SYS SEE SHARP SCP ANTIFG LNG

## (undated) DEVICE — IRRIGATOR ENDOSCOPY DISP.

## (undated) DEVICE — PENCIL ELECTROSURG HOLST W/BLD

## (undated) DEVICE — SOL IRR SOD CHL .9% POUR

## (undated) DEVICE — TROCAR ENDOPATH XCEL 5X100MM

## (undated) DEVICE — CONTAINER SPEC OR STRL 4.5OZ

## (undated) DEVICE — DRAPE STERI U-SHAPED 47X51IN

## (undated) DEVICE — CUBE COLD THERAPY POLAR CARE

## (undated) DEVICE — UNDERGLOVES BIOGEL PI SIZE 7.5

## (undated) DEVICE — SEE MEDLINE ITEM 157185

## (undated) DEVICE — GLOVE SENSICARE PI SURG 6.5

## (undated) DEVICE — CANNULA ENDOPATH XCEL 5X100MM

## (undated) DEVICE — SUT MCRYL PLUS 4-0 PS2 27IN

## (undated) DEVICE — BAG TISSUE RETRIEVAL 5MM

## (undated) DEVICE — SLEEVE SCD EXPRESS CALF MEDIUM

## (undated) DEVICE — SYR 50CC LL

## (undated) DEVICE — BANDAGE MATRIX HK LOOP 6IN 5YD

## (undated) DEVICE — BAG TISS RETRV MONARCH 10MM

## (undated) DEVICE — SOL STRL WATER INJ 1000ML BG

## (undated) DEVICE — SOL NORMAL USPCA 0.9%

## (undated) DEVICE — PAD PREP CUFFED NS 24X48IN

## (undated) DEVICE — SUT MONO 2-0 CT-1 UNDYED

## (undated) DEVICE — ADHESIVE DERMABOND ADVANCED

## (undated) DEVICE — ALCOHOL 70% ISOP RUBBING 4OZ

## (undated) DEVICE — DRAPE INCISE IOBAN 2 23X17IN

## (undated) DEVICE — KIT TRIATHLON CR TIB PREP SZ4

## (undated) DEVICE — SLEEVE SCD EXPRESS KNEE MEDIUM

## (undated) DEVICE — SUT MONOCRYL 2-0 VIOL 27IN

## (undated) DEVICE — SUT STRATAFIX SPRL PS-2 3-0

## (undated) DEVICE — SEE MEDLINE ITEM 157166

## (undated) DEVICE — SUT GORETEX CV2-THX26

## (undated) DEVICE — NDL SPINAL 18GX3.5 SPINOCAN